# Patient Record
Sex: FEMALE | Race: BLACK OR AFRICAN AMERICAN | NOT HISPANIC OR LATINO | Employment: OTHER | URBAN - METROPOLITAN AREA
[De-identification: names, ages, dates, MRNs, and addresses within clinical notes are randomized per-mention and may not be internally consistent; named-entity substitution may affect disease eponyms.]

---

## 2020-02-21 ENCOUNTER — OFFICE VISIT (OUTPATIENT)
Dept: URGENT CARE | Facility: CLINIC | Age: 75
End: 2020-02-21
Payer: COMMERCIAL

## 2020-02-21 VITALS
DIASTOLIC BLOOD PRESSURE: 82 MMHG | RESPIRATION RATE: 20 BRPM | OXYGEN SATURATION: 97 % | TEMPERATURE: 99 F | SYSTOLIC BLOOD PRESSURE: 126 MMHG | HEART RATE: 116 BPM

## 2020-02-21 DIAGNOSIS — J45.901 ASTHMA WITH ACUTE EXACERBATION, UNSPECIFIED ASTHMA SEVERITY, UNSPECIFIED WHETHER PERSISTENT: Primary | ICD-10-CM

## 2020-02-21 PROCEDURE — G0463 HOSPITAL OUTPT CLINIC VISIT: HCPCS | Performed by: PHYSICIAN ASSISTANT

## 2020-02-21 PROCEDURE — 99213 OFFICE O/P EST LOW 20 MIN: CPT | Performed by: PHYSICIAN ASSISTANT

## 2020-02-21 RX ORDER — EZETIMIBE 10 MG/1
10 TABLET ORAL DAILY
COMMUNITY
End: 2020-07-21 | Stop reason: SDUPTHER

## 2020-02-21 RX ORDER — AMLODIPINE BESYLATE 10 MG/1
10 TABLET ORAL DAILY
COMMUNITY
End: 2020-09-09 | Stop reason: SDUPTHER

## 2020-02-21 RX ORDER — ASPIRIN 81 MG/1
81 TABLET ORAL DAILY
COMMUNITY
End: 2021-09-27

## 2020-02-21 RX ORDER — PREDNISONE 10 MG/1
TABLET ORAL
Qty: 20 TABLET | Refills: 0 | Status: SHIPPED | OUTPATIENT
Start: 2020-02-21 | End: 2020-04-15 | Stop reason: ALTCHOICE

## 2020-02-21 RX ORDER — BUDESONIDE AND FORMOTEROL FUMARATE DIHYDRATE 160; 4.5 UG/1; UG/1
2 AEROSOL RESPIRATORY (INHALATION) 2 TIMES DAILY
COMMUNITY
End: 2021-09-27

## 2020-02-21 NOTE — PATIENT INSTRUCTIONS
Take prednisone as directed  Continue with Symbicort daily  Use albuterol as needed  Advised patient she could consider restarting her Singulair as prescribed by her PCP  Also suggested adding antihistamine such as Claritin 10 milligrams daily  Patient is new to the area and will make a follow-up appointment with a new PCP  Any worsening of symptoms go to the emergency room  Asthma   WHAT YOU NEED TO KNOW:   Asthma is a lung disease that makes breathing difficult  Chronic inflammation and reactions to triggers narrow the airways in the lungs  Asthma can become life-threatening if it is not managed  DISCHARGE INSTRUCTIONS:   Return to the emergency department if:   · You have severe shortness of breath  · Your lips or nails turn blue or gray  · The skin around your neck and ribs pulls in with each breath  · You have shortness of breath, even after you take your short-term medicine as directed  · Your peak flow numbers are in the red zone of your AAP  Contact your healthcare provider if:   · You run out of medicine before your next refill is due  · Your symptoms get worse  · You need to take more medicine than usual to control your symptoms  · You have questions or concerns about your condition or care  Medicines:   · Medicines  decrease inflammation, open airways, and make it easier to breathe  Medicines may be inhaled, taken as a pill, or injected  Short-term medicines relieve your symptoms quickly  Long-term medicines are used to prevent future attacks  You may also need medicine to help control your allergies  Ask your healthcare provider for more information about the medicine you are given and how to take it safely  · Take your medicine as directed  Contact your healthcare provider if you think your medicine is not helping or if you have side effects  Tell him of her if you are allergic to any medicine  Keep a list of the medicines, vitamins, and herbs you take  Include the amounts, and when and why you take them  Bring the list or the pill bottles to follow-up visits  Carry your medicine list with you in case of an emergency  Follow up with your healthcare provider as directed: You will need to return to make sure your medicine is working and your symptoms are controlled  You may be referred to an asthma specialist  Mylene Harbour may be asked to keep a record of your peak flow values and bring it with you to your appointments  Write down your questions so you remember to ask them during your visits  Manage your symptoms and prevent future attacks:   · Follow your Asthma Action Plan (AAP)  This is a written plan that you and your healthcare provider create  It explains which medicine you need and when to change doses if necessary  It also explains how you can monitor symptoms and use a peak flow meter  The meter measures how well your lungs are working  · Manage other health conditions , such as allergies, acid reflux, and sleep apnea  · Identify and avoid triggers  These may include pets, dust mites, mold, and cockroaches  · Do not smoke or be around others who smoke  Nicotine and other chemicals in cigarettes and cigars can cause lung damage  Ask your healthcare provider for information if you currently smoke and need help to quit  E-cigarettes or smokeless tobacco still contain nicotine  Talk to your healthcare provider before you use these products  · Ask about the flu vaccine  The flu can make your asthma worse  You may need a yearly flu shot  © 2017 [facility] Information is for End User's use only and may not be sold, redistributed or otherwise used for commercial purposes  All illustrations and images included in CareNotes® are the copyrighted property of A D A M , Inc  or Messi Louise  The above information is an  only  It is not intended as medical advice for individual conditions or treatments   Talk to your doctor, nurse or pharmacist before following any medical regimen to see if it is safe and effective for you

## 2020-02-21 NOTE — PROGRESS NOTES
[facility]        NAME: Ana Mosqueda is a 69 y o  female  : 1945    MRN: [id number]  DATE: 2020  TIME: 1:47 PM    Assessment and Plan   Asthma with acute exacerbation, unspecified asthma severity, unspecified whether persistent [J45 [id number]]  1  Asthma with acute exacerbation, unspecified asthma severity, unspecified whether persistent  predniSONE 10 mg tablet         Patient Instructions     Follow up with PCP in 3-5 days  Proceed to  ER if symptoms worsen  Chief Complaint     Chief Complaint   Patient presents with    Asthma     Pt states she has hx of asmtha that is exacerbated by dust, recently moved in with daughter and has been having flares  History of Present Illness       72-year-old female presents for exacerbation of asthma  Patient states she has a history of asthma takes Symbicort daily  However, she is not taking Symbicort regularly  She states she was also prescribed Singulair and Claritin which she is currently not taking  Patient states she recently moved here from [facility] and is staying with her daughter  They were fixing up a room for her in the basement which is very [place]  Patient states she has been exposed to the dust she has been experiencing significant coughing  She denies any wheezing or shortness of breath  Review of Systems   Review of Systems   Constitutional: Negative  HENT: Negative  Eyes: Negative  Respiratory: Positive for cough  Negative for chest tightness, shortness of breath and wheezing  Gastrointestinal: Negative  Skin: Negative            Current Medications       Current Outpatient Medications:     amLODIPine (NORVASC) 10 mg tablet, Take 10 mg by mouth daily, Disp: , Rfl:     aspirin (ECOTRIN LOW STRENGTH) 81 mg EC tablet, Take 81 mg by mouth daily, Disp: , Rfl:     budesonide-formoterol (SYMBICORT) 160-4 5 mcg/act inhaler, Inhale 2 puffs 2 (two) times a day Rinse mouth after use , Disp: , Rfl:    Ergocalciferol (VITAMIN D2 PO), Take by mouth, Disp: , Rfl:     ezetimibe (ZETIA) 10 mg tablet, Take 10 mg by mouth daily, Disp: , Rfl:     predniSONE 10 mg tablet, Take 4 pills daily for 2 days, take 3 pills daily for 2 days, take 2 pills daily for 2 days, take 1 pill daily for 2 days, Disp: 20 tablet, Rfl: 0    Current Allergies     Allergies as of 02/21/2020 - Reviewed 02/21/2020   Allergen Reaction Noted    Codeine Rash 02/21/2020            The following portions of the patient's history were reviewed and updated as appropriate: allergies, current medications, past family history, past medical history, past social history, past surgical history and problem list     Objective   /82   Pulse (!) 116   Temp 99 °F (37 2 °C) (Tympanic Core)   Resp 20   SpO2 97%        Physical Exam     Physical Exam   Constitutional: Vital signs are normal  She appears well-developed and well-nourished  No distress  HENT:   Head: Normocephalic and atraumatic  Right Ear: Hearing, tympanic membrane, external ear and ear canal normal    Left Ear: Hearing, tympanic membrane, external ear and ear canal normal    Nose: Nose normal  No mucosal edema or rhinorrhea  Right sinus exhibits no maxillary sinus tenderness and no frontal sinus tenderness  Left sinus exhibits no maxillary sinus tenderness and no frontal sinus tenderness  Mouth/Throat: Uvula is midline, oropharynx is clear and moist and mucous membranes are normal  No oropharyngeal exudate, posterior oropharyngeal edema, posterior oropharyngeal erythema or tonsillar abscesses  Eyes: Conjunctivae and lids are normal    Cardiovascular: Normal rate, regular rhythm and normal heart sounds  No murmur heard  Pulmonary/Chest: Effort normal and breath sounds normal  No respiratory distress  She has no decreased breath sounds  She has no wheezes  She has no rhonchi  She has no rales     Lungs are currently clear no wheezing was noted   Lymphadenopathy:        Head (right side): No submandibular and no tonsillar adenopathy present  Head (left side): No submandibular and no tonsillar adenopathy present  Skin: No rash noted  Nursing note and vitals reviewed

## 2020-03-05 ENCOUNTER — APPOINTMENT (OUTPATIENT)
Dept: RADIOLOGY | Facility: CLINIC | Age: 75
End: 2020-03-05
Payer: COMMERCIAL

## 2020-03-05 ENCOUNTER — OFFICE VISIT (OUTPATIENT)
Dept: FAMILY MEDICINE CLINIC | Facility: CLINIC | Age: 75
End: 2020-03-05
Payer: COMMERCIAL

## 2020-03-05 ENCOUNTER — TELEPHONE (OUTPATIENT)
Dept: FAMILY MEDICINE CLINIC | Facility: CLINIC | Age: 75
End: 2020-03-05

## 2020-03-05 VITALS
HEIGHT: 63 IN | BODY MASS INDEX: 23.04 KG/M2 | HEART RATE: 104 BPM | DIASTOLIC BLOOD PRESSURE: 80 MMHG | WEIGHT: 130 LBS | TEMPERATURE: 97.9 F | SYSTOLIC BLOOD PRESSURE: 142 MMHG

## 2020-03-05 DIAGNOSIS — I10 ESSENTIAL HYPERTENSION: ICD-10-CM

## 2020-03-05 DIAGNOSIS — R05.9 COUGH: Primary | ICD-10-CM

## 2020-03-05 DIAGNOSIS — R05.9 COUGH: ICD-10-CM

## 2020-03-05 DIAGNOSIS — I87.2 VENOUS INSUFFICIENCY: ICD-10-CM

## 2020-03-05 DIAGNOSIS — J45.21 MILD INTERMITTENT ASTHMA WITH ACUTE EXACERBATION: ICD-10-CM

## 2020-03-05 DIAGNOSIS — E78.5 DYSLIPIDEMIA: ICD-10-CM

## 2020-03-05 PROCEDURE — 99204 OFFICE O/P NEW MOD 45 MIN: CPT | Performed by: FAMILY MEDICINE

## 2020-03-05 PROCEDURE — 1160F RVW MEDS BY RX/DR IN RCRD: CPT | Performed by: FAMILY MEDICINE

## 2020-03-05 PROCEDURE — 1101F PT FALLS ASSESS-DOCD LE1/YR: CPT | Performed by: FAMILY MEDICINE

## 2020-03-05 PROCEDURE — 3079F DIAST BP 80-89 MM HG: CPT | Performed by: FAMILY MEDICINE

## 2020-03-05 PROCEDURE — 3288F FALL RISK ASSESSMENT DOCD: CPT | Performed by: FAMILY MEDICINE

## 2020-03-05 PROCEDURE — 71046 X-RAY EXAM CHEST 2 VIEWS: CPT

## 2020-03-05 PROCEDURE — 3008F BODY MASS INDEX DOCD: CPT | Performed by: FAMILY MEDICINE

## 2020-03-05 PROCEDURE — 3077F SYST BP >= 140 MM HG: CPT | Performed by: FAMILY MEDICINE

## 2020-03-05 PROCEDURE — 1036F TOBACCO NON-USER: CPT | Performed by: FAMILY MEDICINE

## 2020-03-05 RX ORDER — DIPHENHYDRAMINE HCL 25 MG
25 TABLET ORAL EVERY 6 HOURS PRN
COMMUNITY
End: 2021-09-27

## 2020-03-05 RX ORDER — MONTELUKAST SODIUM 10 MG/1
10 TABLET ORAL
COMMUNITY
End: 2021-09-27

## 2020-03-06 PROBLEM — I87.2 VENOUS INSUFFICIENCY: Status: ACTIVE | Noted: 2020-03-06

## 2020-03-06 PROBLEM — I10 ESSENTIAL HYPERTENSION: Status: ACTIVE | Noted: 2020-03-06

## 2020-03-06 PROBLEM — E78.5 DYSLIPIDEMIA: Status: ACTIVE | Noted: 2020-03-06

## 2020-03-06 NOTE — TELEPHONE ENCOUNTER
Pt came back up after cxr to ask about blwk wants full workup will go downstairs to [facility] and also rx for compression stockings

## 2020-03-06 NOTE — PROGRESS NOTES
Assessment/Plan:   patient is 72-year-old female new to our practice  She was seen last night in the office when are epic system was down  Patient has a history of asthma, hyperlipidemia and hypertension  She also has a history of venous insufficiency  She was seen in the care now on February 21 and prescribed prednisone for an exacerbation of asthma  Patient still has cough and feels tight on her chest   I did not hear any wheezing and she appear to be moving good air on exam last evening  I reviewed her medications  She does take her Symbicort regularly but takes the this Singulair off and on  I asked her to take that daily and also to start the Claritin daily as recommended in the urgent care  I am holding off on prescribing any prednisone for now as she had a course at the end of January and then from February 22 to February 29  I ordered a chest x-ray to rule out pneumonia  We will call her with results  also I will enter orders for fasting blood work and she can make a follow-up appointment and we can do her Medicare wellness exam at that time  Diagnoses and all orders for this visit:    Cough  -     Cancel: XR chest pa & lateral; Future  -     XR chest pa & lateral; Future  -     CBC and differential; Future    Mild intermittent asthma with acute exacerbation  -     Cancel: XR chest pa & lateral; Future  -     XR chest pa & lateral; Future    Essential hypertension  -     Comprehensive metabolic panel; Future  -     TSH, 3rd generation with Free T4 reflex; Future    Dyslipidemia  -     Comprehensive metabolic panel; Future  -     TSH, 3rd generation with Free T4 reflex; Future  -     Lipid Panel with Direct LDL reflex; Future    Venous insufficiency  -     Compression Stocking    Other orders  -     diphenhydrAMINE (BENADRYL) 25 mg tablet; Take 25 mg by mouth every 6 (six) hours as needed for itching  -     montelukast (SINGULAIR) 10 mg tablet;  Take 10 mg by mouth daily at bedtime          Subjective:   Chief Complaint   Patient presents with    [facility]     New patient  Persistent cough, given prednisone at urgent care [facility] 2/21        Patient ID: Maria Us is a 69 y o  female  Patient is a 82-year-old female new to our practice  She has moved here from [place] to live with her daughter  She was seen in the urgent care for exacerbation of asthma and prescribed prednisone which she took from February 22nd to February 29  Prior to that she had taken a course of prednisone that she had left over from her doctor in [place] and that was from January 26 to February 1  Patient has been taking her Singulair often on  She has not started Claritin as recommended in the urgent care  She is using her generic Symbicort inhaler  She continues with cough and feels tight on the chest    allergies -codeine   past medical history -hypertension, hyperlipidemia, asthma, venous insufficiency, history of fainting spells      The following portions of the patient's history were reviewed and updated as appropriate: allergies, current medications, past family history, past medical history, past social history, past surgical history and problem list     Review of Systems   Constitutional: Negative for chills, fever and unexpected weight change  HENT: Positive for congestion  Respiratory: Positive for cough and chest tightness  Left breast pain   Cardiovascular: Negative for chest pain and palpitations  Objective:      /80 (BP Location: Left arm, Patient Position: Sitting, Cuff Size: Adult)   Pulse 104   Temp 97 9 °F (36 6 °C) (Tympanic)   Ht 5' 2 5" (1 588 m)   Wt 59 kg (130 lb)   Breastfeeding No   BMI 23 40 kg/m²          Physical Exam   Constitutional: She is oriented to person, place, and time  She appears well-nourished  No distress  HENT:     TMs okay  Throat with clear postnasal drainage  Neck: No thyromegaly present  Cardiovascular: Normal rate, regular rhythm and normal heart sounds  Heart rate 96   Pulmonary/Chest: Effort normal and breath sounds normal  She has no wheezes  She exhibits tenderness (  Left pectoral area  No breast masses or adenopathy palpated  )  Musculoskeletal: She exhibits no edema  Lymphadenopathy:     She has no cervical adenopathy  Neurological: She is alert and oriented to person, place, and time  Skin: Skin is warm and dry  Psychiatric: She has a normal mood and affect  Nursing note and vitals reviewed

## 2020-03-16 ENCOUNTER — TELEPHONE (OUTPATIENT)
Dept: FAMILY MEDICINE CLINIC | Facility: CLINIC | Age: 75
End: 2020-03-16

## 2020-03-16 NOTE — TELEPHONE ENCOUNTER
Call patient  She can take Mucinex to help break up the mucus  If she taking the Singulair that I had recommended?    Has her cough or wheezing worsened since I saw her last?

## 2020-03-17 NOTE — TELEPHONE ENCOUNTER
Patient informed  Says that when she presses on her chest her bones feels very fragile due to the persistent cough  Patient will continue to take mucinex and singulair as recommended

## 2020-04-15 ENCOUNTER — TELEPHONE (OUTPATIENT)
Dept: FAMILY MEDICINE CLINIC | Facility: CLINIC | Age: 75
End: 2020-04-15

## 2020-04-15 ENCOUNTER — TELEMEDICINE (OUTPATIENT)
Dept: FAMILY MEDICINE CLINIC | Facility: CLINIC | Age: 75
End: 2020-04-15
Payer: COMMERCIAL

## 2020-04-15 DIAGNOSIS — F41.9 ANXIETY: Primary | ICD-10-CM

## 2020-04-15 DIAGNOSIS — J45.20 MILD INTERMITTENT ASTHMA WITHOUT COMPLICATION: ICD-10-CM

## 2020-04-15 PROCEDURE — 1160F RVW MEDS BY RX/DR IN RCRD: CPT | Performed by: FAMILY MEDICINE

## 2020-04-15 PROCEDURE — 99214 OFFICE O/P EST MOD 30 MIN: CPT | Performed by: FAMILY MEDICINE

## 2020-04-16 PROBLEM — J45.20 MILD INTERMITTENT ASTHMA WITHOUT COMPLICATION: Status: ACTIVE | Noted: 2020-04-16

## 2020-05-11 ENCOUNTER — TELEPHONE (OUTPATIENT)
Dept: FAMILY MEDICINE CLINIC | Facility: CLINIC | Age: 75
End: 2020-05-11

## 2020-05-11 ENCOUNTER — TELEMEDICINE (OUTPATIENT)
Dept: FAMILY MEDICINE CLINIC | Facility: CLINIC | Age: 75
End: 2020-05-11
Payer: COMMERCIAL

## 2020-05-11 DIAGNOSIS — F41.9 ANXIETY: ICD-10-CM

## 2020-05-11 DIAGNOSIS — F51.01 PRIMARY INSOMNIA: Primary | ICD-10-CM

## 2020-05-11 PROCEDURE — G2012 BRIEF CHECK IN BY MD/QHP: HCPCS | Performed by: FAMILY MEDICINE

## 2020-05-11 RX ORDER — LORATADINE 10 MG/1
10 TABLET ORAL DAILY
COMMUNITY
Start: 2020-04-22 | End: 2021-09-27

## 2020-05-11 RX ORDER — ALPRAZOLAM 0.25 MG/1
0.25 TABLET ORAL
Refills: 0
Start: 2020-05-11 | End: 2021-04-29 | Stop reason: ALTCHOICE

## 2020-06-01 ENCOUNTER — TELEPHONE (OUTPATIENT)
Dept: FAMILY MEDICINE CLINIC | Facility: CLINIC | Age: 75
End: 2020-06-01

## 2020-06-09 ENCOUNTER — APPOINTMENT (OUTPATIENT)
Dept: LAB | Facility: CLINIC | Age: 75
End: 2020-06-09
Payer: COMMERCIAL

## 2020-06-09 DIAGNOSIS — E78.5 DYSLIPIDEMIA: ICD-10-CM

## 2020-06-09 DIAGNOSIS — I10 ESSENTIAL HYPERTENSION: ICD-10-CM

## 2020-06-09 DIAGNOSIS — R05.9 COUGH: ICD-10-CM

## 2020-06-09 LAB
ALBUMIN SERPL BCP-MCNC: 4 G/DL (ref 3.5–5)
ALP SERPL-CCNC: 85 U/L (ref 46–116)
ALT SERPL W P-5'-P-CCNC: 19 U/L (ref 12–78)
ANION GAP SERPL CALCULATED.3IONS-SCNC: 5 MMOL/L (ref 4–13)
AST SERPL W P-5'-P-CCNC: 16 U/L (ref 5–45)
BASOPHILS # BLD AUTO: 0.03 THOUSANDS/ΜL (ref 0–0.1)
BASOPHILS NFR BLD AUTO: 0 % (ref 0–1)
BILIRUB SERPL-MCNC: 0.52 MG/DL (ref 0.2–1)
BUN SERPL-MCNC: 16 MG/DL (ref 5–25)
CALCIUM SERPL-MCNC: 9.4 MG/DL (ref 8.3–10.1)
CHLORIDE SERPL-SCNC: 103 MMOL/L (ref 100–108)
CHOLEST SERPL-MCNC: 213 MG/DL (ref 50–200)
CO2 SERPL-SCNC: 29 MMOL/L (ref 21–32)
CREAT SERPL-MCNC: 0.67 MG/DL (ref 0.6–1.3)
EOSINOPHIL # BLD AUTO: 0.09 THOUSAND/ΜL (ref 0–0.61)
EOSINOPHIL NFR BLD AUTO: 1 % (ref 0–6)
ERYTHROCYTE [DISTWIDTH] IN BLOOD BY AUTOMATED COUNT: 12.6 % (ref 11.6–15.1)
GFR SERPL CREATININE-BSD FRML MDRD: 88 ML/MIN/1.73SQ M
GLUCOSE P FAST SERPL-MCNC: 91 MG/DL (ref 65–99)
HCT VFR BLD AUTO: 44.4 % (ref 34.8–46.1)
HDLC SERPL-MCNC: 66 MG/DL
HGB BLD-MCNC: 14.8 G/DL (ref 11.5–15.4)
IMM GRANULOCYTES # BLD AUTO: 0.02 THOUSAND/UL (ref 0–0.2)
IMM GRANULOCYTES NFR BLD AUTO: 0 % (ref 0–2)
LDLC SERPL CALC-MCNC: 118 MG/DL (ref 0–100)
LYMPHOCYTES # BLD AUTO: 2.95 THOUSANDS/ΜL (ref 0.6–4.47)
LYMPHOCYTES NFR BLD AUTO: 29 % (ref 14–44)
MCH RBC QN AUTO: 31.8 PG (ref 26.8–34.3)
MCHC RBC AUTO-ENTMCNC: 33.3 G/DL (ref 31.4–37.4)
MCV RBC AUTO: 95 FL (ref 82–98)
MONOCYTES # BLD AUTO: 0.61 THOUSAND/ΜL (ref 0.17–1.22)
MONOCYTES NFR BLD AUTO: 6 % (ref 4–12)
NEUTROPHILS # BLD AUTO: 6.39 THOUSANDS/ΜL (ref 1.85–7.62)
NEUTS SEG NFR BLD AUTO: 64 % (ref 43–75)
NRBC BLD AUTO-RTO: 0 /100 WBCS
PLATELET # BLD AUTO: 226 THOUSANDS/UL (ref 149–390)
PMV BLD AUTO: 10.7 FL (ref 8.9–12.7)
POTASSIUM SERPL-SCNC: 4 MMOL/L (ref 3.5–5.3)
PROT SERPL-MCNC: 7.5 G/DL (ref 6.4–8.2)
RBC # BLD AUTO: 4.66 MILLION/UL (ref 3.81–5.12)
SODIUM SERPL-SCNC: 137 MMOL/L (ref 136–145)
TRIGL SERPL-MCNC: 143 MG/DL
TSH SERPL DL<=0.05 MIU/L-ACNC: 2 UIU/ML (ref 0.36–3.74)
WBC # BLD AUTO: 10.09 THOUSAND/UL (ref 4.31–10.16)

## 2020-06-09 PROCEDURE — 84443 ASSAY THYROID STIM HORMONE: CPT

## 2020-06-09 PROCEDURE — 80061 LIPID PANEL: CPT

## 2020-06-09 PROCEDURE — 36415 COLL VENOUS BLD VENIPUNCTURE: CPT

## 2020-06-09 PROCEDURE — 85025 COMPLETE CBC W/AUTO DIFF WBC: CPT

## 2020-06-09 PROCEDURE — 80053 COMPREHEN METABOLIC PANEL: CPT

## 2020-06-11 ENCOUNTER — TELEPHONE (OUTPATIENT)
Dept: FAMILY MEDICINE CLINIC | Facility: CLINIC | Age: 75
End: 2020-06-11

## 2020-06-15 ENCOUNTER — OFFICE VISIT (OUTPATIENT)
Dept: FAMILY MEDICINE CLINIC | Facility: CLINIC | Age: 75
End: 2020-06-15
Payer: COMMERCIAL

## 2020-06-15 VITALS
SYSTOLIC BLOOD PRESSURE: 130 MMHG | BODY MASS INDEX: 22.39 KG/M2 | OXYGEN SATURATION: 99 % | HEIGHT: 63 IN | DIASTOLIC BLOOD PRESSURE: 68 MMHG | TEMPERATURE: 97.4 F | WEIGHT: 126.4 LBS | HEART RATE: 93 BPM

## 2020-06-15 DIAGNOSIS — H54.7 WORSENING VISION: ICD-10-CM

## 2020-06-15 DIAGNOSIS — H93.13 TINNITUS OF BOTH EARS: ICD-10-CM

## 2020-06-15 DIAGNOSIS — E55.9 VITAMIN D DEFICIENCY: ICD-10-CM

## 2020-06-15 DIAGNOSIS — R73.9 ELEVATED BLOOD SUGAR: ICD-10-CM

## 2020-06-15 DIAGNOSIS — I10 ESSENTIAL HYPERTENSION: Primary | ICD-10-CM

## 2020-06-15 DIAGNOSIS — Z12.31 BREAST CANCER SCREENING BY MAMMOGRAM: ICD-10-CM

## 2020-06-15 DIAGNOSIS — E78.5 DYSLIPIDEMIA: ICD-10-CM

## 2020-06-15 DIAGNOSIS — Z00.00 MEDICARE ANNUAL WELLNESS VISIT, SUBSEQUENT: ICD-10-CM

## 2020-06-15 PROCEDURE — 3078F DIAST BP <80 MM HG: CPT | Performed by: FAMILY MEDICINE

## 2020-06-15 PROCEDURE — 3008F BODY MASS INDEX DOCD: CPT | Performed by: FAMILY MEDICINE

## 2020-06-15 PROCEDURE — 1160F RVW MEDS BY RX/DR IN RCRD: CPT | Performed by: FAMILY MEDICINE

## 2020-06-15 PROCEDURE — 1170F FXNL STATUS ASSESSED: CPT | Performed by: FAMILY MEDICINE

## 2020-06-15 PROCEDURE — G0439 PPPS, SUBSEQ VISIT: HCPCS | Performed by: FAMILY MEDICINE

## 2020-06-15 PROCEDURE — 3075F SYST BP GE 130 - 139MM HG: CPT | Performed by: FAMILY MEDICINE

## 2020-06-15 PROCEDURE — 1036F TOBACCO NON-USER: CPT | Performed by: FAMILY MEDICINE

## 2020-06-15 PROCEDURE — 99214 OFFICE O/P EST MOD 30 MIN: CPT | Performed by: FAMILY MEDICINE

## 2020-06-15 RX ORDER — ATORVASTATIN CALCIUM 20 MG/1
20 TABLET, FILM COATED ORAL DAILY
Qty: 90 TABLET | Refills: 1 | Status: SHIPPED | OUTPATIENT
Start: 2020-06-15 | End: 2020-11-06 | Stop reason: SDUPTHER

## 2020-06-15 RX ORDER — PREDNISONE 1 MG/1
1 TABLET ORAL
COMMUNITY
End: 2020-06-15 | Stop reason: ALTCHOICE

## 2020-06-15 RX ORDER — ALBUTEROL SULFATE 90 UG/1
AEROSOL, METERED RESPIRATORY (INHALATION)
COMMUNITY
End: 2021-09-27

## 2020-06-15 RX ORDER — ATORVASTATIN CALCIUM 20 MG/1
TABLET, FILM COATED ORAL
COMMUNITY
Start: 2016-04-03 | End: 2020-06-15 | Stop reason: SDUPTHER

## 2020-06-24 ENCOUNTER — TELEPHONE (OUTPATIENT)
Dept: FAMILY MEDICINE CLINIC | Facility: CLINIC | Age: 75
End: 2020-06-24

## 2020-06-26 ENCOUNTER — APPOINTMENT (OUTPATIENT)
Dept: LAB | Facility: CLINIC | Age: 75
End: 2020-06-26
Payer: COMMERCIAL

## 2020-06-26 DIAGNOSIS — R73.9 ELEVATED BLOOD SUGAR: ICD-10-CM

## 2020-06-26 DIAGNOSIS — E55.9 VITAMIN D DEFICIENCY: ICD-10-CM

## 2020-06-26 LAB
25(OH)D3 SERPL-MCNC: 65.1 NG/ML (ref 30–100)
EST. AVERAGE GLUCOSE BLD GHB EST-MCNC: 108 MG/DL
HBA1C MFR BLD: 5.4 %

## 2020-06-26 PROCEDURE — 36415 COLL VENOUS BLD VENIPUNCTURE: CPT

## 2020-06-26 PROCEDURE — 82306 VITAMIN D 25 HYDROXY: CPT

## 2020-06-26 PROCEDURE — 83036 HEMOGLOBIN GLYCOSYLATED A1C: CPT

## 2020-07-21 DIAGNOSIS — I10 ESSENTIAL HYPERTENSION: Primary | ICD-10-CM

## 2020-07-21 RX ORDER — EZETIMIBE 10 MG/1
10 TABLET ORAL DAILY
Qty: 90 TABLET | Refills: 2 | Status: SHIPPED | OUTPATIENT
Start: 2020-07-21 | End: 2020-07-23 | Stop reason: SDUPTHER

## 2020-07-21 NOTE — TELEPHONE ENCOUNTER
----- Message from Zhane [place] [facility] [place] [place] sent at 7/21/2020  4:32 PM EDT -----  Regarding: Prescription Question  Contact: [phone]  Dr Yaima Workman  Can you please call  In my refill     For EZETIMIBE 10 Mg I usually get   Three month supply    [phone]

## 2020-07-23 DIAGNOSIS — I10 ESSENTIAL HYPERTENSION: ICD-10-CM

## 2020-07-23 NOTE — TELEPHONE ENCOUNTER
Patient LM states that she would like to speak to you about a sore throat that she has had, as well as an ear problem  Ears have been ringing for a week now  Please advise

## 2020-07-24 ENCOUNTER — TELEPHONE (OUTPATIENT)
Dept: FAMILY MEDICINE CLINIC | Facility: CLINIC | Age: 75
End: 2020-07-24

## 2020-07-24 DIAGNOSIS — N81.10 FEMALE BLADDER PROLAPSE: Primary | ICD-10-CM

## 2020-07-24 RX ORDER — EZETIMIBE 10 MG/1
10 TABLET ORAL DAILY
Qty: 90 TABLET | Refills: 2 | Status: SHIPPED | OUTPATIENT
Start: 2020-07-24 | End: 2020-11-06 | Stop reason: SDUPTHER

## 2020-07-24 NOTE — TELEPHONE ENCOUNTER
PT called again and said throat is sore  I offered pt appt with providers today and she declined  Offered pt appt with you tomorrow and pt stated she could not do tomorrow  But still would like for you to call pt personally  Please advise

## 2020-07-24 NOTE — TELEPHONE ENCOUNTER
I spoke to patient  I recommended she take 1 Claritin daily  Continue salt water gargles  She does have an appointment to see Dr Ginna Jeronimo under on 07/30  She could call their office and see if they could see her earlier next week  She has been checking her temperature and she has no fever  She also has not seen any exudate in her throat

## 2020-07-24 NOTE — TELEPHONE ENCOUNTER
Please call patient and give her number for gyn in our office  I had called her regarding a message about a sore throat and she asked about seeing a gyn because she thinks her bladder is dropping  I am putting on order in

## 2020-09-01 ENCOUNTER — TELEPHONE (OUTPATIENT)
Dept: FAMILY MEDICINE CLINIC | Facility: CLINIC | Age: 75
End: 2020-09-01

## 2020-09-01 NOTE — TELEPHONE ENCOUNTER
Pt Lm stating she would like a call back in regards to a stabbing L chest pain that started today  I called Pt back and advised Pt to go to either urgent care or ER for this pain  Pt states she does not really want to go to urgent care and would like an appt with Dr Kendall Gomes  I did schedule an appt for Pt on Saturday with Dr Kendall Gomes and also advised Pt that she should be evaluated sooner than that at urgent care or ER

## 2020-09-05 ENCOUNTER — TELEPHONE (OUTPATIENT)
Dept: FAMILY MEDICINE CLINIC | Facility: CLINIC | Age: 75
End: 2020-09-05

## 2020-09-05 NOTE — TELEPHONE ENCOUNTER
I contacted patient because she was scheduled to be seen today by Dr Saul Faye for her chest pain  She did not show up  She was very difficult to understand on the phone, but she states the appointment was canceled, she doesn't know if she canceled it or if we did  I asked her how her chest pain is and she said it was a muscle spasm and it went away  She says she will call back to reschedule

## 2020-09-09 DIAGNOSIS — I10 ESSENTIAL HYPERTENSION: Primary | ICD-10-CM

## 2020-09-10 RX ORDER — AMLODIPINE BESYLATE 10 MG/1
10 TABLET ORAL DAILY
Qty: 90 TABLET | Refills: 1 | Status: SHIPPED | OUTPATIENT
Start: 2020-09-10 | End: 2020-11-06 | Stop reason: SDUPTHER

## 2020-10-13 ENCOUNTER — TELEPHONE (OUTPATIENT)
Dept: FAMILY MEDICINE CLINIC | Facility: CLINIC | Age: 75
End: 2020-10-13

## 2020-10-14 ENCOUNTER — OFFICE VISIT (OUTPATIENT)
Dept: FAMILY MEDICINE CLINIC | Facility: CLINIC | Age: 75
End: 2020-10-14
Payer: COMMERCIAL

## 2020-10-14 VITALS
TEMPERATURE: 97.8 F | DIASTOLIC BLOOD PRESSURE: 76 MMHG | WEIGHT: 124 LBS | HEIGHT: 62 IN | OXYGEN SATURATION: 98 % | SYSTOLIC BLOOD PRESSURE: 126 MMHG | HEART RATE: 101 BPM | BODY MASS INDEX: 22.82 KG/M2

## 2020-10-14 DIAGNOSIS — Z20.822 EXPOSURE TO COVID-19 VIRUS: ICD-10-CM

## 2020-10-14 DIAGNOSIS — M94.0 COSTOCHONDRITIS, ACUTE: ICD-10-CM

## 2020-10-14 DIAGNOSIS — G47.00 INSOMNIA, UNSPECIFIED TYPE: ICD-10-CM

## 2020-10-14 DIAGNOSIS — F41.9 ANXIETY: Primary | ICD-10-CM

## 2020-10-14 DIAGNOSIS — K21.00 GASTROESOPHAGEAL REFLUX DISEASE WITH ESOPHAGITIS WITHOUT HEMORRHAGE: ICD-10-CM

## 2020-10-14 PROBLEM — R94.31 ELECTROCARDIOGRAM ABNORMAL: Status: ACTIVE | Noted: 2020-10-14

## 2020-10-14 PROBLEM — R94.31 ELECTROCARDIOGRAM ABNORMAL: Status: RESOLVED | Noted: 2020-10-14 | Resolved: 2020-10-14

## 2020-10-14 PROBLEM — M67.472 GANGLION CYST OF LEFT FOOT: Status: ACTIVE | Noted: 2019-11-16

## 2020-10-14 PROBLEM — L84 CORN OR CALLUS: Status: ACTIVE | Noted: 2019-09-27

## 2020-10-14 PROBLEM — M19.90 ARTHRITIS: Status: ACTIVE | Noted: 2020-10-14

## 2020-10-14 PROBLEM — M20.12 HALLUX VALGUS, LEFT: Status: ACTIVE | Noted: 2019-09-27

## 2020-10-14 PROCEDURE — 1160F RVW MEDS BY RX/DR IN RCRD: CPT | Performed by: FAMILY MEDICINE

## 2020-10-14 PROCEDURE — 99215 OFFICE O/P EST HI 40 MIN: CPT | Performed by: FAMILY MEDICINE

## 2020-10-14 PROCEDURE — 93000 ELECTROCARDIOGRAM COMPLETE: CPT | Performed by: FAMILY MEDICINE

## 2020-10-14 PROCEDURE — 1036F TOBACCO NON-USER: CPT | Performed by: FAMILY MEDICINE

## 2020-10-14 PROCEDURE — 3078F DIAST BP <80 MM HG: CPT | Performed by: FAMILY MEDICINE

## 2020-10-14 PROCEDURE — U0003 INFECTIOUS AGENT DETECTION BY NUCLEIC ACID (DNA OR RNA); SEVERE ACUTE RESPIRATORY SYNDROME CORONAVIRUS 2 (SARS-COV-2) (CORONAVIRUS DISEASE [COVID-19]), AMPLIFIED PROBE TECHNIQUE, MAKING USE OF HIGH THROUGHPUT TECHNOLOGIES AS DESCRIBED BY CMS-2020-01-R: HCPCS | Performed by: FAMILY MEDICINE

## 2020-10-14 RX ORDER — TRAZODONE HYDROCHLORIDE 50 MG/1
50 TABLET ORAL
Qty: 30 TABLET | Refills: 1 | Status: SHIPPED | OUTPATIENT
Start: 2020-10-14 | End: 2020-11-06 | Stop reason: SDUPTHER

## 2020-10-14 RX ORDER — NAPROXEN SODIUM 220 MG
220 TABLET ORAL 2 TIMES DAILY WITH MEALS
Qty: 30 TABLET | Refills: 1 | Status: SHIPPED | OUTPATIENT
Start: 2020-10-14 | End: 2020-12-09 | Stop reason: ALTCHOICE

## 2020-10-15 LAB — SARS-COV-2 RNA SPEC QL NAA+PROBE: NOT DETECTED

## 2020-11-06 DIAGNOSIS — G47.00 INSOMNIA, UNSPECIFIED TYPE: ICD-10-CM

## 2020-11-06 DIAGNOSIS — I10 ESSENTIAL HYPERTENSION: ICD-10-CM

## 2020-11-06 DIAGNOSIS — F41.9 ANXIETY: ICD-10-CM

## 2020-11-06 DIAGNOSIS — E78.5 DYSLIPIDEMIA: ICD-10-CM

## 2020-11-07 RX ORDER — EZETIMIBE 10 MG/1
10 TABLET ORAL DAILY
Qty: 90 TABLET | Refills: 0 | Status: SHIPPED | OUTPATIENT
Start: 2020-11-07 | End: 2021-03-20 | Stop reason: SDUPTHER

## 2020-11-07 RX ORDER — TRAZODONE HYDROCHLORIDE 50 MG/1
50 TABLET ORAL
Qty: 90 TABLET | Refills: 0 | Status: SHIPPED | OUTPATIENT
Start: 2020-11-07 | End: 2020-12-09 | Stop reason: ALTCHOICE

## 2020-11-07 RX ORDER — ATORVASTATIN CALCIUM 20 MG/1
20 TABLET, FILM COATED ORAL DAILY
Qty: 90 TABLET | Refills: 0 | Status: SHIPPED | OUTPATIENT
Start: 2020-11-07 | End: 2021-03-20 | Stop reason: SDUPTHER

## 2020-11-07 RX ORDER — AMLODIPINE BESYLATE 10 MG/1
10 TABLET ORAL DAILY
Qty: 90 TABLET | Refills: 0 | Status: SHIPPED | OUTPATIENT
Start: 2020-11-07 | End: 2021-01-25

## 2020-12-07 ENCOUNTER — OFFICE VISIT (OUTPATIENT)
Dept: FAMILY MEDICINE CLINIC | Facility: CLINIC | Age: 75
End: 2020-12-07
Payer: COMMERCIAL

## 2020-12-07 VITALS
RESPIRATION RATE: 16 BRPM | SYSTOLIC BLOOD PRESSURE: 122 MMHG | HEART RATE: 97 BPM | OXYGEN SATURATION: 97 % | HEIGHT: 63 IN | TEMPERATURE: 98.3 F | BODY MASS INDEX: 22.04 KG/M2 | WEIGHT: 124.4 LBS | DIASTOLIC BLOOD PRESSURE: 72 MMHG

## 2020-12-07 DIAGNOSIS — R63.4 WEIGHT LOSS: Primary | ICD-10-CM

## 2020-12-07 DIAGNOSIS — E78.5 DYSLIPIDEMIA: ICD-10-CM

## 2020-12-07 DIAGNOSIS — R11.0 NAUSEA: ICD-10-CM

## 2020-12-07 DIAGNOSIS — R07.81 RIB PAIN: ICD-10-CM

## 2020-12-07 DIAGNOSIS — I10 ESSENTIAL HYPERTENSION: ICD-10-CM

## 2020-12-07 PROCEDURE — 3008F BODY MASS INDEX DOCD: CPT | Performed by: FAMILY MEDICINE

## 2020-12-07 PROCEDURE — 99214 OFFICE O/P EST MOD 30 MIN: CPT | Performed by: FAMILY MEDICINE

## 2020-12-07 NOTE — PROGRESS NOTES
Assessment/Plan:    Patient is 72-year-old female seen for complaints of nausea and weight loss  She is concerned because she had history of liver cysts  She is concerned that there is something more going on with her chest and abdomen  I ordered blood work and an ultrasound  Patient is anxious about her symptoms  Diagnoses and all orders for this visit:    Weight loss  -     Comprehensive metabolic panel; Future  -     TSH, 3rd generation with Free T4 reflex; Future  -     CBC and differential; Future  -     Amylase; Future  -     Lipase; Future  -     US abdomen complete; Future    Nausea  -     Comprehensive metabolic panel; Future  -     TSH, 3rd generation with Free T4 reflex; Future  -     CBC and differential; Future  -     Amylase; Future  -     Lipase; Future  -     US abdomen complete; Future    Rib pain  -     Cancel: XR ribs 2 vw right; Future    Essential hypertension  -     Comprehensive metabolic panel; Future    Dyslipidemia  -     Lipid Panel with Direct LDL reflex; Future          Subjective:   Chief Complaint   Patient presents with    Chest Pain     R Side Rib Pain x1wk    Nausea     x1wk    Fatigue     x1wk    Loss of Appetite     x1wk        Patient ID: Jameel Riley is a 70 y o  female  Patient with history of right sided pain since 2014 - inside of her right rib  Has cysts on liver and kidneys  Concerned about weight loss and nausea      The following portions of the patient's history were reviewed and updated as appropriate: allergies, current medications, past family history, past medical history, past social history, past surgical history and problem list     Review of Systems   Constitutional: Positive for appetite change  Negative for chills and fever  HENT: Negative for congestion and sore throat  Respiratory: Negative for chest tightness  Rib pain   Cardiovascular: Negative for chest pain and palpitations  Gastrointestinal: Positive for nausea  Negative for abdominal pain, constipation and diarrhea  Genitourinary: Negative for difficulty urinating  Skin: Negative  Neurological: Negative for dizziness and headaches  Psychiatric/Behavioral: Negative  Objective:      /72 (BP Location: Left arm, Patient Position: Sitting, Cuff Size: Standard)   Pulse 97   Temp 98 3 °F (36 8 °C) (Tympanic)   Resp 16   Ht 5' 2 8" (1 595 m)   Wt 56 4 kg (124 lb 6 4 oz)   SpO2 97%   BMI 22 18 kg/m²          Physical Exam  Vitals signs and nursing note reviewed  Constitutional:       General: She is not in acute distress  HENT:      Head: Normocephalic  Neck:      Thyroid: No thyromegaly  Cardiovascular:      Rate and Rhythm: Normal rate and regular rhythm  Heart sounds: Normal heart sounds  Pulmonary:      Effort: Pulmonary effort is normal       Breath sounds: Normal breath sounds  Chest:      Chest wall: No tenderness  Abdominal:      Palpations: Abdomen is soft  There is no mass  Musculoskeletal:      Right lower leg: No edema  Left lower leg: No edema  Lymphadenopathy:      Cervical: No cervical adenopathy  Skin:     General: Skin is warm and dry  Neurological:      Mental Status: She is alert and oriented to person, place, and time  Psychiatric:         Mood and Affect: Mood is anxious

## 2020-12-08 ENCOUNTER — APPOINTMENT (OUTPATIENT)
Dept: LAB | Facility: CLINIC | Age: 75
End: 2020-12-08
Payer: COMMERCIAL

## 2020-12-08 ENCOUNTER — APPOINTMENT (OUTPATIENT)
Dept: RADIOLOGY | Facility: CLINIC | Age: 75
End: 2020-12-08
Payer: COMMERCIAL

## 2020-12-08 DIAGNOSIS — R11.0 NAUSEA: ICD-10-CM

## 2020-12-08 DIAGNOSIS — R63.4 WEIGHT LOSS: ICD-10-CM

## 2020-12-08 DIAGNOSIS — I10 ESSENTIAL HYPERTENSION: ICD-10-CM

## 2020-12-08 DIAGNOSIS — E78.5 DYSLIPIDEMIA: ICD-10-CM

## 2020-12-08 DIAGNOSIS — R07.81 RIB PAIN: ICD-10-CM

## 2020-12-08 LAB
ALBUMIN SERPL BCP-MCNC: 4.1 G/DL (ref 3.5–5)
ALP SERPL-CCNC: 85 U/L (ref 46–116)
ALT SERPL W P-5'-P-CCNC: 31 U/L (ref 12–78)
AMYLASE SERPL-CCNC: 75 IU/L (ref 25–115)
ANION GAP SERPL CALCULATED.3IONS-SCNC: 6 MMOL/L (ref 4–13)
AST SERPL W P-5'-P-CCNC: 17 U/L (ref 5–45)
BASOPHILS # BLD AUTO: 0.02 THOUSANDS/ΜL (ref 0–0.1)
BASOPHILS NFR BLD AUTO: 0 % (ref 0–1)
BILIRUB SERPL-MCNC: 0.76 MG/DL (ref 0.2–1)
BUN SERPL-MCNC: 13 MG/DL (ref 5–25)
CALCIUM SERPL-MCNC: 9.9 MG/DL (ref 8.3–10.1)
CHLORIDE SERPL-SCNC: 103 MMOL/L (ref 100–108)
CHOLEST SERPL-MCNC: 131 MG/DL (ref 50–200)
CO2 SERPL-SCNC: 31 MMOL/L (ref 21–32)
CREAT SERPL-MCNC: 0.58 MG/DL (ref 0.6–1.3)
EOSINOPHIL # BLD AUTO: 0.12 THOUSAND/ΜL (ref 0–0.61)
EOSINOPHIL NFR BLD AUTO: 2 % (ref 0–6)
ERYTHROCYTE [DISTWIDTH] IN BLOOD BY AUTOMATED COUNT: 12.4 % (ref 11.6–15.1)
GFR SERPL CREATININE-BSD FRML MDRD: 92 ML/MIN/1.73SQ M
GLUCOSE P FAST SERPL-MCNC: 83 MG/DL (ref 65–99)
HCT VFR BLD AUTO: 46.9 % (ref 34.8–46.1)
HDLC SERPL-MCNC: 69 MG/DL
HGB BLD-MCNC: 15.3 G/DL (ref 11.5–15.4)
IMM GRANULOCYTES # BLD AUTO: 0.01 THOUSAND/UL (ref 0–0.2)
IMM GRANULOCYTES NFR BLD AUTO: 0 % (ref 0–2)
LDLC SERPL CALC-MCNC: 43 MG/DL (ref 0–100)
LIPASE SERPL-CCNC: 143 U/L (ref 73–393)
LYMPHOCYTES # BLD AUTO: 2.84 THOUSANDS/ΜL (ref 0.6–4.47)
LYMPHOCYTES NFR BLD AUTO: 38 % (ref 14–44)
MCH RBC QN AUTO: 31.9 PG (ref 26.8–34.3)
MCHC RBC AUTO-ENTMCNC: 32.6 G/DL (ref 31.4–37.4)
MCV RBC AUTO: 98 FL (ref 82–98)
MONOCYTES # BLD AUTO: 0.49 THOUSAND/ΜL (ref 0.17–1.22)
MONOCYTES NFR BLD AUTO: 7 % (ref 4–12)
NEUTROPHILS # BLD AUTO: 4.04 THOUSANDS/ΜL (ref 1.85–7.62)
NEUTS SEG NFR BLD AUTO: 53 % (ref 43–75)
NRBC BLD AUTO-RTO: 0 /100 WBCS
PLATELET # BLD AUTO: 234 THOUSANDS/UL (ref 149–390)
PMV BLD AUTO: 10.7 FL (ref 8.9–12.7)
POTASSIUM SERPL-SCNC: 3.4 MMOL/L (ref 3.5–5.3)
PROT SERPL-MCNC: 7.7 G/DL (ref 6.4–8.2)
RBC # BLD AUTO: 4.8 MILLION/UL (ref 3.81–5.12)
SODIUM SERPL-SCNC: 140 MMOL/L (ref 136–145)
TRIGL SERPL-MCNC: 94 MG/DL
TSH SERPL DL<=0.05 MIU/L-ACNC: 2.05 UIU/ML (ref 0.36–3.74)
WBC # BLD AUTO: 7.52 THOUSAND/UL (ref 4.31–10.16)

## 2020-12-08 PROCEDURE — 71101 X-RAY EXAM UNILAT RIBS/CHEST: CPT

## 2020-12-08 PROCEDURE — 82150 ASSAY OF AMYLASE: CPT

## 2020-12-08 PROCEDURE — 85025 COMPLETE CBC W/AUTO DIFF WBC: CPT

## 2020-12-08 PROCEDURE — 80061 LIPID PANEL: CPT

## 2020-12-08 PROCEDURE — 80053 COMPREHEN METABOLIC PANEL: CPT

## 2020-12-08 PROCEDURE — 84443 ASSAY THYROID STIM HORMONE: CPT

## 2020-12-08 PROCEDURE — 83690 ASSAY OF LIPASE: CPT

## 2020-12-08 PROCEDURE — 36415 COLL VENOUS BLD VENIPUNCTURE: CPT

## 2020-12-09 ENCOUNTER — TELEPHONE (OUTPATIENT)
Dept: FAMILY MEDICINE CLINIC | Facility: CLINIC | Age: 75
End: 2020-12-09

## 2020-12-09 ENCOUNTER — HOSPITAL ENCOUNTER (OUTPATIENT)
Dept: ULTRASOUND IMAGING | Facility: MEDICAL CENTER | Age: 75
Discharge: HOME | End: 2020-12-09
Payer: COMMERCIAL

## 2020-12-09 DIAGNOSIS — R63.4 WEIGHT LOSS: ICD-10-CM

## 2020-12-09 DIAGNOSIS — R11.0 NAUSEA: ICD-10-CM

## 2020-12-09 PROCEDURE — 76700 US EXAM ABDOM COMPLETE: CPT

## 2020-12-11 DIAGNOSIS — R11.0 NAUSEA: Primary | ICD-10-CM

## 2020-12-11 DIAGNOSIS — R10.9 ABDOMINAL DISCOMFORT: ICD-10-CM

## 2020-12-15 ENCOUNTER — TELEPHONE (OUTPATIENT)
Dept: GASTROENTEROLOGY | Facility: MEDICAL CENTER | Age: 75
End: 2020-12-15

## 2020-12-21 ENCOUNTER — RX ONLY (RX ONLY)
Age: 75
End: 2020-12-21

## 2020-12-21 ENCOUNTER — DOCTOR'S OFFICE (OUTPATIENT)
Dept: URBAN - METROPOLITAN AREA CLINIC 136 | Facility: CLINIC | Age: 75
Setting detail: OPHTHALMOLOGY
End: 2020-12-21
Payer: COMMERCIAL

## 2020-12-21 DIAGNOSIS — H25.013: ICD-10-CM

## 2020-12-21 DIAGNOSIS — H40.013: ICD-10-CM

## 2020-12-21 PROCEDURE — 92133 CPTRZD OPH DX IMG PST SGM ON: CPT | Performed by: OPHTHALMOLOGY

## 2020-12-21 PROCEDURE — 99204 OFFICE O/P NEW MOD 45 MIN: CPT | Performed by: OPHTHALMOLOGY

## 2020-12-21 PROCEDURE — 76514 ECHO EXAM OF EYE THICKNESS: CPT | Performed by: OPHTHALMOLOGY

## 2020-12-21 ASSESSMENT — REFRACTION_CURRENTRX
OD_OVR_VA: 20/
OD_ADD: +3.00
OD_VPRISM_DIRECTION: PROGS
OS_SPHERE: +0.75
OD_SPHERE: -0.75
OD_AXIS: 705
OS_CYLINDER: -1.00
OS_VPRISM_DIRECTION: PROGS
OS_OVR_VA: 20/
OS_AXIS: 90
OS_ADD: +3.00
OD_CYLINDER: -0.50

## 2020-12-21 ASSESSMENT — PACHYMETRY
OD_CT_CORRECTION: -1
OD_CT_UM: 557
OS_CT_CORRECTION: -1
OS_CT_UM: 563

## 2020-12-21 ASSESSMENT — REFRACTION_AUTOREFRACTION
OS_CYLINDER: -2.00
OD_AXIS: 91
OD_SPHERE: -0.25
OS_SPHERE: +1.25
OD_CYLINDER: -0.75
OS_AXIS: 86

## 2020-12-21 ASSESSMENT — VISUAL ACUITY
OD_BCVA: 20/40-2
OS_BCVA: 20/40-2

## 2020-12-21 ASSESSMENT — CONFRONTATIONAL VISUAL FIELD TEST (CVF)
OS_FINDINGS: FULL
OD_FINDINGS: FULL

## 2020-12-21 ASSESSMENT — SPHEQUIV_DERIVED
OD_SPHEQUIV: -0.625
OS_SPHEQUIV: 0.25

## 2020-12-29 ENCOUNTER — HOSPITAL ENCOUNTER (OUTPATIENT)
Dept: MAMMOGRAPHY | Facility: MEDICAL CENTER | Age: 75
Discharge: HOME | End: 2020-12-29
Payer: COMMERCIAL

## 2020-12-29 VITALS — WEIGHT: 124 LBS | HEIGHT: 63 IN | BODY MASS INDEX: 21.97 KG/M2

## 2020-12-29 DIAGNOSIS — Z12.31 VISIT FOR SCREENING MAMMOGRAM: ICD-10-CM

## 2020-12-29 PROCEDURE — 77067 SCR MAMMO BI INCL CAD: CPT

## 2020-12-29 PROCEDURE — 77063 BREAST TOMOSYNTHESIS BI: CPT

## 2020-12-30 ENCOUNTER — TELEPHONE (OUTPATIENT)
Dept: FAMILY MEDICINE CLINIC | Facility: CLINIC | Age: 75
End: 2020-12-30

## 2021-01-04 ENCOUNTER — TELEPHONE (OUTPATIENT)
Dept: FAMILY MEDICINE CLINIC | Facility: CLINIC | Age: 76
End: 2021-01-04

## 2021-01-04 DIAGNOSIS — M54.50 ACUTE LOW BACK PAIN, UNSPECIFIED BACK PAIN LATERALITY, UNSPECIFIED WHETHER SCIATICA PRESENT: Primary | ICD-10-CM

## 2021-01-04 RX ORDER — TIZANIDINE 2 MG/1
2 TABLET ORAL EVERY 8 HOURS PRN
Qty: 30 TABLET | Refills: 0 | Status: SHIPPED | OUTPATIENT
Start: 2021-01-04 | End: 2021-09-27

## 2021-01-04 NOTE — TELEPHONE ENCOUNTER
I spoke to patient  She has long history of back problems  She does not recall a muscle relaxer she has had in the past   I will send tizanidine 2 mg to the pharmacy for her  She will let me know if it helps or not

## 2021-01-04 NOTE — TELEPHONE ENCOUNTER
Pt called she would like a callback from you she is having lower back pain she lifted something to heavy is looking for muscle relaxer [facility] [address]

## 2021-01-07 NOTE — TELEPHONE ENCOUNTER
Pt LM stating Tizanidine is not helping with her back pain  Pt would like to know if she can take Meloxicam instead

## 2021-01-25 DIAGNOSIS — I10 ESSENTIAL HYPERTENSION: ICD-10-CM

## 2021-01-25 RX ORDER — AMLODIPINE BESYLATE 10 MG/1
TABLET ORAL
Qty: 90 TABLET | Refills: 0 | Status: SHIPPED | OUTPATIENT
Start: 2021-01-25 | End: 2021-03-18 | Stop reason: SDUPTHER

## 2021-01-28 ENCOUNTER — DOCTOR'S OFFICE (OUTPATIENT)
Dept: URBAN - METROPOLITAN AREA CLINIC 136 | Facility: CLINIC | Age: 76
Setting detail: OPHTHALMOLOGY
End: 2021-01-28
Payer: COMMERCIAL

## 2021-01-28 DIAGNOSIS — H25.013: ICD-10-CM

## 2021-01-28 DIAGNOSIS — H40.023: ICD-10-CM

## 2021-01-28 PROCEDURE — 92020 GONIOSCOPY: CPT | Performed by: OPHTHALMOLOGY

## 2021-01-28 PROCEDURE — 92202 OPSCPY EXTND ON/MAC DRAW: CPT | Performed by: OPHTHALMOLOGY

## 2021-01-28 PROCEDURE — 92083 EXTENDED VISUAL FIELD XM: CPT | Performed by: OPHTHALMOLOGY

## 2021-01-28 PROCEDURE — 92014 COMPRE OPH EXAM EST PT 1/>: CPT | Performed by: OPHTHALMOLOGY

## 2021-01-28 ASSESSMENT — PACHYMETRY
OS_CT_UM: 563
OD_CT_UM: 557
OS_CT_CORRECTION: -1
OD_CT_CORRECTION: -1

## 2021-01-28 ASSESSMENT — TONOMETRY
OS_IOP_MMHG: 15
OD_IOP_MMHG: 14

## 2021-01-28 ASSESSMENT — CONFRONTATIONAL VISUAL FIELD TEST (CVF)
OS_FINDINGS: FULL
OD_FINDINGS: FULL

## 2021-01-29 ASSESSMENT — SPHEQUIV_DERIVED
OD_SPHEQUIV: -0.625
OS_SPHEQUIV: 0.25

## 2021-01-29 ASSESSMENT — REFRACTION_CURRENTRX
OD_AXIS: 705
OD_ADD: +3.00
OS_OVR_VA: 20/
OD_OVR_VA: 20/
OS_VPRISM_DIRECTION: PROGS
OD_VPRISM_DIRECTION: PROGS
OS_SPHERE: +0.75
OD_CYLINDER: -0.50
OD_SPHERE: -0.75
OS_AXIS: 90
OS_ADD: +3.00
OS_CYLINDER: -1.00

## 2021-01-29 ASSESSMENT — REFRACTION_AUTOREFRACTION
OD_AXIS: 91
OD_CYLINDER: -0.75
OS_CYLINDER: -2.00
OS_SPHERE: +1.25
OS_AXIS: 86
OD_SPHERE: -0.25

## 2021-01-29 ASSESSMENT — VISUAL ACUITY
OD_BCVA: 20/50+2
OS_BCVA: 20/40+2

## 2021-02-08 ENCOUNTER — TELEPHONE (OUTPATIENT)
Dept: FAMILY MEDICINE CLINIC | Facility: CLINIC | Age: 76
End: 2021-02-08

## 2021-02-08 NOTE — TELEPHONE ENCOUNTER
Received call from pt  Requesting a telephone visit with you  There are no openings until next week  Pt declined seeing another provider  Pt wanted to know if you could call her  Or if I can override a same day  Pt has multiple concerns  One being ongoing headaches  The other question she has is about current medication trazodone  Please advise

## 2021-02-09 NOTE — TELEPHONE ENCOUNTER
I spoke to patient last evening - she will try taking one half of a Trazodone for sleep  She read the package insert and is afraid to take it  I assured her that any medication prescribed for sleep would have similar possible side effects  It does not mean everyone gets those side effects

## 2021-03-08 ENCOUNTER — TELEPHONE (OUTPATIENT)
Dept: FAMILY MEDICINE CLINIC | Facility: CLINIC | Age: 76
End: 2021-03-08

## 2021-03-08 NOTE — TELEPHONE ENCOUNTER
Pt LM stating she would like a records release form to send to Urology so Dr Ale hCerry will have access to records  Pt would like form emailed to her at email on file

## 2021-03-15 ENCOUNTER — TELEPHONE (OUTPATIENT)
Dept: FAMILY MEDICINE CLINIC | Facility: CLINIC | Age: 76
End: 2021-03-15

## 2021-03-15 NOTE — TELEPHONE ENCOUNTER
Received call from pt to schedule appt to discuss side effects from medication  Scheduled pt for next week  Pt called back asking if she can be seen possibly tomorrow  She feels it is causing SOB  OK to override same day appt?

## 2021-03-16 ENCOUNTER — OFFICE VISIT (OUTPATIENT)
Dept: FAMILY MEDICINE CLINIC | Facility: CLINIC | Age: 76
End: 2021-03-16
Payer: COMMERCIAL

## 2021-03-16 VITALS
BODY MASS INDEX: 23.04 KG/M2 | HEART RATE: 76 BPM | TEMPERATURE: 98 F | DIASTOLIC BLOOD PRESSURE: 78 MMHG | OXYGEN SATURATION: 98 % | WEIGHT: 130 LBS | HEIGHT: 63 IN | SYSTOLIC BLOOD PRESSURE: 122 MMHG

## 2021-03-16 DIAGNOSIS — G47.00 INSOMNIA, UNSPECIFIED TYPE: Primary | ICD-10-CM

## 2021-03-16 DIAGNOSIS — E78.5 DYSLIPIDEMIA: ICD-10-CM

## 2021-03-16 DIAGNOSIS — F41.9 ANXIETY: ICD-10-CM

## 2021-03-16 DIAGNOSIS — I10 ESSENTIAL HYPERTENSION: ICD-10-CM

## 2021-03-16 PROCEDURE — 3074F SYST BP LT 130 MM HG: CPT | Performed by: FAMILY MEDICINE

## 2021-03-16 PROCEDURE — 1036F TOBACCO NON-USER: CPT | Performed by: FAMILY MEDICINE

## 2021-03-16 PROCEDURE — 3078F DIAST BP <80 MM HG: CPT | Performed by: FAMILY MEDICINE

## 2021-03-16 PROCEDURE — 99214 OFFICE O/P EST MOD 30 MIN: CPT | Performed by: FAMILY MEDICINE

## 2021-03-16 PROCEDURE — 1160F RVW MEDS BY RX/DR IN RCRD: CPT | Performed by: FAMILY MEDICINE

## 2021-03-16 PROCEDURE — 3008F BODY MASS INDEX DOCD: CPT | Performed by: FAMILY MEDICINE

## 2021-03-16 RX ORDER — BIMATOPROST 0.01 %
1 DROPS OPHTHALMIC (EYE)
COMMUNITY
Start: 2020-12-21 | End: 2021-06-15

## 2021-03-16 RX ORDER — LATANOPROST 50 UG/ML
SOLUTION/ DROPS OPHTHALMIC
COMMUNITY
Start: 2021-03-10 | End: 2022-06-30

## 2021-03-16 NOTE — PROGRESS NOTES
Assessment/Plan:    Patient is 45-year-old female with hypertension and hyperlipidemia  She also has history of lung disease and chronic ischemic changes on MRI  She brings all her old records to the visit with her  She is very concerned because she does not sleep but yet when she reads the side effects of medications, she does not want to take them  She will try to cap the trazodone  I assured her it would not cause her any problems with her breathing  Most the visit was spent reassuring patient about her medical conditions  Will take one half Trazadone and baby aspirin daily    I will see her back in 3 months and she will have fasting blood work prior  Diagnoses and all orders for this visit:    Insomnia, unspecified type    Anxiety  -     TSH, 3rd generation with Free T4 reflex; Future  -     CBC and differential; Future    Dyslipidemia  -     Lipid Panel with Direct LDL reflex; Future    Essential hypertension  -     Comprehensive metabolic panel; Future  -     TSH, 3rd generation with Free T4 reflex; Future  -     CBC and differential; Future    Other orders  -     Lumigan 0 01 % ophthalmic drops; Administer 1 drop to both eyes daily at bedtime  -     latanoprost (XALATAN) 0 005 % ophthalmic solution          Subjective:   No chief complaint on file  Patient ID: Anahi Cardoso is a 70 y o  female  Patient is concerned about Trazadone - she read side effects - concerned about palpitations and risk of seizures  The following portions of the patient's history were reviewed and updated as appropriate: allergies, current medications, past family history, past medical history, past social history, past surgical history and problem list     Review of Systems   Constitutional: Negative for chills and fever  HENT: Negative for congestion and sore throat  Respiratory: Positive for shortness of breath  Negative for chest tightness  Cardiovascular: Negative for chest pain and palpitations  Gastrointestinal: Negative for abdominal pain, constipation, diarrhea and nausea  Genitourinary: Negative for difficulty urinating  Skin: Negative  Neurological: Negative for dizziness and headaches  Psychiatric/Behavioral: Positive for dysphoric mood and sleep disturbance  The patient is nervous/anxious  Objective:      /78 (BP Location: Right arm, Patient Position: Sitting)   Pulse 76   Temp 98 °F (36 7 °C) (Tympanic)   Ht 5' 2 75" (1 594 m)   Wt 59 kg (130 lb)   SpO2 98%   BMI 23 21 kg/m²          Physical Exam  Vitals signs and nursing note reviewed  Constitutional:       General: She is not in acute distress  Neck:      Thyroid: No thyromegaly  Cardiovascular:      Rate and Rhythm: Normal rate and regular rhythm  Heart sounds: Normal heart sounds  Pulmonary:      Effort: Pulmonary effort is normal       Breath sounds: Normal breath sounds  Lymphadenopathy:      Cervical: No cervical adenopathy  Skin:     General: Skin is warm and dry  Neurological:      Mental Status: She is alert and oriented to person, place, and time  Psychiatric:         Mood and Affect: Mood is anxious

## 2021-03-18 DIAGNOSIS — I10 ESSENTIAL HYPERTENSION: ICD-10-CM

## 2021-03-19 RX ORDER — AMLODIPINE BESYLATE 10 MG/1
10 TABLET ORAL DAILY
Qty: 90 TABLET | Refills: 1 | Status: SHIPPED | OUTPATIENT
Start: 2021-03-19 | End: 2021-08-25

## 2021-03-20 DIAGNOSIS — E78.5 DYSLIPIDEMIA: ICD-10-CM

## 2021-03-20 DIAGNOSIS — I10 ESSENTIAL HYPERTENSION: ICD-10-CM

## 2021-03-22 RX ORDER — EZETIMIBE 10 MG/1
10 TABLET ORAL DAILY
Qty: 90 TABLET | Refills: 1 | Status: SHIPPED | OUTPATIENT
Start: 2021-03-22 | End: 2021-08-18

## 2021-03-22 RX ORDER — ATORVASTATIN CALCIUM 20 MG/1
20 TABLET, FILM COATED ORAL DAILY
Qty: 90 TABLET | Refills: 1 | Status: SHIPPED | OUTPATIENT
Start: 2021-03-22 | End: 2021-08-18

## 2021-04-03 ENCOUNTER — TELEPHONE (OUTPATIENT)
Dept: FAMILY MEDICINE CLINIC | Facility: CLINIC | Age: 76
End: 2021-04-03

## 2021-04-03 NOTE — TELEPHONE ENCOUNTER
Pt notified and wants to verify if ok to take Tylenol w/ rib pain  As well wanted to f/u forgot to d/c at recent visit treatment for right side rib pain  States episodes have been on in off recent episode has been this past month please advise

## 2021-04-03 NOTE — TELEPHONE ENCOUNTER
Pt called stating she has hx of right side rib pain  Pt stated she had recent episode of green stools which has since cleared up x1wk  Pt noticed change in stools after taking 1,000 units vit d3, 1,000 vit c, and multivitamin  Pt very anxious she is scheduled for covid shot 4/9/21 at [facility] wants to make sure she is ok to get vaccine per recent events   Pt also wanted to notify you she is taking Trazadone 25 mg for anxiety rx previously d/c on med list

## 2021-04-27 ENCOUNTER — TELEPHONE (OUTPATIENT)
Dept: FAMILY MEDICINE CLINIC | Facility: CLINIC | Age: 76
End: 2021-04-27

## 2021-04-27 NOTE — TELEPHONE ENCOUNTER
Pt called c/o seasonal allergies, is it okay to take Claritin before second COVID vaccine scheduled for Friday?

## 2021-04-29 ENCOUNTER — TELEMEDICINE (OUTPATIENT)
Dept: FAMILY MEDICINE CLINIC | Facility: CLINIC | Age: 76
End: 2021-04-29
Payer: COMMERCIAL

## 2021-04-29 DIAGNOSIS — J02.9 SORE THROAT: ICD-10-CM

## 2021-04-29 DIAGNOSIS — R20.8 BURNING SENSATION OF LOWER EXTREMITY: Primary | ICD-10-CM

## 2021-04-29 DIAGNOSIS — G47.00 INSOMNIA, UNSPECIFIED TYPE: ICD-10-CM

## 2021-04-29 DIAGNOSIS — F41.9 ANXIETY: ICD-10-CM

## 2021-04-29 PROCEDURE — 99213 OFFICE O/P EST LOW 20 MIN: CPT | Performed by: FAMILY MEDICINE

## 2021-04-29 NOTE — PROGRESS NOTES
Virtual Regular Visit      Assessment/Plan:  Can continue Claritin  She is getting good sleep from Trazadone  But she is getting a sore throat - she will stop  Patient is asking to see a therapist virtually  I will put in a referral   Problem List Items Addressed This Visit        Other    Insomnia    Anxiety      Other Visit Diagnoses     Burning sensation of lower extremity    -  Primary    Sore throat                   Reason for visit is   Chief Complaint   Patient presents with    Virtual Regular Visit        Encounter provider Rehana Khan DO    Provider located at [place] [address]  [address] [facility] [id number] [phone]      Recent Visits  Date Type Provider Dept   04/29/21 / Leanna 78, [address] [facility]   04/27/21 Telephone Yifan Boyle 12 recent visits within past 7 days and meeting all other requirements     Future Appointments  No visits were found meeting these conditions  Showing future appointments within next 150 days and meeting all other requirements        The patient was identified by name and date of birth  Brittnee Foster was informed that this is a telemedicine visit and that the visit is being conducted through [address] [place] [address] Now and patient was informed that this is a secure, HIPAA-compliant platform  She agrees to proceed     My office door was closed  No one else was in the room  She acknowledged consent and understanding of privacy and security of the video platform  The patient has agreed to participate and understands they can discontinue the visit at any time  Patient is aware this is a billable service  Subjective  Brittnee Foster is a 70 y o  female     Patient is concerned about getting her second COVID vaccine tomorrow  She had swelling of her arm after the shot  She is concerned about burning she is having in her body   She says that she gets this with allergies - or is it her anxiety? She is stressing about her vaccine  Past Medical History:   Diagnosis Date    Asthma     variant cough asthma    Hyperlipidemia     Hypertension        Past Surgical History:   Procedure Laterality Date    HYSTERECTOMY      UTERINE SUSPENSION         Current Outpatient Medications   Medication Sig Dispense Refill    albuterol (PROVENTIL HFA,VENTOLIN HFA) 90 mcg/act inhaler Ventolin HFA 90 mcg/actuation aerosol inhaler      amLODIPine (NORVASC) 10 mg tablet Take 1 tablet (10 mg total) by mouth daily 90 tablet 1    aspirin (ECOTRIN LOW STRENGTH) 81 mg EC tablet Take 81 mg by mouth daily      atorvastatin (LIPITOR) 20 mg tablet Take 1 tablet (20 mg total) by mouth daily 90 tablet 1    budesonide-formoterol (SYMBICORT) 160-4 5 mcg/act inhaler Inhale 2 puffs 2 (two) times a day Rinse mouth after use   diphenhydrAMINE (BENADRYL) 25 mg tablet Take 25 mg by mouth every 6 (six) hours as needed for itching      Ergocalciferol (VITAMIN D2 PO) Take by mouth      ezetimibe (ZETIA) 10 mg tablet Take 1 tablet (10 mg total) by mouth daily 90 tablet 1    latanoprost (XALATAN) 0 005 % ophthalmic solution       loratadine (CLARITIN) 10 mg tablet Take 10 mg by mouth daily      Lumigan 0 01 % ophthalmic drops Administer 1 drop to both eyes daily at bedtime      montelukast (SINGULAIR) 10 mg tablet Take 10 mg by mouth daily at bedtime      tiZANidine (ZANAFLEX) 2 mg tablet Take 1 tablet (2 mg total) by mouth every 8 (eight) hours as needed for muscle spasms 30 tablet 0     No current facility-administered medications for this visit  Allergies   Allergen Reactions    Codeine Rash       Review of Systems   Constitutional: Negative for chills and fever  HENT: Positive for sore throat  Negative for congestion  Respiratory: Negative for chest tightness  Cardiovascular: Negative for chest pain and palpitations     Gastrointestinal: Negative for abdominal pain, constipation, diarrhea and nausea  Genitourinary: Negative for difficulty urinating  Skin: Negative  Neurological: Negative for dizziness and headaches  As in HPI   Psychiatric/Behavioral: Positive for sleep disturbance  The patient is nervous/anxious  Video Exam   patient had difficulty with video portion of the exam   We have done virtual calls the past and she has been able to do the video visit  There were no vitals filed for this visit  Physical Exam  Psychiatric:      Comments:   Sounds anxious  I spent 15 minutes directly with the patient during this visit      VIRTUAL VISIT DISCLAIMER    Bradley Gopal acknowledges that she has consented to an online visit or consultation  She understands that the online visit is based solely on information provided by her, and that, in the absence of a face-to-face physical evaluation by the physician, the diagnosis she receives is both limited and provisional in terms of accuracy and completeness  This is not intended to replace a full medical face-to-face evaluation by the physician  Bradley Herron understands and accepts these terms

## 2021-06-09 ENCOUNTER — APPOINTMENT (OUTPATIENT)
Dept: LAB | Facility: CLINIC | Age: 76
End: 2021-06-09
Payer: COMMERCIAL

## 2021-06-09 DIAGNOSIS — E78.5 DYSLIPIDEMIA: ICD-10-CM

## 2021-06-09 DIAGNOSIS — I10 ESSENTIAL HYPERTENSION: ICD-10-CM

## 2021-06-09 DIAGNOSIS — F41.9 ANXIETY: ICD-10-CM

## 2021-06-09 LAB
ALBUMIN SERPL BCP-MCNC: 4.3 G/DL (ref 3.5–5)
ALP SERPL-CCNC: 89 U/L (ref 46–116)
ALT SERPL W P-5'-P-CCNC: 30 U/L (ref 12–78)
ANION GAP SERPL CALCULATED.3IONS-SCNC: 6 MMOL/L (ref 4–13)
AST SERPL W P-5'-P-CCNC: 20 U/L (ref 5–45)
BASOPHILS # BLD AUTO: 0.05 THOUSANDS/ΜL (ref 0–0.1)
BASOPHILS NFR BLD AUTO: 1 % (ref 0–1)
BILIRUB SERPL-MCNC: 0.5 MG/DL (ref 0.2–1)
BUN SERPL-MCNC: 13 MG/DL (ref 5–25)
CALCIUM SERPL-MCNC: 9.3 MG/DL (ref 8.3–10.1)
CHLORIDE SERPL-SCNC: 104 MMOL/L (ref 100–108)
CHOLEST SERPL-MCNC: 138 MG/DL (ref 50–200)
CO2 SERPL-SCNC: 29 MMOL/L (ref 21–32)
CREAT SERPL-MCNC: 0.47 MG/DL (ref 0.6–1.3)
EOSINOPHIL # BLD AUTO: 0.19 THOUSAND/ΜL (ref 0–0.61)
EOSINOPHIL NFR BLD AUTO: 2 % (ref 0–6)
ERYTHROCYTE [DISTWIDTH] IN BLOOD BY AUTOMATED COUNT: 12.1 % (ref 11.6–15.1)
GFR SERPL CREATININE-BSD FRML MDRD: 98 ML/MIN/1.73SQ M
GLUCOSE P FAST SERPL-MCNC: 83 MG/DL (ref 65–99)
HCT VFR BLD AUTO: 45.7 % (ref 34.8–46.1)
HDLC SERPL-MCNC: 72 MG/DL
HGB BLD-MCNC: 15.2 G/DL (ref 11.5–15.4)
IMM GRANULOCYTES # BLD AUTO: 0.02 THOUSAND/UL (ref 0–0.2)
IMM GRANULOCYTES NFR BLD AUTO: 0 % (ref 0–2)
LDLC SERPL CALC-MCNC: 44 MG/DL (ref 0–100)
LYMPHOCYTES # BLD AUTO: 3.35 THOUSANDS/ΜL (ref 0.6–4.47)
LYMPHOCYTES NFR BLD AUTO: 32 % (ref 14–44)
MCH RBC QN AUTO: 31.7 PG (ref 26.8–34.3)
MCHC RBC AUTO-ENTMCNC: 33.3 G/DL (ref 31.4–37.4)
MCV RBC AUTO: 95 FL (ref 82–98)
MONOCYTES # BLD AUTO: 0.55 THOUSAND/ΜL (ref 0.17–1.22)
MONOCYTES NFR BLD AUTO: 5 % (ref 4–12)
NEUTROPHILS # BLD AUTO: 6.41 THOUSANDS/ΜL (ref 1.85–7.62)
NEUTS SEG NFR BLD AUTO: 60 % (ref 43–75)
NRBC BLD AUTO-RTO: 0 /100 WBCS
PLATELET # BLD AUTO: 204 THOUSANDS/UL (ref 149–390)
PMV BLD AUTO: 10.7 FL (ref 8.9–12.7)
POTASSIUM SERPL-SCNC: 3.8 MMOL/L (ref 3.5–5.3)
PROT SERPL-MCNC: 7.9 G/DL (ref 6.4–8.2)
RBC # BLD AUTO: 4.8 MILLION/UL (ref 3.81–5.12)
SODIUM SERPL-SCNC: 139 MMOL/L (ref 136–145)
TRIGL SERPL-MCNC: 111 MG/DL
TSH SERPL DL<=0.05 MIU/L-ACNC: 1.29 UIU/ML (ref 0.36–3.74)
WBC # BLD AUTO: 10.57 THOUSAND/UL (ref 4.31–10.16)

## 2021-06-09 PROCEDURE — 84443 ASSAY THYROID STIM HORMONE: CPT

## 2021-06-09 PROCEDURE — 80053 COMPREHEN METABOLIC PANEL: CPT

## 2021-06-09 PROCEDURE — 85025 COMPLETE CBC W/AUTO DIFF WBC: CPT

## 2021-06-09 PROCEDURE — 36415 COLL VENOUS BLD VENIPUNCTURE: CPT

## 2021-06-09 PROCEDURE — 80061 LIPID PANEL: CPT

## 2021-06-10 ENCOUNTER — RA CDI HCC (OUTPATIENT)
Dept: OTHER | Facility: HOSPITAL | Age: 76
End: 2021-06-10

## 2021-06-10 NOTE — PROGRESS NOTES
[facility]  coding opportunities          Chart reviewed, no opportunity found: CHART REVIEWED, NO OPPORTUNITY FOUND                     Patients insurance company: [employer] only)

## 2021-06-15 ENCOUNTER — OFFICE VISIT (OUTPATIENT)
Dept: FAMILY MEDICINE CLINIC | Facility: CLINIC | Age: 76
End: 2021-06-15
Payer: COMMERCIAL

## 2021-06-15 VITALS
HEART RATE: 104 BPM | TEMPERATURE: 98.6 F | DIASTOLIC BLOOD PRESSURE: 70 MMHG | WEIGHT: 126.4 LBS | BODY MASS INDEX: 22.39 KG/M2 | SYSTOLIC BLOOD PRESSURE: 130 MMHG | OXYGEN SATURATION: 98 % | HEIGHT: 63 IN

## 2021-06-15 DIAGNOSIS — F41.9 ANXIETY: ICD-10-CM

## 2021-06-15 DIAGNOSIS — N64.4 BREAST PAIN, LEFT: ICD-10-CM

## 2021-06-15 DIAGNOSIS — Z00.00 ENCOUNTER FOR SUBSEQUENT ANNUAL WELLNESS VISIT (AWV) IN MEDICARE PATIENT: Primary | ICD-10-CM

## 2021-06-15 DIAGNOSIS — Z13.820 SCREENING FOR OSTEOPOROSIS: ICD-10-CM

## 2021-06-15 DIAGNOSIS — Z71.89 ADVANCED CARE PLANNING/COUNSELING DISCUSSION: ICD-10-CM

## 2021-06-15 DIAGNOSIS — D72.829 LEUKOCYTOSIS, UNSPECIFIED TYPE: ICD-10-CM

## 2021-06-15 PROCEDURE — 1125F AMNT PAIN NOTED PAIN PRSNT: CPT | Performed by: FAMILY MEDICINE

## 2021-06-15 PROCEDURE — 3075F SYST BP GE 130 - 139MM HG: CPT | Performed by: FAMILY MEDICINE

## 2021-06-15 PROCEDURE — 99214 OFFICE O/P EST MOD 30 MIN: CPT | Performed by: FAMILY MEDICINE

## 2021-06-15 PROCEDURE — 3008F BODY MASS INDEX DOCD: CPT | Performed by: FAMILY MEDICINE

## 2021-06-15 PROCEDURE — 3078F DIAST BP <80 MM HG: CPT | Performed by: FAMILY MEDICINE

## 2021-06-15 PROCEDURE — 1170F FXNL STATUS ASSESSED: CPT | Performed by: FAMILY MEDICINE

## 2021-06-15 PROCEDURE — 1160F RVW MEDS BY RX/DR IN RCRD: CPT | Performed by: FAMILY MEDICINE

## 2021-06-15 PROCEDURE — 3288F FALL RISK ASSESSMENT DOCD: CPT | Performed by: FAMILY MEDICINE

## 2021-06-15 PROCEDURE — G0439 PPPS, SUBSEQ VISIT: HCPCS | Performed by: FAMILY MEDICINE

## 2021-06-15 PROCEDURE — 3725F SCREEN DEPRESSION PERFORMED: CPT | Performed by: FAMILY MEDICINE

## 2021-06-15 PROCEDURE — 99497 ADVNCD CARE PLAN 30 MIN: CPT | Performed by: FAMILY MEDICINE

## 2021-06-15 PROCEDURE — 1036F TOBACCO NON-USER: CPT | Performed by: FAMILY MEDICINE

## 2021-06-15 RX ORDER — TRAZODONE HYDROCHLORIDE 50 MG/1
TABLET ORAL
COMMUNITY
Start: 2021-02-05 | End: 2021-09-27

## 2021-06-15 NOTE — PROGRESS NOTES
Assessment and Plan:     Problem List Items Addressed This Visit        Other    Anxiety     Chronic anxiety  Addressed self-care methods for lessening anxiety such as decreased alcohol intake, decreased caffeine intake, healthy diet, stress management, and relaxation techniques  Breast pain, left     Left breast pain  Normal breast exam today  Likely musculoskeletal   Recent mammogram normal    Will obtain breast US of left breast for follow up  Relevant Orders    US breast left limited (diagnostic)    Advanced care planning/counseling discussion     Do you have a Living Will, Healthcare Power of  or POLST?: [address] discussed at length with patient and/or family  Palliative Care and Goal setting discussed  The Advanced Directive/POLST form was reviewed in detail, categories explained and questions answered  The patient elected to bring form home for further review and then mail back or schedule another appointment to discuss  Total time spent was 15 minutes  Leukocytosis     Elevated WBC count is mild (10 5) with no bandemia or left shift  Patient educated on meaning of labs  Mild elevation likely due to allergies or current periodontal disease  Recheck in 2-4 weeks for resolution  Other Visit Diagnoses     Encounter for subsequent annual wellness visit (AWV) in Medicare patient    -  Primary    Screening for osteoporosis        Relevant Orders    DXA bone density spine hip and pelvis           Preventive health issues were discussed with patient, and age appropriate screening tests were ordered as noted in patient's After Visit Summary  Personalized health advice and appropriate referrals for health education or preventive services given if needed, as noted in patient's After Visit Summary  History of Present Illness:     Patient presents for Welcome to Medicare visit       Patient Care Team:  Portia Soler DO as PCP - General (Family Medicine)     Review of Systems:     Review of Systems   Constitutional: Negative for chills and fever  HENT: Positive for dental problem (bleeding gums) and sore throat  Negative for ear pain  Eyes: Negative for pain and visual disturbance  Respiratory: Negative for cough and shortness of breath  Cardiovascular: Negative for chest pain and palpitations  Gastrointestinal: Negative for abdominal pain and vomiting  Genitourinary: Negative for dysuria and hematuria         +breast pain    Musculoskeletal: Negative for arthralgias and back pain  Skin: Negative for color change and rash  Neurological: Negative for seizures and syncope  Psychiatric/Behavioral: The patient is nervous/anxious  All other systems reviewed and are negative       Problem List:     Patient Active Problem List   Diagnosis    Essential hypertension    Dyslipidemia    Venous insufficiency    Mild intermittent asthma without complication    Ganglion cyst of left foot    Hallux valgus, left    Corn or callus    Arthritis    Gastroesophageal reflux disease with esophagitis without hemorrhage    Costochondritis, acute    Insomnia    Anxiety    Exposure to COVID-19 virus    Breast pain, left    Advanced care planning/counseling discussion    Leukocytosis      Past Medical and Surgical History:     Past Medical History:   Diagnosis Date    Asthma     variant cough asthma    Hyperlipidemia     Hypertension      Past Surgical History:   Procedure Laterality Date    HYSTERECTOMY      UTERINE SUSPENSION        Family History:     Family History   Problem Relation Age of Onset    No Known Problems Mother     No Known Problems Father     No Known Problems Sister     No Known Problems Daughter     No Known Problems Maternal Grandmother     No Known Problems Maternal Grandfather     No Known Problems Paternal Grandmother     No Known Problems Paternal Grandfather     No Known Problems Sister Social History:     Social History     Socioeconomic History    Marital status:      Spouse name: None    Number of children: None    Years of education: None    Highest education level: None   Occupational History    None   Tobacco Use    Smoking status: Never Smoker    Smokeless tobacco: Never Used   Vaping Use    Vaping Use: Never used   Substance and Sexual Activity    Alcohol use: Not Currently    Drug use: Never    Sexual activity: None   Other Topics Concern    None   Social History Narrative    None     Social Determinants of Health     Financial Resource Strain:     Difficulty of Paying Living Expenses:    Food Insecurity:     Worried About Running Out of Food in the Last Year:     Ran Out of Food in the Last Year:    Transportation Needs:     Lack of Transportation (Medical):      Lack of Transportation (Non-Medical):    Physical Activity:     Days of Exercise per Week:     Minutes of Exercise per Session:    Stress:     Feeling of Stress :    Social Connections:     Frequency of Communication with Friends and Family:     Frequency of Social Gatherings with Friends and Family:     Attends Mandaen Services:     Active Member of Clubs or Organizations:     Attends Club or Organization Meetings:     Marital Status:    Intimate Partner Violence:     Fear of Current or Ex-Partner:     Emotionally Abused:     Physically Abused:     Sexually Abused:       Medications and Allergies:     Current Outpatient Medications   Medication Sig Dispense Refill    amLODIPine (NORVASC) 10 mg tablet Take 1 tablet (10 mg total) by mouth daily 90 tablet 1    aspirin (ECOTRIN LOW STRENGTH) 81 mg EC tablet Take 81 mg by mouth daily      atorvastatin (LIPITOR) 20 mg tablet Take 1 tablet (20 mg total) by mouth daily 90 tablet 1    ezetimibe (ZETIA) 10 mg tablet Take 1 tablet (10 mg total) by mouth daily 90 tablet 1    latanoprost (XALATAN) 0 005 % ophthalmic solution       loratadine (CLARITIN) 10 mg tablet Take 10 mg by mouth daily      albuterol (PROVENTIL HFA,VENTOLIN HFA) 90 mcg/act inhaler Ventolin HFA 90 mcg/actuation aerosol inhaler      budesonide-formoterol (SYMBICORT) 160-4 5 mcg/act inhaler Inhale 2 puffs 2 (two) times a day Rinse mouth after use   diphenhydrAMINE (BENADRYL) 25 mg tablet Take 25 mg by mouth every 6 (six) hours as needed for itching      Ergocalciferol (VITAMIN D2 PO) Take by mouth      montelukast (SINGULAIR) 10 mg tablet Take 10 mg by mouth daily at bedtime      tiZANidine (ZANAFLEX) 2 mg tablet Take 1 tablet (2 mg total) by mouth every 8 (eight) hours as needed for muscle spasms (Patient not taking: Reported on 6/15/2021) 30 tablet 0    traZODone (DESYREL) 50 mg tablet        No current facility-administered medications for this visit  Allergies   Allergen Reactions    Codeine Rash      Immunizations:     Immunization History   Administered Date(s) Administered    INFLUENZA 01/01/2020    Influenza, high dose seasonal 0 7 mL 11/11/2020    SARS-CoV-2 / COVID-19 mRNA IM ([employer]) 04/09/2021, 04/30/2021      Health Maintenance:         Topic Date Due    Hepatitis C Screening  Never done    Colorectal Cancer Screening  Never done    MAMMOGRAM  12/29/2021         Topic Date Due    Pneumococcal Vaccine: 65+ Years (1 of 2 - PPSV23) Never done    DTaP,Tdap,and Td Vaccines (1 - Tdap) Never done      Medicare Screening Tests and Risk Assessments:         Health Risk Assessment:   Patient rates overall health as good  Patient feels that their physical health rating is slightly better  Patient is satisfied with their life  Eyesight was rated as same  Hearing was rated as same  Patient feels that their emotional and mental health rating is slightly better  Patients states they are never, rarely angry  Patient states they are sometimes unusually tired/fatigued  Pain experienced in the last 7 days has been some   Patient states that she has experienced no weight loss or gain in last 6 months  Depression Screening:   PHQ-2 Score: 0      Fall Risk Screening: In the past year, patient has experienced: no history of falling in past year      Urinary Incontinence Screening:   Patient has leaked urine accidently in the last six months  Home Safety:  Patient does not have trouble with stairs inside or outside of their home  Patient has working smoke alarms and has working carbon monoxide detector  Home safety hazards include: none  Nutrition:   Current diet is Regular  Medications:   Patient is currently taking over-the-counter supplements  OTC medications include: see medication list  Patient is able to manage medications  Activities of Daily Living (ADLs)/Instrumental Activities of Daily Living (IADLs):   Walk and transfer into and out of bed and chair?: Yes  Dress and groom yourself?: Yes    Bathe or shower yourself?: Yes    Feed yourself?  Yes  Do your laundry/housekeeping?: Yes  Manage your money, pay your bills and track your expenses?: Yes  Make your own meals?: Yes    Do your own shopping?: Yes    Previous Hospitalizations:   Any hospitalizations or ED visits within the last 12 months?: No      Advance Care Planning:   Living will: No    Durable POA for healthcare: No    Advanced directive: Yes    Advanced directive counseling given: Yes    Five wishes given: Yes    End of Life Decisions reviewed with patient: Yes      PREVENTIVE SCREENINGS      Cardiovascular Screening:    General: Screening Current      Diabetes Screening:     General: Screening Current      Breast Cancer Screening:     General: Screening Current      Cervical Cancer Screening:    General: Screening Not Indicated      Lung Cancer Screening:     General: Screening Not Indicated    Screening, Brief Intervention, and Referral to Treatment (SBIRT)    Screening  Typical number of drinks in a day: 0  Typical number of drinks in a week: 0  Interpretation: Low risk drinking behavior  No exam data present     Physical Exam:     /70 (BP Location: Right arm, Patient Position: Sitting, Cuff Size: Large)   Pulse 104   Temp 98 6 °F (37 °C) (Tympanic)   Ht 5' 2 75" (1 594 m)   Wt 57 3 kg (126 lb 6 4 oz)   SpO2 98%   BMI 22 57 kg/m²     Physical Exam  Vitals and nursing note reviewed  Constitutional:       General: She is not in acute distress  Appearance: She is well-developed  She is not ill-appearing  HENT:      Head: Normocephalic and atraumatic  Eyes:      Conjunctiva/sclera: Conjunctivae normal    Neck:      Thyroid: No thyromegaly  Cardiovascular:      Rate and Rhythm: Normal rate and regular rhythm  Heart sounds: Normal heart sounds  No murmur heard  Pulmonary:      Effort: Pulmonary effort is normal  No respiratory distress  Breath sounds: Normal breath sounds  No wheezing  Abdominal:      General: There is no distension  Palpations: Abdomen is soft  Musculoskeletal:      Cervical back: Normal range of motion  Skin:     General: Skin is warm  Neurological:      General: No focal deficit present  Mental Status: She is alert and oriented to person, place, and time     Psychiatric:         Mood and Affect: Mood normal          Behavior: Behavior normal           Jayson Gun, DO

## 2021-06-15 NOTE — ASSESSMENT & PLAN NOTE
Chronic anxiety  Addressed self-care methods for lessening anxiety such as decreased alcohol intake, decreased caffeine intake, healthy diet, stress management, and relaxation techniques

## 2021-06-15 NOTE — ASSESSMENT & PLAN NOTE
Elevated WBC count is mild (10 5) with no bandemia or left shift  Patient educated on meaning of labs  Mild elevation likely due to allergies or current periodontal disease  Recheck in 2-4 weeks for resolution

## 2021-06-15 NOTE — PATIENT INSTRUCTIONS
Medicare Preventive Visit Patient Instructions  Thank you for completing your Welcome to Medicare Visit or Medicare Annual Wellness Visit today  Your next wellness visit will be due in one year (6/16/2022)  The screening/preventive services that you may require over the next 5-10 years are detailed below  Some tests may not apply to you based off risk factors and/or age  Screening tests ordered at today's visit but not completed yet may show as past due  Also, please note that scanned in results may not display below  Preventive Screenings:  Service Recommendations Previous Testing/Comments   Colorectal Cancer Screening  * Colonoscopy    * Fecal Occult Blood Test (FOBT)/Fecal Immunochemical Test (FIT)  * Fecal DNA/Cologuard Test  * Flexible Sigmoidoscopy Age: 54-65 years old   Colonoscopy: every 10 years (may be performed more frequently if at higher risk)  OR  FOBT/FIT: every 1 year  OR  Cologuard: every 3 years  OR  Sigmoidoscopy: every 5 years  Screening may be recommended earlier than age 48 if at higher risk for colorectal cancer  Also, an individualized decision between you and your healthcare provider will decide whether screening between the ages of 74-80 would be appropriate  Colonoscopy: Not on file  FOBT/FIT: Not on file  Cologuard: Not on file  Sigmoidoscopy: Not on file          Breast Cancer Screening Age: 36 years old  Frequency: every 1-2 years  Not required if history of left and right mastectomy Mammogram: 12/29/2020    Screening Current   Cervical Cancer Screening Between the ages of 21-29, pap smear recommended once every 3 years  Between the ages of 33-67, can perform pap smear with HPV co-testing every 5 years     Recommendations may differ for women with a history of total hysterectomy, cervical cancer, or abnormal pap smears in past  Pap Smear: Not on file    Screening Not Indicated   Hepatitis C Screening Once for adults born between 1945 and 1965  More frequently in patients at high risk for Hepatitis C Hep C Antibody: Not on file        Diabetes Screening 1-2 times per year if you're at risk for diabetes or have pre-diabetes Fasting glucose: 83 mg/dL   A1C: 5 4 %    Screening Current   Cholesterol Screening Once every 5 years if you don't have a lipid disorder  May order more often based on risk factors  Lipid panel: 06/09/2021    Screening Current     Other Preventive Screenings Covered by Medicare:  1  Abdominal Aortic Aneurysm (AAA) Screening: covered once if your at risk  You're considered to be at risk if you have a family history of AAA  2  Lung Cancer Screening: covers low dose CT scan once per year if you meet all of the following conditions: (1) Age 50-69; (2) No signs or symptoms of lung cancer; (3) Current smoker or have quit smoking within the last 15 years; (4) You have a tobacco smoking history of at least 30 pack years (packs per day multiplied by number of years you smoked); (5) You get a written order from a healthcare provider  3  Glaucoma Screening: covered annually if you're considered high risk: (1) You have diabetes OR (2) Family history of glaucoma OR (3)  aged 48 and older OR (3)  American aged 72 and older  3  Osteoporosis Screening: covered every 2 years if you meet one of the following conditions: (1) You're estrogen deficient and at risk for osteoporosis based off medical history and other findings; (2) Have a vertebral abnormality; (3) On glucocorticoid therapy for more than 3 months; (4) Have primary hyperparathyroidism; (5) On osteoporosis medications and need to assess response to drug therapy  · Last bone density test (DXA Scan): Not on file  5  HIV Screening: covered annually if you're between the age of 12-76  Also covered annually if you are younger than 13 and older than 72 with risk factors for HIV infection  For pregnant patients, it is covered up to 3 times per pregnancy      Immunizations:  Immunization Recommendations Influenza Vaccine Annual influenza vaccination during flu season is recommended for all persons aged >= 6 months who do not have contraindications   Pneumococcal Vaccine (Prevnar and Pneumovax)  * Prevnar = PCV13  * Pneumovax = PPSV23   Adults 25-60 years old: 1-3 doses may be recommended based on certain risk factors  Adults 72 years old: Prevnar (PCV13) vaccine recommended followed by Pneumovax (PPSV23) vaccine  If already received PPSV23 since turning 65, then PCV13 recommended at least one year after PPSV23 dose  Hepatitis B Vaccine 3 dose series if at intermediate or high risk (ex: diabetes, end stage renal disease, liver disease)   Tetanus (Td) Vaccine - COST NOT COVERED BY MEDICARE PART B Following completion of primary series, a booster dose should be given every 10 years to maintain immunity against tetanus  Td may also be given as tetanus wound prophylaxis  Tdap Vaccine - COST NOT COVERED BY MEDICARE PART B Recommended at least once for all adults  For pregnant patients, recommended with each pregnancy  Shingles Vaccine (Shingrix) - COST NOT COVERED BY MEDICARE PART B  2 shot series recommended in those aged 48 and above     Health Maintenance Due:      Topic Date Due    Hepatitis C Screening  Never done    Colorectal Cancer Screening  Never done    MAMMOGRAM  12/29/2021     Immunizations Due:      Topic Date Due    Pneumococcal Vaccine: 65+ Years (1 of 2 - PPSV23) Never done    DTaP,Tdap,and Td Vaccines (1 - Tdap) Never done     Advance Directives   What are advance directives? Advance directives are legal documents that state your wishes and plans for medical care  These plans are made ahead of time in case you lose your ability to make decisions for yourself  Advance directives can apply to any medical decision, such as the treatments you want, and if you want to donate organs  What are the types of advance directives?   There are many types of advance directives, and each state has rules about how to use them  You may choose a combination of any of the following:  · Living will: This is a written record of the treatment you want  You can also choose which treatments you do not want, which to limit, and which to stop at a certain time  This includes surgery, medicine, IV fluid, and tube feedings  · Durable power of  for healthcare [facility]): This is a written record that states who you want to make healthcare choices for you when you are unable to make them for yourself  This person, called a proxy, is usually a family member or a friend  You may choose more than 1 proxy  · Do not resuscitate (DNR) order:  A DNR order is used in case your heart stops beating or you stop breathing  It is a request not to have certain forms of treatment, such as CPR  A DNR order may be included in other types of advance directives  · Medical directive: This covers the care that you want if you are in a coma, near death, or unable to make decisions for yourself  You can list the treatments you want for each condition  Treatment may include pain medicine, surgery, blood transfusions, dialysis, IV or tube feedings, and a ventilator (breathing machine)  · Values history: This document has questions about your views, beliefs, and how you feel and think about life  This information can help others choose the care that you would choose  Why are advance directives important? An advance directive helps you control your care  Although spoken wishes may be used, it is better to have your wishes written down  Spoken wishes can be misunderstood, or not followed  Treatments may be given even if you do not want them  An advance directive may make it easier for your family to make difficult choices about your care  Urinary Incontinence   Urinary incontinence (UI)  is when you lose control of your bladder  UI develops because your bladder cannot store or empty urine properly   The 3 most common types of UI are stress incontinence, urge incontinence, or both  Medicines:   · May be given to help strengthen your bladder control  Report any side effects of medication to your healthcare provider  Do pelvic muscle exercises often:  Your pelvic muscles help you stop urinating  Squeeze these muscles tight for 5 seconds, then relax for 5 seconds  Gradually work up to squeezing for 10 seconds  Do 3 sets of 15 repetitions a day, or as directed  This will help strengthen your pelvic muscles and improve bladder control  Train your bladder:  Go to the bathroom at set times, such as every 2 hours, even if you do not feel the urge to go  You can also try to hold your urine when you feel the urge to go  For example, hold your urine for 5 minutes when you feel the urge to go  As that becomes easier, hold your urine for 10 minutes  Self-care:   · Keep a UI record  Write down how often you leak urine and how much you leak  Make a note of what you were doing when you leaked urine  · Drink liquids as directed  You may need to limit the amount of liquid you drink to help control your urine leakage  Do not drink any liquid right before you go to bed  Limit or do not have drinks that contain caffeine or alcohol  · Prevent constipation  Eat a variety of high-fiber foods  Good examples are high-fiber cereals, beans, vegetables, and whole-grain breads  Walking is the best way to trigger your intestines to have a bowel movement  · Exercise regularly and maintain a healthy weight  Weight loss and exercise will decrease pressure on your bladder and help you control your leakage  · Use a catheter as directed  to help empty your bladder  A catheter is a tiny, plastic tube that is put into your bladder to drain your urine  · Go to behavior therapy as directed  Behavior therapy may be used to help you learn to control your urge to urinate         © Copyright [employer] 2018 Information is for End User's use only and may not be sold, redistributed or otherwise used for commercial purposes   All illustrations and images included in CareNotes® are the copyrighted property of A D A M , Inc  or [facility] Mary Ceja

## 2021-06-15 NOTE — PROGRESS NOTES
Assessment/Plan:    1  Encounter for subsequent annual wellness visit (AWV) in Medicare patient    2  Advanced care planning/counseling discussion  Assessment & Plan:  Do you have a Living Will, Healthcare Power of  or POLST?: [address] discussed at length with patient and/or family  Palliative Care and Goal setting discussed  The Advanced Directive/POLST form was reviewed in detail, categories explained and questions answered  The patient elected to bring form home for further review and then mail back or schedule another appointment to discuss  Total time spent was 15 minutes  3  Breast pain, left  Assessment & Plan:  Left breast pain  Normal breast exam today  Likely musculoskeletal   Recent mammogram normal    Will obtain breast US of left breast for follow up  Orders:  -     US breast left limited (diagnostic); Future; Expected date: 06/15/2021    4  Screening for osteoporosis  -     DXA bone density spine hip and pelvis; Future; Expected date: 06/15/2021    5  Anxiety  Assessment & Plan:  Chronic anxiety  Addressed self-care methods for lessening anxiety such as decreased alcohol intake, decreased caffeine intake, healthy diet, stress management, and relaxation techniques  6  Leukocytosis, unspecified type  Assessment & Plan:  Elevated WBC count is mild (10 5) with no bandemia or left shift  Patient educated on meaning of labs  Mild elevation likely due to allergies or current periodontal disease  Recheck in 2-4 weeks for resolution  Subjective:      Patient ID: Rd Mehta is a 70 y o  female  HPI    Patient with multiple complaints today  She has left breast pain  Mammogram was done 6 months ago and was normal   When she pushes on her anterior chest she feels soreness  She holds her phone in her left hand and elevates arm and thinks that may be causing her pain  Patient has had several weeks of sore throat    She does have seasonal allergies  She denies pain with swallowing and fever  She has periodontal disease and is going to be seeing her dentist for deep cleaning  She is a very anxious person at baseline and is worried about her blood work results  Blood work was reviewed with the patient in detail today  Her labs are normal except for slight elevation in WBC which we discussed may be secondary to current periodontal disease or her allergies  The patient is taking Flonase for her allergies and Claritin as well  Patient has venous insufficiency and states that she has feeling that her left leg is always heavy  She does not have foot drop, weakness, numbness or tingling in the foot  She has full range of motion  No current swelling in LE  She denies skin changes  All other ROS negative       The following portions of the patient's history were reviewed and updated as appropriate: allergies, current medications, past family history, past medical history, past social history, past surgical history, and problem list       Current Outpatient Medications:     amLODIPine (NORVASC) 10 mg tablet, Take 1 tablet (10 mg total) by mouth daily, Disp: 90 tablet, Rfl: 1    aspirin (ECOTRIN LOW STRENGTH) 81 mg EC tablet, Take 81 mg by mouth daily, Disp: , Rfl:     atorvastatin (LIPITOR) 20 mg tablet, Take 1 tablet (20 mg total) by mouth daily, Disp: 90 tablet, Rfl: 1    ezetimibe (ZETIA) 10 mg tablet, Take 1 tablet (10 mg total) by mouth daily, Disp: 90 tablet, Rfl: 1    latanoprost (XALATAN) 0 005 % ophthalmic solution, , Disp: , Rfl:     loratadine (CLARITIN) 10 mg tablet, Take 10 mg by mouth daily, Disp: , Rfl:     albuterol (PROVENTIL HFA,VENTOLIN HFA) 90 mcg/act inhaler, Ventolin HFA 90 mcg/actuation aerosol inhaler, Disp: , Rfl:     budesonide-formoterol (SYMBICORT) 160-4 5 mcg/act inhaler, Inhale 2 puffs 2 (two) times a day Rinse mouth after use , Disp: , Rfl:     diphenhydrAMINE (BENADRYL) 25 mg tablet, Take 25 mg by mouth every 6 (six) hours as needed for itching, Disp: , Rfl:     Ergocalciferol (VITAMIN D2 PO), Take by mouth, Disp: , Rfl:     montelukast (SINGULAIR) 10 mg tablet, Take 10 mg by mouth daily at bedtime, Disp: , Rfl:     tiZANidine (ZANAFLEX) 2 mg tablet, Take 1 tablet (2 mg total) by mouth every 8 (eight) hours as needed for muscle spasms (Patient not taking: Reported on 6/15/2021), Disp: 30 tablet, Rfl: 0    traZODone (DESYREL) 50 mg tablet, , Disp: , Rfl:       Review of Systems   Constitutional: Negative for activity change, appetite change, chills, fatigue and fever  HENT: Positive for dental problem (bleeding gums) and sore throat  Negative for congestion, ear discharge, ear pain, postnasal drip, rhinorrhea, sinus pressure, sinus pain, sneezing and trouble swallowing  Eyes: Negative for pain, discharge, redness, itching and visual disturbance  Respiratory: Negative for apnea, cough, chest tightness, shortness of breath and wheezing  Cardiovascular: Negative for chest pain, palpitations and leg swelling  Gastrointestinal: Negative for abdominal distention, abdominal pain, blood in stool, constipation, diarrhea, nausea and vomiting  Endocrine: Negative for polyuria  Genitourinary: Negative for decreased urine volume, difficulty urinating, dyspareunia, dysuria, flank pain, frequency, menstrual problem, pelvic pain, urgency, vaginal bleeding and vaginal discharge  +breast pain     Musculoskeletal: Negative for arthralgias, gait problem, joint swelling and myalgias  Skin: Negative for rash  Neurological: Negative for dizziness, weakness, light-headedness, numbness and headaches  Psychiatric/Behavioral: Negative for behavioral problems and dysphoric mood  The patient is nervous/anxious  All other systems reviewed and are negative          Objective:      /70 (BP Location: Right arm, Patient Position: Sitting, Cuff Size: Large)   Pulse 104   Temp 98 6 °F (37 °C) (Tympanic) Ht 5' 2 75" (1 594 m)   Wt 57 3 kg (126 lb 6 4 oz)   SpO2 98%   BMI 22 57 kg/m²          Physical Exam  Vitals and nursing note reviewed  Constitutional:       General: She is not in acute distress  Appearance: She is well-developed  She is not ill-appearing  HENT:      Head: Normocephalic and atraumatic  Eyes:      Conjunctiva/sclera: Conjunctivae normal    Neck:      Thyroid: No thyromegaly  Cardiovascular:      Rate and Rhythm: Normal rate and regular rhythm  Heart sounds: Normal heart sounds  No murmur heard  Pulmonary:      Effort: Pulmonary effort is normal       Breath sounds: Normal breath sounds  Chest:      Breasts: Breasts are symmetrical          Right: Normal  No swelling, inverted nipple, mass, skin change or tenderness  Left: Tenderness present  No swelling, inverted nipple, mass or skin change  Abdominal:      General: Bowel sounds are normal  There is no distension  Palpations: Abdomen is soft  Musculoskeletal:      Cervical back: Normal range of motion and neck supple  Lymphadenopathy:      Cervical: No cervical adenopathy  Skin:     General: Skin is warm and dry  Neurological:      General: No focal deficit present  Mental Status: She is alert and oriented to person, place, and time     Psychiatric:         Mood and Affect: Mood normal          Behavior: Behavior normal

## 2021-06-15 NOTE — ASSESSMENT & PLAN NOTE
Do you have a Living Will, Healthcare Power of  or POLST?: [address] discussed at length with patient and/or family  Palliative Care and Goal setting discussed  The Advanced Directive/POLST form was reviewed in detail, categories explained and questions answered  The patient elected to bring form home for further review and then mail back or schedule another appointment to discuss  Total time spent was 15 minutes

## 2021-06-15 NOTE — ASSESSMENT & PLAN NOTE
Left breast pain  Normal breast exam today  Likely musculoskeletal   Recent mammogram normal    Will obtain breast US of left breast for follow up

## 2021-07-07 ENCOUNTER — TELEPHONE (OUTPATIENT)
Dept: FAMILY MEDICINE CLINIC | Facility: CLINIC | Age: 76
End: 2021-07-07

## 2021-07-07 NOTE — TELEPHONE ENCOUNTER
Discharge Instructions  Kidney Stones    Kidney stones are a common problem that can cause a lot of pain but fortunately are usually not dangerous. Kidney stones form in the kidney and then can cause a blockage (obstruction) of the flow of urine from the kidney which leads to pain. Most patients can manage kidney stones at home (without a hospital stay).  However, sometimes your condition may be worse than it seemed at first, or may get worse with time. Most kidney stones will pass on their own, but occasionally stones may need to be removed by an urologist.    Generally, every Emergency Department visit should have a follow-up clinic visit with either a primary or a specialty clinic/provider. Please follow-up as instructed by your emergency provider today.      Return to the Emergency Department if:  Your pain is not controlled despite the medications provided or recommended.  You are vomiting (throwing up) and cannot keep fluids or medications down.  You develop a fever (>100.4 F).  You feel much more ill or develop new symptoms.  What can I do to help myself?  Be sure to drink plenty of fluids.  If instructed to do so, strain your urine (pee) with the urine strainer you were provided with today. Your stone may look like a grain of sand or a small pebble. Collect any stones in the cup provided and bring to your follow-up appointment.  Staying active is good, and may help the stone to pass. You may do whatever you feel up to doing without restrictions.   Treatment:  Non-steroidal anti-inflammatory drugs (NSAIDs). This includes prescription medicines like Toradol  (ketorolac) and non-prescription medicines like Advil  (ibuprofen) and Nuprin  (ibuprofen) and Naproxen. These pain relievers are very effective for kidney stones.  Nausea (sick to your stomach) medication.  Nausea and vomiting are common with kidney stones, so your provider may send you home with medicine for this.   Flomax  (tamsulosin). This medicine is  Pt called would like to speak with you about starting back on trazodone for sleep would like call back today if possible told her it may be after your shift after 5p sometimes used for men with prostate problems, but also can help kidney stones to pass. Its effectiveness is controversial or questionable so it is prescribed in certain situations. This medicine can lower blood pressure, and you may feel faint/lightheaded, especially when you first stand up. Be sure to get up gradually, sit down if you feel faint, and avoid activity where feeling faint would be dangerous, such as climbing ladders.  If you were given a prescription for medicine here today, be sure to read all of the information (including the package insert) that comes with your prescription.  This will include important information about the medicine, its side effects, and any warnings that you need to know about.  The pharmacist who fills the prescription can provide more information and answer questions you may have about the medicine.  If you have questions or concerns that the pharmacist cannot address, please call or return to the Emergency Department.   Remember that you can always come back to the Emergency Department if you are not able to see your regular provider in the amount of time listed above, if you get any new symptoms, or if there is anything that worries you.     Opioid Medication Information    You have been given a prescription for an opioid (narcotic) pain medicine and/or have received a pain medicine while here in the Emergency Department. These medicines can make you drowsy or impaired. You must not drive, operate dangerous equipment, or engage in any other dangerous activities while taking these medications. If you drive while taking these medications, you could be arrested for driving under the influence (DUI). Do not drink any alcohol while you are taking these medications.     Opioid pain medications can cause addiction. If you have a history of chemical dependency of any type, you are at a higher risk of becoming addicted to pain medications.  Only take these prescribed medications to  treat your pain when all other options have been tried. Take it for as short a time and as few doses as possible. Store your pain pills in a secure place, as they are frequently stolen and provide a dangerous opportunity for children or visitors in your house to start abusing these powerful medications. We will not replace any lost or stolen medicine.    If you do not finish your medication, it is a good idea to get rid of it but please do not flush it down the toilet. Please dispose of the remaining medication at a local pharmacy or law enforcement facility. The [place] [employer] has additional information on medication disposal: [email or web address]://[email or web address].[email or web address].us/living-green/[email or web address]-[email or web address]-medications.      Many prescription pain medications contain Tylenol  (acetaminophen), including Vicodin , Tylenol #3 , Norco , Lortab , and Percocet .  You should not take any extra pills of Tylenol  if you are using these prescription medications or you can get very sick.  Do not ever take more than 3000 mg of acetaminophen in any 24 hour period.    All opioids tend to cause constipation. Drink plenty of water and eat foods that have a lot of fiber, such as fruits, vegetables, prune juice, apple juice and high fiber cereal.  Take a laxative if you don t move your bowels at least every other day. Miralax , Milk of Magnesia, Colace , or Senna  can be used to keep you regular.

## 2021-07-07 NOTE — TELEPHONE ENCOUNTER
I spoke to patient  She would like to try trazodone again  She had stopped it because she thought it was causing burning in her throat but she continues to have the burning  I recommend she start with half a tablet at bedtime and she can increase to a full tablet as needed for sleep

## 2021-07-18 ENCOUNTER — OFFICE VISIT (OUTPATIENT)
Dept: URGENT CARE | Facility: MEDICAL CENTER | Age: 76
End: 2021-07-18
Payer: COMMERCIAL

## 2021-07-18 VITALS
WEIGHT: 124 LBS | BODY MASS INDEX: 21.97 KG/M2 | DIASTOLIC BLOOD PRESSURE: 84 MMHG | OXYGEN SATURATION: 98 % | HEART RATE: 104 BPM | RESPIRATION RATE: 18 BRPM | HEIGHT: 63 IN | SYSTOLIC BLOOD PRESSURE: 141 MMHG | TEMPERATURE: 98.1 F

## 2021-07-18 DIAGNOSIS — R00.2 PALPITATION: Primary | ICD-10-CM

## 2021-07-18 DIAGNOSIS — F41.9 ANXIETY: ICD-10-CM

## 2021-07-18 LAB
ATRIAL RATE: 108 BPM
P AXIS: 67 DEGREES
PR INTERVAL: 178 MS
QRS AXIS: 17 DEGREES
QRSD INTERVAL: 72 MS
QT INTERVAL: 322 MS
QTC INTERVAL: 431 MS
T WAVE AXIS: 41 DEGREES
VENTRICULAR RATE: 108 BPM

## 2021-07-18 PROCEDURE — G0463 HOSPITAL OUTPT CLINIC VISIT: HCPCS | Performed by: FAMILY MEDICINE

## 2021-07-18 PROCEDURE — 93010 ELECTROCARDIOGRAM REPORT: CPT | Performed by: INTERNAL MEDICINE

## 2021-07-18 PROCEDURE — 99213 OFFICE O/P EST LOW 20 MIN: CPT | Performed by: FAMILY MEDICINE

## 2021-07-18 PROCEDURE — 93005 ELECTROCARDIOGRAM TRACING: CPT | Performed by: FAMILY MEDICINE

## 2021-07-18 NOTE — PATIENT INSTRUCTIONS
Vital signs are stable  Repeat blood pressure was  141/64 with a heart rate of 108  EKG reveals sinus tachycardia heart rate of 1 0 weight  No ischemic changes observed  I believe her symptoms are secondary to anxiety  Recommend she discuss treatment with her primary care provider  Anxiety   WHAT YOU NEED TO KNOW:   Anxiety is a condition that causes you to feel extremely worried or nervous  The feelings are so strong that they can cause problems with your daily activities or sleep  Anxiety may be triggered by something you fear, or it may happen without a cause  Family or work stress, smoking, caffeine, and alcohol can increase your risk for anxiety  Certain medicines or health conditions can also increase your risk  Anxiety can become a long-term condition if it is not managed or treated  DISCHARGE INSTRUCTIONS:   Call your local emergency number (911 in the [address] [facility]) if:   · You have chest pain, tightness, or heaviness that may spread to your shoulders, arms, jaw, neck, or back  · You feel like hurting yourself or someone else  Call your doctor if:   · Your symptoms get worse or do not get better with treatment  · Your anxiety keeps you from doing your regular daily activities  · You have new symptoms since your last visit  · You have questions or concerns about your condition or care  Medicines:   · Medicines  may be given to help you feel more calm and relaxed, and decrease your symptoms  · Take your medicine as directed  Contact your healthcare provider if you think your medicine is not helping or if you have side effects  Tell him of her if you are allergic to any medicine  Keep a list of the medicines, vitamins, and herbs you take  Include the amounts, and when and why you take them  Bring the list or the pill bottles to follow-up visits  Carry your medicine list with you in case of an emergency  Manage anxiety:   · Talk to someone about your anxiety    Your healthcare provider may suggest counseling  Cognitive behavioral therapy can help you understand and change how you react to events that trigger your symptoms  You might feel more comfortable talking with a friend or family member about your anxiety  Choose someone you know will be supportive and encouraging  · Find ways to relax  Activities such as exercise, meditation, or listening to music can help you relax  Spend time with friends, or do things you enjoy  · Practice deep breathing  Deep breathing can help you relax when you feel anxious  Focus on taking slow, deep breaths several times a day, or during an anxiety attack  Breathe in through your nose and out through your mouth  · Create a regular sleep routine  Regular sleep can help you feel calmer during the day  Go to sleep and wake up at the same times every day  Do not watch television or use the computer right before bed  Your room should be comfortable, dark, and quiet  · Eat a variety of healthy foods  Healthy foods include fruits, vegetables, low-fat dairy products, lean meats, fish, whole-grain breads, and cooked beans  Healthy foods can help you feel less anxious and have more energy  · Exercise regularly  Exercise can increase your energy level  Exercise may also lift your mood and help you sleep better  Your healthcare provider can help you create an exercise plan  · Do not smoke  Nicotine and other chemicals in cigarettes and cigars can increase anxiety  Ask your healthcare provider for information if you currently smoke and need help to quit  E-cigarettes or smokeless tobacco still contain nicotine  Talk to your healthcare provider before you use these products  · Do not have caffeine  Caffeine can make your symptoms worse  Do not have foods or drinks that are meant to increase your energy level  · Limit or do not drink alcohol  Ask your healthcare provider if alcohol is safe for you   You may not be able to drink alcohol if you take certain anxiety or depression medicines  Limit alcohol to 1 drink per day if you are a woman  Limit alcohol to 2 drinks per day if you are a man  A drink of alcohol is 12 ounces of beer, 5 ounces of wine, or 1½ ounces of liquor  · Do not use drugs  Drugs can make your anxiety worse  It can also make anxiety hard to manage  Talk to your healthcare provider if you use drugs and want help to quit  Follow up with your doctor within 2 weeks or as directed:  Write down your questions so you remember to ask them during your visits  © Copyright [address] [place] [address] Drive Information is for End User's use only and may not be sold, redistributed or otherwise used for commercial purposes  All illustrations and images included in CareNotes® are the copyrighted property of [employer]  or [facility] [employer]   The above information is an  only  It is not intended as medical advice for individual conditions or treatments  Talk to your doctor, nurse or pharmacist before following any medical regimen to see if it is safe and effective for you

## 2021-07-18 NOTE — PROGRESS NOTES
[facility] Now        NAME: Luis Enrique Pagan is a 70 y o  female  : 1945    MRN: [id number]  DATE: 2021  TIME: 2:38 PM    Assessment and Plan   Palpitation [R00 2]  1  Palpitation     2  Anxiety           Patient Instructions       Follow up with PCP in 3-5 days  Proceed to  ER if symptoms worsen  Chief Complaint     Chief Complaint   Patient presents with    Dizziness     Patient states sh has been feeling anxious, dizzy, nauseated, and her head feels heavy  Patient has been having a hard time sleeping  She states since the pandemic she has had more difficulty  she was started on trazadone  SHe stopped inmarch and restarted in July  She started with the symptoms after restarting the trazadone         History of Present Illness         79-year-old female here today with complaints of dizziness, headache, lightheadedness and nauseousness which is been present for well over week  She attributes her symptoms to the fact that she was taking trazodone a up until 1 week ago that was prescribed for insomnia  She was taking trazodone about 2-3 months ago that was prescribed by her primary care provider  However after a while she stopped it because she developed some side effects such as sore throat  Patient has a known history of anxiety  She expressed concern today because she checked her blood pressure and was elevated 170/85 and a pulse rate of 115  She repeated found elevated and decided to come to urgent care for assessment  At the present time denies any chest pain  Denies any diaphoresis  Denies any known heart disease  Describes having chronic in sinus sleeps on average 4 6 hours      Review of Systems   Review of Systems   Constitutional: Negative  Respiratory: Negative  Cardiovascular: Positive for palpitations  Gastrointestinal: Positive for nausea  Neurological: Positive for dizziness and headaches  Psychiatric/Behavioral: The patient is nervous/anxious  Current Medications       Current Outpatient Medications:     amLODIPine (NORVASC) 10 mg tablet, Take 1 tablet (10 mg total) by mouth daily, Disp: 90 tablet, Rfl: 1    atorvastatin (LIPITOR) 20 mg tablet, Take 1 tablet (20 mg total) by mouth daily, Disp: 90 tablet, Rfl: 1    ezetimibe (ZETIA) 10 mg tablet, Take 1 tablet (10 mg total) by mouth daily, Disp: 90 tablet, Rfl: 1    latanoprost (XALATAN) 0 005 % ophthalmic solution, , Disp: , Rfl:     albuterol (PROVENTIL HFA,VENTOLIN HFA) 90 mcg/act inhaler, Ventolin HFA 90 mcg/actuation aerosol inhaler (Patient not taking: Reported on 7/18/2021), Disp: , Rfl:     aspirin (ECOTRIN LOW STRENGTH) 81 mg EC tablet, Take 81 mg by mouth daily (Patient not taking: Reported on 7/18/2021), Disp: , Rfl:     budesonide-formoterol (SYMBICORT) 160-4 5 mcg/act inhaler, Inhale 2 puffs 2 (two) times a day Rinse mouth after use   (Patient not taking: Reported on 7/18/2021), Disp: , Rfl:     diphenhydrAMINE (BENADRYL) 25 mg tablet, Take 25 mg by mouth every 6 (six) hours as needed for itching (Patient not taking: Reported on 7/18/2021), Disp: , Rfl:     Ergocalciferol (VITAMIN D2 PO), Take by mouth (Patient not taking: Reported on 7/18/2021), Disp: , Rfl:     loratadine (CLARITIN) 10 mg tablet, Take 10 mg by mouth daily (Patient not taking: Reported on 7/18/2021), Disp: , Rfl:     montelukast (SINGULAIR) 10 mg tablet, Take 10 mg by mouth daily at bedtime (Patient not taking: Reported on 7/18/2021), Disp: , Rfl:     tiZANidine (ZANAFLEX) 2 mg tablet, Take 1 tablet (2 mg total) by mouth every 8 (eight) hours as needed for muscle spasms (Patient not taking: Reported on 6/15/2021), Disp: 30 tablet, Rfl: 0    traZODone (DESYREL) 50 mg tablet, , Disp: , Rfl:     Current Allergies     Allergies as of 07/18/2021 - Reviewed 07/18/2021   Allergen Reaction Noted    Codeine Rash 02/21/2020            The following portions of the patient's history were reviewed and updated as appropriate: allergies, current medications, past family history, past medical history, past social history, past surgical history and problem list      Past Medical History:   Diagnosis Date    Asthma     variant cough asthma    Hyperlipidemia     Hypertension        Past Surgical History:   Procedure Laterality Date    HYSTERECTOMY      UTERINE SUSPENSION         Family History   Problem Relation Age of Onset    No Known Problems Mother     No Known Problems Father     No Known Problems Sister     No Known Problems Daughter     No Known Problems Maternal Grandmother     No Known Problems Maternal Grandfather     No Known Problems Paternal Grandmother     No Known Problems Paternal Grandfather     No Known Problems Sister          Medications have been verified  Objective   /84   Pulse 104   Temp 98 1 °F (36 7 °C)   Resp 18   Ht 5' 3" (1 6 m)   Wt 56 2 kg (124 lb)   SpO2 98%   BMI 21 97 kg/m²   No LMP recorded  Patient is postmenopausal        Physical Exam     Physical Exam  Nursing note reviewed  Constitutional:       Appearance: Normal appearance  Cardiovascular:      Rate and Rhythm: Regular rhythm  Tachycardia present  Pulses: Normal pulses  Pulmonary:      Effort: Pulmonary effort is normal       Breath sounds: Normal breath sounds  Abdominal:      General: Abdomen is flat  Bowel sounds are normal    Skin:     General: Skin is warm  Neurological:      General: No focal deficit present  Mental Status: She is alert and oriented to person, place, and time  Comments: Romberg test- negative    Gait - normal    Psychiatric:         Mood and Affect: Mood normal

## 2021-08-18 DIAGNOSIS — I10 ESSENTIAL HYPERTENSION: ICD-10-CM

## 2021-08-18 DIAGNOSIS — E78.5 DYSLIPIDEMIA: ICD-10-CM

## 2021-08-18 RX ORDER — ATORVASTATIN CALCIUM 20 MG/1
20 TABLET, FILM COATED ORAL DAILY
Qty: 90 TABLET | Refills: 1 | Status: SHIPPED | OUTPATIENT
Start: 2021-08-18 | End: 2021-11-26

## 2021-08-18 RX ORDER — EZETIMIBE 10 MG/1
10 TABLET ORAL DAILY
Qty: 90 TABLET | Refills: 1 | Status: SHIPPED | OUTPATIENT
Start: 2021-08-18 | End: 2021-11-26

## 2021-08-18 NOTE — TELEPHONE ENCOUNTER
Last zetia fill: 3/22/21 #90 1 refill  Last atorvastatin fill: 3/22/21 #90 1 refill  Last appt: 6/15/21  No future appt scheduled

## 2021-08-25 DIAGNOSIS — I10 ESSENTIAL HYPERTENSION: ICD-10-CM

## 2021-08-25 RX ORDER — AMLODIPINE BESYLATE 10 MG/1
TABLET ORAL
Qty: 90 TABLET | Refills: 1 | Status: SHIPPED | OUTPATIENT
Start: 2021-08-25 | End: 2022-01-19

## 2021-09-26 ENCOUNTER — NURSE TRIAGE (OUTPATIENT)
Dept: OTHER | Facility: OTHER | Age: 76
End: 2021-09-26

## 2021-09-26 NOTE — TELEPHONE ENCOUNTER
Reason for Disposition   [1] MODERATE dizziness (e g , interferes with normal activities) AND [2] has NOT been evaluated by physician for this  (Exception: dizziness caused by heat exposure, sudden standing, or poor fluid intake)    Answer Assessment - Initial Assessment Questions  1  DESCRIPTION: "Describe your dizziness "      Pt feels dizzy when standing up since 9/20    2  LIGHTHEADED: "Do you feel lightheaded?" (e g , somewhat faint, woozy, weak upon standing)   somewhat faint    3  VERTIGO: "Do you feel like either you or the room is spinning or tilting?" (i e  vertigo)     No    4  SEVERITY: "How bad is it?"  "Do you feel like you are going to faint?" "Can you stand and walk?"    - MILD: Feels slightly dizzy, but walking normally  - MODERATE: Feels very unsteady when walking, but not falling; interferes with normal activities (e g , school, work)   - SEVERE: Unable to walk without falling, or requires assistance to walk without falling; feels like passing out now  Moderate    5  ONSET:  "When did the dizziness begin?"     9/20    6  AGGRAVATING FACTORS: "Does anything make it worse?" (e g , standing, change in head position)    Standing    7  HEART RATE: "Can you tell me your heart rate?" "How many beats in 15 seconds?"  (Note: not all patients can do this)       91 bpm    8  CAUSE: "What do you think is causing the dizziness?"    Unknown    9  RECURRENT SYMPTOM: "Have you had dizziness before?" If Yes, ask: "When was the last time?" "What happened that time?"  Denies     10  OTHER SYMPTOMS: "Do you have any other symptoms?" (e g , fever, chest pain, vomiting, diarrhea, bleeding)      nausea    Protocols used: DIZZINESS - LIGHTHEADEDNESS-ADULT-AH    Pt states she has been hydrating with water and drinking a lot of herbal teas  Bp-159/79- pt has not taken BP meds yet  HR-91bpm  Pulse ox-98%    Pt does not have transportation, requesting a virtual appt for tomorrow   Virtual appt on 9/27 at 2: 45pm

## 2021-09-27 ENCOUNTER — TELEMEDICINE (OUTPATIENT)
Dept: FAMILY MEDICINE CLINIC | Facility: CLINIC | Age: 76
End: 2021-09-27
Payer: COMMERCIAL

## 2021-09-27 ENCOUNTER — TELEPHONE (OUTPATIENT)
Dept: FAMILY MEDICINE CLINIC | Facility: CLINIC | Age: 76
End: 2021-09-27

## 2021-09-27 DIAGNOSIS — E78.5 DYSLIPIDEMIA: ICD-10-CM

## 2021-09-27 DIAGNOSIS — I10 ESSENTIAL HYPERTENSION: ICD-10-CM

## 2021-09-27 DIAGNOSIS — R01.1 HEART MURMUR: ICD-10-CM

## 2021-09-27 DIAGNOSIS — R07.9 CHEST PAIN, UNSPECIFIED TYPE: Primary | ICD-10-CM

## 2021-09-27 DIAGNOSIS — K21.00 GASTROESOPHAGEAL REFLUX DISEASE WITH ESOPHAGITIS WITHOUT HEMORRHAGE: ICD-10-CM

## 2021-09-27 DIAGNOSIS — R11.0 NAUSEA: ICD-10-CM

## 2021-09-27 DIAGNOSIS — R42 LIGHTHEADEDNESS: ICD-10-CM

## 2021-09-27 PROCEDURE — 1036F TOBACCO NON-USER: CPT | Performed by: FAMILY MEDICINE

## 2021-09-27 PROCEDURE — 1160F RVW MEDS BY RX/DR IN RCRD: CPT | Performed by: FAMILY MEDICINE

## 2021-09-27 PROCEDURE — 99213 OFFICE O/P EST LOW 20 MIN: CPT | Performed by: FAMILY MEDICINE

## 2021-09-27 NOTE — PROGRESS NOTES
Assessment/Plan:     There are no diagnoses linked to this encounter  Subjective:      Patient ID: Laurence Peters is a 70 y o  female  Patient states she has a terrible burning sensation in her stomach  She states it came back this stomach  She has history of GERD  She is assuming it is due to something she ate  Sometimes better with milk or mylanta  She does feel a soreness in between her sternum  She states she is lightheaded in morning and nauseated in the am  She is feeling lightheaded and nauseated  Has not had   Took last BP about 20 minutes ago  Patient is having problems sleeping at night  She has been taking a lot of herbal teas  Helps with sleep for about 5 weeks or more  Does have history of headaches  Noticed more issues recently  Also has history of       The following portions of the patient's history were reviewed and updated as appropriate: allergies, current medications, past family history, past medical history, past social history, past surgical history, and problem list     Review of Systems      Objective: There were no vitals taken for this visit           Physical Exam

## 2021-09-27 NOTE — PROGRESS NOTES
Virtual Regular Visit    Verification of patient location:    Patient is located in the following state in which I hold an active license [place]      Assessment/Plan:    Problem List Items Addressed This Visit        Digestive    Gastroesophageal reflux disease with esophagitis without hemorrhage       Cardiovascular and Mediastinum    Essential hypertension       Other    Dyslipidemia    Heart murmur    Chest pain - Primary    Lightheadedness      Other Visit Diagnoses     Nausea                   Reason for visit is   Chief Complaint   Patient presents with    Virtual Regular Visit        Encounter provider Josi Paiz DO    Provider located at [place] [id number]  [address] [facility] [address] [facility] [address]  [phone]      Recent Visits  No visits were found meeting these conditions  Showing recent visits within past 7 days and meeting all other requirements  Today's Visits  Date Type Provider Dept   09/27/21 Telephone [facility]   09/27/21 Telemedicine DO [facility]   Showing today's visits and meeting all other requirements  Future Appointments  No visits were found meeting these conditions  Showing future appointments within next 150 days and meeting all other requirements       The patient was identified by name and date of birth  Zakiya Feliz was informed that this is a telemedicine visit and that the visit is being conducted through [address] [place] [address] Now and patient was informed that this is a secure, HIPAA-compliant platform  She agrees to proceed     My office door was closed  No one else was in the room  She acknowledged consent and understanding of privacy and security of the video platform  The patient has agreed to participate and understands they can discontinue the visit at any time  Patient is aware this is a billable service       Subjective  Zakiya Feliz is a 70 y o  female with concerns of lightheadedness  Patient presents with about 1 week of lightheadedness and chest discomfort  Per patient she has had lightheadedness especially in am  She also complains of nausea with this  She is feeling lightheaded and nauseated still  Seems to have terrible burning sensation substernal  She does have history of GERD and assumes it is related to something she ate  Seems to get better at times with milk or MyLanta  She states there is soreness between her sternum  No fevers  Has not taken BP  She states she did just take her BP medication about 20 minutes ago  She was having trouble sleeping and started taking herbal teas about 5 or so weeks ago and unsure if related to her onset of symptoms  She also is complaining of a headache  She does have history of headache but noticed more recently  Past Medical History:   Diagnosis Date    Asthma     variant cough asthma    Hyperlipidemia     Hypertension        Past Surgical History:   Procedure Laterality Date    HYSTERECTOMY      UTERINE SUSPENSION         Current Outpatient Medications   Medication Sig Dispense Refill    amLODIPine (NORVASC) 10 mg tablet TAKE 1 TABLET DAILY 90 tablet 1    atorvastatin (LIPITOR) 20 mg tablet TAKE 1 TABLET (20 MG TOTAL) BY MOUTH DAILY 90 tablet 1    ezetimibe (ZETIA) 10 mg tablet TAKE 1 TABLET (10 MG TOTAL) BY MOUTH DAILY 90 tablet 1    latanoprost (XALATAN) 0 005 % ophthalmic solution        No current facility-administered medications for this visit  Allergies   Allergen Reactions    Codeine Rash       Review of Systems   Constitutional: Positive for appetite change and fatigue  Negative for chills and fever  Respiratory: Negative for shortness of breath  Cardiovascular: Positive for chest pain  Gastrointestinal: Positive for abdominal pain and nausea  Neurological: Positive for light-headedness and headaches  Psychiatric/Behavioral: The patient is nervous/anxious          Video Exam    There were no vitals filed for this visit  Physical Exam  Constitutional:       General: She is not in acute distress  Appearance: She is not ill-appearing, toxic-appearing or diaphoretic  HENT:      Head: Normocephalic and atraumatic  Pulmonary:      Effort: Pulmonary effort is normal    Neurological:      General: No focal deficit present  Mental Status: She is alert  Psychiatric:         Mood and Affect: Mood is anxious  Behavior: Behavior normal          Thought Content: Thought content normal          Judgment: Judgment normal           I spent 15 minutes directly with the patient during this visit    [address] [place] [address] verbally agrees to participate in [employer]  Pt is aware that [employer] could be limited without vital signs or the ability to perform a full hands-on physical Dorian Hunt understands she or the provider may request at any time to terminate the video visit and request the patient to seek care or treatment in person

## 2021-09-27 NOTE — TELEPHONE ENCOUNTER
Called pt to review current symptoms  Pt scheduled today @ [phone] for a VV, but that visit is not appropriate due to her symptoms on the visit note

## 2021-09-29 NOTE — ASSESSMENT & PLAN NOTE
May be related to her symptoms but advised needed further evaluation given atypical chest pain; pt in agreement with plan

## 2021-09-29 NOTE — ASSESSMENT & PLAN NOTE
Advised given patient's age and symptoms she needed to be physically assessed; given chest pain advised ER for further evaluation; pt agrees with plan and will have daughter transport her

## 2021-10-14 ENCOUNTER — SOCIAL WORK (OUTPATIENT)
Dept: BEHAVIORAL/MENTAL HEALTH CLINIC | Facility: CLINIC | Age: 76
End: 2021-10-14
Payer: COMMERCIAL

## 2021-10-14 DIAGNOSIS — F41.1 GENERALIZED ANXIETY DISORDER: Primary | ICD-10-CM

## 2021-10-14 PROCEDURE — 90834 PSYTX W PT 45 MINUTES: CPT | Performed by: SOCIAL WORKER

## 2021-10-18 PROBLEM — F41.1 GENERALIZED ANXIETY DISORDER: Status: ACTIVE | Noted: 2021-10-18

## 2021-10-29 ENCOUNTER — CONSULT (OUTPATIENT)
Dept: GASTROENTEROLOGY | Facility: MEDICAL CENTER | Age: 76
End: 2021-10-29
Payer: COMMERCIAL

## 2021-10-29 VITALS
DIASTOLIC BLOOD PRESSURE: 68 MMHG | BODY MASS INDEX: 22.6 KG/M2 | SYSTOLIC BLOOD PRESSURE: 118 MMHG | TEMPERATURE: 97.8 F | WEIGHT: 127.6 LBS | HEART RATE: 87 BPM

## 2021-10-29 DIAGNOSIS — K21.9 GASTROESOPHAGEAL REFLUX DISEASE, UNSPECIFIED WHETHER ESOPHAGITIS PRESENT: Primary | ICD-10-CM

## 2021-10-29 DIAGNOSIS — Z12.11 COLON CANCER SCREENING: ICD-10-CM

## 2021-10-29 DIAGNOSIS — R10.9 ABDOMINAL DISCOMFORT: ICD-10-CM

## 2021-10-29 PROCEDURE — 99203 OFFICE O/P NEW LOW 30 MIN: CPT | Performed by: STUDENT IN AN ORGANIZED HEALTH CARE EDUCATION/TRAINING PROGRAM

## 2021-10-29 RX ORDER — FAMOTIDINE 20 MG/1
20 TABLET, FILM COATED ORAL 2 TIMES DAILY PRN
Qty: 180 TABLET | Refills: 3 | Status: SHIPPED | OUTPATIENT
Start: 2021-10-29

## 2021-10-29 RX ORDER — FAMOTIDINE 20 MG/1
20 TABLET, FILM COATED ORAL 2 TIMES DAILY
COMMUNITY
End: 2021-11-26

## 2021-10-29 RX ORDER — HYDROXYZINE HYDROCHLORIDE 25 MG/1
25 TABLET, FILM COATED ORAL EVERY 6 HOURS PRN
COMMUNITY
End: 2021-11-26

## 2021-11-06 ENCOUNTER — TELEPHONE (OUTPATIENT)
Dept: FAMILY MEDICINE CLINIC | Facility: CLINIC | Age: 76
End: 2021-11-06

## 2021-11-07 ENCOUNTER — NURSE TRIAGE (OUTPATIENT)
Dept: OTHER | Facility: OTHER | Age: 76
End: 2021-11-07

## 2021-11-17 ENCOUNTER — OFFICE VISIT (OUTPATIENT)
Dept: URGENT CARE | Facility: CLINIC | Age: 76
End: 2021-11-17
Payer: COMMERCIAL

## 2021-11-17 VITALS
WEIGHT: 127 LBS | DIASTOLIC BLOOD PRESSURE: 80 MMHG | HEIGHT: 63 IN | OXYGEN SATURATION: 100 % | HEART RATE: 115 BPM | BODY MASS INDEX: 22.5 KG/M2 | RESPIRATION RATE: 22 BRPM | SYSTOLIC BLOOD PRESSURE: 176 MMHG | TEMPERATURE: 97.2 F

## 2021-11-17 DIAGNOSIS — R05.9 COUGH: Primary | ICD-10-CM

## 2021-11-17 PROCEDURE — U0005 INFEC AGEN DETEC AMPLI PROBE: HCPCS | Performed by: PHYSICIAN ASSISTANT

## 2021-11-17 PROCEDURE — 99213 OFFICE O/P EST LOW 20 MIN: CPT | Performed by: PHYSICIAN ASSISTANT

## 2021-11-17 PROCEDURE — U0003 INFECTIOUS AGENT DETECTION BY NUCLEIC ACID (DNA OR RNA); SEVERE ACUTE RESPIRATORY SYNDROME CORONAVIRUS 2 (SARS-COV-2) (CORONAVIRUS DISEASE [COVID-19]), AMPLIFIED PROBE TECHNIQUE, MAKING USE OF HIGH THROUGHPUT TECHNOLOGIES AS DESCRIBED BY CMS-2020-01-R: HCPCS | Performed by: PHYSICIAN ASSISTANT

## 2021-11-17 PROCEDURE — G0463 HOSPITAL OUTPT CLINIC VISIT: HCPCS | Performed by: PHYSICIAN ASSISTANT

## 2021-11-17 RX ORDER — BENZONATATE 200 MG/1
200 CAPSULE ORAL 3 TIMES DAILY PRN
Qty: 20 CAPSULE | Refills: 0 | Status: SHIPPED | OUTPATIENT
Start: 2021-11-17 | End: 2022-01-17 | Stop reason: ALTCHOICE

## 2021-11-18 LAB — SARS-COV-2 RNA RESP QL NAA+PROBE: NEGATIVE

## 2021-11-26 ENCOUNTER — TELEMEDICINE (OUTPATIENT)
Dept: FAMILY MEDICINE CLINIC | Facility: CLINIC | Age: 76
End: 2021-11-26
Payer: COMMERCIAL

## 2021-11-26 DIAGNOSIS — M54.9 UPPER BACK PAIN: ICD-10-CM

## 2021-11-26 DIAGNOSIS — D17.1 LIPOMA OF TORSO: ICD-10-CM

## 2021-11-26 DIAGNOSIS — J45.21 MILD INTERMITTENT ASTHMA WITH ACUTE EXACERBATION: Primary | ICD-10-CM

## 2021-11-26 PROCEDURE — 99213 OFFICE O/P EST LOW 20 MIN: CPT | Performed by: FAMILY MEDICINE

## 2021-11-26 PROCEDURE — 1160F RVW MEDS BY RX/DR IN RCRD: CPT | Performed by: FAMILY MEDICINE

## 2021-11-26 PROCEDURE — 1036F TOBACCO NON-USER: CPT | Performed by: FAMILY MEDICINE

## 2021-11-26 RX ORDER — METHYLPREDNISOLONE 4 MG/1
TABLET ORAL
Qty: 21 EACH | Refills: 0 | Status: SHIPPED | OUTPATIENT
Start: 2021-11-26 | End: 2021-11-26

## 2021-11-26 RX ORDER — PREDNISONE 10 MG/1
TABLET ORAL
Qty: 30 TABLET | Refills: 0 | Status: SHIPPED | OUTPATIENT
Start: 2021-11-26 | End: 2022-01-17 | Stop reason: ALTCHOICE

## 2021-11-26 RX ORDER — ROSUVASTATIN AND EZETIMIBE 10; 10.4 MG/1; MG/1
TABLET ORAL
COMMUNITY
End: 2022-02-02 | Stop reason: CLARIF

## 2021-12-07 ENCOUNTER — TELEPHONE (OUTPATIENT)
Dept: PREADMISSION TESTING | Facility: HOSPITAL | Age: 76
End: 2021-12-07

## 2021-12-10 ENCOUNTER — TELEPHONE (OUTPATIENT)
Dept: GASTROENTEROLOGY | Facility: CLINIC | Age: 76
End: 2021-12-10

## 2021-12-14 ENCOUNTER — TELEPHONE (OUTPATIENT)
Dept: GASTROENTEROLOGY | Facility: CLINIC | Age: 76
End: 2021-12-14

## 2021-12-14 ENCOUNTER — TELEPHONE (OUTPATIENT)
Dept: FAMILY MEDICINE CLINIC | Facility: CLINIC | Age: 76
End: 2021-12-14

## 2022-01-11 ENCOUNTER — TELEPHONE (OUTPATIENT)
Dept: FAMILY MEDICINE CLINIC | Facility: CLINIC | Age: 77
End: 2022-01-11

## 2022-01-11 NOTE — TELEPHONE ENCOUNTER
Pt LM stating she was seen in UC and given a script for Mucinex 600 and the pharmacy only had Mucinex 1200  Pt would like to know if it is okay she take the 1200 instead

## 2022-01-11 NOTE — TELEPHONE ENCOUNTER
Pt notified  Pt states her sxs started on 12/28/21  Pt was tested on 1/6/22 and was negative  Pt still has cough with some mucus  Will try and cut Mucinex in half also is using nasal spray and Tylenol as needed  Will call office in 1 week if sxs are not resolving

## 2022-01-17 ENCOUNTER — TELEMEDICINE (OUTPATIENT)
Dept: FAMILY MEDICINE CLINIC | Facility: CLINIC | Age: 77
End: 2022-01-17
Payer: COMMERCIAL

## 2022-01-17 DIAGNOSIS — R05.9 COUGH: Primary | ICD-10-CM

## 2022-01-17 DIAGNOSIS — R09.81 HEAD CONGESTION: ICD-10-CM

## 2022-01-17 PROCEDURE — 99213 OFFICE O/P EST LOW 20 MIN: CPT | Performed by: FAMILY MEDICINE

## 2022-01-17 NOTE — PROGRESS NOTES
COVID-19 Outpatient Progress Note    Assessment/Plan:    Problem List Items Addressed This Visit     None      Visit Diagnoses     Cough    -  Primary    Relevant Orders    COVID Only- Collected at [facility] (Completed)    Head congestion        Relevant Orders    COVID Only- Collected at [facility] (Completed)         Disposition:     Referred patient to centralized site to test for COVID-19  Discussed symptom directed medication options with patient  I asked patient to go to the tent for a COVID test but she says she wants to come to the [facility] in [place]  for testing  I did tell her that she may wait longer than if she went to the tent on [address] [facility]  We discussed her medications  She should use Symbicort  Be sure to keep well hydrated  Also, she may take Mucinex as I had instructed before but not the 1200 mg formulation  And if she takes Mucinex DM, she should not take Delsym  I have spent 16 minutes directly with the patient  Encounter provider Rodrigo Foster DO    Provider located at [place] [id number] [facility] [address] [place] [id number] [phone]    Recent Visits  Date Type Provider Dept   01/21/22 Telephone Rodrigo Foster DO [id number] recent visits within past 7 days and meeting all other requirements  Future Appointments  No visits were found meeting these conditions  Showing future appointments within next 150 days and meeting all other requirements     This virtual check-in was done via [employer] and patient was informed that this is a secure, HIPAA-compliant platform  She agrees to proceed  Patient agrees to participate in a virtual check in via telephone or video visit instead of presenting to the office to address urgent/immediate medical needs  Patient is aware this is a billable service  After connecting through [facility], the patient was identified by name and date of birth  Renu Cueto was informed that this was a telemedicine visit and that the exam was being conducted confidentially over secure lines  My office door was closed  No one else was in the room  Renu Cueto acknowledged consent and understanding of privacy and security of the telemedicine visit  I informed the patient that I have reviewed her record in Epic and presented the opportunity for her to ask any questions regarding the visit today  The patient agreed to participate  Verification of patient location:  Patient is located in the following state in which I hold an active license: [place]    Subjective:   Renu Cueto is a 78 y o  female who is concerned about COVID-19  Patient's symptoms include cough  Patient denies fever, chills, fatigue, malaise, congestion, rhinorrhea, sore throat, anosmia, loss of taste, shortness of breath, chest tightness, abdominal pain, nausea, vomiting, diarrhea, myalgias and headaches  COVID-19 vaccination status: Fully vaccinated with booster    Exposure:   Contact with a person who is under investigation (PUI) for or who is positive for COVID-19 within the last 14 days?: Yes    Hospitalized recently for fever and/or lower respiratory symptoms?: No      Currently a healthcare worker that is involved in direct patient care?: No      Works in a special setting where the risk of COVID-19 transmission may be high? (this may include long-term care, correctional and MCC facilities; homeless shelters; assisted-living facilities and group homes ): No      Patient is concerned about COVID exposure - possibly 1/1  Feeling weak everyday  Feeling mucous in her throat      Lab Results   Component Value Date    SARSCOV2 Positive (A) 01/19/2022    Elias Ormond Not Detected 10/14/2020    [address] [facility] Not Detected 01/06/2022     Past Medical History:   Diagnosis Date    Asthma     variant cough asthma    Hyperlipidemia     Hypertension      Past Surgical History:   Procedure Laterality Date    HYSTERECTOMY      UTERINE SUSPENSION       Current Outpatient Medications   Medication Sig Dispense Refill    albuterol (Ventolin HFA) 90 mcg/act inhaler Inhale 2 puffs every 6 (six) hours as needed for wheezing 54 g 1    budesonide-formoterol (Symbicort) 160-4 5 mcg/act inhaler Inhale 2 puffs 2 (two) times a day Rinse mouth after use  30 6 g 1    amLODIPine (NORVASC) 10 mg tablet TAKE 1 TABLET EVERY DAY 90 tablet 1    Ezetimibe-Rosuvastatin 10-10 MG TABS Take by mouth      famotidine (PEPCID) 20 mg tablet Take 1 tablet (20 mg total) by mouth 2 (two) times a day as needed for heartburn 180 tablet 3    latanoprost (XALATAN) 0 005 % ophthalmic solution       Simethicone (MYLANTA GAS RELIEF MAXIMUM STR PO) Take by mouth       No current facility-administered medications for this visit  Allergies   Allergen Reactions    Codeine Rash       Review of Systems   Constitutional: Negative for chills, fatigue and fever  HENT: Negative for congestion, rhinorrhea and sore throat  Respiratory: Positive for cough  Negative for chest tightness and shortness of breath  Gastrointestinal: Negative for abdominal pain, diarrhea, nausea and vomiting  Musculoskeletal: Negative for myalgias  Neurological: Negative for headaches  Objective: There were no vitals filed for this visit  Physical Exam  Constitutional:       General: She is not in acute distress  HENT:      Head: Normocephalic  Pulmonary:      Effort: Pulmonary effort is normal  No respiratory distress  Musculoskeletal:      Comments: Patient is ambulating  Skin:     Coloration: Skin is not pale  Neurological:      Mental Status: She is alert and oriented to person, place, and time  Psychiatric:         Mood and Affect: Mood normal          VIRTUAL VISIT DISCLAIMER    Nishant Jaramillo verbally agrees to participate in [employer]   Pt is aware that Virtual Care Services could be limited without vital signs or the ability to perform a full hands-on physical Rice Ache understands she or the provider may request at any time to terminate the video visit and request the patient to seek care or treatment in person

## 2022-01-19 DIAGNOSIS — I10 ESSENTIAL HYPERTENSION: ICD-10-CM

## 2022-01-19 PROCEDURE — U0005 INFEC AGEN DETEC AMPLI PROBE: HCPCS | Performed by: FAMILY MEDICINE

## 2022-01-19 PROCEDURE — U0003 INFECTIOUS AGENT DETECTION BY NUCLEIC ACID (DNA OR RNA); SEVERE ACUTE RESPIRATORY SYNDROME CORONAVIRUS 2 (SARS-COV-2) (CORONAVIRUS DISEASE [COVID-19]), AMPLIFIED PROBE TECHNIQUE, MAKING USE OF HIGH THROUGHPUT TECHNOLOGIES AS DESCRIBED BY CMS-2020-01-R: HCPCS | Performed by: FAMILY MEDICINE

## 2022-01-19 RX ORDER — AMLODIPINE BESYLATE 10 MG/1
TABLET ORAL
Qty: 90 TABLET | Refills: 1 | Status: SHIPPED | OUTPATIENT
Start: 2022-01-19 | End: 2022-06-15

## 2022-01-21 ENCOUNTER — TELEPHONE (OUTPATIENT)
Dept: FAMILY MEDICINE CLINIC | Facility: CLINIC | Age: 77
End: 2022-01-21

## 2022-01-21 NOTE — TELEPHONE ENCOUNTER
I spoke to patient last night regarding her positive COVID test   She had a negative test on the 6th even though she thought she was exposed on the 1st of this month  Her test on 18th was positive for COVID  I cannot tell her exactly when she was exposed and since she has had symptoms off and on, I recommended that she isolate for another 5 days to be on the safe side  She continue with the Mucinex DM and her inhaler  She should call with any more symptoms or questions

## 2022-02-01 ENCOUNTER — TELEPHONE (OUTPATIENT)
Dept: PREADMISSION TESTING | Facility: HOSPITAL | Age: 77
End: 2022-02-01

## 2022-02-02 ENCOUNTER — APPOINTMENT (OUTPATIENT)
Dept: RADIOLOGY | Facility: CLINIC | Age: 77
End: 2022-02-02
Payer: COMMERCIAL

## 2022-02-02 ENCOUNTER — OFFICE VISIT (OUTPATIENT)
Dept: FAMILY MEDICINE CLINIC | Facility: CLINIC | Age: 77
End: 2022-02-02
Payer: COMMERCIAL

## 2022-02-02 VITALS
HEIGHT: 63 IN | HEART RATE: 93 BPM | BODY MASS INDEX: 22.04 KG/M2 | SYSTOLIC BLOOD PRESSURE: 130 MMHG | DIASTOLIC BLOOD PRESSURE: 72 MMHG | OXYGEN SATURATION: 97 % | WEIGHT: 124.4 LBS | TEMPERATURE: 97.8 F

## 2022-02-02 DIAGNOSIS — R53.83 FATIGUE, UNSPECIFIED TYPE: ICD-10-CM

## 2022-02-02 DIAGNOSIS — R05.9 COUGH: Primary | ICD-10-CM

## 2022-02-02 DIAGNOSIS — Z12.31 BREAST CANCER SCREENING BY MAMMOGRAM: ICD-10-CM

## 2022-02-02 DIAGNOSIS — Z78.0 POST-MENOPAUSAL: ICD-10-CM

## 2022-02-02 DIAGNOSIS — E78.5 DYSLIPIDEMIA: ICD-10-CM

## 2022-02-02 DIAGNOSIS — Z13.820 SCREENING FOR OSTEOPOROSIS: ICD-10-CM

## 2022-02-02 DIAGNOSIS — R05.9 COUGH: ICD-10-CM

## 2022-02-02 PROCEDURE — 1036F TOBACCO NON-USER: CPT | Performed by: FAMILY MEDICINE

## 2022-02-02 PROCEDURE — 3078F DIAST BP <80 MM HG: CPT | Performed by: FAMILY MEDICINE

## 2022-02-02 PROCEDURE — 3075F SYST BP GE 130 - 139MM HG: CPT | Performed by: FAMILY MEDICINE

## 2022-02-02 PROCEDURE — 1160F RVW MEDS BY RX/DR IN RCRD: CPT | Performed by: FAMILY MEDICINE

## 2022-02-02 PROCEDURE — 99213 OFFICE O/P EST LOW 20 MIN: CPT | Performed by: FAMILY MEDICINE

## 2022-02-02 PROCEDURE — 71046 X-RAY EXAM CHEST 2 VIEWS: CPT

## 2022-02-02 PROCEDURE — 3008F BODY MASS INDEX DOCD: CPT | Performed by: FAMILY MEDICINE

## 2022-02-02 RX ORDER — MELATONIN
1000 DAILY
COMMUNITY

## 2022-02-02 RX ORDER — EZETIMIBE 10 MG/1
10 TABLET ORAL DAILY
Start: 2022-02-02

## 2022-02-02 RX ORDER — PHENOL 1.4 %
600 AEROSOL, SPRAY (ML) MUCOUS MEMBRANE 2 TIMES DAILY WITH MEALS
COMMUNITY

## 2022-02-02 RX ORDER — ATORVASTATIN CALCIUM 20 MG/1
20 TABLET, FILM COATED ORAL DAILY
Start: 2022-02-02

## 2022-02-02 NOTE — PROGRESS NOTES
Assessment/Plan:    Patient is 21-year-old female seen after testing positive for COVID on 1/19  She is still with weakness and cough  I told her that the inflammation from the COVID virus can last weeks  Her lungs sounded fine but I ordered a chest x-ray to assure her that she is okay  I also ordered a blood count  I recommended that she use her Symbicort  Will call with xray and blood work  At this time, I do not feel that Prednisone is necessary at this time  Use symbicort  Can continue to use OTC mucous relief  Diagnoses and all orders for this visit:    Cough  -     XR chest pa & lateral; Future    Fatigue, unspecified type  -     XR chest pa & lateral; Future  -     CBC and differential; Future  -     Comprehensive metabolic panel; Future    Screening for osteoporosis  -     DXA bone density spine hip and pelvis; Future    Post-menopausal  -     DXA bone density spine hip and pelvis; Future    Breast cancer screening by mammogram  -     Mammo screening bilateral w 3d & cad; Future    Dyslipidemia  -     atorvastatin (LIPITOR) 20 mg tablet; Take 1 tablet (20 mg total) by mouth daily  -     ezetimibe (ZETIA) 10 mg tablet; Take 1 tablet (10 mg total) by mouth daily    Other orders  -     cholecalciferol (VITAMIN D3) 1,000 units tablet; Take 1,000 Units by mouth daily  -     calcium carbonate (OS-JULIETA) 600 MG tablet; Take 600 mg by mouth 2 (two) times a day with meals          Subjective:   Chief Complaint   Patient presents with    COVID-19     longhauler sxs        Patient ID: Lorna Vee is a 78 y o  female  Patient tested positive for COVID on 1/19/2022  Still feeling weak, lightheaded  Not sleeping well  Has cough- taking Symbicort  Was on Prednisone in November for cough, it went away  Then in end of December/beginning of January had bad headache, runny nose        The following portions of the patient's history were reviewed and updated as appropriate: allergies, current medications, past family history, past medical history, past social history, past surgical history and problem list     Review of Systems   Constitutional: Positive for fatigue  Negative for chills and fever  HENT: Negative for congestion and sore throat  Respiratory: Positive for cough  Negative for chest tightness  Cardiovascular: Negative for chest pain and palpitations  Gastrointestinal: Negative for abdominal pain, constipation, diarrhea and nausea  Genitourinary: Negative for difficulty urinating  Skin: Negative  Neurological: Negative for dizziness and headaches  Psychiatric/Behavioral: Positive for sleep disturbance  The patient is nervous/anxious  Objective:      /72 (BP Location: Left arm, Patient Position: Sitting)   Pulse 93   Temp 97 8 °F (36 6 °C) (Tympanic)   Ht 5' 3" (1 6 m)   Wt 56 4 kg (124 lb 6 4 oz)   SpO2 97%   BMI 22 04 kg/m²          Physical Exam  Vitals and nursing note reviewed  Constitutional:       General: She is not in acute distress  HENT:      Head: Normocephalic  Neck:      Thyroid: No thyromegaly  Cardiovascular:      Rate and Rhythm: Normal rate and regular rhythm  Heart sounds: Normal heart sounds  Pulmonary:      Effort: Pulmonary effort is normal       Breath sounds: Normal breath sounds  No wheezing  Musculoskeletal:      Right lower leg: No edema  Left lower leg: No edema  Lymphadenopathy:      Cervical: No cervical adenopathy  Skin:     General: Skin is warm and dry  Neurological:      Mental Status: She is alert and oriented to person, place, and time     Psychiatric:         Mood and Affect: Mood normal  Eyes with no visual disturbances.  Ears clean and dry and no hearing difficulties. Nose with pink mucosa and no drainage.  Mouth mucous membranes moist and pink.  No tenderness or swelling to throat or neck.

## 2022-02-03 ENCOUNTER — APPOINTMENT (OUTPATIENT)
Dept: LAB | Facility: CLINIC | Age: 77
End: 2022-02-03
Payer: COMMERCIAL

## 2022-02-03 DIAGNOSIS — R53.83 FATIGUE, UNSPECIFIED TYPE: ICD-10-CM

## 2022-02-03 LAB
ALBUMIN SERPL BCP-MCNC: 3.8 G/DL (ref 3.5–5)
ALP SERPL-CCNC: 96 U/L (ref 46–116)
ALT SERPL W P-5'-P-CCNC: 26 U/L (ref 12–78)
ANION GAP SERPL CALCULATED.3IONS-SCNC: 4 MMOL/L (ref 4–13)
AST SERPL W P-5'-P-CCNC: 17 U/L (ref 5–45)
BASOPHILS # BLD AUTO: 0.02 THOUSANDS/ΜL (ref 0–0.1)
BASOPHILS NFR BLD AUTO: 0 % (ref 0–1)
BILIRUB SERPL-MCNC: 1.2 MG/DL (ref 0.2–1)
BUN SERPL-MCNC: 22 MG/DL (ref 5–25)
CALCIUM SERPL-MCNC: 9.2 MG/DL (ref 8.3–10.1)
CHLORIDE SERPL-SCNC: 104 MMOL/L (ref 100–108)
CO2 SERPL-SCNC: 32 MMOL/L (ref 21–32)
CREAT SERPL-MCNC: 0.75 MG/DL (ref 0.6–1.3)
EOSINOPHIL # BLD AUTO: 0.14 THOUSAND/ΜL (ref 0–0.61)
EOSINOPHIL NFR BLD AUTO: 2 % (ref 0–6)
ERYTHROCYTE [DISTWIDTH] IN BLOOD BY AUTOMATED COUNT: 12.5 % (ref 11.6–15.1)
GFR SERPL CREATININE-BSD FRML MDRD: 78 ML/MIN/1.73SQ M
GLUCOSE SERPL-MCNC: 113 MG/DL (ref 65–140)
HCT VFR BLD AUTO: 45 % (ref 34.8–46.1)
HGB BLD-MCNC: 15 G/DL (ref 11.5–15.4)
IMM GRANULOCYTES # BLD AUTO: 0.02 THOUSAND/UL (ref 0–0.2)
IMM GRANULOCYTES NFR BLD AUTO: 0 % (ref 0–2)
LYMPHOCYTES # BLD AUTO: 2.97 THOUSANDS/ΜL (ref 0.6–4.47)
LYMPHOCYTES NFR BLD AUTO: 39 % (ref 14–44)
MCH RBC QN AUTO: 31.6 PG (ref 26.8–34.3)
MCHC RBC AUTO-ENTMCNC: 33.3 G/DL (ref 31.4–37.4)
MCV RBC AUTO: 95 FL (ref 82–98)
MONOCYTES # BLD AUTO: 0.53 THOUSAND/ΜL (ref 0.17–1.22)
MONOCYTES NFR BLD AUTO: 7 % (ref 4–12)
NEUTROPHILS # BLD AUTO: 3.89 THOUSANDS/ΜL (ref 1.85–7.62)
NEUTS SEG NFR BLD AUTO: 52 % (ref 43–75)
NRBC BLD AUTO-RTO: 0 /100 WBCS
PLATELET # BLD AUTO: 251 THOUSANDS/UL (ref 149–390)
PMV BLD AUTO: 10.8 FL (ref 8.9–12.7)
POTASSIUM SERPL-SCNC: 3.9 MMOL/L (ref 3.5–5.3)
PROT SERPL-MCNC: 7.4 G/DL (ref 6.4–8.2)
RBC # BLD AUTO: 4.75 MILLION/UL (ref 3.81–5.12)
SODIUM SERPL-SCNC: 140 MMOL/L (ref 136–145)
WBC # BLD AUTO: 7.57 THOUSAND/UL (ref 4.31–10.16)

## 2022-02-03 PROCEDURE — 85025 COMPLETE CBC W/AUTO DIFF WBC: CPT

## 2022-02-03 PROCEDURE — 80053 COMPREHEN METABOLIC PANEL: CPT

## 2022-02-03 PROCEDURE — 36415 COLL VENOUS BLD VENIPUNCTURE: CPT

## 2022-02-22 ENCOUNTER — TELEPHONE (OUTPATIENT)
Dept: FAMILY MEDICINE CLINIC | Facility: CLINIC | Age: 77
End: 2022-02-22

## 2022-02-22 NOTE — TELEPHONE ENCOUNTER
Pt LM stating she still has a cough for over a month  Pt states the way she is coughing makes her heart race  Pt states she has tries Delsym, Mucinex, and Symbicort with no relief

## 2022-02-22 NOTE — TELEPHONE ENCOUNTER
Call patient  I noted she had taken Singulair in the past   I would like to try that again  I am sending a prescription to her pharmacy

## 2022-02-23 NOTE — TELEPHONE ENCOUNTER
Call patient  I did not prescribe prednisone when I saw her at the beginning of February as I did not feel that her exam warranted the need for steroids  She had just had a course of prednisone in November we do not like to write for them often  It has nothing to do with her having had COVID  I will send a prescription for a prednisone taper but I would still recommend that she try the Singulair

## 2022-02-23 NOTE — TELEPHONE ENCOUNTER
Patient informed to try Singulair again  She states she has been coughing constantly for 5 weeks and is requesting prednisone  Says it's the only thing that knocks it out  She wants to know if the reason we aren't prescribing it has anything to do with her recently having COVID? (I don't see any prior messages that she requested prednisone and was denied)  Please advise

## 2022-03-04 ENCOUNTER — TELEPHONE (OUTPATIENT)
Dept: GASTROENTEROLOGY | Facility: CLINIC | Age: 77
End: 2022-03-04

## 2022-03-04 NOTE — TELEPHONE ENCOUNTER
Patients GI provider:  Dr Junito Pickett    Number to return call: [phone]    Reason for call: Pt called stating that she is currently on prednisone and other meds for her asthma  She would like to know if she is able to have her colonoscopy while on these meds or does she need to stop  Above is her number       Scheduled procedure/appointment date if applicable: Apt/procedure NA

## 2022-03-07 NOTE — TELEPHONE ENCOUNTER
Pt does not currently have colonoscopy scheduled but is asking if being on prednisone is an issue for the scope?

## 2022-03-14 NOTE — TELEPHONE ENCOUNTER
Spoke with patient  She had COVID in January/February and required steroids for some breathing difficulty/asthma  Now off prednisone and feels breathing is back to baseline  Told her it is ok to schedule her EGD at [facility] for sometime in April or May

## 2022-03-14 NOTE — TELEPHONE ENCOUNTER
Patient is calling back and wants to speak directly to Dr Bebe Davis  She states that she is waiting to hear back about whethere or not it is safe for her to schdule her procedure now that she is done taking prednisone and hydroxyzine for her asthma and COVID  She finished both medication last week and never heard back from the office  She would like to speak to someone asap  Please advise

## 2022-03-15 NOTE — TELEPHONE ENCOUNTER
TC to pt to schedule EGD      Scheduled date of EGD(as of today):  5/12/22  Physician performing EGD:  Dr Nurys Rankin  Location of EGD:  [facility]  Instructions reviewed with patient by:  Vilma Gusman pt retains instructions provided in office  Clearances: n/a

## 2022-03-16 ENCOUNTER — OFFICE VISIT (OUTPATIENT)
Dept: URGENT CARE | Facility: CLINIC | Age: 77
End: 2022-03-16
Payer: COMMERCIAL

## 2022-03-16 ENCOUNTER — APPOINTMENT (OUTPATIENT)
Dept: LAB | Facility: CLINIC | Age: 77
End: 2022-03-16
Payer: COMMERCIAL

## 2022-03-16 VITALS
HEART RATE: 104 BPM | OXYGEN SATURATION: 96 % | HEIGHT: 63 IN | RESPIRATION RATE: 16 BRPM | DIASTOLIC BLOOD PRESSURE: 84 MMHG | WEIGHT: 124 LBS | TEMPERATURE: 97.6 F | BODY MASS INDEX: 21.97 KG/M2 | SYSTOLIC BLOOD PRESSURE: 142 MMHG

## 2022-03-16 DIAGNOSIS — F41.9 ANXIETY: ICD-10-CM

## 2022-03-16 DIAGNOSIS — R17 ELEVATED BILIRUBIN: ICD-10-CM

## 2022-03-16 DIAGNOSIS — G47.00 INSOMNIA, UNSPECIFIED TYPE: ICD-10-CM

## 2022-03-16 DIAGNOSIS — R42 DIZZINESS AND GIDDINESS: Primary | ICD-10-CM

## 2022-03-16 LAB
ALBUMIN SERPL BCP-MCNC: 4 G/DL (ref 3.5–5)
ALP SERPL-CCNC: 79 U/L (ref 46–116)
ALT SERPL W P-5'-P-CCNC: 30 U/L (ref 12–78)
ANION GAP SERPL CALCULATED.3IONS-SCNC: 5 MMOL/L (ref 4–13)
AST SERPL W P-5'-P-CCNC: 21 U/L (ref 5–45)
BILIRUB DIRECT SERPL-MCNC: 0.24 MG/DL (ref 0–0.2)
BILIRUB SERPL-MCNC: 0.95 MG/DL (ref 0.2–1)
BUN SERPL-MCNC: 10 MG/DL (ref 5–25)
CALCIUM SERPL-MCNC: 9.4 MG/DL (ref 8.3–10.1)
CHLORIDE SERPL-SCNC: 107 MMOL/L (ref 100–108)
CO2 SERPL-SCNC: 29 MMOL/L (ref 21–32)
CREAT SERPL-MCNC: 0.63 MG/DL (ref 0.6–1.3)
GFR SERPL CREATININE-BSD FRML MDRD: 88 ML/MIN/1.73SQ M
GLUCOSE P FAST SERPL-MCNC: 110 MG/DL (ref 65–99)
POTASSIUM SERPL-SCNC: 3.8 MMOL/L (ref 3.5–5.3)
PROT SERPL-MCNC: 7.6 G/DL (ref 6.4–8.2)
SODIUM SERPL-SCNC: 141 MMOL/L (ref 136–145)

## 2022-03-16 PROCEDURE — 93010 ELECTROCARDIOGRAM REPORT: CPT | Performed by: INTERNAL MEDICINE

## 2022-03-16 PROCEDURE — 80076 HEPATIC FUNCTION PANEL: CPT

## 2022-03-16 PROCEDURE — 93005 ELECTROCARDIOGRAM TRACING: CPT | Performed by: PHYSICIAN ASSISTANT

## 2022-03-16 PROCEDURE — 80048 BASIC METABOLIC PNL TOTAL CA: CPT

## 2022-03-16 PROCEDURE — 36415 COLL VENOUS BLD VENIPUNCTURE: CPT

## 2022-03-16 PROCEDURE — 99213 OFFICE O/P EST LOW 20 MIN: CPT | Performed by: PHYSICIAN ASSISTANT

## 2022-03-16 PROCEDURE — G0463 HOSPITAL OUTPT CLINIC VISIT: HCPCS | Performed by: PHYSICIAN ASSISTANT

## 2022-03-16 NOTE — PROGRESS NOTES
[facility]    NAME: Kiara Mike is a 78 y o  female  : 1945    MRN: [id number]  DATE: 2022  TIME: 2:38 PM    Assessment and Plan   Dizziness and giddiness [R42]  1  Dizziness and giddiness  ECG 12 lead   2  Insomnia, unspecified type     3  Anxiety       EKG:  Mild sinus tachycardia at 1:06  No acute ST T-wave changes  Good R-wave progression  Patient Instructions   Patient Instructions   Until your seen by the primary care provider, I would recommend that you continue your hydroxyzine to help with sleep and anxiety  Stay well hydrated  Avoid sudden changes in body positions from laying to standing, sitting to standing as may cause drop in blood pressure that can cause dizziness and associated nausea  This may be worse 1st thing in the morning  Small frequent meals  Make follow-up appointment with your primary care provider as soon as possible for further evaluation  If significant worsening of your symptoms proceed to emergency room immediately for further evaluation  Chief Complaint     Chief Complaint   Patient presents with    Anxiety     Pt c/o lightheadness, dizzy, and nauseous for the past few days  Pt states she also has not been sleeping the past few nights and admits to having anxiety  Last BM yesterday  Pt has endoscopy appt on 22 for digestive issues  Pt finished prednisone taper on 3/9/22  Pt also takes hydroxyzine, last dose 3/7/22  History of Present Illness   Kiara Mike presents to the clinic c/o  77-year-old female comes in stating the last several days she has not been sleeping well, feels more anxious, and  dizzy the 1st half of the day with associated nausea  She says when she 1st gets up she feels lightheaded and vision seems to be a little off  As the day goes on after several hours she feels better  She recently stopped her hydroxyzine because she is afraid of potential dementia side effects    She has use that for sleep and anxiety  She was recently on prednisone for cough  She has been off that for a few days  Her cough has resolved  She says that that never causes problems with sleeplessness and it has taken her cough away  She takes her blood pressure medicine at bedtime  She came in for blood work today and decided to be seen due to not feeling well the last several days  Denies chest pain, shortness of breath  Denies any weakness of arm or leg  No vertigo-like dizziness at this time although history of vertigo  Daughter states that patient is better on the nights that she sleeps well and after taking her hydroxyzine  Moved to this area 2019 to live with her daughter  From [place]  Review of Systems   Review of Systems   Constitutional: Positive for activity change, appetite change and fatigue  Negative for chills, diaphoresis and fever  Respiratory: Negative  Cardiovascular: Negative  Gastrointestinal: Positive for nausea  Negative for abdominal pain and vomiting  Neurological: Positive for light-headedness  Negative for dizziness and headaches  Current Medications     Long-Term Medications   Medication Sig Dispense Refill    amLODIPine (NORVASC) 10 mg tablet TAKE 1 TABLET EVERY DAY 90 tablet 1    atorvastatin (LIPITOR) 20 mg tablet Take 1 tablet (20 mg total) by mouth daily      budesonide-formoterol (Symbicort) 160-4 5 mcg/act inhaler Inhale 2 puffs 2 (two) times a day Rinse mouth after use   30 6 g 1    calcium carbonate (OS-JULIETA) 600 MG tablet Take 600 mg by mouth 2 (two) times a day with meals      cholecalciferol (VITAMIN D3) 1,000 units tablet Take 1,000 Units by mouth daily      ezetimibe (ZETIA) 10 mg tablet Take 1 tablet (10 mg total) by mouth daily      famotidine (PEPCID) 20 mg tablet Take 1 tablet (20 mg total) by mouth 2 (two) times a day as needed for heartburn 180 tablet 3    latanoprost (XALATAN) 0 005 % ophthalmic solution       montelukast (SINGULAIR) 10 mg tablet Take 1 tablet (10 mg total) by mouth daily at bedtime 30 tablet 0       Current Allergies     Allergies as of 03/16/2022 - Reviewed 03/16/2022   Allergen Reaction Noted    Codeine Rash 02/21/2020          The following portions of the patient's history were reviewed and updated as appropriate: allergies, current medications, past family history, past medical history, past social history, past surgical history and problem list   Past Medical History:   Diagnosis Date    Asthma     variant cough asthma    Hyperlipidemia     Hypertension      Past Surgical History:   Procedure Laterality Date    HYSTERECTOMY      UTERINE SUSPENSION       Family History   Problem Relation Age of Onset    No Known Problems Mother     No Known Problems Father     No Known Problems Sister     No Known Problems Daughter     No Known Problems Maternal Grandmother     No Known Problems Maternal Grandfather     No Known Problems Paternal Grandmother     No Known Problems Paternal Grandfather     No Known Problems Sister        Objective   /84   Pulse 104   Temp 97 6 °F (36 4 °C) (Tympanic)   Resp 16   Ht 5' 3" (1 6 m)   Wt 56 2 kg (124 lb)   SpO2 96%   BMI 21 97 kg/m²   No LMP recorded  Patient is postmenopausal        Physical Exam     Physical Exam  Vitals and nursing note reviewed  Constitutional:       General: She is not in acute distress  Appearance: Normal appearance  She is not ill-appearing, toxic-appearing or diaphoretic  Comments: Slightly anxious female no acute distress  Daughter did come in towards the end of her appointment  HENT:      Head: Normocephalic and atraumatic  Eyes:      General: No scleral icterus  Extraocular Movements: Extraocular movements intact  Conjunctiva/sclera: Conjunctivae normal       Pupils: Pupils are equal, round, and reactive to light  Cardiovascular:      Rate and Rhythm: Regular rhythm  Tachycardia present        Heart sounds: Murmur heard  No friction rub  No gallop  Pulmonary:      Effort: Pulmonary effort is normal  No respiratory distress  Breath sounds: Normal breath sounds  No stridor  No wheezing, rhonchi or rales  Abdominal:      Tenderness: There is no abdominal tenderness  There is no guarding  Musculoskeletal:      Cervical back: Normal range of motion and neck supple  No rigidity or tenderness  Right lower leg: No edema  Left lower leg: No edema  Lymphadenopathy:      Cervical: No cervical adenopathy  Skin:     General: Skin is warm and dry  Coloration: Skin is not pale  Neurological:      General: No focal deficit present  Mental Status: She is alert and oriented to person, place, and time     Psychiatric:         Mood and Affect: Mood normal          Behavior: Behavior normal

## 2022-03-16 NOTE — PATIENT INSTRUCTIONS
Until your seen by the primary care provider, I would recommend that you continue your hydroxyzine to help with sleep and anxiety  Stay well hydrated  Avoid sudden changes in body positions from laying to standing, sitting to standing as may cause drop in blood pressure that can cause dizziness and associated nausea  This may be worse 1st thing in the morning  Small frequent meals  Make follow-up appointment with your primary care provider as soon as possible for further evaluation  If significant worsening of your symptoms proceed to emergency room immediately for further evaluation

## 2022-03-17 LAB
ATRIAL RATE: 106 BPM
ATRIAL RATE: 106 BPM
P AXIS: 51 DEGREES
P AXIS: 57 DEGREES
PR INTERVAL: 158 MS
PR INTERVAL: 158 MS
QRS AXIS: 12 DEGREES
QRS AXIS: 13 DEGREES
QRSD INTERVAL: 76 MS
QRSD INTERVAL: 78 MS
QT INTERVAL: 328 MS
QT INTERVAL: 330 MS
QTC INTERVAL: 435 MS
QTC INTERVAL: 438 MS
T WAVE AXIS: 39 DEGREES
T WAVE AXIS: 43 DEGREES
VENTRICULAR RATE: 106 BPM
VENTRICULAR RATE: 106 BPM

## 2022-03-17 PROCEDURE — 93010 ELECTROCARDIOGRAM REPORT: CPT | Performed by: INTERNAL MEDICINE

## 2022-04-09 ENCOUNTER — HOSPITAL ENCOUNTER (OUTPATIENT)
Dept: MAMMOGRAPHY | Facility: MEDICAL CENTER | Age: 77
Discharge: HOME | End: 2022-04-09
Payer: COMMERCIAL

## 2022-04-09 VITALS — BODY MASS INDEX: 21.97 KG/M2 | WEIGHT: 124 LBS | HEIGHT: 63 IN

## 2022-04-09 DIAGNOSIS — Z12.31 BREAST CANCER SCREENING BY MAMMOGRAM: ICD-10-CM

## 2022-04-09 PROCEDURE — 77063 BREAST TOMOSYNTHESIS BI: CPT

## 2022-04-09 PROCEDURE — 77067 SCR MAMMO BI INCL CAD: CPT

## 2022-04-13 ENCOUNTER — TELEPHONE (OUTPATIENT)
Dept: FAMILY MEDICINE CLINIC | Facility: CLINIC | Age: 77
End: 2022-04-13

## 2022-04-13 NOTE — TELEPHONE ENCOUNTER
I spoke with patient - she can take Claritin and montelukast with Symbicort  She should have her upper endoscopy as reflux can cause cough  We will refer to pulmonary for the cough  Also she has concerns because her brother has hypertrophic cardiomyopathy - she did have a work up and was told that she does not have it  Certainly if GI and pulmonary do not find a cause, we will refer to cardiology  ----- Message from Katerina Branch sent at 4/13/2022 12:47 PM EDT -----  Regarding: FW: mammogram    ----- Message -----  From: Oneida Whitfield  Sent: 4/13/2022  12:42 PM EDT  To: [facility]  Subject: mammogram                                        Hi  Its regarding my coughing asthma   Its back again and am coughing out of control   Two weeks  Am using the symbicort  I have taken Prednisone  twice  End of November   And end of February  And its back again   Am afraid to take the prednisone again   I am having a pain my upper right side under my ribs

## 2022-04-27 ENCOUNTER — TELEPHONE (OUTPATIENT)
Dept: GASTROENTEROLOGY | Facility: AMBULARY SURGERY CENTER | Age: 77
End: 2022-04-27

## 2022-04-27 NOTE — TELEPHONE ENCOUNTER
Patient called back stated she missed a call from the gastro office and was not sure what the call was about  Would like a call back the call was received at [phone] today as per patient

## 2022-04-27 NOTE — TELEPHONE ENCOUNTER
Spoke to patient and mailed out another prep sheet and yellow hospital form to her she lost her prior ones

## 2022-04-27 NOTE — TELEPHONE ENCOUNTER
Patients GI provider:  Dr Sharon Castaneda    Number to return call: [phone]    Reason for call: Pt calling requesting for a new form that she needs to fill out for her procedure because the one she has was dated for December  She would like a new copy sent to her home address       Scheduled procedure/appointment date if applicable: Apt/procedure 5/12/22

## 2022-05-12 ENCOUNTER — HOSPITAL ENCOUNTER (OUTPATIENT)
Dept: GASTROENTEROLOGY | Facility: HOSPITAL | Age: 77
Setting detail: OUTPATIENT SURGERY
Discharge: HOME | End: 2022-05-12
Attending: STUDENT IN AN ORGANIZED HEALTH CARE EDUCATION/TRAINING PROGRAM | Admitting: STUDENT IN AN ORGANIZED HEALTH CARE EDUCATION/TRAINING PROGRAM
Payer: COMMERCIAL

## 2022-05-12 ENCOUNTER — ANESTHESIA EVENT (OUTPATIENT)
Dept: GASTROENTEROLOGY | Facility: HOSPITAL | Age: 77
End: 2022-05-12

## 2022-05-12 ENCOUNTER — ANESTHESIA (OUTPATIENT)
Dept: GASTROENTEROLOGY | Facility: HOSPITAL | Age: 77
End: 2022-05-12

## 2022-05-12 VITALS
OXYGEN SATURATION: 97 % | DIASTOLIC BLOOD PRESSURE: 61 MMHG | BODY MASS INDEX: 21.97 KG/M2 | WEIGHT: 124 LBS | TEMPERATURE: 97.7 F | SYSTOLIC BLOOD PRESSURE: 110 MMHG | HEART RATE: 80 BPM | HEIGHT: 63 IN | RESPIRATION RATE: 18 BRPM

## 2022-05-12 DIAGNOSIS — R10.9 ABDOMINAL DISCOMFORT: ICD-10-CM

## 2022-05-12 DIAGNOSIS — K21.9 GASTROESOPHAGEAL REFLUX DISEASE, UNSPECIFIED WHETHER ESOPHAGITIS PRESENT: ICD-10-CM

## 2022-05-12 PROCEDURE — 43239 EGD BIOPSY SINGLE/MULTIPLE: CPT | Performed by: STUDENT IN AN ORGANIZED HEALTH CARE EDUCATION/TRAINING PROGRAM

## 2022-05-12 PROCEDURE — 88305 TISSUE EXAM BY PATHOLOGIST: CPT | Performed by: PATHOLOGY

## 2022-05-12 RX ORDER — PROPOFOL 10 MG/ML
INJECTION, EMULSION INTRAVENOUS AS NEEDED
Status: DISCONTINUED | OUTPATIENT
Start: 2022-05-12 | End: 2022-05-12

## 2022-05-12 RX ORDER — SODIUM CHLORIDE 9 MG/ML
75 INJECTION, SOLUTION INTRAVENOUS CONTINUOUS
Status: DISCONTINUED | OUTPATIENT
Start: 2022-05-12 | End: 2022-05-16 | Stop reason: HOSPADM

## 2022-05-12 RX ORDER — SODIUM CHLORIDE 9 MG/ML
INJECTION, SOLUTION INTRAVENOUS CONTINUOUS PRN
Status: DISCONTINUED | OUTPATIENT
Start: 2022-05-12 | End: 2022-05-12

## 2022-05-12 RX ADMIN — SODIUM CHLORIDE 75 ML/HR: 0.9 INJECTION, SOLUTION INTRAVENOUS at 09:56

## 2022-05-12 RX ADMIN — PROPOFOL 50 MG: 10 INJECTION, EMULSION INTRAVENOUS at 10:23

## 2022-05-12 RX ADMIN — PROPOFOL 50 MG: 10 INJECTION, EMULSION INTRAVENOUS at 10:25

## 2022-05-12 RX ADMIN — SODIUM CHLORIDE: 0.9 INJECTION, SOLUTION INTRAVENOUS at 10:22

## 2022-05-12 NOTE — H&P
History and Physical - [facility]  Mindy Cheng 78 y o  female MRN: [id number]                  HPI: Mindy Cheng is a 78y o  year old female who presents for abdominal discomfort and GERD      REVIEW OF SYSTEMS: Per the HPI, and otherwise unremarkable      Historical Information   Past Medical History:   Diagnosis Date    Asthma     variant cough asthma    Hyperlipidemia     Hypertension      Past Surgical History:   Procedure Laterality Date    HYSTERECTOMY      OOPHORECTOMY Bilateral     UTERINE SUSPENSION       Social History   Social History     Substance and Sexual Activity   Alcohol Use Not Currently     Social History     Substance and Sexual Activity   Drug Use Never     Social History     Tobacco Use   Smoking Status Never Smoker   Smokeless Tobacco Never Used     Family History   Problem Relation Age of Onset    No Known Problems Mother     No Known Problems Father     No Known Problems Sister     No Known Problems Maternal Grandmother     No Known Problems Maternal Grandfather     No Known Problems Paternal Grandmother     No Known Problems Paternal Grandfather     No Known Problems Sister        Meds/Allergies       Current Outpatient Medications:     amLODIPine (NORVASC) 10 mg tablet    atorvastatin (LIPITOR) 20 mg tablet    budesonide-formoterol (Symbicort) 160-4 5 mcg/act inhaler    calcium carbonate (OS-JULIETA) 600 MG tablet    cholecalciferol (VITAMIN D3) 1,000 units tablet    ezetimibe (ZETIA) 10 mg tablet    famotidine (PEPCID) 20 mg tablet    latanoprost (XALATAN) 0 005 % ophthalmic solution    montelukast (SINGULAIR) 10 mg tablet    Simethicone (MYLANTA GAS RELIEF MAXIMUM STR PO)    albuterol (Ventolin HFA) 90 mcg/act inhaler    Current Facility-Administered Medications:     sodium chloride 0 9 % infusion, 75 mL/hr, Intravenous, Continuous, 75 mL/hr at 05/12/22 0956    Allergies   Allergen Reactions    Codeine Rash       Objective     BP 162/72   Pulse (!) 108   Temp (!) 97 °F (36 1 °C) (Temporal)   Resp 16   Ht 5' 3" (1 6 m)   Wt 56 2 kg (124 lb)   SpO2 98%   BMI 21 97 kg/m²       PHYSICAL EXAM    Gen: NAD  Head: NCAT  CV: RRR  CHEST: Clear  ABD: soft, NT/ND  EXT: no edema      ASSESSMENT/PLAN:  This is a 78y o  year old female here for EGD, and she is stable and optimized for her procedure

## 2022-05-12 NOTE — ANESTHESIA POSTPROCEDURE EVALUATION
Post-Op Assessment Note    CV Status:  Stable  Pain Score: 0    Pain management: adequate     Mental Status:  Alert   Hydration Status:  Stable   PONV Controlled:  Controlled   Airway Patency:  Patent      Post Op Vitals Reviewed: Yes      Staff: CRNA         No complications documented      BP   140/63   Temp     Pulse 103   Resp   20   SpO2   99

## 2022-05-12 NOTE — ANESTHESIA PREPROCEDURE EVALUATION
Procedure:  EGD    Relevant Problems   CARDIO   (+) Breast pain, left   (+) Chest pain   (+) Essential hypertension   (+) Heart murmur      GI/HEPATIC   (+) Gastroesophageal reflux disease with esophagitis without hemorrhage      MUSCULOSKELETAL   (+) Arthritis   (+) Upper back pain      NEURO/PSYCH   (+) Anxiety   (+) Generalized anxiety disorder      PULMONARY   (+) Mild intermittent asthma without complication      Sinus tachycardia  Otherwise normal ECG  When compared with ECG of 16-MAR-2022 12:54, (unconfirmed)  No significant change was found  Confirmed by Paxton Murphy ([id number]) on 3/17/2022 12:39:22 PM    Physical Exam    Airway    Mallampati score: II  TM Distance: >3 FB  Neck ROM: full     Dental       Cardiovascular      Pulmonary      Other Findings        Anesthesia Plan  ASA Score- 2     Anesthesia Type- IV sedation with anesthesia with ASA Monitors  Additional Monitors:   Airway Plan:           Plan Factors-Exercise tolerance (METS): >4 METS  Chart reviewed  Patient summary reviewed  Patient is not a current smoker  Patient did not smoke on day of surgery  Induction- intravenous  Postoperative Plan-     Informed Consent- Anesthetic plan and risks discussed with patient  I personally reviewed this patient with the CRNA  Discussed and agreed on the Anesthesia Plan with the CRNA  Peri Chan

## 2022-05-16 ENCOUNTER — NURSE TRIAGE (OUTPATIENT)
Dept: OTHER | Facility: OTHER | Age: 77
End: 2022-05-16

## 2022-05-16 DIAGNOSIS — R11.0 NAUSEA: Primary | ICD-10-CM

## 2022-05-16 DIAGNOSIS — R10.13 EPIGASTRIC PAIN: ICD-10-CM

## 2022-05-16 RX ORDER — ONDANSETRON 4 MG/1
4 TABLET, FILM COATED ORAL EVERY 8 HOURS PRN
Qty: 30 TABLET | Refills: 0 | Status: SHIPPED | OUTPATIENT
Start: 2022-05-16 | End: 2022-09-28 | Stop reason: ALTCHOICE

## 2022-05-16 NOTE — TELEPHONE ENCOUNTER
Regarding: Nauseous advice  ----- Message from Mitzy Sanders sent at 5/16/2022  2:10 PM EDT -----  "I had a procedure done last Tuesday and they told me I have to wait 4 weeks for a biopsy, but I'm so nauseous   What can I do?"

## 2022-05-16 NOTE — TELEPHONE ENCOUNTER
Reason for Disposition   Age > 60 years    Answer Assessment - Initial Assessment Questions  1  NAUSEA SEVERITY: "How bad is the nausea?" (e g , mild, moderate, severe; dehydration, weight loss)    - MILD: loss of appetite without change in eating habits    - MODERATE: decreased oral intake without significant weight loss, dehydration, or malnutrition    - SEVERE: inadequate caloric or fluid intake, significant weight loss, symptoms of dehydration      Mild nausea   2  ONSET: "When did the nausea begin?"      Patient is not sure when nausea was developed   3  VOMITING: "Any vomiting?" If Yes, ask: "How many times today?"      No   4  RECURRENT SYMPTOM: "Have you had nausea before?" If Yes, ask: "When was the last time?" "What happened that time?"      No   5  CAUSE: "What do you think is causing the nausea?"      Patient thinks that her nausea could be related to the EGD procedure on 5/12/2022   6  PREGNANCY: "Is there any chance you are pregnant?" (e g , unprotected intercourse, missed birth control pill, broken condom)      N/A    Answer Assessment - Initial Assessment Questions  1  LOCATION: "Where does it hurt?"       Middle of the stomach   2  RADIATION: "Does the pain shoot anywhere else?" (e g , chest, back)      No   3  ONSET: "When did the pain begin?" (e g , minutes, hours or days ago)       Before the EGD procedure on 5/12/22   5  PATTERN "Does the pain come and go, or is it constant?"     - If constant: "Is it getting better, staying the same, or worsening?"       (Note: Constant means the pain never goes away completely; most serious pain is constant and it progresses)      - If intermittent: "How long does it last?" "Do you have pain now?"      (Note: Intermittent means the pain goes away completely between bouts)       Constant   6   SEVERITY: "How bad is the pain?"  (e g , Scale 1-10; mild, moderate, or severe)     - MILD (1-3): doesn't interfere with normal activities, abdomen soft and not tender to touch      - MODERATE (4-7): interferes with normal activities or awakens from sleep, tender to touch      - SEVERE (8-10): excruciating pain, doubled over, unable to do any normal activities        Moderate 7 out of 10   7  RECURRENT SYMPTOM: "Have you ever had this type of stomach pain before?" If Yes, ask: "When was the last time?" and "What happened that time?"       Chronic ongoing pain along with nausea   8  AGGRAVATING FACTORS: "Does anything seem to cause this pain?" (e g , foods, stress, alcohol)      Unknown   9  CARDIAC SYMPTOMS: "Do you have any of the following symptoms: chest pain, difficulty breathing, sweating, nausea?"        Patient was referred by PCP to pulmonologist to evaluate pain in ribcage      10  OTHER SYMPTOMS: "Do you have any other symptoms?" (e g , fever, vomiting, diarrhea)        Nausea    Protocols used: ABDOMINAL PAIN - UPPER-ADULT-OH, NAUSEA-ADULT-OH

## 2022-05-21 ENCOUNTER — TELEPHONE (OUTPATIENT)
Dept: FAMILY MEDICINE CLINIC | Facility: CLINIC | Age: 77
End: 2022-05-21

## 2022-05-21 NOTE — TELEPHONE ENCOUNTER
Pt LM stating she is having rib pain under her breast bone  Pt thinks it may be from coughing so much  Pt would like to know what she can take to help alleviate this discomfort  Pt sees Pulmonology on 5/25 for cough

## 2022-05-23 DIAGNOSIS — I10 ESSENTIAL HYPERTENSION: Primary | ICD-10-CM

## 2022-05-23 DIAGNOSIS — F41.9 ANXIETY: ICD-10-CM

## 2022-05-23 DIAGNOSIS — E78.5 DYSLIPIDEMIA: ICD-10-CM

## 2022-05-23 PROBLEM — L30.9 DERMATITIS: Status: ACTIVE | Noted: 2022-05-23

## 2022-05-23 NOTE — TELEPHONE ENCOUNTER
I spoke to patient - she can use tylenol and moist warm compresses  She asked about coming in sooner for her Medicare wellness   I said I would put the blood owrk orders in her chart, but she can wait until the end of June (it is already scheduled)

## 2022-05-25 ENCOUNTER — CONSULT (OUTPATIENT)
Dept: PULMONOLOGY | Facility: CLINIC | Age: 77
End: 2022-05-25
Payer: COMMERCIAL

## 2022-05-25 VITALS
HEIGHT: 63 IN | BODY MASS INDEX: 21.97 KG/M2 | RESPIRATION RATE: 18 BRPM | HEART RATE: 70 BPM | WEIGHT: 124 LBS | DIASTOLIC BLOOD PRESSURE: 70 MMHG | SYSTOLIC BLOOD PRESSURE: 108 MMHG | TEMPERATURE: 97 F | OXYGEN SATURATION: 97 %

## 2022-05-25 DIAGNOSIS — J45.20 MILD INTERMITTENT ASTHMA WITHOUT COMPLICATION: ICD-10-CM

## 2022-05-25 DIAGNOSIS — R05.3 CHRONIC COUGH: Primary | ICD-10-CM

## 2022-05-25 PROCEDURE — 99204 OFFICE O/P NEW MOD 45 MIN: CPT | Performed by: INTERNAL MEDICINE

## 2022-05-25 PROCEDURE — 1160F RVW MEDS BY RX/DR IN RCRD: CPT | Performed by: INTERNAL MEDICINE

## 2022-05-25 RX ORDER — ALBUTEROL SULFATE 90 UG/1
2 AEROSOL, METERED RESPIRATORY (INHALATION) EVERY 6 HOURS PRN
Qty: 54 G | Refills: 11 | Status: SHIPPED | OUTPATIENT
Start: 2022-05-25

## 2022-05-25 RX ORDER — HYDROXYZINE HYDROCHLORIDE 25 MG/1
25 TABLET, FILM COATED ORAL EVERY 6 HOURS PRN
COMMUNITY
End: 2022-06-30 | Stop reason: SDUPTHER

## 2022-05-25 NOTE — PROGRESS NOTES
Pulmonary Consultation   Oneida Whitfield 78 y o  female MRN: [id number]  5/25/2022      Assessment:  Chronic cough   · DDx cough variant asthma/reversible airway disease (given the symptomatic relief with oral steroids) vs UACS/reflux , vs post viral cough (+ COVID 19 PCR in 01/2022), not on ACEI, no history of CHF, or chronic lung infection lifelong nonsmoker  · Chest x-ray was clear lung fields 3 months ago     Plan:   · Already has Symbicort, noted that she was not raising at the right way   · Reviewed the proper inhaler use technique/advised to rinse mouth following each use, given a spacer   · Reordered p r n  albuterol, encouraged to use the Singulair HS   · In the chronicity, will check CT chest for underlying structural lung abnormalities  · Complete pulmonary function test/BD response  · Educated about anti-reflux measures, advised to use famotidine daily instead of p r n  Return in about 3 months (around 8/25/2022)  History of Present Illness     New Consult for:  Chronic cough      Background  78 y o  female with a h/o mild intermittent asthma, HTN, osteoarthritis, reflux    Patient states that symptoms started approximately around 11/2021, with persistent minimally productive cough  Does not recall all the circumstances at that time  Reports worse with exposure to chemicals, paint, dusts, or perfume and sometimes with exposure to cold air  Improved with 1 round of steroids at that time however recurred after having a COVID infection in 01/2022  Given another round of steroids at the end of February with improvement however recurred again in March/April  Described as intermittent/frequent, with the above triggers and sometimes worse at night/with laying flat, also associated with pleuritic/right lower chest pain  Mucinex did not help, as well as Symbicort  Reports prior diagnosis of cough variant asthma      Review of Systems  As per hpi, all other systems reviewed and were negative  Social history   Originally from [facility], moved to [place]  Moved to [address] 2 years ago  Lifelong nonsmoker, nonalcoholic  Used to work as a nanny/  Family history: Mother had a TB    Studies:  Imaging and other studies: I have personally reviewed pertinent films in PACS      Pulmonary function testing:       EKG, Pathology, and Other Studies: I have personally reviewed pertinent reports  Historical Information   Past Medical History:   Diagnosis Date    Asthma     variant cough asthma    Cataract     GERD (gastroesophageal reflux disease)     Hyperlipidemia     Hypertension      Past Surgical History:   Procedure Laterality Date    EYE SURGERY      HYSTERECTOMY      OOPHORECTOMY Bilateral     UTERINE SUSPENSION       Family History   Problem Relation Age of Onset    Asthma Mother     Hypertension Mother             No Known Problems Father     No Known Problems Sister     No Known Problems Maternal Grandmother     No Known Problems Maternal Grandfather     No Known Problems Paternal Grandmother     No Known Problems Paternal Grandfather     No Known Problems Sister     Hypertension Brother         Pace maker         Medications/Allergies: Reviewed    Vitals: Blood pressure 108/70, pulse 70, temperature (!) 97 °F (36 1 °C), resp  rate 18, height 5' 3" (1 6 m), weight 56 2 kg (124 lb), SpO2 97 %, not currently breastfeeding  Body mass index is 21 97 kg/m²  Oxygen Therapy  SpO2: 97 %  Oxygen Therapy: None (Room air)      Physical Exam  Body mass index is 21 97 kg/m²     Gen: not in acute distress,   Neck/Eyes: supple, no adenopathy, PERRL  Ear: normal appearance, no significant hearing impairment  Nose:  normal nasal mucosa, no drainage  Mouth:  unremarkable/normal appearance of lips, teeth and gums  Oropharynx: mucosa is moist, no focal lesions or erythema  Salivary glands: soft nontender  Chest: normal respiratory efforts, clear breath sounds bilaterally  CV: RRR, no murmurs appreciated, no edema  Abdomen: soft, non tender  Extremities:  No observed deformity   Skin: unremarkable  Neuro: AAO X3, no focal motor deficit         Labs:  Lab Results   Component Value Date    WBC 7 57 02/03/2022    HGB 15 0 02/03/2022    HCT 45 0 02/03/2022    MCV 95 02/03/2022     02/03/2022     Lab Results   Component Value Date    CALCIUM 9 4 03/16/2022    K 3 8 03/16/2022    CO2 29 03/16/2022     03/16/2022    BUN 10 03/16/2022    CREATININE 0 63 03/16/2022     No results found for: IGE  Lab Results   Component Value Date    ALT 30 03/16/2022    AST 21 03/16/2022    ALKPHOS 79 03/16/2022           Portions of the record may have been created with voice recognition software  Occasional wrong word or "sound a like" substitutions may have occurred due to the inherent limitations of voice recognition software  Read the chart carefully and recognize, using context, where substitutions have occurred    CELSO Hylton [facility]

## 2022-05-27 ENCOUNTER — APPOINTMENT (OUTPATIENT)
Dept: LAB | Facility: CLINIC | Age: 77
End: 2022-05-27
Payer: COMMERCIAL

## 2022-05-27 DIAGNOSIS — I10 ESSENTIAL HYPERTENSION: ICD-10-CM

## 2022-05-27 DIAGNOSIS — E78.5 DYSLIPIDEMIA: ICD-10-CM

## 2022-05-27 DIAGNOSIS — F41.9 ANXIETY: ICD-10-CM

## 2022-05-27 LAB
ALBUMIN SERPL BCP-MCNC: 3.9 G/DL (ref 3.5–5)
ALP SERPL-CCNC: 86 U/L (ref 46–116)
ALT SERPL W P-5'-P-CCNC: 30 U/L (ref 12–78)
ANION GAP SERPL CALCULATED.3IONS-SCNC: 2 MMOL/L (ref 4–13)
AST SERPL W P-5'-P-CCNC: 22 U/L (ref 5–45)
BASOPHILS # BLD AUTO: 0.03 THOUSANDS/ΜL (ref 0–0.1)
BASOPHILS NFR BLD AUTO: 0 % (ref 0–1)
BILIRUB SERPL-MCNC: 0.7 MG/DL (ref 0.2–1)
BUN SERPL-MCNC: 12 MG/DL (ref 5–25)
CALCIUM SERPL-MCNC: 9.8 MG/DL (ref 8.3–10.1)
CHLORIDE SERPL-SCNC: 106 MMOL/L (ref 100–108)
CHOLEST SERPL-MCNC: 132 MG/DL
CO2 SERPL-SCNC: 32 MMOL/L (ref 21–32)
CREAT SERPL-MCNC: 0.62 MG/DL (ref 0.6–1.3)
EOSINOPHIL # BLD AUTO: 0.15 THOUSAND/ΜL (ref 0–0.61)
EOSINOPHIL NFR BLD AUTO: 2 % (ref 0–6)
ERYTHROCYTE [DISTWIDTH] IN BLOOD BY AUTOMATED COUNT: 12.7 % (ref 11.6–15.1)
GFR SERPL CREATININE-BSD FRML MDRD: 89 ML/MIN/1.73SQ M
GLUCOSE P FAST SERPL-MCNC: 99 MG/DL (ref 65–99)
HCT VFR BLD AUTO: 46.1 % (ref 34.8–46.1)
HDLC SERPL-MCNC: 74 MG/DL
HGB BLD-MCNC: 15.4 G/DL (ref 11.5–15.4)
IMM GRANULOCYTES # BLD AUTO: 0.02 THOUSAND/UL (ref 0–0.2)
IMM GRANULOCYTES NFR BLD AUTO: 0 % (ref 0–2)
LDLC SERPL CALC-MCNC: 43 MG/DL (ref 0–100)
LYMPHOCYTES # BLD AUTO: 2.05 THOUSANDS/ΜL (ref 0.6–4.47)
LYMPHOCYTES NFR BLD AUTO: 31 % (ref 14–44)
MCH RBC QN AUTO: 31.8 PG (ref 26.8–34.3)
MCHC RBC AUTO-ENTMCNC: 33.4 G/DL (ref 31.4–37.4)
MCV RBC AUTO: 95 FL (ref 82–98)
MONOCYTES # BLD AUTO: 0.45 THOUSAND/ΜL (ref 0.17–1.22)
MONOCYTES NFR BLD AUTO: 7 % (ref 4–12)
NEUTROPHILS # BLD AUTO: 3.97 THOUSANDS/ΜL (ref 1.85–7.62)
NEUTS SEG NFR BLD AUTO: 60 % (ref 43–75)
NRBC BLD AUTO-RTO: 0 /100 WBCS
PLATELET # BLD AUTO: 205 THOUSANDS/UL (ref 149–390)
PMV BLD AUTO: 10.5 FL (ref 8.9–12.7)
POTASSIUM SERPL-SCNC: 3.9 MMOL/L (ref 3.5–5.3)
PROT SERPL-MCNC: 7.5 G/DL (ref 6.4–8.2)
RBC # BLD AUTO: 4.85 MILLION/UL (ref 3.81–5.12)
SODIUM SERPL-SCNC: 140 MMOL/L (ref 136–145)
TRIGL SERPL-MCNC: 75 MG/DL
TSH SERPL DL<=0.05 MIU/L-ACNC: 1.37 UIU/ML (ref 0.45–4.5)
WBC # BLD AUTO: 6.67 THOUSAND/UL (ref 4.31–10.16)

## 2022-05-27 PROCEDURE — 36415 COLL VENOUS BLD VENIPUNCTURE: CPT

## 2022-05-27 PROCEDURE — 85025 COMPLETE CBC W/AUTO DIFF WBC: CPT

## 2022-05-27 PROCEDURE — 80053 COMPREHEN METABOLIC PANEL: CPT

## 2022-05-27 PROCEDURE — 80061 LIPID PANEL: CPT

## 2022-05-27 PROCEDURE — 84443 ASSAY THYROID STIM HORMONE: CPT

## 2022-06-02 DIAGNOSIS — R05.9 COUGH: ICD-10-CM

## 2022-06-02 DIAGNOSIS — J45.20 MILD INTERMITTENT ASTHMA WITHOUT COMPLICATION: ICD-10-CM

## 2022-06-02 RX ORDER — MONTELUKAST SODIUM 10 MG/1
10 TABLET ORAL
Qty: 90 TABLET | Refills: 1 | Status: SHIPPED | OUTPATIENT
Start: 2022-06-02

## 2022-06-08 ENCOUNTER — HOSPITAL ENCOUNTER (OUTPATIENT)
Dept: ULTRASOUND IMAGING | Facility: MEDICAL CENTER | Age: 77
Discharge: HOME | End: 2022-06-08
Payer: COMMERCIAL

## 2022-06-08 ENCOUNTER — OFFICE VISIT (OUTPATIENT)
Dept: URGENT CARE | Facility: MEDICAL CENTER | Age: 77
End: 2022-06-08
Payer: COMMERCIAL

## 2022-06-08 ENCOUNTER — HOSPITAL ENCOUNTER (OUTPATIENT)
Dept: BONE DENSITY | Facility: MEDICAL CENTER | Age: 77
Discharge: HOME | End: 2022-06-08
Payer: COMMERCIAL

## 2022-06-08 VITALS
TEMPERATURE: 97.1 F | HEIGHT: 63 IN | WEIGHT: 125 LBS | RESPIRATION RATE: 20 BRPM | HEART RATE: 86 BPM | BODY MASS INDEX: 22.15 KG/M2 | OXYGEN SATURATION: 98 %

## 2022-06-08 DIAGNOSIS — R10.13 EPIGASTRIC PAIN: ICD-10-CM

## 2022-06-08 DIAGNOSIS — J02.9 SORE THROAT: Primary | ICD-10-CM

## 2022-06-08 DIAGNOSIS — Z78.0 POST-MENOPAUSAL: ICD-10-CM

## 2022-06-08 DIAGNOSIS — Z13.820 SCREENING FOR OSTEOPOROSIS: ICD-10-CM

## 2022-06-08 DIAGNOSIS — R11.0 NAUSEA: ICD-10-CM

## 2022-06-08 LAB — S PYO AG THROAT QL: NEGATIVE

## 2022-06-08 PROCEDURE — 87880 STREP A ASSAY W/OPTIC: CPT | Performed by: EMERGENCY MEDICINE

## 2022-06-08 PROCEDURE — G0463 HOSPITAL OUTPT CLINIC VISIT: HCPCS | Performed by: EMERGENCY MEDICINE

## 2022-06-08 PROCEDURE — 76705 ECHO EXAM OF ABDOMEN: CPT

## 2022-06-08 PROCEDURE — 99213 OFFICE O/P EST LOW 20 MIN: CPT | Performed by: EMERGENCY MEDICINE

## 2022-06-08 PROCEDURE — 77080 DXA BONE DENSITY AXIAL: CPT

## 2022-06-08 NOTE — PATIENT INSTRUCTIONS
Pharyngitis   WHAT YOU NEED TO KNOW:   Pharyngitis, or sore throat, is inflammation of the tissues and structures in your pharynx (throat)  Pharyngitis is most often caused by bacteria  It may also be caused by a cold or flu virus  Other causes include smoking, allergies, or acid reflux  DISCHARGE INSTRUCTIONS:   Call 911 for any of the following: You have trouble breathing or swallowing because your throat is swollen or sore  Return to the emergency department if:   You are drooling because it hurts too much to swallow  Your fever is higher than 102? F (39?C) or lasts longer than 3 days  You are confused  You taste blood in your throat  Contact your healthcare provider if:   Your throat pain gets worse  You have a painful lump in your throat that does not go away after 5 days  Your symptoms do not improve after 5 days  You have questions or concerns about your condition or care  Medicines:  Viral pharyngitis will go away on its own without treatment  Your sore throat should start to feel better in 3 to 5 days for both viral and bacterial infections  You may need any of the following:  Antibiotics  treat a bacterial infection  NSAIDs , such as ibuprofen, help decrease swelling, pain, and fever  NSAIDs can cause stomach bleeding or kidney problems in certain people  If you take blood thinner medicine, always ask your healthcare provider if NSAIDs are safe for you  Always read the medicine label and follow directions  Acetaminophen  decreases pain and fever  It is available without a doctor's order  Ask how much to take and how often to take it  Follow directions  Acetaminophen can cause liver damage if not taken correctly  Take your medicine as directed  Contact your healthcare provider if you think your medicine is not helping or if you have side effects  Tell him or her if you are allergic to any medicine  Keep a list of the medicines, vitamins, and herbs you take   Include the amounts, and when and why you take them  Bring the list or the pill bottles to follow-up visits  Carry your medicine list with you in case of an emergency  Manage your symptoms:   Gargle salt water  Mix ¼ teaspoon salt in an 8 ounce glass of warm water and gargle  This may help decrease swelling in your throat  Drink liquids as directed  You may need to drink more liquids than usual  Liquids may help soothe your throat and prevent dehydration  Ask how much liquid to drink each day and which liquids are best for you  Use a cool-steam humidifier  to help moisten the air in your room and calm your cough  Soothe your throat  with cough drops, ice, soft foods, or popsicles  Prevent the spread of pharyngitis:  Cover your mouth and nose when you cough or sneeze  Do not share food or drinks  Wash your hands often  Use soap and water  If soap and water are unavailable, use an alcohol based hand   Follow up with your doctor as directed:  Write down your questions so you remember to ask them during your visits  © Copyright [employer] 2022 Information is for End User's use only and may not be sold, redistributed or otherwise used for commercial purposes  All illustrations and images included in CareNotes® are the copyrighted property of [employer]  or [employer]  The above information is an  only  It is not intended as medical advice for individual conditions or treatments  Talk to your doctor, nurse or pharmacist before following any medical regimen to see if it is safe and effective for you

## 2022-06-08 NOTE — PROGRESS NOTES
[facility]        NAME: Jay Limon is a 78 y o  female  : 1945    MRN: [id number]  DATE: 2022  TIME: 12:08 PM    Assessment and Plan   Sore throat [J02 9]  1  Sore throat  POCT rapid strepA     Rapid strep negative  No clinical evidence of strep throat  There is no erythema  No exudate  There is no evidence of oral or pharyngeal thrush  Patient's physical exam is normal   Advised her to discuss her symptoms with her primary care physician or the physician that prescribed the medications she is concerned about  I did advise her to not stop taking her pulmonary medications without discussion with her doctor  Patient Instructions     Pharyngitis   WHAT YOU NEED TO KNOW:   Pharyngitis, or sore throat, is inflammation of the tissues and structures in your pharynx (throat)  Pharyngitis is most often caused by bacteria  It may also be caused by a cold or flu virus  Other causes include smoking, allergies, or acid reflux  DISCHARGE INSTRUCTIONS:   Call 911 for any of the following:   · You have trouble breathing or swallowing because your throat is swollen or sore  Return to the emergency department if:   · You are drooling because it hurts too much to swallow  · Your fever is higher than 102? F (39?C) or lasts longer than 3 days  · You are confused  · You taste blood in your throat  Contact your healthcare provider if:   · Your throat pain gets worse  · You have a painful lump in your throat that does not go away after 5 days  · Your symptoms do not improve after 5 days  · You have questions or concerns about your condition or care  Medicines:  Viral pharyngitis will go away on its own without treatment  Your sore throat should start to feel better in 3 to 5 days for both viral and bacterial infections  You may need any of the following:  · Antibiotics  treat a bacterial infection      · NSAIDs , such as ibuprofen, help decrease swelling, pain, and fever  NSAIDs can cause stomach bleeding or kidney problems in certain people  If you take blood thinner medicine, always ask your healthcare provider if NSAIDs are safe for you  Always read the medicine label and follow directions  · Acetaminophen  decreases pain and fever  It is available without a doctor's order  Ask how much to take and how often to take it  Follow directions  Acetaminophen can cause liver damage if not taken correctly  · Take your medicine as directed  Contact your healthcare provider if you think your medicine is not helping or if you have side effects  Tell him or her if you are allergic to any medicine  Keep a list of the medicines, vitamins, and herbs you take  Include the amounts, and when and why you take them  Bring the list or the pill bottles to follow-up visits  Carry your medicine list with you in case of an emergency  Manage your symptoms:   · Gargle salt water  Mix ¼ teaspoon salt in an 8 ounce glass of warm water and gargle  This may help decrease swelling in your throat  · Drink liquids as directed  You may need to drink more liquids than usual  Liquids may help soothe your throat and prevent dehydration  Ask how much liquid to drink each day and which liquids are best for you  · Use a cool-steam humidifier  to help moisten the air in your room and calm your cough  · Soothe your throat  with cough drops, ice, soft foods, or popsicles  Prevent the spread of pharyngitis:  Cover your mouth and nose when you cough or sneeze  Do not share food or drinks  Wash your hands often  Use soap and water  If soap and water are unavailable, use an alcohol based hand   Follow up with your doctor as directed:  Write down your questions so you remember to ask them during your visits  © Copyright [employer] 2022 Information is for End User's use only and may not be sold, redistributed or otherwise used for commercial purposes   All illustrations and images included in [employer] are the copyrighted property of [employer]  or [facility]   The above information is an  only  It is not intended as medical advice for individual conditions or treatments  Talk to your doctor, nurse or pharmacist before following any medical regimen to see if it is safe and effective for you  Follow up with PCP in 3-5 days  Proceed to  ER if symptoms worsen  Chief Complaint     Chief Complaint   Patient presents with    Cold Like Symptoms     Taking Symbicort and Singulair for "variant asthma cough"x 8 months  Now c/o sore throat and ear pain  Pharmacist warned of sore throat and ear pain with regular use of these medications  History of Present Illness       82-year-old female presents today for evaluation of a sore throat and bilateral ear pain  Patient states that she has had a cough that started approximately around 11/2021, with persistent minimally productive cough  Does not recall all the circumstances at that time  Reports worse with exposure to chemicals, paint, dusts, or perfume and sometimes with exposure to cold air  Improved with 1 round of steroids at that time however recurred after having a COVID infection in 01/2022  Given another round of steroids at the end of February with improvement however recurred again in March/April  She states that she was here in the building for other testing and decided to come down to [facility] sure she did not have strep throat  She did call the pharmacist who told her that her symptoms could be due to the medications that she is taking, including Symbicort and Singulair, stating both of their side effect profiles list sore throat  Review of Systems   Review of Systems   Constitutional: Negative for chills, fatigue and fever  HENT: Positive for ear pain and sore throat  Negative for postnasal drip and trouble swallowing  Respiratory: Positive for cough   Negative for chest tightness and shortness of breath  Gastrointestinal: Negative for abdominal pain  Genitourinary: Negative for dysuria  Musculoskeletal: Negative for back pain  Skin: Negative for rash  Allergic/Immunologic: Negative for immunocompromised state  Neurological: Negative for dizziness, light-headedness and headaches  Psychiatric/Behavioral: Negative for confusion           Current Medications       Current Outpatient Medications:     albuterol (Ventolin HFA) 90 mcg/act inhaler, Inhale 2 puffs every 6 (six) hours as needed for wheezing, Disp: 54 g, Rfl: 11    amLODIPine (NORVASC) 10 mg tablet, TAKE 1 TABLET EVERY DAY, Disp: 90 tablet, Rfl: 1    atorvastatin (LIPITOR) 20 mg tablet, Take 1 tablet (20 mg total) by mouth daily, Disp: , Rfl:     budesonide-formoterol (Symbicort) 160-4 5 mcg/act inhaler, Inhale 2 puffs 2 (two) times a day Rinse mouth after use , Disp: 30 6 g, Rfl: 1    ezetimibe (ZETIA) 10 mg tablet, Take 1 tablet (10 mg total) by mouth daily, Disp: , Rfl:     famotidine (PEPCID) 20 mg tablet, Take 1 tablet (20 mg total) by mouth 2 (two) times a day as needed for heartburn, Disp: 180 tablet, Rfl: 3    hydrOXYzine HCL (ATARAX) 25 mg tablet, Take 25 mg by mouth every 6 (six) hours as needed for itching, Disp: , Rfl:     montelukast (SINGULAIR) 10 mg tablet, Take 1 tablet (10 mg total) by mouth daily at bedtime, Disp: 90 tablet, Rfl: 1    ondansetron (ZOFRAN) 4 mg tablet, Take 1 tablet (4 mg total) by mouth every 8 (eight) hours as needed for nausea or vomiting (Patient not taking: No sig reported), Disp: 30 tablet, Rfl: 0    Simethicone (MYLANTA GAS RELIEF MAXIMUM STR PO), Take by mouth, Disp: , Rfl:     calcium carbonate (OS-JULIETA) 600 MG tablet, Take 600 mg by mouth 2 (two) times a day with meals (Patient not taking: No sig reported), Disp: , Rfl:     cholecalciferol (VITAMIN D3) 1,000 units tablet, Take 1,000 Units by mouth daily (Patient not taking: No sig reported), Disp: , Rfl:     latanoprost (XALATAN) 0 005 % ophthalmic solution, , Disp: , Rfl:     Current Allergies     Allergies as of 2022 - Reviewed 2022   Allergen Reaction Noted    Other Cough 2022    Codeine Rash 2020            The following portions of the patient's history were reviewed and updated as appropriate: allergies, current medications, past family history, past medical history, past social history, past surgical history and problem list      Past Medical History:   Diagnosis Date    Asthma     variant cough asthma    Cataract     GERD (gastroesophageal reflux disease)     Hyperlipidemia     Hypertension        Past Surgical History:   Procedure Laterality Date    EYE SURGERY      HYSTERECTOMY      OOPHORECTOMY Bilateral     UTERINE SUSPENSION         Family History   Problem Relation Age of Onset    Asthma Mother     Hypertension Mother             No Known Problems Father     No Known Problems Sister     No Known Problems Maternal Grandmother     No Known Problems Maternal Grandfather     No Known Problems Paternal Grandmother     No Known Problems Paternal Grandfather     No Known Problems Sister     Hypertension Brother         Pace maker         Medications have been verified  Objective   Pulse 86   Temp (!) 97 1 °F (36 2 °C)   Resp 20   Ht 5' 3" (1 6 m)   Wt 56 7 kg (125 lb)   SpO2 98%   BMI 22 14 kg/m²        Physical Exam     Physical Exam  Vitals and nursing note reviewed  Constitutional:       Appearance: Normal appearance  She is not ill-appearing  HENT:      Head: Normocephalic and atraumatic  Right Ear: Tympanic membrane, ear canal and external ear normal       Left Ear: Tympanic membrane, ear canal and external ear normal       Nose: Nose normal       Mouth/Throat:      Mouth: Mucous membranes are moist    Eyes:      Extraocular Movements: Extraocular movements intact  Pupils: Pupils are equal, round, and reactive to light     Cardiovascular: Rate and Rhythm: Normal rate and regular rhythm  Pulses: Normal pulses  Pulmonary:      Effort: Pulmonary effort is normal       Breath sounds: Normal breath sounds  Musculoskeletal:      Cervical back: Normal range of motion  Skin:     General: Skin is warm and dry  Capillary Refill: Capillary refill takes less than 2 seconds  Neurological:      General: No focal deficit present  Mental Status: She is alert and oriented to person, place, and time     Psychiatric:         Mood and Affect: Mood normal          Behavior: Behavior normal

## 2022-06-10 ENCOUNTER — TELEPHONE (OUTPATIENT)
Dept: PULMONOLOGY | Facility: CLINIC | Age: 77
End: 2022-06-10

## 2022-06-10 DIAGNOSIS — R05.3 CHRONIC COUGH: Primary | ICD-10-CM

## 2022-06-10 NOTE — TELEPHONE ENCOUNTER
Called and spoke with pt, she reports she has always had a cough and used prednisone for it in the past  When she saw Dr Renaldo Rodriguez she stopped taking the prednisone and now her cough is worse- she is also c/o sore throat  Pt wants to know if she needs Covid test? Pt also mentioned she has a PFT and CT scan scheduled for 7/26 and does not want the prednisone to interfere with the results of these tests  She is asking for a call back to discuss if possible from the provider   [phone]

## 2022-06-12 RX ORDER — PREDNISONE 10 MG/1
TABLET ORAL
Qty: 30 TABLET | Refills: 0 | Status: SHIPPED | OUTPATIENT
Start: 2022-06-12

## 2022-06-12 NOTE — TELEPHONE ENCOUNTER
Spoke with patient she developed sore throat headache since using her symbicort singular and pepcid regurally she stopped on 6/6 and since then her throat has somewhat improved, she was seen by PCP negative for strep and thrush    - I feel the symbicort is causing the sore throat, will order prednisone taper , unfortunately I am not sure if she will be able to preform PFTs

## 2022-06-15 DIAGNOSIS — I10 ESSENTIAL HYPERTENSION: ICD-10-CM

## 2022-06-15 RX ORDER — AMLODIPINE BESYLATE 10 MG/1
TABLET ORAL
Qty: 90 TABLET | Refills: 1 | Status: SHIPPED | OUTPATIENT
Start: 2022-06-15

## 2022-06-20 ENCOUNTER — TELEPHONE (OUTPATIENT)
Dept: PULMONOLOGY | Facility: CLINIC | Age: 77
End: 2022-06-20

## 2022-06-20 NOTE — TELEPHONE ENCOUNTER
Dr Mandy Dixon called about a peer to peer for Pt's CT Chest   You can give Dr Mandy Dixon a call at [phone]

## 2022-06-23 ENCOUNTER — RA CDI HCC (OUTPATIENT)
Dept: OTHER | Facility: HOSPITAL | Age: 77
End: 2022-06-23

## 2022-06-23 NOTE — PROGRESS NOTES
[facility]  coding opportunities       Chart reviewed, no opportunity found:   [address]        Patients Insurance     Medicare Insurance: [facility]

## 2022-06-28 ENCOUNTER — TELEPHONE (OUTPATIENT)
Dept: GASTROENTEROLOGY | Facility: MEDICAL CENTER | Age: 77
End: 2022-06-28

## 2022-06-28 NOTE — TELEPHONE ENCOUNTER
Her EGD and ultrasound did not show any significant abnormality that would cause her pain  She should continue using famotidine as needed for heartburn  Please offer her a follow up visit with me or one of the PAs if she would like to discuss face to face

## 2022-06-28 NOTE — TELEPHONE ENCOUNTER
Called and spoke with Pt and gave her the results and she is scheduled at 11:30 am on august 5th that was the soonest available she did state that she is still having the same symptoms and she is if not having more discomfort and pain at this time   I advised that if it gets so bad please to hospital but she was very pleased that we called when we did because she is in a lot of discomfort and pain

## 2022-06-28 NOTE — TELEPHONE ENCOUNTER
Patient called because she is confused about her results of the endoscopy  and US of the rt upper quadrant, and how to proceed moving forward

## 2022-06-30 ENCOUNTER — OFFICE VISIT (OUTPATIENT)
Dept: FAMILY MEDICINE CLINIC | Facility: CLINIC | Age: 77
End: 2022-06-30
Payer: COMMERCIAL

## 2022-06-30 VITALS
DIASTOLIC BLOOD PRESSURE: 84 MMHG | HEART RATE: 86 BPM | RESPIRATION RATE: 18 BRPM | OXYGEN SATURATION: 98 % | TEMPERATURE: 97.7 F | SYSTOLIC BLOOD PRESSURE: 124 MMHG | WEIGHT: 124.8 LBS | BODY MASS INDEX: 22.11 KG/M2 | HEIGHT: 63 IN

## 2022-06-30 DIAGNOSIS — Z23 ENCOUNTER FOR IMMUNIZATION: ICD-10-CM

## 2022-06-30 DIAGNOSIS — Z00.00 MEDICARE ANNUAL WELLNESS VISIT, SUBSEQUENT: ICD-10-CM

## 2022-06-30 DIAGNOSIS — M81.0 AGE-RELATED OSTEOPOROSIS WITHOUT CURRENT PATHOLOGICAL FRACTURE: ICD-10-CM

## 2022-06-30 DIAGNOSIS — E78.5 DYSLIPIDEMIA: ICD-10-CM

## 2022-06-30 DIAGNOSIS — N81.10 PROLAPSE OF FEMALE BLADDER, ACQUIRED: ICD-10-CM

## 2022-06-30 DIAGNOSIS — F41.9 ANXIETY: ICD-10-CM

## 2022-06-30 DIAGNOSIS — I10 ESSENTIAL HYPERTENSION: Primary | ICD-10-CM

## 2022-06-30 DIAGNOSIS — Z11.59 NEED FOR HEPATITIS C SCREENING TEST: ICD-10-CM

## 2022-06-30 PROCEDURE — G0439 PPPS, SUBSEQ VISIT: HCPCS | Performed by: FAMILY MEDICINE

## 2022-06-30 PROCEDURE — 99214 OFFICE O/P EST MOD 30 MIN: CPT | Performed by: FAMILY MEDICINE

## 2022-06-30 PROCEDURE — 90677 PCV20 VACCINE IM: CPT | Performed by: FAMILY MEDICINE

## 2022-06-30 PROCEDURE — 3008F BODY MASS INDEX DOCD: CPT | Performed by: INTERNAL MEDICINE

## 2022-06-30 PROCEDURE — G0009 ADMIN PNEUMOCOCCAL VACCINE: HCPCS | Performed by: FAMILY MEDICINE

## 2022-06-30 RX ORDER — HYDROXYZINE HYDROCHLORIDE 25 MG/1
25 TABLET, FILM COATED ORAL EVERY 6 HOURS PRN
Qty: 40 TABLET | Refills: 1 | Status: SHIPPED | OUTPATIENT
Start: 2022-06-30 | End: 2022-09-28 | Stop reason: ALTCHOICE

## 2022-06-30 NOTE — PROGRESS NOTES
Assessment and Plan:     Problem List Items Addressed This Visit        Cardiovascular and Mediastinum    Essential hypertension - Primary       Musculoskeletal and Integument    Age-related osteoporosis without current pathological fracture    Relevant Orders    Vitamin D 25 hydroxy    PTH, intact       Other    Dyslipidemia    Anxiety    Relevant Medications    hydrOXYzine HCL (ATARAX) 25 mg tablet      Other Visit Diagnoses     Need for hepatitis C screening test        Relevant Orders    Hepatitis C antibody    Encounter for immunization        Relevant Orders    Pneumococcal Conjugate Vaccine 20-valent (PCV20) (Completed)    Prolapse of female bladder, acquired        Relevant Orders    Ambulatory Referral to Urogynecology    Medicare annual wellness visit, subsequent              Depression Screening and Follow-up Plan: Patient was screened for depression during today's encounter  They screened negative with a PHQ-2 score of 2  Preventive health issues were discussed with patient, and age appropriate screening tests were ordered as noted in patient's After Visit Summary  Personalized health advice and appropriate referrals for health education or preventive services given if needed, as noted in patient's After Visit Summary       History of Present Illness:     Patient presents for a Medicare Wellness Visit    Patient Care Team:  Priscila Seay DO as PCP - General (Family Medicine)     Review of Systems:        Problem List:     Patient Active Problem List   Diagnosis    Essential hypertension    Dyslipidemia    Venous insufficiency    Mild intermittent asthma without complication    Ganglion cyst of left foot    Hallux valgus, left    Corn or callus    Arthritis    Gastroesophageal reflux disease with esophagitis without hemorrhage    Costochondritis, acute    Insomnia    Anxiety    Exposure to COVID-19 virus    Breast pain, left    Advanced care planning/counseling discussion    Leukocytosis    Heart murmur    Chest pain    Lightheadedness    Generalized anxiety disorder    Upper back pain    Lipoma of torso    Dermatitis    Chronic cough    Age-related osteoporosis without current pathological fracture      Past Medical and Surgical History:     Past Medical History:   Diagnosis Date    Asthma     variant cough asthma    Cataract     GERD (gastroesophageal reflux disease)     Hyperlipidemia     Hypertension      Past Surgical History:   Procedure Laterality Date    EYE SURGERY      HYSTERECTOMY      OOPHORECTOMY Bilateral     UTERINE SUSPENSION        Family History:     Family History   Problem Relation Age of Onset    Asthma Mother     Hypertension Mother             No Known Problems Father     No Known Problems Sister     No Known Problems Maternal Grandmother     No Known Problems Maternal Grandfather     No Known Problems Paternal Grandmother     No Known Problems Paternal Grandfather     No Known Problems Sister     Hypertension Brother         Pace maker      Social History:     Social History     Socioeconomic History    Marital status:      Spouse name: None    Number of children: None    Years of education: None    Highest education level: None   Occupational History    None   Tobacco Use    Smoking status: Never Smoker    Smokeless tobacco: Never Used   Vaping Use    Vaping Use: Never used   Substance and Sexual Activity    Alcohol use: Not Currently    Drug use: Never    Sexual activity: Yes     Partners: Male   Other Topics Concern    None   Social History Narrative    None     Social Determinants of Health     Financial Resource Strain: Not on file   Food Insecurity: Not on file   Transportation Needs: Not on file   Physical Activity: Not on file   Stress: Not on file   Social Connections: Not on file   Intimate Partner Violence: Not on file   Housing Stability: Not on file      Medications and Allergies: Current Outpatient Medications   Medication Sig Dispense Refill    amLODIPine (NORVASC) 10 mg tablet TAKE 1 TABLET EVERY DAY 90 tablet 1    atorvastatin (LIPITOR) 20 mg tablet Take 1 tablet (20 mg total) by mouth daily      ezetimibe (ZETIA) 10 mg tablet Take 1 tablet (10 mg total) by mouth daily      famotidine (PEPCID) 20 mg tablet Take 1 tablet (20 mg total) by mouth 2 (two) times a day as needed for heartburn 180 tablet 3    hydrOXYzine HCL (ATARAX) 25 mg tablet Take 1 tablet (25 mg total) by mouth every 6 (six) hours as needed for itching 40 tablet 1    albuterol (Ventolin HFA) 90 mcg/act inhaler Inhale 2 puffs every 6 (six) hours as needed for wheezing (Patient not taking: Reported on 6/30/2022) 54 g 11    budesonide-formoterol (Symbicort) 160-4 5 mcg/act inhaler Inhale 2 puffs 2 (two) times a day Rinse mouth after use  (Patient not taking: Reported on 6/30/2022) 30 6 g 1    calcium carbonate (OS-JULIETA) 600 MG tablet Take 600 mg by mouth 2 (two) times a day with meals (Patient not taking: No sig reported)      cholecalciferol (VITAMIN D3) 1,000 units tablet Take 1,000 Units by mouth daily (Patient not taking: No sig reported)      montelukast (SINGULAIR) 10 mg tablet Take 1 tablet (10 mg total) by mouth daily at bedtime (Patient not taking: Reported on 6/30/2022) 90 tablet 1    ondansetron (ZOFRAN) 4 mg tablet Take 1 tablet (4 mg total) by mouth every 8 (eight) hours as needed for nausea or vomiting (Patient not taking: No sig reported) 30 tablet 0    predniSONE 10 mg tablet 4 tabs x 3 days, decrease by 1 tablet every 3 days (Patient not taking: Reported on 6/30/2022) 30 tablet 0    Simethicone (MYLANTA GAS RELIEF MAXIMUM STR PO) Take by mouth (Patient not taking: Reported on 6/30/2022)       No current facility-administered medications for this visit       Allergies   Allergen Reactions    Other Cough     Seasonal, perfumes, colon, chemicals    Codeine Rash      Immunizations:     Immunization History   Administered Date(s) Administered    COVID-19 PFIZER VACCINE 0 3 ML IM 04/09/2021, 04/30/2021, 12/16/2021    INFLUENZA 01/01/2020, 10/24/2021    Influenza, high dose seasonal 0 7 mL 11/11/2020    Pneumococcal Conjugate Vaccine 20-valent (Pcv20), Polysace 06/30/2022      Health Maintenance:         Topic Date Due    Hepatitis C Screening  Never done    Colorectal Cancer Screening  Never done    Breast Cancer Screening: Mammogram  04/09/2023         Topic Date Due    DTaP,Tdap,and Td Vaccines (1 - Tdap) Never done    COVID-19 Vaccine (4 - Booster for Pfizer series) 04/16/2022    Influenza Vaccine (1) 09/01/2022      Medicare Screening Tests and Risk Assessments:     Graciela Gray is here for her Subsequent Wellness visit  Last Medicare Wellness visit information reviewed, patient interviewed and updates made to the record today  Health Risk Assessment:   Patient rates overall health as fair  Patient feels that their physical health rating is slightly worse  Patient is satisfied with their life  Eyesight was rated as slightly worse  Hearing was rated as same  Patient feels that their emotional and mental health rating is slightly worse  Patients states they are never, rarely angry  Patient states they are never, rarely unusually tired/fatigued  Pain experienced in the last 7 days has been some  Patient's pain rating has been 8/10  Patient states that she has experienced no weight loss or gain in last 6 months  Depression Screening:   PHQ-2 Score: 2      Fall Risk Screening: In the past year, patient has experienced: no history of falling in past year      Urinary Incontinence Screening:   Patient has leaked urine accidently in the last six months  Home Safety:  Patient does not have trouble with stairs inside or outside of their home  Patient has working smoke alarms and has no working carbon monoxide detector  Home safety hazards include: none  Nutrition:   Current diet is Regular  Medications:   Patient is currently taking over-the-counter supplements  OTC medications include: see medication list  Patient is able to manage medications  Activities of Daily Living (ADLs)/Instrumental Activities of Daily Living (IADLs):   Walk and transfer into and out of bed and chair?: Yes  Dress and groom yourself?: Yes    Bathe or shower yourself?: Yes    Feed yourself? Yes  Do your laundry/housekeeping?: Yes  Manage your money, pay your bills and track your expenses?: Yes  Make your own meals?: Yes    Do your own shopping?: Yes    Previous Hospitalizations:   Any hospitalizations or ED visits within the last 12 months?: No      Advance Care Planning:   Living will: No    Durable POA for healthcare: No    Advanced directive: No    Five wishes given: Yes    End of Life Decisions reviewed with patient: Yes      PREVENTIVE SCREENINGS      Cardiovascular Screening:    General: Screening Current      Diabetes Screening:     General: Screening Current      Breast Cancer Screening:     General: Screening Current      Cervical Cancer Screening:    General: Screening Not Indicated      Osteoporosis Screening:    General: Screening Not Indicated and History Osteoporosis      Abdominal Aortic Aneurysm (AAA) Screening:        General: Screening Not Indicated      Lung Cancer Screening:     General: Screening Not Indicated    Screening, Brief Intervention, and Referral to Treatment (SBIRT)    Screening  Typical number of drinks in a day: 0  Typical number of drinks in a week: 0  Interpretation: Low risk drinking behavior  Single Item Drug Screening:  How often have you used an illegal drug (including marijuana) or a prescription medication for non-medical reasons in the past year? never    Single Item Drug Screen Score: 0  Interpretation: Negative screen for possible drug use disorder    Brief Intervention  Alcohol & drug use screenings were reviewed  No concerns regarding substance use disorder identified  No exam data present     Physical Exam:     /84 (BP Location: Left arm, Patient Position: Sitting)   Pulse 86   Temp 97 7 °F (36 5 °C) (Tympanic)   Resp 18   Ht 5' 3 25" (1 607 m)   Wt 56 6 kg (124 lb 12 8 oz)   SpO2 98%   BMI 21 93 kg/m²       Marty Nunes DO

## 2022-06-30 NOTE — PROGRESS NOTES
Assessment/Plan:    Patient is 72-year-old female seen for follow-up of chronic medical conditions  She has discontinued a lot of her medication because of her cough  Restart Famotidine  Patient would like to see urogynecologist     We discussed her bone density test and she will get a vitamin-D level and a parathyroid hormone  Diagnoses and all orders for this visit:    Essential hypertension    Need for hepatitis C screening test  -     Hepatitis C antibody; Future    Encounter for immunization  -     Pneumococcal Conjugate Vaccine 20-valent (PCV20)    Dyslipidemia    Prolapse of female bladder, acquired  -     Ambulatory Referral to Urogynecology; Future    Anxiety  -     hydrOXYzine HCL (ATARAX) 25 mg tablet; Take 1 tablet (25 mg total) by mouth every 6 (six) hours as needed for itching    Age-related osteoporosis without current pathological fracture  -     Vitamin D 25 hydroxy; Future  -     PTH, intact; Future    Medicare annual wellness visit, subsequent        Subjective:   Chief Complaint   Patient presents with    Follow-up    Medicare Wellness Visit        Patient ID: Carl Nicholson is a 78 y o  female  Patient continues with cough  She finished Prednisone  Has PFT scheduled for 7/26 and seeing GI on 8/6  The following portions of the patient's history were reviewed and updated as appropriate: allergies, current medications, past family history, past medical history, past social history, past surgical history and problem list     Review of Systems   Constitutional: Negative for chills and fever  HENT: Negative for congestion and sore throat  Respiratory: Positive for cough  Negative for chest tightness  Cardiovascular: Negative for chest pain and palpitations  Gastrointestinal: Negative for abdominal pain, constipation, diarrhea and nausea  Genitourinary: Negative for difficulty urinating  Skin: Negative  Neurological: Negative for dizziness and headaches  Psychiatric/Behavioral: The patient is nervous/anxious  Objective:      /84 (BP Location: Left arm, Patient Position: Sitting)   Pulse 86   Temp 97 7 °F (36 5 °C) (Tympanic)   Resp 18   Ht 5' 3 25" (1 607 m)   Wt 56 6 kg (124 lb 12 8 oz)   SpO2 98%   BMI 21 93 kg/m²          Physical Exam  Vitals and nursing note reviewed  Constitutional:       General: She is not in acute distress  HENT:      Head: Normocephalic  Right Ear: Tympanic membrane normal       Left Ear: Tympanic membrane normal    Neck:      Thyroid: No thyromegaly  Cardiovascular:      Rate and Rhythm: Normal rate and regular rhythm  Heart sounds: Normal heart sounds  Pulmonary:      Effort: Pulmonary effort is normal       Breath sounds: Normal breath sounds  No wheezing  Musculoskeletal:      Right lower leg: No edema  Left lower leg: No edema  Lymphadenopathy:      Cervical: No cervical adenopathy  Skin:     General: Skin is warm and dry  Neurological:      Mental Status: She is alert and oriented to person, place, and time     Psychiatric:         Mood and Affect: Mood normal

## 2022-07-01 ENCOUNTER — TELEPHONE (OUTPATIENT)
Dept: ADMINISTRATIVE | Facility: OTHER | Age: 77
End: 2022-07-01

## 2022-07-01 NOTE — LETTER
Procedure Request Form: Colonoscopy      Date Requested: 22  Patient: Lizzy Sanz  Patient : 1945   Referring Provider: Mk Conteh DO        Date of Procedure ______________________________       The above patient has informed us that they have completed their   most recent Colonoscopy at your facility  Please complete   this form and attach all corresponding procedure reports/results  Comments DOS: [id number]  ____________________________________________________________________  ____________________________________________________________________  ____________________________________________________________________    Facility Completing Procedure _________________________________________    Form Completed By (print name) _______________________________________      Signature __________________________________________________________      These reports are needed for  compliance  Please fax this completed form and a copy of the procedure report to our office located at [place] [address] as soon as possible to [phone] edward Hernandez Pu: Phone [phone]    We thank you for your assistance in treating our mutual patient

## 2022-07-01 NOTE — LETTER
Procedure Request Form: Colonoscopy      Date Requested: 07/15/22  Patient:   Patient : 1945   Referring Provider: Home Ruggiero DO        Date of Procedure ______________________________       The above patient has informed us that they have completed their   most recent Colonoscopy at your facility  Please complete   this form and attach all corresponding procedure reports/results  Comments DOS: [id number]  ____________________________________________________________________  ____________________________________________________________________  ____________________________________________________________________    Facility Completing Procedure _________________________________________    Form Completed By (print name) _______________________________________      Signature __________________________________________________________      These reports are needed for  compliance  Please fax this completed form and a copy of the procedure report to our office located at [place] [address] as soon as possible to [phone] attention Corky Grubbs: Phone [phone]    We thank you for your assistance in treating our mutual patient

## 2022-07-01 NOTE — TELEPHONE ENCOUNTER
----- Message from Katerina Leo sent at 6/30/2022  5:39 PM EDT -----  Regarding: Care Gap Request  06/30/22 5:39 PM    Hello, our patient attached above has had CRC: Colonoscopy completed/performed  Please assist in updating the patient chart by making an External outreach to Dr Poncho Ching facility located in [place], [place]  The date of service is [id number]      Thank you,  Toi Enriquez MA  [facility]

## 2022-07-07 NOTE — TELEPHONE ENCOUNTER
Upon review of the In Basket request and the patient's chart, initial outreach has been made via fax, please see Contacts section for details       Thank you  Triston Bishop

## 2022-07-15 NOTE — TELEPHONE ENCOUNTER
As a follow-up, a second attempt has been made for outreach via fax, please see Contacts section for details      Thank you  Marry García

## 2022-07-26 ENCOUNTER — HOSPITAL ENCOUNTER (OUTPATIENT)
Dept: CT IMAGING | Facility: HOSPITAL | Age: 77
Discharge: HOME | End: 2022-07-26
Attending: INTERNAL MEDICINE
Payer: COMMERCIAL

## 2022-07-26 ENCOUNTER — HOSPITAL ENCOUNTER (OUTPATIENT)
Dept: PULMONOLOGY | Facility: HOSPITAL | Age: 77
Discharge: HOME | End: 2022-07-26
Attending: INTERNAL MEDICINE
Payer: COMMERCIAL

## 2022-07-26 ENCOUNTER — APPOINTMENT (OUTPATIENT)
Dept: LAB | Facility: HOSPITAL | Age: 77
End: 2022-07-26
Payer: COMMERCIAL

## 2022-07-26 DIAGNOSIS — M81.0 AGE-RELATED OSTEOPOROSIS WITHOUT CURRENT PATHOLOGICAL FRACTURE: ICD-10-CM

## 2022-07-26 DIAGNOSIS — R05.3 CHRONIC COUGH: ICD-10-CM

## 2022-07-26 DIAGNOSIS — Z11.59 NEED FOR HEPATITIS C SCREENING TEST: ICD-10-CM

## 2022-07-26 LAB
25(OH)D3 SERPL-MCNC: 32.9 NG/ML (ref 30–100)
HCV AB SER QL: NORMAL
PTH-INTACT SERPL-MCNC: 39.1 PG/ML (ref 18.4–80.1)

## 2022-07-26 PROCEDURE — 94010 BREATHING CAPACITY TEST: CPT | Performed by: INTERNAL MEDICINE

## 2022-07-26 PROCEDURE — 94010 BREATHING CAPACITY TEST: CPT

## 2022-07-26 PROCEDURE — 94726 PLETHYSMOGRAPHY LUNG VOLUMES: CPT

## 2022-07-26 PROCEDURE — 94729 DIFFUSING CAPACITY: CPT

## 2022-07-26 PROCEDURE — 36415 COLL VENOUS BLD VENIPUNCTURE: CPT

## 2022-07-26 PROCEDURE — 94729 DIFFUSING CAPACITY: CPT | Performed by: INTERNAL MEDICINE

## 2022-07-26 PROCEDURE — G1004 CDSM NDSC: HCPCS

## 2022-07-26 PROCEDURE — 94726 PLETHYSMOGRAPHY LUNG VOLUMES: CPT | Performed by: INTERNAL MEDICINE

## 2022-07-26 PROCEDURE — 94760 N-INVAS EAR/PLS OXIMETRY 1: CPT

## 2022-07-26 PROCEDURE — 86803 HEPATITIS C AB TEST: CPT

## 2022-07-26 PROCEDURE — 82306 VITAMIN D 25 HYDROXY: CPT

## 2022-07-26 PROCEDURE — 83970 ASSAY OF PARATHORMONE: CPT

## 2022-07-26 PROCEDURE — 71250 CT THORAX DX C-: CPT

## 2022-07-26 RX ORDER — ALBUTEROL SULFATE 2.5 MG/3ML
2.5 SOLUTION RESPIRATORY (INHALATION) ONCE AS NEEDED
Status: DISCONTINUED | OUTPATIENT
Start: 2022-07-26 | End: 2022-07-30 | Stop reason: HOSPADM

## 2022-08-01 ENCOUNTER — TELEPHONE (OUTPATIENT)
Dept: PULMONOLOGY | Facility: CLINIC | Age: 77
End: 2022-08-01

## 2022-08-01 NOTE — TELEPHONE ENCOUNTER
Patient is calling with very high anxiety about her recent CT results from 7/26  She is unable to sleep or think and is so worried about this  She hopes someone will give her a call back to briefly discuss   Please advise

## 2022-08-01 NOTE — TELEPHONE ENCOUNTER
Called patient reviewed results of CT chest   Offered reassurance  Patient is completely asymptomatic  Recommend she follow up with Dr Kelly Bird as previously planned  She verbalized understanding and appreciation  All questions answered

## 2022-08-02 NOTE — TELEPHONE ENCOUNTER
As a final attempt, a third outreach has been made via telephone call  Please see Contacts section for details  This encounter will be closed and completed by end of day  Should we receive the requested information because of previous outreach attempts, the requested patient's chart will be updated appropriately       Thank you  Leela Noble

## 2022-08-16 DIAGNOSIS — E78.5 DYSLIPIDEMIA: ICD-10-CM

## 2022-08-16 RX ORDER — ATORVASTATIN CALCIUM 20 MG/1
20 TABLET, FILM COATED ORAL DAILY
Qty: 90 TABLET | Refills: 1 | Status: SHIPPED | OUTPATIENT
Start: 2022-08-16

## 2022-08-16 RX ORDER — EZETIMIBE 10 MG/1
10 TABLET ORAL DAILY
Qty: 90 TABLET | Refills: 1 | Status: SHIPPED | OUTPATIENT
Start: 2022-08-16

## 2022-08-31 ENCOUNTER — TELEPHONE (OUTPATIENT)
Dept: PULMONOLOGY | Facility: CLINIC | Age: 77
End: 2022-08-31

## 2022-08-31 DIAGNOSIS — M94.0 COSTOCHONDRITIS: Primary | ICD-10-CM

## 2022-08-31 NOTE — TELEPHONE ENCOUNTER
Pt called re: she has this strange burning and cramping sensation from her lower abdomen to upper back and has been coughing for months now  She also stated she never received her PFT results from 7/22     Please advise: [phone]

## 2022-08-31 NOTE — TELEPHONE ENCOUNTER
Called patient-left message    PFTs normal    Will order CXR    Instructed to take NSAIDS and use ICE/heat    Otherwise can be further discussed at scheduled follow up on 9/14/22

## 2022-09-13 ENCOUNTER — TELEPHONE (OUTPATIENT)
Dept: FAMILY MEDICINE CLINIC | Facility: CLINIC | Age: 77
End: 2022-09-13

## 2022-09-13 NOTE — TELEPHONE ENCOUNTER
----- Message from Yogesh Gill DO sent at 9/13/2022  5:53 AM EDT -----  Regarding: FW: Burning Sensation Upper Back and lower Abdomen  Please call patient to make an appointment to see one of the other providers in the office since my schedule is full today  Thanks, Richelle To  ----- Message -----  From: Damon Pate LPN  Sent: 2/18/1215   7:06 PM EDT  To: Yogesh Gill DO  Subject: FW: Burning Sensation Upper Back and lower A#      ----- Message -----  From: Veto Cheng  Sent: 9/12/2022   6:51 PM EDT  To: [facility]  Subject: Burning Sensation Upper Back and lower Abdom#    The Burning is not with urination    Its  with the lower abdomen and upper and lower back cramping   Sometimes in my entire tummy  The best way I can explain its burning sensation  Especially upper and lower back  My appointment is on Wednesday the 14th  Should I still have the test done? Tomorrow?   Thank you Dr Coleen Perdomo

## 2022-09-13 NOTE — TELEPHONE ENCOUNTER
Spoke with patient, she has an appt with her Pulmonary doctor and her Gynecologist tomorrow  She will call to set up an appt with us after she gets these two appts taken care of

## 2022-09-14 ENCOUNTER — TELEPHONE (OUTPATIENT)
Dept: PULMONOLOGY | Facility: CLINIC | Age: 77
End: 2022-09-14

## 2022-09-14 ENCOUNTER — OFFICE VISIT (OUTPATIENT)
Dept: PULMONOLOGY | Facility: CLINIC | Age: 77
End: 2022-09-14
Payer: COMMERCIAL

## 2022-09-14 VITALS
SYSTOLIC BLOOD PRESSURE: 126 MMHG | HEART RATE: 100 BPM | OXYGEN SATURATION: 98 % | HEIGHT: 63 IN | WEIGHT: 124 LBS | DIASTOLIC BLOOD PRESSURE: 70 MMHG | RESPIRATION RATE: 18 BRPM | BODY MASS INDEX: 21.97 KG/M2

## 2022-09-14 DIAGNOSIS — R05.3 CHRONIC COUGH: Primary | ICD-10-CM

## 2022-09-14 DIAGNOSIS — R93.89 ABNORMAL CT OF THE CHEST: ICD-10-CM

## 2022-09-14 DIAGNOSIS — J45.20 MILD INTERMITTENT ASTHMA WITHOUT COMPLICATION: ICD-10-CM

## 2022-09-14 LAB
DME PARACHUTE DELIVERY DATE REQUESTED: NORMAL
DME PARACHUTE ITEM DESCRIPTION: NORMAL
DME PARACHUTE ORDER STATUS: NORMAL
DME PARACHUTE SUPPLIER NAME: NORMAL
DME PARACHUTE SUPPLIER PHONE: NORMAL

## 2022-09-14 PROCEDURE — 99215 OFFICE O/P EST HI 40 MIN: CPT | Performed by: INTERNAL MEDICINE

## 2022-09-14 PROCEDURE — 1160F RVW MEDS BY RX/DR IN RCRD: CPT | Performed by: INTERNAL MEDICINE

## 2022-09-14 RX ORDER — GABAPENTIN 300 MG/1
300 CAPSULE ORAL
Qty: 30 CAPSULE | Refills: 5 | Status: SHIPPED | OUTPATIENT
Start: 2022-09-14

## 2022-09-14 RX ORDER — SODIUM CHLORIDE FOR INHALATION 3 %
4 VIAL, NEBULIZER (ML) INHALATION 2 TIMES DAILY
Qty: 240 ML | Refills: 5 | Status: SHIPPED | OUTPATIENT
Start: 2022-09-14 | End: 2022-10-14

## 2022-09-14 NOTE — PROGRESS NOTES
Pulmonary Follow Up Note   Riana Novoa 78 y o  female MRN: [id number]  9/14/2022    Assessment:  Chronic cough/abnormal CT chest   · Suspect cough variant asthma/reversible airway disease given the known triggers, improving with oral steroids, vs UACS/reflux related vs chronic atypical infection/MAC given the CT chest findings  · Since last visit, only use Symbicort 2 puffs q 12, no significant improve with that   · Continued to have new coughing fits with exposure to allergens, treated with oral steroid rounds once in June   · Admits to not following the anti-reflux measures, also not using the p r n  albuterol as recommended  · PFT was not a good quality study, no lung restriction or hyperinflation, spirometry was a poor quality cannot be interpreted    Plan:   · Counseled extensively about all the above potential etiologies   · Continue Symbicort 2 puffs q 12, reinforced/reviewed the proper inhaler use technique   · Encouraged/advised to use p r n  albuterol when having coughing spells  · Added gabapentin 300 mg daily   · Will check methacholine challenge test   · Ordered [place] allergy panel    Abnormal CT chest   · Suspect S chronic bronchitis/chronic atypical infection, retained secretions  · Denies fever, chills, night sweats, anorexia or weight loss, no hemoptysis   · No exposure to birds, however she lived in [facility] before coming to [place], possible prior exposure to TB sick contact  · No signs of active TB on CT chest    Plan:  · I recommended bronchoscopy /BAL for deeper sample however she is hesitant about that   · Start/enhance mucus clearing with hypertonic saline nebs b i d , this has been improving to improve symptoms related to MAC    · Check QuantiFERON  · Will re-evaluate after other treatment for reversible airway disease as above for bronchoscopy    Return in about 3 months (around 12/14/2022)  History of Present Illness     Follow up for: Chronic cough        Background  78 y o  female with a h/o mild intermittent asthma, HTN, osteoarthritis, reflux     Since 11/2021, noted persistent minimally productive cough  Does not recall all the circumstances at that time  Reports worse with exposure to chemicals, paint, dusts, or perfume and sometimes with exposure to cold air  Improved with 1 round of steroids at that time however recurred after having a COVID infection in 01/2022  Given another round of steroids at the end of February with improvement however recurred again in March/April      Describes it as intermittent/frequent, with the above triggers and sometimes worse at [facility] flat, also associated with pleuritic/right lower chest pain  Mucinex did not help, as well as Symbicort  Reports prior diagnosis of cough variant asthma      05/25/2022 visit-continued on Symbicort start using Singulair/p r n  albuterol  CT chest, PFT  Anti-reflux measures    Interval history  Since last seen, continued to have coughing fits, mainly dry sometimes with clear sputum  Using Symbicort 2 puffs q 12, did not use albuterol p r n  with the coughing fits  Treated with oral steroids once in June, noted significant improvement of the cough with that  States that she had exposure to cleaning detergents/Lysol that seems to cause exacerbation of the cough spells  Also known other triggers of smoke, dust, paint, or chemicals  Denies any fever, chills, night sweats, anorexia or weight loss        Review of Systems  As per hpi, all other systems reviewed and were negative  Answers for HPI/ROS submitted by the patient on 9/13/2022  Do you have a wet cough?: Yes  Chronicity: chronic  When did you first notice your symptoms?: more than 1 month ago  How often do your symptoms occur?: daily  Since you first noticed this problem, how has it changed?: unchanged  Do you have shortness of breath that occurs with effort or exertion?: Yes  Do you have fatigue?: Yes  Which of the following makes your symptoms worse?: exposure to smoke, occupational exposure, pollen  Which of the following makes your symptoms better?: nothing, oral steroids, OTC cough suppressant, prescription cough suppressant, steroid inhaler      Studies:    Imaging and other studies: I have personally reviewed pertinent films in PACS    CT chest 7/26/2022 - small areas of TIBO at RLL, and LLL  Multiple nodules 3 mm at the R major fissure and LLL  Mild bronchial wall thickening    Pulmonary function testing:   PFT 07/26/2022-ratio 89%, FEV1 1 96 L/93%, FVC 2 2 L/81%  TLC 88%, RV 93%, DLCO 65%  EKG, Pathology, and Other Studies: I have personally reviewed pertinent reports  Past medical, surgical, social and family histories reviewed  Medications/Allergies: Reviewed      Vitals: Blood pressure 126/70, pulse 100, resp  rate 18, height 5' 3" (1 6 m), weight 56 2 kg (124 lb), SpO2 98 %, not currently breastfeeding  Body mass index is 21 97 kg/m²  Oxygen Therapy  SpO2: 98 %  Oxygen Therapy: None (Room air)      Physical Exam  Body mass index is 21 97 kg/m²     Gen: not in acute distress, thin  Neck/Eyes: supple, no adenopathy, PERRL  Ear: normal appearance, no significant hearing impairment  Nose:  normal nasal mucosa, no drainage  Mouth:  unremarkable/normal appearance of lips, teeth and gums  Oropharynx: mucosa is moist, no focal lesions or erythema  Salivary glands: soft nontender  Chest: normal respiratory efforts, clear breath sounds, no wheezing, crackles or rhonchi  CV: RRR, no murmurs appreciated, no edema  Abdomen: soft, non tender  Extremities:  No observed deformity   Skin: unremarkable  Neuro: AAO X3, no focal motor deficit        Labs:  Lab Results   Component Value Date    WBC 6 67 05/27/2022    HGB 15 4 05/27/2022    HCT 46 1 05/27/2022    MCV 95 05/27/2022     05/27/2022     Lab Results   Component Value Date    CALCIUM 9 8 05/27/2022    K 3 9 05/27/2022    CO2 32 05/27/2022     05/27/2022    BUN 12 05/27/2022    CREATININE 0 62 05/27/2022     No results found for: IGE  Lab Results   Component Value Date    ALT 30 05/27/2022    AST 22 05/27/2022    ALKPHOS 86 05/27/2022           Portions of the record may have been created with voice recognition software  Occasional wrong word or "sound a like" substitutions may have occurred due to the inherent limitations of voice recognition software  Read the chart carefully and recognize, using context, where substitutions have occurred    [facility]

## 2022-09-15 ENCOUNTER — APPOINTMENT (OUTPATIENT)
Dept: LAB | Facility: CLINIC | Age: 77
End: 2022-09-15
Payer: COMMERCIAL

## 2022-09-15 DIAGNOSIS — J45.20 MILD INTERMITTENT ASTHMA WITHOUT COMPLICATION: ICD-10-CM

## 2022-09-15 DIAGNOSIS — R05.3 CHRONIC COUGH: ICD-10-CM

## 2022-09-15 PROCEDURE — 36415 COLL VENOUS BLD VENIPUNCTURE: CPT

## 2022-09-15 PROCEDURE — 86480 TB TEST CELL IMMUN MEASURE: CPT

## 2022-09-15 PROCEDURE — 86003 ALLG SPEC IGE CRUDE XTRC EA: CPT

## 2022-09-15 PROCEDURE — 82785 ASSAY OF IGE: CPT

## 2022-09-16 LAB
A ALTERNATA IGE QN: <0.1 KUA/I
A FUMIGATUS IGE QN: <0.1 KUA/I
BERMUDA GRASS IGE QN: <0.1 KUA/I
BOXELDER IGE QN: <0.1 KUA/I
C HERBARUM IGE QN: <0.1 KUA/I
CAT DANDER IGE QN: <0.1 KUA/I
CMN PIGWEED IGE QN: <0.1 KUA/I
COMMON RAGWEED IGE QN: <0.1 KUA/I
COTTONWOOD IGE QN: <0.1 KUA/I
D FARINAE IGE QN: 0.38 KUA/I
D PTERONYSS IGE QN: 0.26 KUA/I
DOG DANDER IGE QN: <0.1 KUA/I
LONDON PLANE IGE QN: <0.1 KUA/I
MOUSE URINE PROT IGE QN: <0.1 KUA/I
MT JUNIPER IGE QN: 0.1 KUA/I
MUGWORT IGE QN: <0.1 KUA/I
P NOTATUM IGE QN: <0.1 KUA/I
ROACH IGE QN: 0.13 KUA/I
SHEEP SORREL IGE QN: <0.1 KUA/I
SILVER BIRCH IGE QN: 11.4 KUA/I
TIMOTHY IGE QN: <0.1 KUA/I
TOTAL IGE SMQN RAST: 215 KU/L (ref 0–113)
WALNUT IGE QN: 0.14 KUA/I
WHITE ASH IGE QN: <0.1 KUA/I
WHITE ELM IGE QN: 0.15 KUA/I
WHITE MULBERRY IGE QN: <0.1 KUA/I
WHITE OAK IGE QN: 1.37 KUA/I

## 2022-09-17 LAB
GAMMA INTERFERON BACKGROUND BLD IA-ACNC: 0.03 IU/ML
M TB IFN-G BLD-IMP: NEGATIVE
M TB IFN-G CD4+ BCKGRND COR BLD-ACNC: -0.01 IU/ML
M TB IFN-G CD4+ BCKGRND COR BLD-ACNC: 0 IU/ML
MITOGEN IGNF BCKGRD COR BLD-ACNC: >10 IU/ML

## 2022-09-21 LAB
DME PARACHUTE DELIVERY DATE ACTUAL: NORMAL
DME PARACHUTE DELIVERY DATE REQUESTED: NORMAL
DME PARACHUTE ITEM DESCRIPTION: NORMAL
DME PARACHUTE ORDER STATUS: NORMAL
DME PARACHUTE SUPPLIER NAME: NORMAL
DME PARACHUTE SUPPLIER PHONE: NORMAL

## 2022-09-22 ENCOUNTER — PATIENT MESSAGE (OUTPATIENT)
Dept: PULMONOLOGY | Facility: CLINIC | Age: 77
End: 2022-09-22

## 2022-09-23 ENCOUNTER — TELEPHONE (OUTPATIENT)
Dept: PULMONOLOGY | Facility: CLINIC | Age: 77
End: 2022-09-23

## 2022-09-23 DIAGNOSIS — R05.3 CHRONIC COUGH: Primary | ICD-10-CM

## 2022-09-23 RX ORDER — ALBUTEROL SULFATE 2.5 MG/3ML
2.5 SOLUTION RESPIRATORY (INHALATION) EVERY 6 HOURS PRN
Qty: 180 ML | Refills: 4 | Status: SHIPPED | OUTPATIENT
Start: 2022-09-23

## 2022-09-23 NOTE — TELEPHONE ENCOUNTER
Regarding: Medication for nebulizer   ----- Message from Svitlana Daniels sent at 9/23/2022  3:23 PM EDT -----  Rudi Au, can you please advise this as Dr Yves Wright is off today     ----- Message from Danny Hester to Zaida Noriega MD sent at 9/22/2022  7:11 PM -----   Good morning Dr Yves Wright  I received the Nebulizer today   And need to know what medicine   I should purchase  For the nebulizer  Still coughing out of control   I got an appointment for October 10th  at the [place] location  For the Thounds   Yola Gregg And will need to bring my December 8th appointment with you  Closer since I will be out of the country from end of November  Thank you Dr Yves Wright

## 2022-09-27 ENCOUNTER — TELEPHONE (OUTPATIENT)
Dept: PULMONOLOGY | Facility: CLINIC | Age: 77
End: 2022-09-27

## 2022-09-27 NOTE — TELEPHONE ENCOUNTER
Pt has called in to ask if she should continue using the Albuterol ( Ventolin ) Inhaler  Pt states she is using the Symbicort inhaler along with the new albuterol neb solution that was previously sent in  Pt wants to confirm if she should be using all 3   Please advise

## 2022-09-27 NOTE — TELEPHONE ENCOUNTER
Yes she may use all three the albuterol inhaler is only as needed for severe wheezing or chest tightness

## 2022-09-28 ENCOUNTER — TELEPHONE (OUTPATIENT)
Dept: PSYCHIATRY | Facility: CLINIC | Age: 77
End: 2022-09-28

## 2022-09-28 ENCOUNTER — OFFICE VISIT (OUTPATIENT)
Dept: FAMILY MEDICINE CLINIC | Facility: CLINIC | Age: 77
End: 2022-09-28
Payer: COMMERCIAL

## 2022-09-28 DIAGNOSIS — F41.9 ANXIETY: Primary | ICD-10-CM

## 2022-09-28 DIAGNOSIS — R19.5 CHANGE IN CONSISTENCY OF STOOL: ICD-10-CM

## 2022-09-28 DIAGNOSIS — Z23 NEED FOR INFLUENZA VACCINATION: ICD-10-CM

## 2022-09-28 DIAGNOSIS — Z82.49 FAMILY HISTORY OF CARDIOMYOPATHY: ICD-10-CM

## 2022-09-28 PROBLEM — N64.4 BREAST PAIN, LEFT: Status: RESOLVED | Noted: 2021-06-15 | Resolved: 2022-09-28

## 2022-09-28 PROCEDURE — G0008 ADMIN INFLUENZA VIRUS VAC: HCPCS | Performed by: FAMILY MEDICINE

## 2022-09-28 PROCEDURE — 99214 OFFICE O/P EST MOD 30 MIN: CPT | Performed by: FAMILY MEDICINE

## 2022-09-28 PROCEDURE — 90662 IIV NO PRSV INCREASED AG IM: CPT | Performed by: FAMILY MEDICINE

## 2022-09-28 NOTE — PROGRESS NOTES
Name: Lorna Vee      :       MRN: [id number]  Encounter Provider: Jan Carpenter DO  Encounter Date: 2022   Encounter department: [address] [place] [address]     Patient is 68-year-old female seen for increased anxiety and shortness of breath  She does see Pulmonary but she is concerned because she used to see a cardiologist when she lived in [place] - she has a family history of cardiomyopathy  She feels her anxiety is worsening  She is asking to be referred to Psychiatry  1  Anxiety  -     Ambulatory Referral to Psychiatry; Future    2  Family history of cardiomyopathy  -     Ambulatory Referral to Cardiology; Future    3  Change in consistency of stool  -     Ambulatory Referral to Gastroenterology; Future    4  Need for influenza vaccination  -     influenza vaccine, high-dose, PF 0 7 mL (FLUZONE HIGH-DOSE)         Subjective    No chief complaint on file  Feels that she is more short of breath than usual  Just got nebulizer ordered by pulmonary - got instructions today  Pulmonologist also started Gabapentin for cough  She has had a burning sensation in lower abdomen - has decreased  Also having problems with BM's - is having issues with BM's leaving rectum  Her brother was diagnosed with a hypertrophic cardiomyopathy  Patient is going to [facility] to visit family at end of November  She was told by her ophthalmologist that she needs eye surgery - lens has dislocated  She worries about everything, very anxious wants to see psychiatry  She moved here from [place] in 2019 to live with her daughter and since the pandemic, she feels more isolated  Living in daughter's basement  Review of Systems   Constitutional: Negative for chills and fever  HENT: Negative for congestion and sore throat  Respiratory: Positive for shortness of breath  Negative for chest tightness  Cardiovascular: Negative for chest pain and palpitations     Gastrointestinal: Positive for constipation  Negative for abdominal pain, diarrhea and nausea  Genitourinary: Negative for difficulty urinating  Skin: Negative  Neurological: Negative for dizziness and headaches  Psychiatric/Behavioral: The patient is nervous/anxious  Current Outpatient Medications on File Prior to Visit   Medication Sig    albuterol (2 5 mg/3 mL) 0 083 % nebulizer solution Take 3 mL (2 5 mg total) by nebulization every 6 (six) hours as needed for wheezing or shortness of breath    albuterol (Ventolin HFA) 90 mcg/act inhaler Inhale 2 puffs every 6 (six) hours as needed for wheezing (Patient not taking: No sig reported)    amLODIPine (NORVASC) 10 mg tablet TAKE 1 TABLET EVERY DAY    atorvastatin (LIPITOR) 20 mg tablet Take 1 tablet (20 mg total) by mouth daily    budesonide-formoterol (Symbicort) 160-4 5 mcg/act inhaler Inhale 2 puffs 2 (two) times a day Rinse mouth after use   calcium carbonate (OS-JULIETA) 600 MG tablet Take 600 mg by mouth 2 (two) times a day with meals (Patient not taking: No sig reported)    cholecalciferol (VITAMIN D3) 1,000 units tablet Take 1,000 Units by mouth daily (Patient not taking: No sig reported)    ezetimibe (ZETIA) 10 mg tablet Take 1 tablet (10 mg total) by mouth daily    famotidine (PEPCID) 20 mg tablet Take 1 tablet (20 mg total) by mouth 2 (two) times a day as needed for heartburn    gabapentin (Neurontin) 300 mg capsule Take 1 capsule (300 mg total) by mouth daily at bedtime    montelukast (SINGULAIR) 10 mg tablet Take 1 tablet (10 mg total) by mouth daily at bedtime    Simethicone (MYLANTA GAS RELIEF MAXIMUM STR PO) Take by mouth (Patient not taking: No sig reported)    sodium chloride 3 % inhalation solution Take 4 mL by nebulization 2 (two) times a day       Objective     There were no vitals taken for this visit  Physical Exam  Vitals and nursing note reviewed  Constitutional:       General: She is not in acute distress    HENT:      Head: Normocephalic  Neck:      Thyroid: No thyromegaly  Cardiovascular:      Rate and Rhythm: Normal rate and regular rhythm  Heart sounds: Normal heart sounds  Pulmonary:      Effort: Pulmonary effort is normal       Breath sounds: Normal breath sounds  Musculoskeletal:      Right lower leg: No edema  Left lower leg: No edema  Lymphadenopathy:      Cervical: No cervical adenopathy  Skin:     General: Skin is warm and dry  Neurological:      Mental Status: She is alert and oriented to person, place, and time     Psychiatric:         Mood and Affect: Mood normal        Glenna Friedman, DO

## 2022-09-28 NOTE — TELEPHONE ENCOUNTER
called pt in regards to routine referral  pt has been added to talk therapy waitlist  Pt prefers female and in person

## 2022-10-03 ENCOUNTER — TELEPHONE (OUTPATIENT)
Dept: PULMONOLOGY | Facility: CLINIC | Age: 77
End: 2022-10-03

## 2022-10-03 DIAGNOSIS — J45.20 MILD INTERMITTENT ASTHMA WITHOUT COMPLICATION: ICD-10-CM

## 2022-10-03 RX ORDER — BUDESONIDE AND FORMOTEROL FUMARATE DIHYDRATE 160; 4.5 UG/1; UG/1
2 AEROSOL RESPIRATORY (INHALATION) 2 TIMES DAILY
Qty: 30.6 G | Refills: 1 | Status: SHIPPED | OUTPATIENT
Start: 2022-10-03

## 2022-10-03 NOTE — TELEPHONE ENCOUNTER
Pt called and LVM: She is asking to speak with Dr Fajardo about her Gabapentin medication  She is feeling light headed, nausea and full body weakness  She said she only started this about two weeks now, which helps her sleep at night, but she is still coughing and having these other symptoms   Please advise

## 2022-10-03 NOTE — TELEPHONE ENCOUNTER
I called and spoke with the pt, she would prefer to stop the medication  Can she just stop or should she taper off? Please let me know and ill call her back

## 2022-10-03 NOTE — TELEPHONE ENCOUNTER
These are all side effects of gabapentin, I would have her reduce her dose to 150 mg daily for one week and reevaluate symptoms  If symptoms persist she will need to stop medication    If symptoms resolve can then consider increasing dose back to 300mg

## 2022-10-04 ENCOUNTER — TELEPHONE (OUTPATIENT)
Dept: FAMILY MEDICINE CLINIC | Facility: CLINIC | Age: 77
End: 2022-10-04

## 2022-10-04 NOTE — TELEPHONE ENCOUNTER
Pt is going into the surgeon's office on 10/19 and they will call and let us know when the surgery is going to be

## 2022-10-04 NOTE — TELEPHONE ENCOUNTER
Call patient  Usually the eye surgeon will give them a form for pre-op physical  It needs to be done no more than 30 days prior to surgery   So, she will likely need an appointment for a pre-op physical

## 2022-10-04 NOTE — TELEPHONE ENCOUNTER
Pt is having cataract surgery soon and needs a clearance  from PCP for anesthesia  She was just seen here  9/28/22 and wasn't aware that she needed the letter  Pt is asking if she needs a letter   Please advise

## 2022-10-05 ENCOUNTER — OFFICE VISIT (OUTPATIENT)
Dept: GASTROENTEROLOGY | Facility: MEDICAL CENTER | Age: 77
End: 2022-10-05
Payer: COMMERCIAL

## 2022-10-05 VITALS
WEIGHT: 119.7 LBS | DIASTOLIC BLOOD PRESSURE: 74 MMHG | HEART RATE: 86 BPM | BODY MASS INDEX: 21.2 KG/M2 | SYSTOLIC BLOOD PRESSURE: 123 MMHG | TEMPERATURE: 98.5 F

## 2022-10-05 DIAGNOSIS — K59.00 CONSTIPATION, UNSPECIFIED CONSTIPATION TYPE: ICD-10-CM

## 2022-10-05 DIAGNOSIS — K21.9 GASTROESOPHAGEAL REFLUX DISEASE WITHOUT ESOPHAGITIS: Primary | ICD-10-CM

## 2022-10-05 DIAGNOSIS — Z12.11 COLON CANCER SCREENING: ICD-10-CM

## 2022-10-05 DIAGNOSIS — F41.9 ANXIETY: ICD-10-CM

## 2022-10-05 PROCEDURE — 99214 OFFICE O/P EST MOD 30 MIN: CPT | Performed by: STUDENT IN AN ORGANIZED HEALTH CARE EDUCATION/TRAINING PROGRAM

## 2022-10-05 NOTE — PROGRESS NOTES
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 MRN: [id number]  Encounter: [id number]          ASSESSMENT AND PLAN:    75F with HTN, HLD anxiety here for follow up epigastric pain  EGD 5/12/22 was normal with normal gastric biopsies  Doing well in terms of rare epigastric pain requiring infrequent H2 blocker  Has been having some intermittent constipation, unclear if this is dietary vs medication effect vs stress  Encouraged patient to try miralax for constipation  Also encouraged patient to follow up with PCP or schedule psychiatry appointment for anxiety as this is significantly impacting daily life  1  Gastroesophageal reflux disease without esophagitis  Continue famotidine as needed    2  Constipation, unspecified constipation type  Counseled on using fiber supplement or miralax as needed for soft, formed BMs    3  Colon cancer screening  Reviewed patient's colonoscopy records, next colonoscopy 2025 if health permitting    4  Anxiety  Encouraged patient to schedule psychiatry visit or follow up with PCP to discuss long term anxiety med like SSRI     ______________________________________________________________________    SUBJECTIVE:    Has been feeling weak because of medications  Still has persistent cough for the past year without relief  Going for different pulmonary testing  Very anxious about all this  Reviewed colonoscopy reports that patient brought  Takes famotidine rarely  Getting lower pelvic/abdominal pain that radiates up the back  First episode lasted a couple weeks with no aggravating or alleviating factors  Had shorter episode within the last month  Has been associated with wearing high heels both times  Feeling very anxious  Not sleeping at night at all  Worried she has cancer    REVIEW OF SYSTEMS IS OTHERWISE NEGATIVE        Historical Information   Past Medical History:   Diagnosis Date    Asthma     variant cough asthma    Cataract     GERD (gastroesophageal reflux disease)     Hyperlipidemia     Hypertension      Past Surgical History:   Procedure Laterality Date    EYE SURGERY      HYSTERECTOMY      OOPHORECTOMY Bilateral     UTERINE SUSPENSION       Social History   Social History     Substance and Sexual Activity   Alcohol Use Not Currently     Social History     Substance and Sexual Activity   Drug Use Never     Social History     Tobacco Use   Smoking Status Never Smoker   Smokeless Tobacco Never Used     Family History   Problem Relation Age of Onset    Asthma Mother     Hypertension Mother             No Known Problems Father     No Known Problems Sister     No Known Problems Maternal Grandmother     No Known Problems Maternal Grandfather     No Known Problems Paternal Grandmother     No Known Problems Paternal Grandfather     No Known Problems Sister     Hypertension Brother         Pace maker       Meds/Allergies       Current Outpatient Medications:     albuterol (2 5 mg/3 mL) 0 083 % nebulizer solution    amLODIPine (NORVASC) 10 mg tablet    atorvastatin (LIPITOR) 20 mg tablet    budesonide-formoterol (Symbicort) 160-4 5 mcg/act inhaler    cholecalciferol (VITAMIN D3) 1,000 units tablet    ezetimibe (ZETIA) 10 mg tablet    famotidine (PEPCID) 20 mg tablet    montelukast (SINGULAIR) 10 mg tablet    sodium chloride 3 % inhalation solution    albuterol (Ventolin HFA) 90 mcg/act inhaler    calcium carbonate (OS-JULIETA) 600 MG tablet    gabapentin (Neurontin) 300 mg capsule    Simethicone (MYLANTA GAS RELIEF MAXIMUM STR PO)    Allergies   Allergen Reactions    Other Cough     Seasonal, perfumes, colon, chemicals    Codeine Rash           Objective     Blood pressure 123/74, pulse 86, temperature 98 5 °F (36 9 °C), weight 54 3 kg (119 lb 11 2 oz), not currently breastfeeding  Body mass index is 21 2 kg/m²        PHYSICAL EXAM:      General Appearance:   Alert, cooperative, no distress   HEENT:   Normocephalic, atraumatic, anicteric  Neck:  Supple, symmetrical, trachea midline   Lungs:   Clear to auscultation bilaterally; no rales, rhonchi or wheezing; respirations unlabored    Heart[de-identified]   Regular rate and rhythm; no murmur, rub, or gallop  Abdomen:   Soft, non-tender, non-distended; normal bowel sounds; no masses, no organomegaly    Genitalia:   Deferred    Rectal:   Deferred    Extremities:  No cyanosis, clubbing or edema    Pulses:  2+ and symmetric    Skin:  No jaundice, rashes, or lesions    Lymph nodes:  No palpable cervical lymphadenopathy        Lab Results:   No visits with results within 1 Day(s) from this visit  Latest known visit with results is:   Appointment on 09/15/2022   Component Date Value    A  ALTERNATA 09/15/2022 <0 10     A  FUMIGATUS 09/15/2022 <0 10     Bermuda Grass 09/15/2022 <0 10     Box Elder  09/15/2022 <0 10     Cat Epithellium-Dander 09/15/2022 <0 10     C  HERBARUM 09/15/2022 <0 10     Cockroach 09/15/2022 0 13 (A)    Common Silver Ardean Zoran 09/15/2022 11 40 (A)    [place] 09/15/2022 <0 10     D  farinae 09/15/2022 0 38 (A)    D  pteronyssinus 09/15/2022 0 26 (A)    Dog Dander 09/15/2022 <0 10     Elm IgE 09/15/2022 0 15 (A)    [place]ia 09/15/2022 0 10 (A)    Mugwort 09/15/2022 <0 10     [place] Tree 09/15/2022 <0 10     Oak 09/15/2022 1 37 (A)    P CHRYSOGENUM 09/15/2022 <0 10     Rough Pigweed  IgE 09/15/2022 <0 10     Common Ragweed 09/15/2022 <0 10     Sheep [place] IgE 09/15/2022 <0 10     [place] Tree 09/15/2022 <0 10     Elvin Grass 09/15/2022 <0 10     [place] Tree 09/15/2022 0 14 (A)    [place] Tree 09/15/2022 <0 10     IgE 09/15/2022 215 (A)    MOUSE URINE 09/15/2022 <0 10     QFT Nil 09/15/2022 0 03     QFT TB1-NIL 09/15/2022 0 00     QFT TB2-NIL 09/15/2022 -0 01     QFT Mitogen-NIL 09/15/2022 >10 00     QFT Final Interpretation 09/15/2022 Negative          Radiology Results:   No results found

## 2022-10-07 ENCOUNTER — TELEPHONE (OUTPATIENT)
Dept: FAMILY MEDICINE CLINIC | Facility: CLINIC | Age: 77
End: 2022-10-07

## 2022-10-07 ENCOUNTER — PATIENT MESSAGE (OUTPATIENT)
Dept: PULMONOLOGY | Facility: CLINIC | Age: 77
End: 2022-10-07

## 2022-10-07 DIAGNOSIS — R13.12 OROPHARYNGEAL DYSPHAGIA: Primary | ICD-10-CM

## 2022-10-07 NOTE — TELEPHONE ENCOUNTER
Pt called stating that she did not receive a call from psychiatry and is asking for a phone number  Is there someone specific you had in mind for her?     Please advise [phone]

## 2022-10-10 ENCOUNTER — HOSPITAL ENCOUNTER (OUTPATIENT)
Dept: PULMONOLOGY | Facility: HOSPITAL | Age: 77
Discharge: HOME | End: 2022-10-10
Attending: INTERNAL MEDICINE
Payer: COMMERCIAL

## 2022-10-10 DIAGNOSIS — R05.3 CHRONIC COUGH: ICD-10-CM

## 2022-10-10 DIAGNOSIS — J45.20 MILD INTERMITTENT ASTHMA WITHOUT COMPLICATION: ICD-10-CM

## 2022-10-10 PROBLEM — R13.12 OROPHARYNGEAL DYSPHAGIA: Status: ACTIVE | Noted: 2022-10-10

## 2022-10-10 PROCEDURE — 94726 PLETHYSMOGRAPHY LUNG VOLUMES: CPT | Performed by: INTERNAL MEDICINE

## 2022-10-10 PROCEDURE — 94070 EVALUATION OF WHEEZING: CPT

## 2022-10-10 PROCEDURE — 94070 EVALUATION OF WHEEZING: CPT | Performed by: INTERNAL MEDICINE

## 2022-10-10 PROCEDURE — 94760 N-INVAS EAR/PLS OXIMETRY 1: CPT

## 2022-10-10 RX ORDER — ALBUTEROL SULFATE 2.5 MG/3ML
2.5 SOLUTION RESPIRATORY (INHALATION) ONCE AS NEEDED
Status: COMPLETED | OUTPATIENT
Start: 2022-10-10 | End: 2022-10-10

## 2022-10-10 RX ADMIN — ALBUTEROL SULFATE 2.5 MG: 2.5 SOLUTION RESPIRATORY (INHALATION) at 14:21

## 2022-10-10 NOTE — TELEPHONE ENCOUNTER
Spoke to patient  She stated that at this point she will see a male or female  She would like to see a psychiatrist and would like her first visit to be virtual until she can arrange transportation

## 2022-10-10 NOTE — TELEPHONE ENCOUNTER
Called patient and infomed of Dr Lavern Che note  Patient informed she would talk to the doctor when she goes in on 10/19 and will have their office fax the form over  Informed patient she may need to come in for pre op physical with Dr Lavern Che  Patient understood and agreed

## 2022-10-10 NOTE — TELEPHONE ENCOUNTER
Call patient  It appears that she did receive a phone call from psychiatry and she told them that she wanted to see a talk therapist to was a female and so they put her name on a waiting list   Does she want to see a psychiatrist?  A psychiatrist would prescribe medication where as a therapist would engage in talk therapy  Certainly, she could do both

## 2022-10-11 NOTE — PATIENT COMMUNICATION
Pt called re: She needs a referral placed for the ENT Dr Zurita Foster she has an appt on 1020/22 with him  Also she would like to know in which order she should do the barium swallow study and see the ENT    Please advise: [phone]

## 2022-10-11 NOTE — PATIENT COMMUNICATION
I called patient, she does not have any fever, or chills or acute illness compared to last visit  Still having coughing fits, now starts with throat trickling  Methacholine challenge test is negative for asthma    I advised to stop the Symbicort, use albuterol nebs 3 times a day  I recommended ENT evaluation for direct laryngoscopy/concern for LPR      Still not interested in bronchoscopy/would like to move discussed that after the ENT evaluation

## 2022-10-12 ENCOUNTER — PATIENT MESSAGE (OUTPATIENT)
Dept: PULMONOLOGY | Facility: CLINIC | Age: 77
End: 2022-10-12

## 2022-10-12 DIAGNOSIS — F41.9 ANXIETY: Primary | ICD-10-CM

## 2022-10-12 NOTE — TELEPHONE ENCOUNTER
I would schedule follow up with Dr Christiano Frausto as she likely needs a prolonged discussion    She states she is leaving 12/8-but has an appointment on 12/8

## 2022-10-13 ENCOUNTER — TELEPHONE (OUTPATIENT)
Dept: PSYCHIATRY | Facility: CLINIC | Age: 77
End: 2022-10-13

## 2022-10-13 ENCOUNTER — TELEMEDICINE (OUTPATIENT)
Dept: FAMILY MEDICINE CLINIC | Facility: CLINIC | Age: 77
End: 2022-10-13
Payer: COMMERCIAL

## 2022-10-13 DIAGNOSIS — R05.3 CHRONIC COUGH: Primary | ICD-10-CM

## 2022-10-13 DIAGNOSIS — F41.9 ANXIETY: ICD-10-CM

## 2022-10-13 PROCEDURE — 99213 OFFICE O/P EST LOW 20 MIN: CPT | Performed by: FAMILY MEDICINE

## 2022-10-13 NOTE — PROGRESS NOTES
Virtual Regular Visit    Verification of patient location:    Patient is located in the following state in which I hold an active license [place]      Assessment/Plan:  Patient is 53-year-old female seen for chronic cough  She is concerned about radiation exposure from the "many tests and "she has had  I explained that although she has had radiation exposure, the newer machines have less exposure  She does not understand that all her inhalers have been discontinued  She did not tolerate gabapentin  I recommended that she finish up her testing and keep her follow-up appointment with Pulmonary so they can explain all the test to her  She asked if he could use Mucinex DM and told her this would be okay  Problem List Items Addressed This Visit        Other    Anxiety    Chronic cough - Primary      Patient was very anxious, repeating herself  I had put in the referral for therapy and Psychiatry and appears that when he received the call, he told you wanted talk therapy did not want medication  Reason for visit is   Chief Complaint   Patient presents with   • Virtual Regular Visit          Encounter provider Kacey Sutherland DO    Provider located at [place] [id number] [facility] [address] [facility] [address] [phone]      Recent Visits  Date Type Provider Dept   10/13/22 / [facility] 02, [facility]   10/07/22 Telephone Kacey Sutherland DO [place] [id number] [address] recent visits within past 7 days and meeting all other requirements  Future Appointments  No visits were found meeting these conditions  Showing future appointments within next 150 days and meeting all other requirements       The patient was identified by name and date of birth  Azul Anger was informed that this is a telemedicine visit and that the visit is being conducted through  [address] and patient was informed this is a secure, HIPAA-complaint platform   She agrees to proceed     My office door was closed  No one else was in the room  She acknowledged consent and understanding of privacy and security of the video platform  The patient has agreed to participate and understands they can discontinue the visit at any time  Patient is aware this is a billable service  Santiago Leija is a 78 y o  female cough  Patient with cough for many years  She is concerned because she has been having many tests - CT scan and PFT's but is still with cough  She is now scheduled for swallow study  She is concerned about radiation exposure  She is very anxious  Past Medical History:   Diagnosis Date   • Asthma     variant cough asthma   • Cataract    • GERD (gastroesophageal reflux disease) 1989   • Hyperlipidemia    • Hypertension        Past Surgical History:   Procedure Laterality Date   • EYE SURGERY     • HYSTERECTOMY     • OOPHORECTOMY Bilateral    • UTERINE SUSPENSION         Current Outpatient Medications   Medication Sig Dispense Refill   • albuterol (2 5 mg/3 mL) 0 083 % nebulizer solution Take 3 mL (2 5 mg total) by nebulization every 6 (six) hours as needed for wheezing or shortness of breath 180 mL 4   • albuterol (Ventolin HFA) 90 mcg/act inhaler Inhale 2 puffs every 6 (six) hours as needed for wheezing (Patient not taking: No sig reported) 54 g 11   • amLODIPine (NORVASC) 10 mg tablet TAKE 1 TABLET EVERY DAY 90 tablet 1   • atorvastatin (LIPITOR) 20 mg tablet Take 1 tablet (20 mg total) by mouth daily 90 tablet 1   • budesonide-formoterol (Symbicort) 160-4 5 mcg/act inhaler Inhale 2 puffs 2 (two) times a day Rinse mouth after use   30 6 g 1   • calcium carbonate (OS-JULIETA) 600 MG tablet Take 600 mg by mouth 2 (two) times a day with meals (Patient not taking: No sig reported)     • cholecalciferol (VITAMIN D3) 1,000 units tablet Take 1,000 Units by mouth daily     • ezetimibe (ZETIA) 10 mg tablet Take 1 tablet (10 mg total) by mouth daily 90 tablet 1   • famotidine (PEPCID) 20 mg tablet Take 1 tablet (20 mg total) by mouth 2 (two) times a day as needed for heartburn 180 tablet 3   • gabapentin (Neurontin) 300 mg capsule Take 1 capsule (300 mg total) by mouth daily at bedtime 30 capsule 5   • montelukast (SINGULAIR) 10 mg tablet Take 1 tablet (10 mg total) by mouth daily at bedtime 90 tablet 1   • Simethicone (MYLANTA GAS RELIEF MAXIMUM STR PO) Take by mouth (Patient not taking: No sig reported)     • sodium chloride 3 % inhalation solution Take 4 mL by nebulization 2 (two) times a day 240 mL 5     No current facility-administered medications for this visit  Allergies   Allergen Reactions   • Other Cough     Seasonal, perfumes, colon, chemicals   • Codeine Rash       Review of Systems   Respiratory: Positive for cough  Psychiatric/Behavioral: The patient is nervous/anxious  Video Exam    There were no vitals filed for this visit  Physical Exam  Neurological:      Mental Status: She is alert and oriented to person, place, and time  Psychiatric:         Mood and Affect: Mood is anxious  I had virtual visits with patient jae the past and she was able to use [email or web address] but today, she could not seem to open her camera  We did the visit by phone through [facility]       I spent 10 minutes directly with the patient during this visit

## 2022-10-13 NOTE — TELEPHONE ENCOUNTER
Spoke to pt and she is on the therapy wait list- was calling about a referral for psychiatry and she just wants to start with therapy and see If there is a need for med mgmt- I am closing the psychiatry referral at this time

## 2022-10-18 NOTE — TELEPHONE ENCOUNTER
Pt called in with update on wait list status  Informed pt that she is on wait list as of 10/13 and that in the meantime to reach out to her insurance company for a list of providers to reach out to

## 2022-10-25 ENCOUNTER — HOSPITAL ENCOUNTER (OUTPATIENT)
Dept: RADIOLOGY | Facility: HOSPITAL | Age: 77
Discharge: HOME | End: 2022-10-25
Attending: INTERNAL MEDICINE
Payer: COMMERCIAL

## 2022-10-25 DIAGNOSIS — R13.12 OROPHARYNGEAL DYSPHAGIA: ICD-10-CM

## 2022-10-25 PROCEDURE — 92611 MOTION FLUOROSCOPY/SWALLOW: CPT

## 2022-10-25 PROCEDURE — 74230 X-RAY XM SWLNG FUNCJ C+: CPT

## 2022-10-25 NOTE — PROCEDURES
Video Swallow Study      Patient Name: Joan Ibanez  YKNDC'O Date: 10/25/2022        Past Medical History  Past Medical History:   Diagnosis Date   • Asthma     variant cough asthma   • Cataract    • GERD (gastroesophageal reflux disease) 1989   • Hyperlipidemia    • Hypertension         Past Surgical History  Past Surgical History:   Procedure Laterality Date   • EYE SURGERY     • HYSTERECTOMY     • OOPHORECTOMY Bilateral    • UTERINE SUSPENSION         Video Barium Swallow Study    Summary:  Images are on PACS for review  Oral stage: WNL for mastication, bolus control, formation, and transfer  Pharyngeal stage: WNL for prompt swallow, hyolaryngeal excursion, pharyngeal constriction and epiglottic inversion  No retention, no penetration or aspiration  Per gross esophageal screen: Thin liquid retropulsion observed on final A/P view  Recommendations:  Diet:Regular  Liquids: thin  Meds: as tolerated  Frequent oral care  Upright position  F/u ST tx: Consider OP ST for chronic cough  Reflux Precautions  Results reviewed with: pt  If a dedicated assessment of the esophagus is desired, consider esophagram/barium swallow or EGD  Pt is a 80yof who reports chronic cough for years, as well as feeling of mucous in her throat         H&P/pertinent provider notes: (PMH noted above)  9/14/22 office visit w/ Pulmonary:  (Refer to that note for additional information)  Assessment:  Chronic cough/abnormal CT chest   · Suspect cough variant asthma/reversible airway disease given the known triggers, improving with oral steroids, vs UACS/reflux related vs chronic atypical infection/MAC given the CT chest findings  · Since last visit, only use Symbicort 2 puffs q 12, no significant improve with that   · Continued to have new coughing fits with exposure to allergens, treated with oral steroid rounds once in June   · Admits to not following the anti-reflux measures, also not using the p r n  albuterol as recommended  · PFT was not a good quality study, no lung restriction or hyperinflation, spirometry was a poor quality cannot be interpreted    GI note from office visit 10/5/22:  ASSESSMENT AND PLAN:    75F with HTN, HLD anxiety here for follow up epigastric pain  EGD 5/12/22 was normal with normal gastric biopsies  Doing well in terms of rare epigastric pain requiring infrequent H2 blocker  Has been having some intermittent constipation, unclear if this is dietary vs medication effect vs stress  Encouraged patient to try miralax for constipation  Also encouraged patient to follow up with PCP or schedule psychiatry appointment for anxiety as this is significantly impacting daily life  1  Gastroesophageal reflux disease without esophagitis  Continue famotidine as needed  2  Constipation, unspecified constipation type  Counseled on using fiber supplement or miralax as needed for soft, formed BMs  3  Colon cancer screening  Reviewed patient's colonoscopy records, next colonoscopy 2025 if health permitting  4  Anxiety  Encouraged patient to schedule psychiatry visit or follow up with PCP to discuss long term anxiety med like SSRI  Previous VBS:  none    Food Allergies:  none  Current Diet:  Regular   Dentition:  Partial natural, partial plate  O2 requirement:  none  Oral mech:  Strength and ROM:  WNL  Vocal Quality/Speech:  WNL  Cognitive status:  Alert    Consistencies administered: Puree, nutrigrain bar, hard cookie, turkey sandwich, thin liquid, barium tablet with thin  Liquids were administered/taken by cup      Pt was viewed standing laterally and AP

## 2022-10-27 ENCOUNTER — PATIENT MESSAGE (OUTPATIENT)
Dept: PULMONOLOGY | Facility: CLINIC | Age: 77
End: 2022-10-27

## 2022-10-31 ENCOUNTER — OFFICE VISIT (OUTPATIENT)
Dept: FAMILY MEDICINE CLINIC | Facility: CLINIC | Age: 77
End: 2022-10-31

## 2022-10-31 ENCOUNTER — APPOINTMENT (OUTPATIENT)
Dept: LAB | Facility: CLINIC | Age: 77
End: 2022-10-31

## 2022-10-31 VITALS
BODY MASS INDEX: 21.23 KG/M2 | HEIGHT: 63 IN | TEMPERATURE: 97.9 F | OXYGEN SATURATION: 98 % | WEIGHT: 119.8 LBS | SYSTOLIC BLOOD PRESSURE: 118 MMHG | DIASTOLIC BLOOD PRESSURE: 80 MMHG | HEART RATE: 97 BPM

## 2022-10-31 DIAGNOSIS — R51.9 TEMPORAL PAIN: ICD-10-CM

## 2022-10-31 DIAGNOSIS — F51.01 PRIMARY INSOMNIA: ICD-10-CM

## 2022-10-31 DIAGNOSIS — F41.9 ANXIETY: ICD-10-CM

## 2022-10-31 DIAGNOSIS — R05.3 CHRONIC COUGH: ICD-10-CM

## 2022-10-31 DIAGNOSIS — R05.3 CHRONIC COUGH: Primary | ICD-10-CM

## 2022-10-31 LAB
ALBUMIN SERPL BCP-MCNC: 3.8 G/DL (ref 3.5–5)
ALP SERPL-CCNC: 96 U/L (ref 46–116)
ALT SERPL W P-5'-P-CCNC: 27 U/L (ref 12–78)
ANION GAP SERPL CALCULATED.3IONS-SCNC: 4 MMOL/L (ref 4–13)
AST SERPL W P-5'-P-CCNC: 15 U/L (ref 5–45)
BASOPHILS # BLD AUTO: 0.04 THOUSANDS/ÂΜL (ref 0–0.1)
BASOPHILS NFR BLD AUTO: 1 % (ref 0–1)
BILIRUB SERPL-MCNC: 0.52 MG/DL (ref 0.2–1)
BUN SERPL-MCNC: 14 MG/DL (ref 5–25)
CALCIUM SERPL-MCNC: 9.7 MG/DL (ref 8.3–10.1)
CHLORIDE SERPL-SCNC: 104 MMOL/L (ref 96–108)
CO2 SERPL-SCNC: 30 MMOL/L (ref 21–32)
CREAT SERPL-MCNC: 0.79 MG/DL (ref 0.6–1.3)
CRP SERPL QL: <3 MG/L
EOSINOPHIL # BLD AUTO: 0.33 THOUSAND/ÂΜL (ref 0–0.61)
EOSINOPHIL NFR BLD AUTO: 4 % (ref 0–6)
ERYTHROCYTE [DISTWIDTH] IN BLOOD BY AUTOMATED COUNT: 12.3 % (ref 11.6–15.1)
ERYTHROCYTE [SEDIMENTATION RATE] IN BLOOD: 11 MM/HOUR (ref 0–29)
GFR SERPL CREATININE-BSD FRML MDRD: 73 ML/MIN/1.73SQ M
GLUCOSE P FAST SERPL-MCNC: 100 MG/DL (ref 65–99)
HCT VFR BLD AUTO: 46.9 % (ref 34.8–46.1)
HGB BLD-MCNC: 15.4 G/DL (ref 11.5–15.4)
IMM GRANULOCYTES # BLD AUTO: 0.01 THOUSAND/UL (ref 0–0.2)
IMM GRANULOCYTES NFR BLD AUTO: 0 % (ref 0–2)
LYMPHOCYTES # BLD AUTO: 2.42 THOUSANDS/ÂΜL (ref 0.6–4.47)
LYMPHOCYTES NFR BLD AUTO: 32 % (ref 14–44)
MCH RBC QN AUTO: 30.8 PG (ref 26.8–34.3)
MCHC RBC AUTO-ENTMCNC: 32.8 G/DL (ref 31.4–37.4)
MCV RBC AUTO: 94 FL (ref 82–98)
MONOCYTES # BLD AUTO: 0.43 THOUSAND/ÂΜL (ref 0.17–1.22)
MONOCYTES NFR BLD AUTO: 6 % (ref 4–12)
NEUTROPHILS # BLD AUTO: 4.43 THOUSANDS/ÂΜL (ref 1.85–7.62)
NEUTS SEG NFR BLD AUTO: 57 % (ref 43–75)
NRBC BLD AUTO-RTO: 0 /100 WBCS
PLATELET # BLD AUTO: 233 THOUSANDS/UL (ref 149–390)
PMV BLD AUTO: 10.4 FL (ref 8.9–12.7)
POTASSIUM SERPL-SCNC: 4 MMOL/L (ref 3.5–5.3)
PROT SERPL-MCNC: 7.8 G/DL (ref 6.4–8.4)
RBC # BLD AUTO: 5 MILLION/UL (ref 3.81–5.12)
SODIUM SERPL-SCNC: 138 MMOL/L (ref 135–147)
WBC # BLD AUTO: 7.66 THOUSAND/UL (ref 4.31–10.16)

## 2022-10-31 RX ORDER — HYDROXYZINE HYDROCHLORIDE 25 MG/1
25 TABLET, FILM COATED ORAL
Start: 2022-10-31

## 2022-10-31 NOTE — PROGRESS NOTES
Name: Jacob Stuart      :       MRN: [id number]  Encounter Provider: Cassie Martell DO  Encounter Date: 10/31/2022   Encounter department: [address] [place] [address]    Assessment & Plan     Patient is seen for chronic cough  She was scheduled for a preop physical on Wednesday, the day after tomorrow but showed up today  She has actually canceled her eye surgery but is very concerned about her cough, weight loss, blurry vision, weakness  1  Chronic cough  -     CBC and differential; Future; Expected date: 10/31/2022  -     Sedimentation rate, automated; Future; Expected date: 10/31/2022  -     C-reactive protein; Future; Expected date: 10/31/2022  -     Comprehensive metabolic panel; Future; Expected date: 10/31/2022    2  Anxiety  -     hydrOXYzine HCL (ATARAX) 25 mg tablet; Take 1 tablet (25 mg total) by mouth daily at bedtime    3  Primary insomnia  -     hydrOXYzine HCL (ATARAX) 25 mg tablet; Take 1 tablet (25 mg total) by mouth daily at bedtime    4  Temporal pain  -     CBC and differential; Future; Expected date: 10/31/2022  -     Sedimentation rate, automated; Future; Expected date: 10/31/2022  -     C-reactive protein; Future; Expected date: 10/31/2022  -     Comprehensive metabolic panel; Future; Expected date: 10/31/2022  Patient has coughed since  when she used Lysol on her mask  She coughs when goes outside in the cold without her mask  She coughs when she gets exposed to chemicals  (Patient did not cough at all while in the office)  Patient talked about not being able to sleep or eat - worried about cancer because of her CT scan  I discussed with her that her CT of the chest was in July and radiologist and pulmonary did not mention cancer  Pulmonologist has done all this testing to find out what is causing her cough  She does not tolerate medication that he has prescribed - like Gabapentin and she says meds like Symbicort do not work   She is not taking Pepcid regularly  I asked her to take this regularly  She also complains of pain in her temples and blurry vision and so I am checking a sed rate  I also recommend that she restart Hydroxyzine to help with sleep and anxiety  She had stopped it because she read that anti-depressants interfere with the nebulizer medication  I am not sure what she is referring to but assured her that Hydroxyzine is not in the class anti-depressants  Subjective      Chief Complaint   Patient presents with   • Headache     Pt canceled her surgery due to not feeling well  She has pain in her temple, nauseas and weak  Feels to have mucus on chest and nose  Patient states that she has pain in her left temple for weeks  She was scheduled for eye surgery, but she cancelled it  Patient is very upset  She continues to cough  She tried Gabapentin but it made her sick  She went back on Hydroxyzine - for sleep - took for two days  She is going away on December 12 for two months to [facility]  Headache    Review of Systems   Constitutional: Negative for chills and fever  HENT: Positive for congestion  Temporal pain   Eyes:        Blurry vision   Respiratory: Positive for cough  Neurological: Positive for headaches  Psychiatric/Behavioral: Positive for decreased concentration and sleep disturbance  The patient is nervous/anxious          Current Outpatient Medications on File Prior to Visit   Medication Sig   • albuterol (2 5 mg/3 mL) 0 083 % nebulizer solution Take 3 mL (2 5 mg total) by nebulization every 6 (six) hours as needed for wheezing or shortness of breath   • amLODIPine (NORVASC) 10 mg tablet TAKE 1 TABLET EVERY DAY   • atorvastatin (LIPITOR) 20 mg tablet Take 1 tablet (20 mg total) by mouth daily   • ezetimibe (ZETIA) 10 mg tablet Take 1 tablet (10 mg total) by mouth daily   • famotidine (PEPCID) 20 mg tablet Take 1 tablet (20 mg total) by mouth 2 (two) times a day as needed for heartburn   • montelukast (SINGULAIR) 10 mg tablet Take 1 tablet (10 mg total) by mouth daily at bedtime       Objective     /80   Pulse 97   Temp 97 9 °F (36 6 °C) (Tympanic)   Ht 5' 3" (1 6 m)   Wt 54 3 kg (119 lb 12 8 oz)   SpO2 98%   BMI 21 22 kg/m²     Physical Exam  Vitals and nursing note reviewed  Constitutional:       General: She is not in acute distress  Comments: She has lost some weight  HENT:      Head: Normocephalic  Eyes:      Extraocular Movements: Extraocular movements intact  Neck:      Thyroid: No thyromegaly  Cardiovascular:      Rate and Rhythm: Normal rate and regular rhythm  Heart sounds: Normal heart sounds  Pulmonary:      Effort: Pulmonary effort is normal       Breath sounds: Normal breath sounds  No wheezing  Lymphadenopathy:      Cervical: No cervical adenopathy  Skin:     General: Skin is warm and dry  Neurological:      Mental Status: She is alert and oriented to person, place, and time  Psychiatric:         Mood and Affect: Mood is anxious         Radha Jaramillo,

## 2022-11-11 ENCOUNTER — TELEPHONE (OUTPATIENT)
Dept: PULMONOLOGY | Facility: CLINIC | Age: 77
End: 2022-11-11

## 2022-11-11 DIAGNOSIS — R05.3 CHRONIC COUGH: Primary | ICD-10-CM

## 2022-11-11 NOTE — TELEPHONE ENCOUNTER
Pt called stating she is not feeling well  She stated she was told to stop inhalers and just use her nebulizer but she is feeling week & nauseous  Pt has an appointment with Dr Florin Bangura on 11/25/22 but she would like to be seen earlier  Can you please give her a call?

## 2022-11-15 RX ORDER — PREDNISONE 10 MG/1
TABLET ORAL
Qty: 30 TABLET | Refills: 0 | Status: SHIPPED | OUTPATIENT
Start: 2022-11-15

## 2022-11-15 NOTE — TELEPHONE ENCOUNTER
Spoke with patient-she would like something for her cough- order prednisone 40 mg decreased by 10 mg every 3 days- patient will follow-up on 11/25

## 2022-11-17 ENCOUNTER — OFFICE VISIT (OUTPATIENT)
Dept: URGENT CARE | Facility: CLINIC | Age: 77
End: 2022-11-17

## 2022-11-17 VITALS
DIASTOLIC BLOOD PRESSURE: 82 MMHG | TEMPERATURE: 97.7 F | OXYGEN SATURATION: 98 % | SYSTOLIC BLOOD PRESSURE: 126 MMHG | HEART RATE: 79 BPM | RESPIRATION RATE: 18 BRPM

## 2022-11-17 DIAGNOSIS — R30.0 DYSURIA: Primary | ICD-10-CM

## 2022-11-17 LAB
SL AMB  POCT GLUCOSE, UA: NEGATIVE
SL AMB LEUKOCYTE ESTERASE,UA: ABNORMAL
SL AMB POCT BILIRUBIN,UA: ABNORMAL
SL AMB POCT BLOOD,UA: ABNORMAL
SL AMB POCT CLARITY,UA: ABNORMAL
SL AMB POCT COLOR,UA: YELLOW
SL AMB POCT KETONES,UA: NEGATIVE
SL AMB POCT NITRITE,UA: POSITIVE
SL AMB POCT PH,UA: 6
SL AMB POCT SPECIFIC GRAVITY,UA: 1.02
SL AMB POCT URINE PROTEIN: 30
SL AMB POCT UROBILINOGEN: 0.2

## 2022-11-17 RX ORDER — NITROFURANTOIN 25; 75 MG/1; MG/1
100 CAPSULE ORAL 2 TIMES DAILY
Qty: 14 CAPSULE | Refills: 0 | Status: SHIPPED | OUTPATIENT
Start: 2022-11-17 | End: 2022-11-25

## 2022-11-17 RX ORDER — SULFAMETHOXAZOLE AND TRIMETHOPRIM 800; 160 MG/1; MG/1
1 TABLET ORAL EVERY 12 HOURS SCHEDULED
Qty: 6 TABLET | Refills: 0 | Status: SHIPPED | OUTPATIENT
Start: 2022-11-17 | End: 2022-11-17

## 2022-11-17 NOTE — PROGRESS NOTES
[facility]        NAME: Lorna Vee is a 78 y o  female  : 1945    MRN: [id number]  DATE: 2022  TIME: 11:04 AM    Assessment and Plan   Dysuria [R30 0]  1  Dysuria  POCT urine dip    Urine culture    nitrofurantoin (MACROBID) 100 mg capsule    DISCONTINUED: sulfamethoxazole-trimethoprim (BACTRIM DS) 800-160 mg per tablet        ua pos leuks, blood, nit  Sent bactrim - pt states she thinks when she was in her 29's may have had a reaction to sulfa but was never told so by a doctor - will start macrobid instead  Culture with sensitivity sent -  F/u with pcp    Patient Instructions     Follow up with PCP in 3-5 days  Proceed to  ER if symptoms worsen  Chief Complaint     Chief Complaint   Patient presents with   • Possible UTI     Patient with urinary frequency and burning and left lower flank pain for 1 week         History of Present Illness   Lorna Vee presents to the clinic c/o    Possible UTI (Patient with urinary frequency for about a week    Started on Saturday with burning at the end of the stream and some left low back pain)         Review of Systems   Review of Systems      Current Medications     Long-Term Medications   Medication Sig Dispense Refill   • amLODIPine (NORVASC) 10 mg tablet TAKE 1 TABLET EVERY DAY 90 tablet 1   • atorvastatin (LIPITOR) 20 mg tablet Take 1 tablet (20 mg total) by mouth daily 90 tablet 1   • ezetimibe (ZETIA) 10 mg tablet Take 1 tablet (10 mg total) by mouth daily 90 tablet 1   • famotidine (PEPCID) 20 mg tablet Take 1 tablet (20 mg total) by mouth 2 (two) times a day as needed for heartburn 180 tablet 3   • hydrOXYzine HCL (ATARAX) 25 mg tablet Take 1 tablet (25 mg total) by mouth daily at bedtime     • montelukast (SINGULAIR) 10 mg tablet Take 1 tablet (10 mg total) by mouth daily at bedtime 90 tablet 1       Current Allergies     Allergies as of 2022 - Reviewed 2022   Allergen Reaction Noted   • Other Cough 2022 • Codeine Rash 02/21/2020            The following portions of the patient's history were reviewed and updated as appropriate: allergies, current medications, past family history, past medical history, past social history, past surgical history and problem list     Objective   /82   Pulse 79   Temp 97 7 °F (36 5 °C) (Tympanic)   Resp 18   SpO2 98%        Physical Exam     Physical Exam

## 2022-11-17 NOTE — PATIENT INSTRUCTIONS
Urinary Tract Infection in Women   AMBULATORY CARE:   A urinary tract infection (UTI)  is caused by bacteria that get inside your urinary tract  Most bacteria that enter your urinary tract come out when you urinate  If the bacteria stay in your urinary tract, you may get an infection  Your urinary tract includes your kidneys, ureters, bladder, and urethra  Urine is made in your kidneys, and it flows from the ureters to the bladder  Urine leaves the bladder through the urethra  A UTI is more common in your lower urinary tract, which includes your bladder and urethra  Common symptoms include the following:   Urinating more often or waking from sleep to urinate    Pain or burning when you urinate    Pain or pressure in your lower abdomen     Urine that smells bad    Blood in your urine    Leaking urine    Seek care immediately if:   You are urinating very little or not at all  You have a high fever with shaking chills  You have side or back pain that gets worse  Call your doctor if:   You have a fever  You do not feel better after 2 days of taking antibiotics  You are vomiting  You have questions or concerns about your condition or care  Treatment for a UTI  may include antibiotics to treat a bacterial infection  You may also need medicines to decrease pain and burning, or decrease the urge to urinate often  If you have UTIs often (called recurrent UTIs), you may be given antibiotics to take regularly  You will be given directions for when and how to use antibiotics  The goal is to prevent UTIs but not cause antibiotic resistance by using antibiotics too often  Prevent a UTI:   Empty your bladder often  Urinate and empty your bladder as soon as you feel the need  Do not hold your urine for long periods of time  Wipe from front to back after you urinate or have a bowel movement  This will help prevent germs from getting into your urinary tract through your urethra      Drink liquids as directed  Ask how much liquid to drink each day and which liquids are best for you  You may need to drink more liquids than usual to help flush out the bacteria  Do not drink alcohol, caffeine, or citrus juices  These can irritate your bladder and increase your symptoms  Your healthcare provider may recommend cranberry juice to help prevent a UTI  Urinate after you have sex  This can help flush out bacteria passed during sex  Do not douche or use feminine deodorants  These can change the chemical balance in your vagina  Change sanitary pads or tampons often  This will help prevent germs from getting into your urinary tract  Talk to your healthcare provider about your birth control method  You may need to change your method if it is increasing your risk for UTIs  Wear cotton underwear and clothes that are loose  Tight pants and nylon underwear can trap moisture and cause bacteria to grow  Vaginal estrogen may be recommended  This medicine helps prevent UTIs in women who have gone through menopause or are in marky-menopause  Do pelvic muscle exercises often  Pelvic muscle exercises may help you start and stop urinating  Strong pelvic muscles may help you empty your bladder easier  Squeeze these muscles tightly for 5 seconds like you are trying to hold back urine  Then relax for 5 seconds  Gradually work up to squeezing for 10 seconds  Do 3 sets of 15 repetitions a day, or as directed  Follow up with your doctor as directed:  Write down your questions so you remember to ask them during your visits  © Copyright [employer] 2022 Information is for End User's use only and may not be sold, redistributed or otherwise used for commercial purposes  All illustrations and images included in CareNotes® are the copyrighted property of [employer]  or Nilo Ceja  The above information is an  only   It is not intended as medical advice for individual conditions or treatments  Talk to your doctor, nurse or pharmacist before following any medical regimen to see if it is safe and effective for you

## 2022-11-18 ENCOUNTER — TELEPHONE (OUTPATIENT)
Dept: PULMONOLOGY | Facility: CLINIC | Age: 77
End: 2022-11-18

## 2022-11-18 NOTE — TELEPHONE ENCOUNTER
Pt called re: she went to the urgent care yesterday for a UTI and was prescribed Macrobid  She states she just read the side effects and would like to know if its ok for her to continue taking this drug? She started it yesterday     Please advise: [phone]

## 2022-11-18 NOTE — TELEPHONE ENCOUNTER
Please let the patient know that pulmonary side effects are typically noted with chronic/long-term use of Macrobid  She should be okay with just a short-term course

## 2022-11-19 LAB — BACTERIA UR CULT: ABNORMAL

## 2022-11-25 ENCOUNTER — OFFICE VISIT (OUTPATIENT)
Dept: PULMONOLOGY | Facility: CLINIC | Age: 77
End: 2022-11-25

## 2022-11-25 VITALS
HEIGHT: 63 IN | OXYGEN SATURATION: 96 % | BODY MASS INDEX: 21.09 KG/M2 | SYSTOLIC BLOOD PRESSURE: 128 MMHG | DIASTOLIC BLOOD PRESSURE: 68 MMHG | WEIGHT: 119 LBS | RESPIRATION RATE: 18 BRPM | HEART RATE: 91 BPM

## 2022-11-25 DIAGNOSIS — R05.3 CHRONIC COUGH: Primary | ICD-10-CM

## 2022-11-25 DIAGNOSIS — J45.20 MILD INTERMITTENT ASTHMA WITHOUT COMPLICATION: ICD-10-CM

## 2022-11-25 RX ORDER — BUDESONIDE AND FORMOTEROL FUMARATE DIHYDRATE 160; 4.5 UG/1; UG/1
2 AEROSOL RESPIRATORY (INHALATION) 2 TIMES DAILY
Qty: 10.2 G | COMMUNITY
Start: 2022-11-25

## 2022-11-25 RX ORDER — ALBUTEROL SULFATE 90 UG/1
2 AEROSOL, METERED RESPIRATORY (INHALATION) EVERY 6 HOURS PRN
Qty: 18 G | COMMUNITY
Start: 2022-11-25

## 2022-11-25 NOTE — LETTER
November 25, 2022     Patient: Derek Angeles  YOB: 1945  Date of Visit: 11/25/2022      To Whom it May Concern:    Duyen Kowalski is under my professional care  Cathygerard Larasheng was seen in my office on 11/25/2022  Cathern Opitz has a chronic bronchitis that can sometimes cause a coughing fits  If you have any questions or concerns, please don't hesitate to call           Sincerely,          Kirk Hooker MD        CC: No Recipients

## 2022-11-25 NOTE — PROGRESS NOTES
Pulmonary Follow Up Note   Carmita Pérez 78 y o  female MRN: [id number]  11/25/2022    Assessment:  Chronic cough  · Possible chronic bronchitis vs variant at vs  upper laryngeal irritation UACS/reflux related vs chronic atypical infection   · Continues to have cough with throat irritation as a start, also reported coughing spells with exposure to strong perfumes/smoke  · Positive [place] allergy panel to multiple allergens  · Usually response to steroids, partial relief with Mucinex/albuterol  · PFT showed no obstruction/restriction, methacholine challenge test was negative    Plan:   · Continue p r n  albuterol/HFA and nebulizer  · May use Symbicort if noted frequent use of the albuterol   · Already has a steroid taper/may use before next travel  · Reinforced the anti-reflux measures  · ENT referral for DL exam    , CT chest   · Seems chronic bronchitis/possible chronic atypical infection   • No exposure to birds, no fever, chills, night sweats, anorexia  • Negative QuantiFERON, no signs of active TB on CT chest    Plan:   · Recommended bronchoscopy for deeper sample, however continues to be hesitant about undergoing it  · Could be agreeable to bronchoscopy after coming back from her international trip        Return in about 4 months (around 3/25/2023)  History of Present Illness     Follow up for: Chronic cough        Background  78 y  o  female with a h/o mild intermittent asthma, HTN, osteoarthritis, reflux     Since 11/2021, noted persistent minimally productive cough   Does not recall all the circumstances at that time   Reports worse with exposure to chemicals, paint, dusts, or perfume and sometimes with exposure to cold air   Improved with 1 round of steroids at that time however recurred after having a COVID infection in 01/2022   Given another round of steroids at the end of February with improvement however recurred again in March/April      Describes it as intermittent/frequent, with the above triggers and sometimes worse at [facility] flat, also associated with pleuritic/right lower chest pain   Mucinex did not help, as well as Symbicort  Reports prior diagnosis of cough variant asthma      05/25/2022 visit-continued on Symbicort start using Singulair/p r n  albuterol  CT chest, PFT  Anti-reflux measures    09/14/2022 visit-continue Symbicort, advised to use p r n  albuterol, added gabapentin 300 discontinued due to intolerance methacholine challenge test negative  QuantiFERON negative     Interval History  Since last seen, no significant changes    Better exercise capacity, less dyspnea on exertion significantly with using albuterol nebulizer, doing it once or twice a day  Prescribed prednisone taper however did not start using  Still with coughing spells 3-4 times per day, usually starts as throat trickling/mucus in the throat then triggers them minimally productive cough/mainly dry  Also worse with exposure to smoke, cologne, strong perfumes or sometimes with eating thick diet        Review of Systems  As per hpi, all other systems reviewed and were negative  Answers for HPI/ROS submitted by the patient on 11/18/2022  Do you experience frequent throat clearing?: Yes  Do you have a wet cough?: Yes  Chronicity: recurrent  When did you first notice your symptoms?: more than 1 year ago  How often do your symptoms occur?: daily  Since you first noticed this problem, how has it changed?: unchanged  Do you have shortness of breath that occurs with effort or exertion?: No  Do you have ear congestion?: No  Do you have heartburn?: Yes  Do you have fatigue?: Yes  Do you have shortness of breath when lying flat?: No  Do you have shortness of breath when you wake up?: No  Do you have sweats?: No  Have you experienced weight loss?: Yes  Which of the following makes your symptoms worse?: change in weather, exposure to fumes, exposure to smoke, minimal activity, occupational exposure, pollen  Which of the following makes your symptoms better?: oral steroids, OTC cough suppressant, prescription cough suppressant      Studies:     Imaging and other studies: I have personally reviewed pertinent films in PACS     CT chest 7/26/2022 - small areas of TIBO at RLL, and LLL  Multiple nodules 3 mm at the R major fissure and LLL  Mild bronchial wall thickening      Fluoroscopy barium swallow W/video-no penetration/aspiration     Pulmonary function testing:   PFT 07/26/2022-ratio 89%, FEV1 1 96 L/93%, FVC 2 2 L/81%  TLC 88%, RV 93%, DLCO 65%      EKG, Pathology, and Other Studies: I have personally reviewed pertinent reports        Past medical, surgical, social and family histories reviewed  Medications/Allergies: Reviewed      Vitals: Blood pressure 128/68, pulse 91, resp  rate 18, height 5' 3" (1 6 m), weight 54 kg (119 lb), SpO2 96 %, not currently breastfeeding  Body mass index is 21 08 kg/m²  Oxygen Therapy  SpO2: 96 %  Oxygen Therapy: None (Room air)      Physical Exam  Body mass index is 21 08 kg/m²     Gen: not in acute distress, thin  Neck/Eyes: supple, PERRL  Ear: normal appearance, no significant hearing impairment  Nose:  normal nasal mucosa, no drainage  Mouth:  unremarkable/normal appearance of lips, teeth and gums  Oropharynx: mucosa is moist, mild posterior pharyngeal erythema  Salivary glands: soft nontender  Chest: normal respiratory efforts, clear breath sounds bilaterally  CV: RRR, no murmurs appreciated, no edema  Abdomen: soft, non tender  Extremities:  No observed deformity   Skin: unremarkable  Neuro: AAO X3, no focal motor deficit        Labs:  Lab Results   Component Value Date    WBC 7 66 10/31/2022    HGB 15 4 10/31/2022    HCT 46 9 (H) 10/31/2022    MCV 94 10/31/2022     10/31/2022     Lab Results   Component Value Date    CALCIUM 9 7 10/31/2022    K 4 0 10/31/2022    CO2 30 10/31/2022     10/31/2022    BUN 14 10/31/2022    CREATININE 0 79 10/31/2022     Lab Results   Component Value Date     (H) 09/15/2022     Lab Results   Component Value Date    ALT 27 10/31/2022    AST 15 10/31/2022    ALKPHOS 96 10/31/2022           Portions of the record may have been created with voice recognition software  Occasional wrong word or "sound a like" substitutions may have occurred due to the inherent limitations of voice recognition software  Read the chart carefully and recognize, using context, where substitutions have occurred    CELSO Fierro    [address] [facility]

## 2022-11-29 ENCOUNTER — TELEPHONE (OUTPATIENT)
Dept: FAMILY MEDICINE CLINIC | Facility: CLINIC | Age: 77
End: 2022-11-29

## 2022-11-29 DIAGNOSIS — R30.0 DYSURIA: Primary | ICD-10-CM

## 2022-11-29 NOTE — TELEPHONE ENCOUNTER
Pt LM stating she has been taking Macrobid for UTI but is still having discomfort  Pt states she is leaving the country and would like to know if she should see GYN for this ongoing issue

## 2022-11-30 ENCOUNTER — APPOINTMENT (OUTPATIENT)
Dept: LAB | Facility: CLINIC | Age: 77
End: 2022-11-30

## 2022-11-30 DIAGNOSIS — Z20.2 POSSIBLE EXPOSURE TO STD: Primary | ICD-10-CM

## 2022-11-30 DIAGNOSIS — R30.0 DYSURIA: ICD-10-CM

## 2022-11-30 LAB
BACTERIA UR QL AUTO: ABNORMAL /HPF
BILIRUB UR QL STRIP: NEGATIVE
CLARITY UR: CLEAR
COLOR UR: ABNORMAL
GLUCOSE UR STRIP-MCNC: NEGATIVE MG/DL
HGB UR QL STRIP.AUTO: NEGATIVE
HYALINE CASTS #/AREA URNS LPF: ABNORMAL /LPF
KETONES UR STRIP-MCNC: NEGATIVE MG/DL
LEUKOCYTE ESTERASE UR QL STRIP: ABNORMAL
MUCOUS THREADS UR QL AUTO: ABNORMAL
NITRITE UR QL STRIP: NEGATIVE
NON-SQ EPI CELLS URNS QL MICRO: ABNORMAL /HPF
PH UR STRIP.AUTO: 6.5 [PH]
PROT UR STRIP-MCNC: NEGATIVE MG/DL
RBC #/AREA URNS AUTO: ABNORMAL /HPF
SP GR UR STRIP.AUTO: 1.01 (ref 1–1.03)
UROBILINOGEN UR STRIP-ACNC: <2 MG/DL
WBC #/AREA URNS AUTO: ABNORMAL /HPF

## 2022-11-30 PROCEDURE — 81001 URINALYSIS AUTO W/SCOPE: CPT | Performed by: FAMILY MEDICINE

## 2022-11-30 NOTE — TELEPHONE ENCOUNTER
Patient stopped into the office asking if Dr Quiana Benavidez has put in lab work for patient to complete  Patient states that she spoke with Dr Quiana Benavidez yesterday and she stated that she was putting in the urine and lab orders  Patient is requesting lab orders

## 2022-12-02 DIAGNOSIS — N39.0 URINARY TRACT INFECTION WITHOUT HEMATURIA, SITE UNSPECIFIED: Primary | ICD-10-CM

## 2022-12-02 LAB
BACTERIA UR CULT: ABNORMAL
BACTERIA UR CULT: ABNORMAL

## 2022-12-02 RX ORDER — AMOXICILLIN 500 MG/1
500 CAPSULE ORAL EVERY 12 HOURS SCHEDULED
Qty: 14 CAPSULE | Refills: 0 | Status: SHIPPED | OUTPATIENT
Start: 2022-12-02 | End: 2022-12-09

## 2023-01-11 DIAGNOSIS — E78.5 DYSLIPIDEMIA: ICD-10-CM

## 2023-01-11 RX ORDER — ATORVASTATIN CALCIUM 20 MG/1
TABLET, FILM COATED ORAL
Qty: 90 TABLET | Refills: 1 | Status: SHIPPED | OUTPATIENT
Start: 2023-01-11

## 2023-01-11 RX ORDER — EZETIMIBE 10 MG/1
TABLET ORAL
Qty: 90 TABLET | Refills: 1 | Status: SHIPPED | OUTPATIENT
Start: 2023-01-11

## 2023-01-21 DIAGNOSIS — I10 ESSENTIAL HYPERTENSION: ICD-10-CM

## 2023-01-21 RX ORDER — AMLODIPINE BESYLATE 10 MG/1
TABLET ORAL
Qty: 90 TABLET | Refills: 1 | Status: SHIPPED | OUTPATIENT
Start: 2023-01-21

## 2023-02-09 ENCOUNTER — TELEPHONE (OUTPATIENT)
Dept: PULMONOLOGY | Facility: CLINIC | Age: 78
End: 2023-02-09

## 2023-02-09 NOTE — TELEPHONE ENCOUNTER
Spoke with patient in regards to scheduling a 4 month follow up at our [facility] office with Dr Kurt Oswald or [address] in April but patient is in the [facility]  Informed patient I will mail her a reminder letter to schedule follow up

## 2023-03-23 ENCOUNTER — RA CDI HCC (OUTPATIENT)
Dept: OTHER | Facility: HOSPITAL | Age: 78
End: 2023-03-23

## 2023-03-27 ENCOUNTER — OFFICE VISIT (OUTPATIENT)
Dept: FAMILY MEDICINE CLINIC | Facility: CLINIC | Age: 78
End: 2023-03-27

## 2023-03-27 VITALS — BODY MASS INDEX: 22.5 KG/M2 | WEIGHT: 127 LBS

## 2023-03-27 DIAGNOSIS — I10 ESSENTIAL HYPERTENSION: Primary | ICD-10-CM

## 2023-03-27 DIAGNOSIS — E78.5 DYSLIPIDEMIA: ICD-10-CM

## 2023-03-27 DIAGNOSIS — R39.89 BLADDER PAIN: ICD-10-CM

## 2023-03-27 DIAGNOSIS — Z12.31 BREAST CANCER SCREENING BY MAMMOGRAM: ICD-10-CM

## 2023-03-27 PROBLEM — L84 CORN OR CALLUS: Status: RESOLVED | Noted: 2019-09-27 | Resolved: 2023-03-27

## 2023-03-27 PROBLEM — M67.472 GANGLION CYST OF LEFT FOOT: Status: RESOLVED | Noted: 2019-11-16 | Resolved: 2023-03-27

## 2023-03-27 PROBLEM — M20.12 HALLUX VALGUS, LEFT: Status: RESOLVED | Noted: 2019-09-27 | Resolved: 2023-03-27

## 2023-03-27 PROBLEM — N39.41 URGE INCONTINENCE: Status: ACTIVE | Noted: 2023-03-27

## 2023-03-27 PROBLEM — M94.0 COSTOCHONDRITIS, ACUTE: Status: RESOLVED | Noted: 2020-10-14 | Resolved: 2023-03-27

## 2023-03-27 PROBLEM — K59.00 CONSTIPATION: Status: ACTIVE | Noted: 2023-03-27

## 2023-03-27 RX ORDER — LATANOPROST 50 UG/ML
SOLUTION/ DROPS OPHTHALMIC
COMMUNITY
Start: 2023-03-24

## 2023-03-27 NOTE — PROGRESS NOTES
Name: Vikas Villanueva      :       MRN: [id number]  Encounter Provider: Paulie Nieto DO  Encounter Date: 3/27/2023   Encounter department: [address] [place] [address]   Patient is seen for multiple concerns - worried about Atorvastatin affecting her memory  Concerned about bladder pain - Dr Paige Kaur told her that she does not have a prolapsed bladder  1  Essential hypertension  -     Comprehensive metabolic panel; Future; Expected date: 2023  -     TSH, 3rd generation with Free T4 reflex; Future; Expected date: 2023  -     CBC and differential; Future; Expected date: 2023    2  Dyslipidemia  -     Comprehensive metabolic panel; Future; Expected date: 2023  -     TSH, 3rd generation with Free T4 reflex; Future; Expected date: 2023  -     CBC and differential; Future; Expected date: 2023  -     Lipid Panel with Direct LDL reflex; Future; Expected date: 2023    3  Bladder pain  -     Ambulatory Referral to Urology; Future    4  Breast cancer screening by mammogram  -     Mammo screening bilateral w 3d & cad; Future; Expected date: 2023  Given  orders for fasting blood work to see if we can decrease cholesterol medication  I have referred her to urology for a second opinion  Subjective    No chief complaint on file  Patient was in [place] for three months and she felt fine  Since she's back, she has pressure in her head  She saw eye doctor recently and pressure in her eyes was elevated up to 26 - started on Latanoprost   Patient also concerned about pelvic discomfort - saw Dr Paige Kaur and he told her that she did not have a prolapsed bladder  She says that she was told that she had a prolapsed bladder by two different doctors when she lived in [place]  Patient is concerned about taking statin and Zetia  Review of Systems   Constitutional: Negative for chills and fever     HENT: Negative for congestion and sore throat  Respiratory: Negative for chest tightness  Cardiovascular: Negative for chest pain and palpitations  Gastrointestinal: Negative for abdominal pain, constipation, diarrhea and nausea  Genitourinary: Positive for pelvic pain  Negative for difficulty urinating  Musculoskeletal: Positive for back pain and neck pain  Skin: Negative  Neurological: Positive for headaches  Negative for dizziness  Psychiatric/Behavioral: Negative  Current Outpatient Medications on File Prior to Visit   Medication Sig   • albuterol (2 5 mg/3 mL) 0 083 % nebulizer solution Take 3 mL (2 5 mg total) by nebulization every 6 (six) hours as needed for wheezing or shortness of breath   • albuterol (Ventolin HFA) 90 mcg/act inhaler Inhale 2 puffs every 6 (six) hours as needed for wheezing   • amLODIPine (NORVASC) 10 mg tablet TAKE 1 TABLET EVERY DAY   • atorvastatin (LIPITOR) 20 mg tablet TAKE 1 TABLET EVERY DAY   • budesonide-formoterol (Symbicort) 160-4 5 mcg/act inhaler Inhale 2 puffs 2 (two) times a day Rinse mouth after use  • ezetimibe (ZETIA) 10 mg tablet TAKE 1 TABLET EVERY DAY   • famotidine (PEPCID) 20 mg tablet Take 1 tablet (20 mg total) by mouth 2 (two) times a day as needed for heartburn   • hydrOXYzine HCL (ATARAX) 25 mg tablet Take 1 tablet (25 mg total) by mouth daily at bedtime   • latanoprost (XALATAN) 0 005 % ophthalmic solution    • [DISCONTINUED] montelukast (SINGULAIR) 10 mg tablet Take 1 tablet (10 mg total) by mouth daily at bedtime (Patient not taking: Reported on 11/25/2022)   • [DISCONTINUED] predniSONE 10 mg tablet 4 tabs x 3 days decreased by 1 tab every 3rd day (Patient not taking: Reported on 11/25/2022)       Objective     Wt 57 6 kg (127 lb)   BMI 22 50 kg/m²     Physical Exam  Vitals and nursing note reviewed  Constitutional:       General: She is not in acute distress  HENT:      Head: Normocephalic  Neck:      Thyroid: No thyromegaly     Cardiovascular:      Rate and Rhythm: Normal rate and regular rhythm  Heart sounds: Normal heart sounds  Pulmonary:      Effort: Pulmonary effort is normal       Breath sounds: Normal breath sounds  Musculoskeletal:      Right lower leg: No edema  Left lower leg: No edema  Lymphadenopathy:      Cervical: No cervical adenopathy  Skin:     General: Skin is warm and dry  Neurological:      Mental Status: She is alert and oriented to person, place, and time     Psychiatric:         Mood and Affect: Mood normal        Eh Ford DO

## 2023-03-29 ENCOUNTER — TELEPHONE (OUTPATIENT)
Dept: UROLOGY | Facility: MEDICAL CENTER | Age: 78
End: 2023-03-29

## 2023-03-29 NOTE — TELEPHONE ENCOUNTER
Should be urogyn or gynecology bobby if the concern is prolapse which it appears there was a questionable history of

## 2023-03-29 NOTE — TELEPHONE ENCOUNTER
Please Triage  New Patient    What is the reason for the patient’s appointment? Pelvic pain off and on for years  Pt requested to see Geena Mckee     What office location does the patient prefer? [facility]     Imaging/Lab Results:    Do we accept the patient's insurance or is the patient Self-Pay? Insurance Provider: [employer]   Plan Type/Number:  Member ID#: Has the patient had any previous Urologist(s)? No     Have patient records been requested? If not are records showing in Epic: in epic     Has the patient had any outside testing done? In epic   Does the patient have a personal history of cancer?   No

## 2023-03-31 ENCOUNTER — APPOINTMENT (OUTPATIENT)
Dept: LAB | Facility: CLINIC | Age: 78
End: 2023-03-31

## 2023-03-31 DIAGNOSIS — E78.5 DYSLIPIDEMIA: ICD-10-CM

## 2023-03-31 DIAGNOSIS — I10 ESSENTIAL HYPERTENSION: ICD-10-CM

## 2023-03-31 LAB
ALBUMIN SERPL BCP-MCNC: 3.7 G/DL (ref 3.5–5)
ALP SERPL-CCNC: 80 U/L (ref 46–116)
ALT SERPL W P-5'-P-CCNC: 24 U/L (ref 12–78)
ANION GAP SERPL CALCULATED.3IONS-SCNC: 3 MMOL/L (ref 4–13)
AST SERPL W P-5'-P-CCNC: 19 U/L (ref 5–45)
BASOPHILS # BLD AUTO: 0.05 THOUSANDS/ÂΜL (ref 0–0.1)
BASOPHILS NFR BLD AUTO: 1 % (ref 0–1)
BILIRUB SERPL-MCNC: 0.5 MG/DL (ref 0.2–1)
BUN SERPL-MCNC: 14 MG/DL (ref 5–25)
CALCIUM SERPL-MCNC: 8.7 MG/DL (ref 8.3–10.1)
CHLORIDE SERPL-SCNC: 105 MMOL/L (ref 96–108)
CHOLEST SERPL-MCNC: 134 MG/DL
CO2 SERPL-SCNC: 29 MMOL/L (ref 21–32)
CREAT SERPL-MCNC: 0.56 MG/DL (ref 0.6–1.3)
EOSINOPHIL # BLD AUTO: 0.37 THOUSAND/ÂΜL (ref 0–0.61)
EOSINOPHIL NFR BLD AUTO: 4 % (ref 0–6)
ERYTHROCYTE [DISTWIDTH] IN BLOOD BY AUTOMATED COUNT: 12 % (ref 11.6–15.1)
GFR SERPL CREATININE-BSD FRML MDRD: 91 ML/MIN/1.73SQ M
GLUCOSE P FAST SERPL-MCNC: 95 MG/DL (ref 65–99)
HCT VFR BLD AUTO: 45.7 % (ref 34.8–46.1)
HDLC SERPL-MCNC: 67 MG/DL
HGB BLD-MCNC: 15 G/DL (ref 11.5–15.4)
IMM GRANULOCYTES # BLD AUTO: 0.02 THOUSAND/UL (ref 0–0.2)
IMM GRANULOCYTES NFR BLD AUTO: 0 % (ref 0–2)
LDLC SERPL CALC-MCNC: 43 MG/DL (ref 0–100)
LYMPHOCYTES # BLD AUTO: 3.05 THOUSANDS/ÂΜL (ref 0.6–4.47)
LYMPHOCYTES NFR BLD AUTO: 37 % (ref 14–44)
MCH RBC QN AUTO: 31.2 PG (ref 26.8–34.3)
MCHC RBC AUTO-ENTMCNC: 32.8 G/DL (ref 31.4–37.4)
MCV RBC AUTO: 95 FL (ref 82–98)
MONOCYTES # BLD AUTO: 0.59 THOUSAND/ÂΜL (ref 0.17–1.22)
MONOCYTES NFR BLD AUTO: 7 % (ref 4–12)
NEUTROPHILS # BLD AUTO: 4.24 THOUSANDS/ÂΜL (ref 1.85–7.62)
NEUTS SEG NFR BLD AUTO: 51 % (ref 43–75)
NRBC BLD AUTO-RTO: 0 /100 WBCS
PLATELET # BLD AUTO: 221 THOUSANDS/UL (ref 149–390)
PMV BLD AUTO: 10.5 FL (ref 8.9–12.7)
POTASSIUM SERPL-SCNC: 3.6 MMOL/L (ref 3.5–5.3)
PROT SERPL-MCNC: 7.3 G/DL (ref 6.4–8.4)
RBC # BLD AUTO: 4.81 MILLION/UL (ref 3.81–5.12)
SODIUM SERPL-SCNC: 137 MMOL/L (ref 135–147)
TRIGL SERPL-MCNC: 122 MG/DL
TSH SERPL DL<=0.05 MIU/L-ACNC: 2.65 UIU/ML (ref 0.45–4.5)
WBC # BLD AUTO: 8.32 THOUSAND/UL (ref 4.31–10.16)

## 2023-05-17 ENCOUNTER — OFFICE VISIT (OUTPATIENT)
Dept: FAMILY MEDICINE CLINIC | Facility: CLINIC | Age: 78
End: 2023-05-17

## 2023-05-17 ENCOUNTER — NEW PATIENT COMPREHENSIVE (OUTPATIENT)
Dept: URBAN - METROPOLITAN AREA CLINIC 6 | Facility: CLINIC | Age: 78
End: 2023-05-17

## 2023-05-17 VITALS
DIASTOLIC BLOOD PRESSURE: 70 MMHG | WEIGHT: 128 LBS | TEMPERATURE: 97.5 F | SYSTOLIC BLOOD PRESSURE: 120 MMHG | RESPIRATION RATE: 16 BRPM | HEIGHT: 63 IN | BODY MASS INDEX: 22.68 KG/M2

## 2023-05-17 DIAGNOSIS — H25.813: ICD-10-CM

## 2023-05-17 DIAGNOSIS — H40.053: ICD-10-CM

## 2023-05-17 DIAGNOSIS — Z98.890: ICD-10-CM

## 2023-05-17 DIAGNOSIS — N64.4 BREAST PAIN, LEFT: Primary | ICD-10-CM

## 2023-05-17 DIAGNOSIS — R51.9: ICD-10-CM

## 2023-05-17 PROCEDURE — 99204 OFFICE O/P NEW MOD 45 MIN: CPT

## 2023-05-17 PROCEDURE — 92020 GONIOSCOPY: CPT

## 2023-05-17 ASSESSMENT — VISUAL ACUITY
OD_CC: 20/40-2
OS_PH: 20/40-2
OS_CC: 20/70

## 2023-05-17 ASSESSMENT — TONOMETRY
OD_IOP_MMHG: 22
OS_IOP_MMHG: 24

## 2023-05-17 NOTE — PROGRESS NOTES
Assessment/Plan:    1  Breast pain, left  Assessment & Plan:  Left breast pain has resolved  Patient with normal breast exam today  We discussed that her symptoms are likely musculoskeletal and to avoid wearing underwire bras as they can put pressure on rib cage  She has mammogram scheduled next week  Previous mammograms have been normal   Follow up as needed  Subjective:      Patient ID: Vee Charlton is a 78 y o  female  HPI    Patient with left breast pain  She has had this complaint in the past   She has mammogram scheduled for next week  Her mammograms in the past have all been normal    She has been wearing more underwire bone bra and states that while she was in [place] for 4 months that is the only bra she would wear  She also uses her left arm to hold her phone up, especially at night  Over the last week her pain has resolved and she is asymptomatic today       The following portions of the patient's history were reviewed and updated as appropriate: allergies, current medications, past family history, past medical history, past social history, past surgical history, and problem list       Current Outpatient Medications:   •  albuterol (2 5 mg/3 mL) 0 083 % nebulizer solution, Take 3 mL (2 5 mg total) by nebulization every 6 (six) hours as needed for wheezing or shortness of breath, Disp: 180 mL, Rfl: 4  •  albuterol (PROVENTIL HFA,VENTOLIN HFA) 90 mcg/act inhaler, Inhale 2 puffs every 6 (six) hours as needed for wheezing, Disp: 18 g, Rfl:   •  amLODIPine (NORVASC) 10 mg tablet, TAKE 1 TABLET EVERY DAY, Disp: 90 tablet, Rfl: 1  •  atorvastatin (LIPITOR) 20 mg tablet, TAKE 1 TABLET EVERY DAY, Disp: 90 tablet, Rfl: 1  •  budesonide-formoterol (SYMBICORT) 160-4 5 mcg/act inhaler, Inhale 2 puffs 2 (two) times a day Rinse mouth after use , Disp: 10 2 g, Rfl:   •  ezetimibe (ZETIA) 10 mg tablet, TAKE 1 TABLET EVERY DAY, Disp: 90 tablet, Rfl: 1  •  famotidine (PEPCID) 20 mg tablet, Take 1 "tablet (20 mg total) by mouth 2 (two) times a day as needed for heartburn, Disp: 180 tablet, Rfl: 3  •  hydrOXYzine HCL (ATARAX) 25 mg tablet, Take 1 tablet (25 mg total) by mouth daily at bedtime, Disp: , Rfl:   •  latanoprost (XALATAN) 0 005 % ophthalmic solution, , Disp: , Rfl:       Review of Systems   Constitutional: Negative for chills and fever  HENT: Negative for ear pain and sore throat  Eyes: Negative for pain and visual disturbance  Respiratory: Negative for cough and shortness of breath  Cardiovascular: Negative for chest pain and palpitations  Gastrointestinal: Negative for abdominal pain and vomiting  Genitourinary: Negative for dysuria and hematuria  Musculoskeletal: Negative for arthralgias and back pain  Skin: Negative for color change and rash  Neurological: Negative for seizures and syncope  All other systems reviewed and are negative  Objective:      /70 (BP Location: Right arm, Patient Position: Sitting, Cuff Size: Adult)   Temp 97 5 °F (36 4 °C) (Temporal)   Resp 16   Ht 5' 2 99\" (1 6 m)   Wt 58 1 kg (128 lb)   BMI 22 68 kg/m²          Physical Exam  Vitals and nursing note reviewed  Constitutional:       General: She is not in acute distress  Appearance: Normal appearance  She is not toxic-appearing  HENT:      Head: Normocephalic and atraumatic  Eyes:      Extraocular Movements: Extraocular movements intact  Conjunctiva/sclera: Conjunctivae normal       Pupils: Pupils are equal, round, and reactive to light  Cardiovascular:      Rate and Rhythm: Normal rate and regular rhythm  Heart sounds: Normal heart sounds  No murmur heard  Pulmonary:      Effort: Pulmonary effort is normal  No respiratory distress  Breath sounds: Normal breath sounds  No wheezing  Chest:   Breasts:     Breasts are symmetrical       Right: Normal  No mass, nipple discharge, skin change or tenderness        Left: Normal  No mass, nipple discharge, skin " change or tenderness  Musculoskeletal:      Cervical back: Normal range of motion  Lymphadenopathy:      Upper Body:      Right upper body: No axillary adenopathy  Left upper body: No axillary adenopathy  Skin:     General: Skin is warm  Neurological:      General: No focal deficit present  Mental Status: She is alert and oriented to person, place, and time  Psychiatric:         Mood and Affect: Mood normal          Behavior: Behavior normal          Thought Content:  Thought content normal

## 2023-05-17 NOTE — ASSESSMENT & PLAN NOTE
Left breast pain has resolved  Patient with normal breast exam today  We discussed that her symptoms are likely musculoskeletal and to avoid wearing underwire bras as they can put pressure on rib cage  She has mammogram scheduled next week  Previous mammograms have been normal   Follow up as needed

## 2023-05-18 ENCOUNTER — TELEPHONE (OUTPATIENT)
Dept: OTHER | Facility: OTHER | Age: 78
End: 2023-05-18

## 2023-05-18 ENCOUNTER — CONSULT (OUTPATIENT)
Dept: CARDIOLOGY CLINIC | Facility: CLINIC | Age: 78
End: 2023-05-18

## 2023-05-18 VITALS
HEART RATE: 87 BPM | DIASTOLIC BLOOD PRESSURE: 72 MMHG | WEIGHT: 127.4 LBS | BODY MASS INDEX: 22.57 KG/M2 | SYSTOLIC BLOOD PRESSURE: 120 MMHG

## 2023-05-18 DIAGNOSIS — E78.5 DYSLIPIDEMIA: ICD-10-CM

## 2023-05-18 DIAGNOSIS — I10 ESSENTIAL HYPERTENSION: ICD-10-CM

## 2023-05-18 DIAGNOSIS — R06.09 DYSPNEA ON EXERTION: ICD-10-CM

## 2023-05-18 DIAGNOSIS — Z01.810 PREOPERATIVE CARDIOVASCULAR EXAMINATION: Primary | ICD-10-CM

## 2023-05-18 DIAGNOSIS — H26.9 CATARACT OF BOTH EYES, UNSPECIFIED CATARACT TYPE: ICD-10-CM

## 2023-05-18 DIAGNOSIS — Z82.49 FAMILY HISTORY OF CARDIOMYOPATHY: ICD-10-CM

## 2023-05-18 NOTE — PROGRESS NOTES
Cardiology Office Note  MD Francisco Llamas MD, Zoie Trejo DO, Annalee Failing Mansoor, MD Denver Boas, DO, Kristyn Garland DO, [facility] [place]  ----------------------------------------------------------------  [facility]  [address], [address]    Raheem Durham 78 y o  female MRN: [id number]  Unit/Bed#:  Encounter: [id number]      History of Present Illness: It was a pleasure to see Raheem Durham in the office today for initial CV evaluation  She has a past medical history of hypertension, dyslipidemia, asthma and GERD  She has a family history reportedly with hypertrophic cardiomyopathy in her brother who also has either pacemaker or defibrillator in her mother had a history of cardiovascular disease  She established care with us in May 2023  The patient was recommended for evaluation of her cardiac status due to her family history  At baseline, when she had been doing significant physical activity, she did admit to some dyspnea on exertion  Her symptoms however did not stop her from doing physical activity including climbing stairs or being very active on a daily basis  She had no history of cardiovascular disease of which she was aware  She denies any chest pain, pressure, tightness or squeezing  Denies lightheadedness, dizziness or palpitations  Has chronic venous insufficiency which is very minimal   Denies orthopnea or paroxysmal nocturnal dyspnea  She is here today for cardiovascular evaluation as well as preoperative CV risk assessment for upcoming cataract surgery  Review of Systems:  Review of Systems   Constitutional: Negative for decreased appetite, fever, weight gain and weight loss  HENT: Negative for congestion and sore throat  Eyes: Negative for visual disturbance  Cardiovascular: Negative for chest pain, dyspnea on exertion, leg swelling, near-syncope and palpitations     Respiratory: Negative for cough and shortness of breath  Hematologic/Lymphatic: Negative for bleeding problem  Skin: Negative for rash  Musculoskeletal: Negative for myalgias and neck pain  Gastrointestinal: Negative for abdominal pain and nausea  Neurological: Negative for light-headedness and weakness  Psychiatric/Behavioral: Negative for depression         Past Medical History:   Diagnosis Date   • Asthma     variant cough asthma   • Cataract    • GERD (gastroesophageal reflux disease)    • Hyperlipidemia    • Hypertension        Past Surgical History:   Procedure Laterality Date   • EYE SURGERY     • HYSTERECTOMY     • OOPHORECTOMY Bilateral    • UTERINE SUSPENSION         Social History     Socioeconomic History   • Marital status:      Spouse name: Not on file   • Number of children: Not on file   • Years of education: Not on file   • Highest education level: Not on file   Occupational History   • Not on file   Tobacco Use   • Smoking status: Never   • Smokeless tobacco: Never   Vaping Use   • Vaping Use: Never used   Substance and Sexual Activity   • Alcohol use: Not Currently   • Drug use: Never   • Sexual activity: Yes     Partners: Male   Other Topics Concern   • Not on file   Social History Narrative   • Not on file     Social Determinants of Health     Financial Resource Strain: Not on file   Food Insecurity: Not on file   Transportation Needs: Not on file   Physical Activity: Not on file   Stress: Not on file   Social Connections: Not on file   Intimate Partner Violence: Not on file   Housing Stability: Not on file       Family History   Problem Relation Age of Onset   • Asthma Mother    • Hypertension Mother            • No Known Problems Father    • No Known Problems Sister    • No Known Problems Maternal Grandmother    • No Known Problems Maternal Grandfather    • No Known Problems Paternal Grandmother    • No Known Problems Paternal Grandfather    • No Known Problems Sister    • Hypertension Brother         Airam maker       Allergies   Allergen Reactions   • Other Cough     Seasonal, perfumes, colon, chemicals   • Codeine Rash         Current Outpatient Medications:   •  albuterol (2 5 mg/3 mL) 0 083 % nebulizer solution, Take 3 mL (2 5 mg total) by nebulization every 6 (six) hours as needed for wheezing or shortness of breath, Disp: 180 mL, Rfl: 4  •  albuterol (PROVENTIL HFA,VENTOLIN HFA) 90 mcg/act inhaler, Inhale 2 puffs every 6 (six) hours as needed for wheezing, Disp: 18 g, Rfl:   •  amLODIPine (NORVASC) 10 mg tablet, TAKE 1 TABLET EVERY DAY, Disp: 90 tablet, Rfl: 1  •  atorvastatin (LIPITOR) 20 mg tablet, TAKE 1 TABLET EVERY DAY, Disp: 90 tablet, Rfl: 1  •  ezetimibe (ZETIA) 10 mg tablet, TAKE 1 TABLET EVERY DAY, Disp: 90 tablet, Rfl: 1  •  famotidine (PEPCID) 20 mg tablet, Take 1 tablet (20 mg total) by mouth 2 (two) times a day as needed for heartburn, Disp: 180 tablet, Rfl: 3  •  budesonide-formoterol (SYMBICORT) 160-4 5 mcg/act inhaler, Inhale 2 puffs 2 (two) times a day Rinse mouth after use  (Patient not taking: Reported on 5/18/2023), Disp: 10 2 g, Rfl:   •  hydrOXYzine HCL (ATARAX) 25 mg tablet, Take 1 tablet (25 mg total) by mouth daily at bedtime (Patient not taking: Reported on 5/18/2023), Disp: , Rfl:   •  latanoprost (XALATAN) 0 005 % ophthalmic solution, , Disp: , Rfl:     Vitals:    05/18/23 1010   BP: 120/72   BP Location: Left arm   Patient Position: Sitting   Cuff Size: Standard   Pulse: 87   Weight: 57 8 kg (127 lb 6 4 oz)     Body mass index is 22 57 kg/m²  PHYSICAL EXAMINATION:  Gen: Awake, Alert, NAD   Head/eyes: AT/NC, pupils equal and round, Anicteric  ENT: mmm  Neck: Supple, No elevated JVP, trachea midline  Resp: CTA bilaterally no w/r/r  CV: RRR +S1, S2, No m/r/g  Abd: Soft, NT/ND + BS  Ext: no LE edema bilaterally  Neuro:  Follows commands, moves all extermities  Psych: Appropriate affect, normal mood, pleasant attitude, non-combative  Skin: warm; no rash, erythema or venous stasis changes on exposed skin    --------------------------------------------------------------------------------  TREADMILL STRESS  No results found for this or any previous visit      --------------------------------------------------------------------------------  NUCLEAR STRESS TEST: No results found for this or any previous visit  No results found for this or any previous visit       --------------------------------------------------------------------------------  CATH:  No results found for this or any previous visit     --------------------------------------------------------------------------------  ECHO:   No results found for this or any previous visit  No results found for this or any previous visit     --------------------------------------------------------------------------------  HOLTER  No results found for this or any previous visit      No results found for this or any previous visit     --------------------------------------------------------------------------------  CAROTIDS  No results found for this or any previous visit      --------------------------------------------------------------------------------  ECGs:  Results for orders placed or performed in visit on 05/18/23   POCT ECG    Impression    Sinus rhythm 87 bpm, normal ECG        Lab Results   Component Value Date    WBC 8 32 03/31/2023    HGB 15 0 03/31/2023    HCT 45 7 03/31/2023    MCV 95 03/31/2023     03/31/2023      Lab Results   Component Value Date    SODIUM 137 03/31/2023    K 3 6 03/31/2023     03/31/2023    CO2 29 03/31/2023    BUN 14 03/31/2023    CREATININE 0 56 (L) 03/31/2023    GLUC 113 02/03/2022    CALCIUM 8 7 03/31/2023      Lab Results   Component Value Date    HGBA1C 5 4 06/26/2020      No results found for: CHOL  Lab Results   Component Value Date    HDL 67 03/31/2023    HDL 74 05/27/2022    HDL 72 06/09/2021     Lab Results   Component Value Date    LDLCALC 43 03/31/2023    LDLCALC 43 05/27/2022 LDLCALC 44 06/09/2021     Lab Results   Component Value Date    TRIG 122 03/31/2023    TRIG 75 05/27/2022    TRIG 111 06/09/2021     No results found for: CHOLHDL   No results found for: INR, PROTIME     1  Family history of cardiomyopathy  -     Ambulatory Referral to Cardiology  -     POCT ECG    2  Essential hypertension    3  Dyslipidemia        IMPRESSION:  • Preoperative CV risk assessment for cataract surgery  • Bilateral cataracts  • Dyspnea on exertion  • Family history of presumed hypertrophic cardiomyopathy in brother  • Hypertension  • Dyslipidemia  • Chronic venous insufficiency  • Asthma  • GERD  • Anxiety    PLAN:  It was a pleasure to see Umesh Sharma in the office today for initial CV evaluation  She is here today due to her family history presumably of hypertrophic cardiomyopathy, but this is unclear  She also is here for preoperative CV risk assessment prior to her upcoming cataract surgery  She does admit to some exertional dyspnea that she had experienced in the past   Her ECG shows no acute ischemic changes  She has no signs or symptoms of heart failure and examines to be euvolemic  Blood pressure and heart rate are currently stable  She has been tolerating her current medications without any reported adverse effects and can perform greater than 4 METS on a daily basis without significant exertional symptoms  Based on her clinical presentation, I have the following recommendations:    1  Due to her good overall functional capacity, nonischemic ECG and limited risk factors, she is a low risk for her upcoming cataract surgery  No further CV testing prior to the OR as it would not change her management  There are no absolute contraindications to surgery from CV perspective  2   She admits to some exertional dyspnea from time to time  For completeness, I will like to check an exercise stress test to assess for any evidence of underlying myocardial ischemia    Stress test can be performed "after cataract surgery and should not hold up surgical intervention  3   Would obtain 2D echocardiogram to assess cardiac structure and function with longstanding history of hypertension and possible family history of hypertrophic cardiomyopathy  4   Recommend heart healthy diet low in sodium and carbohydrate  5  Would encourage 30 minutes a day, 5 days a week of moderate intensity activity to build cardiovascular endurance  6  Continue current antihypertensive regimen including amlodipine  7   Continue lipid-lowering regimen including statin therapy and Zetia  Goal LDL is less than 70 mg/dL  Repeat lipid panel in 3 to 6 months  8   Should her symptoms of shortness of breath worsen in frequency or severity or change in quality, recommend seeking immediate medical attention  9  We will follow-up with her after testing  As always, please do not hesitate to call with any questions  Portions of the record may have been created with voice recognition software  Occasional wrong word or \"sound a like\" substitutions may have occurred due to the inherent limitations of voice recognition software  Read the chart carefully and recognize, using context, where substitutions have occurred        Signed: Mary Li, , FACC, KEITH, FACP  "

## 2023-05-18 NOTE — TELEPHONE ENCOUNTER
Pt was in the office today and wanted to know if needs to have Ct scan done before or after her cataract surgery that is scheduled in June   Please call back to advise

## 2023-05-25 ENCOUNTER — HOSPITAL ENCOUNTER (OUTPATIENT)
Dept: MAMMOGRAPHY | Facility: MEDICAL CENTER | Age: 78
Discharge: HOME | End: 2023-05-25

## 2023-05-25 VITALS — HEIGHT: 63 IN | BODY MASS INDEX: 22.27 KG/M2 | WEIGHT: 125.66 LBS

## 2023-05-25 DIAGNOSIS — Z12.31 BREAST CANCER SCREENING BY MAMMOGRAM: ICD-10-CM

## 2023-05-30 ENCOUNTER — PRE-OP CATARACT MEASUREMENTS (OUTPATIENT)
Dept: URBAN - METROPOLITAN AREA CLINIC 6 | Facility: CLINIC | Age: 78
End: 2023-05-30

## 2023-05-30 DIAGNOSIS — Z98.890: ICD-10-CM

## 2023-05-30 DIAGNOSIS — H40.053: ICD-10-CM

## 2023-05-30 DIAGNOSIS — H25.813: ICD-10-CM

## 2023-05-30 LAB
BLOOD WORKUP: NORMAL

## 2023-05-30 PROCEDURE — 92136 OPHTHALMIC BIOMETRY: CPT

## 2023-05-30 PROCEDURE — 92012 INTRM OPH EXAM EST PATIENT: CPT

## 2023-05-30 ASSESSMENT — TONOMETRY
OD_IOP_MMHG: 23
OS_IOP_MMHG: 25

## 2023-05-30 ASSESSMENT — VISUAL ACUITY
OD_CC: 20/70
OU_CC: J1
OS_CC: 20/60

## 2023-05-31 ENCOUNTER — TELEPHONE (OUTPATIENT)
Dept: OTHER | Facility: OTHER | Age: 78
End: 2023-05-31

## 2023-05-31 ENCOUNTER — TELEPHONE (OUTPATIENT)
Dept: FAMILY MEDICINE CLINIC | Facility: CLINIC | Age: 78
End: 2023-05-31

## 2023-05-31 NOTE — TELEPHONE ENCOUNTER
Reviewed Dr Tarik Cordon message  Pt has not been contacted by breast navigators yet but I let her know she will   She has the # for central scheduling to schedule her diagnostic tests

## 2023-05-31 NOTE — TELEPHONE ENCOUNTER
Pt  Was told a nurse would call her about her mammogram follow up tests and she has still not heard from anyone and she is full of anxiety  pls have a nurse return her call

## 2023-06-07 ENCOUNTER — APPOINTMENT (OUTPATIENT)
Dept: LAB | Facility: CLINIC | Age: 78
End: 2023-06-07
Payer: COMMERCIAL

## 2023-06-07 DIAGNOSIS — H40.039 ANATOMICAL NARROW ANGLE: ICD-10-CM

## 2023-06-07 DIAGNOSIS — Z98.890 OTHER STATES FOLLOWING SURGERY OF EYE AND ADNEXA: ICD-10-CM

## 2023-06-07 LAB — ERYTHROCYTE [SEDIMENTATION RATE] IN BLOOD: 7 MM/HOUR (ref 0–29)

## 2023-06-07 PROCEDURE — 85652 RBC SED RATE AUTOMATED: CPT

## 2023-06-07 PROCEDURE — 36415 COLL VENOUS BLD VENIPUNCTURE: CPT

## 2023-06-13 ENCOUNTER — VBI (OUTPATIENT)
Dept: ADMINISTRATIVE | Facility: OTHER | Age: 78
End: 2023-06-13

## 2023-06-15 ENCOUNTER — OFFICE VISIT (OUTPATIENT)
Dept: FAMILY MEDICINE CLINIC | Facility: CLINIC | Age: 78
End: 2023-06-15
Payer: COMMERCIAL

## 2023-06-15 ENCOUNTER — HOSPITAL ENCOUNTER (OUTPATIENT)
Dept: ULTRASOUND IMAGING | Facility: CLINIC | Age: 78
Discharge: HOME | End: 2023-06-15
Payer: COMMERCIAL

## 2023-06-15 ENCOUNTER — HOSPITAL ENCOUNTER (OUTPATIENT)
Dept: MAMMOGRAPHY | Facility: CLINIC | Age: 78
Discharge: HOME | End: 2023-06-15
Payer: COMMERCIAL

## 2023-06-15 VITALS
TEMPERATURE: 97.3 F | WEIGHT: 127.2 LBS | HEART RATE: 94 BPM | BODY MASS INDEX: 22.54 KG/M2 | HEIGHT: 63 IN | OXYGEN SATURATION: 97 % | SYSTOLIC BLOOD PRESSURE: 120 MMHG | DIASTOLIC BLOOD PRESSURE: 68 MMHG

## 2023-06-15 VITALS — HEIGHT: 63 IN | BODY MASS INDEX: 22.15 KG/M2 | WEIGHT: 125 LBS

## 2023-06-15 DIAGNOSIS — I10 ESSENTIAL HYPERTENSION: ICD-10-CM

## 2023-06-15 DIAGNOSIS — Z01.818 PRE-OP EXAM: Primary | ICD-10-CM

## 2023-06-15 DIAGNOSIS — R92.8 ABNORMAL MAMMOGRAM: ICD-10-CM

## 2023-06-15 DIAGNOSIS — E78.5 DYSLIPIDEMIA: ICD-10-CM

## 2023-06-15 DIAGNOSIS — H25.9 AGE-RELATED CATARACT OF BOTH EYES, UNSPECIFIED AGE-RELATED CATARACT TYPE: ICD-10-CM

## 2023-06-15 PROCEDURE — G0279 TOMOSYNTHESIS, MAMMO: HCPCS

## 2023-06-15 PROCEDURE — 99214 OFFICE O/P EST MOD 30 MIN: CPT | Performed by: FAMILY MEDICINE

## 2023-06-15 PROCEDURE — 77065 DX MAMMO INCL CAD UNI: CPT

## 2023-06-15 NOTE — PROGRESS NOTES
Name: Farideh Roldan      :       MRN: [id number]  Encounter Provider: Jonathon Lowry DO  Encounter Date: 6/15/2023   Encounter department: [address] [place] [address]   Patient is 61-year-old female seen for preop exam for cataract surgery  1  Pre-op exam    2  Age-related cataract of both eyes, unspecified age-related cataract type    3  Essential hypertension  Assessment & Plan:  Blood pressure is stable on Amlodipine  4  Dyslipidemia  Assessment & Plan:  Patient is taking statin and Ezetimibe         Physical exam and history reviewed at visit  Patient is medically stable for cataract surgery  Patient did see cardiology in May because of family history of heart disease and it was felt that she is a low risk for cataract surgery  She does have an appointment to see me for an AWV in July  We did discuss her mammogram  Subjective     Chief Complaint   Patient presents with   • Pre-op Exam     cataract       Patient is seen for preop exam   She is controlled for cataract surgery on  and 7/10  Dr Sarah Love  Saw cardiology in May because of family history  No testing needed prior to cataract surgery  She reports that she is taking Latanoprost for open angle glaucoma    Review of Systems   Constitutional: Negative for chills and fever  HENT: Negative for congestion and sore throat  Respiratory: Negative for chest tightness  Cardiovascular: Negative for chest pain and palpitations  Gastrointestinal: Negative for abdominal pain, constipation, diarrhea and nausea  Genitourinary: Negative for difficulty urinating  Skin: Negative  Neurological: Negative for dizziness and headaches  Psychiatric/Behavioral: Positive for dysphoric mood  The patient is nervous/anxious  Patient feels isolated at times since she moved from [place] to live with her daughter         Past Medical History:   Diagnosis Date   • Asthma     variant cough asthma • Cataract    • GERD (gastroesophageal reflux disease)    • Hyperlipidemia    • Hypertension      Past Surgical History:   Procedure Laterality Date   • EYE SURGERY     • HYSTERECTOMY     • OOPHORECTOMY Bilateral    • UTERINE SUSPENSION       Family History   Problem Relation Age of Onset   • Asthma Mother    • Hypertension Mother            • No Known Problems Father    • No Known Problems Sister    • No Known Problems Maternal Grandmother    • No Known Problems Maternal Grandfather    • No Known Problems Paternal Grandmother    • No Known Problems Paternal Grandfather    • No Known Problems Sister    • Hypertension Brother         Pace maker     Social History     Socioeconomic History   • Marital status:      Spouse name: None   • Number of children: None   • Years of education: None   • Highest education level: None   Occupational History   • None   Tobacco Use   • Smoking status: Never   • Smokeless tobacco: Never   Vaping Use   • Vaping Use: Never used   Substance and Sexual Activity   • Alcohol use: Not Currently   • Drug use: Never   • Sexual activity: Yes     Partners: Male   Other Topics Concern   • None   Social History Narrative   • None     Social Determinants of Health     Financial Resource Strain: Not on file   Food Insecurity: Not on file   Transportation Needs: Not on file   Physical Activity: Not on file   Stress: Not on file   Social Connections: Not on file   Intimate Partner Violence: Not on file   Housing Stability: Not on file     Current Outpatient Medications on File Prior to Visit   Medication Sig   • albuterol (2 5 mg/3 mL) 0 083 % nebulizer solution Take 3 mL (2 5 mg total) by nebulization every 6 (six) hours as needed for wheezing or shortness of breath   • albuterol (PROVENTIL HFA,VENTOLIN HFA) 90 mcg/act inhaler Inhale 2 puffs every 6 (six) hours as needed for wheezing   • amLODIPine (NORVASC) 10 mg tablet TAKE 1 TABLET EVERY DAY   • atorvastatin (LIPITOR) 20 mg "tablet TAKE 1 TABLET EVERY DAY   • ezetimibe (ZETIA) 10 mg tablet TAKE 1 TABLET EVERY DAY   • famotidine (PEPCID) 20 mg tablet Take 1 tablet (20 mg total) by mouth 2 (two) times a day as needed for heartburn   • latanoprost (XALATAN) 0 005 % ophthalmic solution      Allergies   Allergen Reactions   • Other Cough     Seasonal, perfumes, colon, chemicals   • Codeine Rash     Immunization History   Administered Date(s) Administered   • COVID-19 PFIZER VACCINE 0 3 ML IM 04/09/2021, 04/30/2021, 12/16/2021   • INFLUENZA 01/01/2020, 10/24/2021, 09/28/2022   • Influenza, high dose seasonal 0 7 mL 11/11/2020, 09/28/2022   • Pneumococcal Conjugate Vaccine 20-valent (Pcv20), Polysace 06/30/2022       Objective     /68 (BP Location: Left arm, Patient Position: Sitting, Cuff Size: Adult)   Pulse 94   Temp (!) 97 3 °F (36 3 °C) (Temporal)   Ht 5' 3\" (1 6 m)   Wt 57 7 kg (127 lb 3 2 oz)   SpO2 97%   BMI 22 53 kg/m²     Physical Exam  Vitals and nursing note reviewed  Constitutional:       General: She is not in acute distress  HENT:      Head: Normocephalic  Right Ear: Tympanic membrane normal       Left Ear: Tympanic membrane normal    Eyes:      General: No scleral icterus  Neck:      Thyroid: No thyromegaly  Vascular: No carotid bruit  Cardiovascular:      Rate and Rhythm: Normal rate and regular rhythm  Heart sounds: Normal heart sounds  Pulmonary:      Effort: Pulmonary effort is normal       Breath sounds: Normal breath sounds  Abdominal:      General: Bowel sounds are normal       Palpations: Abdomen is soft  Tenderness: There is no abdominal tenderness  Musculoskeletal:      Right lower leg: No edema  Left lower leg: No edema  Lymphadenopathy:      Cervical: No cervical adenopathy  Skin:     General: Skin is warm and dry  Neurological:      Mental Status: She is alert and oriented to person, place, and time  Deep Tendon Reflexes: Reflexes are normal and symmetric   " Psychiatric:         Mood and Affect: Mood normal        Tom Rojas DO

## 2023-07-03 ENCOUNTER — 1 DAY POST-OP (OUTPATIENT)
Dept: URBAN - METROPOLITAN AREA CLINIC 6 | Facility: CLINIC | Age: 78
End: 2023-07-03

## 2023-07-03 ENCOUNTER — SURGERY/PROCEDURE (OUTPATIENT)
Dept: URBAN - METROPOLITAN AREA SURGICAL CENTER 6 | Facility: SURGICAL CENTER | Age: 78
End: 2023-07-03

## 2023-07-03 DIAGNOSIS — H25.811: ICD-10-CM

## 2023-07-03 DIAGNOSIS — Z96.1: ICD-10-CM

## 2023-07-03 DIAGNOSIS — H40.053: ICD-10-CM

## 2023-07-03 DIAGNOSIS — H25.812: ICD-10-CM

## 2023-07-03 DIAGNOSIS — Z98.890: ICD-10-CM

## 2023-07-03 PROCEDURE — 92136 OPHTHALMIC BIOMETRY: CPT | Mod: 26,RT

## 2023-07-03 PROCEDURE — 66984 XCAPSL CTRC RMVL W/O ECP: CPT

## 2023-07-03 PROCEDURE — 99024 POSTOP FOLLOW-UP VISIT: CPT

## 2023-07-03 ASSESSMENT — TONOMETRY
OD_IOP_MMHG: 23
OS_IOP_MMHG: 30
OS_IOP_MMHG: 28

## 2023-07-03 ASSESSMENT — VISUAL ACUITY
OS_PH: 20/200
OD_CC: 20/70
OS_SC: 20/400

## 2023-07-10 ENCOUNTER — SURGERY/PROCEDURE (OUTPATIENT)
Dept: URBAN - METROPOLITAN AREA SURGICAL CENTER 6 | Facility: SURGICAL CENTER | Age: 78
End: 2023-07-10

## 2023-07-10 DIAGNOSIS — H25.811: ICD-10-CM

## 2023-07-10 PROCEDURE — 66984 XCAPSL CTRC RMVL W/O ECP: CPT | Mod: 79,RT

## 2023-07-11 ENCOUNTER — 1 DAY POST-OP (OUTPATIENT)
Dept: URBAN - METROPOLITAN AREA CLINIC 6 | Facility: CLINIC | Age: 78
End: 2023-07-11

## 2023-07-11 DIAGNOSIS — Z98.890: ICD-10-CM

## 2023-07-11 DIAGNOSIS — Z96.1: ICD-10-CM

## 2023-07-11 DIAGNOSIS — H40.053: ICD-10-CM

## 2023-07-11 PROCEDURE — 99024 POSTOP FOLLOW-UP VISIT: CPT

## 2023-07-11 ASSESSMENT — TONOMETRY
OD_IOP_MMHG: 26
OD_IOP_MMHG: 22
OS_IOP_MMHG: 20
OS_IOP_MMHG: 16

## 2023-07-11 ASSESSMENT — VISUAL ACUITY
OD_PH: 20/200
OS_PH: 20/30+1
OD_SC: 20/400
OS_SC: 20/40-2

## 2023-07-18 ENCOUNTER — 1 WEEK POST-OP (OUTPATIENT)
Dept: URBAN - METROPOLITAN AREA CLINIC 6 | Facility: CLINIC | Age: 78
End: 2023-07-18

## 2023-07-18 DIAGNOSIS — Z96.1: ICD-10-CM

## 2023-07-18 PROCEDURE — 99024 POSTOP FOLLOW-UP VISIT: CPT

## 2023-07-18 ASSESSMENT — TONOMETRY
OD_IOP_MMHG: 14
OS_IOP_MMHG: 18

## 2023-07-18 ASSESSMENT — VISUAL ACUITY
OD_SC: 20/40
OS_PH: 20/40
OD_PH: 20/25
OS_SC: 20/60-1

## 2023-07-27 ENCOUNTER — OFFICE VISIT (OUTPATIENT)
Dept: FAMILY MEDICINE CLINIC | Facility: CLINIC | Age: 78
End: 2023-07-27
Payer: COMMERCIAL

## 2023-07-27 VITALS
HEART RATE: 79 BPM | TEMPERATURE: 97.2 F | BODY MASS INDEX: 22.11 KG/M2 | HEIGHT: 63 IN | DIASTOLIC BLOOD PRESSURE: 72 MMHG | WEIGHT: 124.8 LBS | OXYGEN SATURATION: 97 % | SYSTOLIC BLOOD PRESSURE: 126 MMHG

## 2023-07-27 DIAGNOSIS — R32 URINARY INCONTINENCE, UNSPECIFIED TYPE: ICD-10-CM

## 2023-07-27 DIAGNOSIS — Z00.00 MEDICARE ANNUAL WELLNESS VISIT, SUBSEQUENT: ICD-10-CM

## 2023-07-27 DIAGNOSIS — F41.1 GENERALIZED ANXIETY DISORDER: Primary | ICD-10-CM

## 2023-07-27 DIAGNOSIS — Z59.82 INABILITY TO ACQUIRE TRANSPORTATION: ICD-10-CM

## 2023-07-27 DIAGNOSIS — G47.00 INSOMNIA, UNSPECIFIED TYPE: ICD-10-CM

## 2023-07-27 DIAGNOSIS — E78.5 DYSLIPIDEMIA: ICD-10-CM

## 2023-07-27 DIAGNOSIS — R41.3 MEMORY DISTURBANCE: ICD-10-CM

## 2023-07-27 PROCEDURE — 99214 OFFICE O/P EST MOD 30 MIN: CPT | Performed by: FAMILY MEDICINE

## 2023-07-27 PROCEDURE — 3288F FALL RISK ASSESSMENT DOCD: CPT | Performed by: FAMILY MEDICINE

## 2023-07-27 PROCEDURE — G0439 PPPS, SUBSEQ VISIT: HCPCS | Performed by: FAMILY MEDICINE

## 2023-07-27 RX ORDER — SILICONE ADHESIVE 1.5" X 3"
1 SHEET (EA) TOPICAL AS NEEDED
COMMUNITY

## 2023-07-27 SDOH — ECONOMIC STABILITY - TRANSPORTATION SECURITY: TRANSPORTATION INSECURITY: Z59.82

## 2023-07-27 NOTE — PROGRESS NOTES
Assessment and Plan:     Problem List Items Addressed This Visit        Other    Dyslipidemia    Insomnia    Relevant Orders    Lipid Panel with Direct LDL reflex    Comprehensive metabolic panel    Generalized anxiety disorder - Primary   Other Visit Diagnoses     Medicare annual wellness visit, subsequent        Memory disturbance        Relevant Orders    Vitamin B12    Comprehensive metabolic panel    Urinary incontinence, unspecified type        Inability to acquire transportation        Relevant Orders    Ambulatory referral to social work care management program            Depression Screening and Follow-up Plan: Patient was screened for depression during today's encounter. They screened negative with a PHQ-2 score of 2. Urinary Incontinence Plan of Care: counseling topics discussed: use restroom every 2 hours. Preventive health issues were discussed with patient, and age appropriate screening tests were ordered as noted in patient's After Visit Summary. Personalized health advice and appropriate referrals for health education or preventive services given if needed, as noted in patient's After Visit Summary.      History of Present Illness:     Patient presents for a Medicare Wellness Visit      Patient Care Team:  Leonarda Joaquin DO as PCP - General (Family Medicine)     Review of Systems:          Problem List:     Patient Active Problem List   Diagnosis   • Essential hypertension   • Dyslipidemia   • Venous insufficiency   • Mild intermittent asthma without complication   • Arthritis   • Gastroesophageal reflux disease with esophagitis without hemorrhage   • Insomnia   • Anxiety   • Exposure to COVID-19 virus   • Breast pain, left   • Advanced care planning/counseling discussion   • Leukocytosis   • Heart murmur   • Chest pain   • Lightheadedness   • Generalized anxiety disorder   • Upper back pain   • Lipoma of torso   • Dermatitis   • Chronic cough   • Age-related osteoporosis without current pathological fracture   • Abnormal CT of the chest   • Oropharyngeal dysphagia   • Urge incontinence   • Constipation      Past Medical and Surgical History:     Past Medical History:   Diagnosis Date   • Asthma     variant cough asthma   • Cataract    • GERD (gastroesophageal reflux disease)    • Hyperlipidemia    • Hypertension      Past Surgical History:   Procedure Laterality Date   • EYE SURGERY     • HYSTERECTOMY     • OOPHORECTOMY Bilateral    • UTERINE SUSPENSION        Family History:     Family History   Problem Relation Age of Onset   • Asthma Mother    • Hypertension Mother            • No Known Problems Father    • No Known Problems Sister    • No Known Problems Maternal Grandmother    • No Known Problems Maternal Grandfather    • No Known Problems Paternal Grandmother    • No Known Problems Paternal Grandfather    • No Known Problems Sister    • Hypertension Brother         Pace maker      Social History:     Social History     Socioeconomic History   • Marital status:      Spouse name: None   • Number of children: None   • Years of education: None   • Highest education level: None   Occupational History   • None   Tobacco Use   • Smoking status: Never     Passive exposure: Never   • Smokeless tobacco: Never   Vaping Use   • Vaping Use: Never used   Substance and Sexual Activity   • Alcohol use: Not Currently   • Drug use: Never   • Sexual activity: Yes     Partners: Male   Other Topics Concern   • None   Social History Narrative   • None     Social Determinants of Health     Financial Resource Strain: Low Risk  (2023)    Overall Financial Resource Strain (CARDIA)    • Difficulty of Paying Living Expenses: Not very hard   Food Insecurity: Not on file   Transportation Needs: Unmet Transportation Needs (2023)    PRAPARE - Transportation    • Lack of Transportation (Medical):  Yes    • Lack of Transportation (Non-Medical): Yes   Physical Activity: Not on file   Stress: Not on file   Social Connections: Not on file   Intimate Partner Violence: Not on file   Housing Stability: Not on file      Medications and Allergies:     Current Outpatient Medications   Medication Sig Dispense Refill   • amLODIPine (NORVASC) 10 mg tablet TAKE 1 TABLET EVERY DAY 90 tablet 1   • atorvastatin (LIPITOR) 20 mg tablet TAKE 1 TABLET EVERY DAY 90 tablet 1   • famotidine (PEPCID) 20 mg tablet Take 1 tablet (20 mg total) by mouth 2 (two) times a day as needed for heartburn 180 tablet 3   • latanoprost (XALATAN) 0.005 % ophthalmic solution      • sodium chloride (NICKI 128) 5 % hypertonic ophthalmic solution 1 drop as needed     • albuterol (2.5 mg/3 mL) 0.083 % nebulizer solution Take 3 mL (2.5 mg total) by nebulization every 6 (six) hours as needed for wheezing or shortness of breath (Patient not taking: Reported on 7/27/2023) 180 mL 4   • albuterol (PROVENTIL HFA,VENTOLIN HFA) 90 mcg/act inhaler Inhale 2 puffs every 6 (six) hours as needed for wheezing (Patient not taking: Reported on 7/27/2023) 18 g      No current facility-administered medications for this visit.      Allergies   Allergen Reactions   • Nsaids Other (See Comments)   • Other Cough     Seasonal, perfumes, colon, chemicals   • Sulfa Antibiotics Other (See Comments)   • Codeine Rash   • Shellfish Allergy - Food Allergy Rash      Immunizations:     Immunization History   Administered Date(s) Administered   • COVID-19 PFIZER VACCINE 0.3 ML IM 04/09/2021, 04/30/2021, 12/16/2021   • INFLUENZA 01/01/2020, 10/24/2021, 09/28/2022   • Influenza, high dose seasonal 0.7 mL 11/11/2020, 09/28/2022   • Pneumococcal Conjugate Vaccine 20-valent (Pcv20), Polysace 06/30/2022      Health Maintenance:         Topic Date Due   • Colorectal Cancer Screening  Never done   • Breast Cancer Screening: Mammogram  05/25/2024   • Hepatitis C Screening  Completed         Topic Date Due   • COVID-19 Vaccine (4 - Pfizer series) 02/10/2022   • Influenza Vaccine (1) 09/01/2023 Medicare Screening Tests and Risk Assessments:     Gallo Thomas is here for her Subsequent Wellness visit. Last Medicare Wellness visit information reviewed, patient interviewed and updates made to the record today. Health Risk Assessment:   Patient rates overall health as good. Patient feels that their physical health rating is slightly better. Patient is satisfied with their life. Eyesight was rated as slightly worse. Hearing was rated as same. Patient feels that their emotional and mental health rating is slightly worse. Patients states they are never, rarely angry. Patient states they are sometimes unusually tired/fatigued. Pain experienced in the last 7 days has been none. Depression Screening:   PHQ-2 Score: 2      Fall Risk Screening: In the past year, patient has experienced: no history of falling in past year      Urinary Incontinence Screening:   Patient has leaked urine accidently in the last six months. Home Safety:  Patient does not have trouble with stairs inside or outside of their home. Patient has working smoke alarms and has no working carbon monoxide detector. Home safety hazards include: none. Nutrition:   Current diet is Regular. Medications:   Patient is currently taking over-the-counter supplements. OTC medications include: see medication list. Patient is able to manage medications. Activities of Daily Living (ADLs)/Instrumental Activities of Daily Living (IADLs):   Walk and transfer into and out of bed and chair?: Yes  Dress and groom yourself?: Yes    Bathe or shower yourself?: Yes    Feed yourself?  Yes  Do your laundry/housekeeping?: Yes  Manage your money, pay your bills and track your expenses?: Yes  Make your own meals?: Yes    Do your own shopping?: Yes    Previous Hospitalizations:   Any hospitalizations or ED visits within the last 12 months?: No      Advance Care Planning:   Living will: No    Durable POA for healthcare: No    Advanced directive counseling given: No    Five wishes given: Yes    End of Life Decisions reviewed with patient: No      Cognitive Screening:   Provider or family/friend/caregiver concerned regarding cognition?: No    PREVENTIVE SCREENINGS      Cardiovascular Screening:    General: Screening Current      Diabetes Screening:     General: Screening Current      Colorectal Cancer Screening:     General: Risks and Benefits Discussed      Breast Cancer Screening:     General: Screening Current      Cervical Cancer Screening:    General: Screening Not Indicated      Osteoporosis Screening:    General: Screening Not Indicated and History Osteoporosis      Abdominal Aortic Aneurysm (AAA) Screening:        General: Screening Not Indicated      Lung Cancer Screening:     General: Screening Not Indicated      Hepatitis C Screening:    General: Screening Current    Screening, Brief Intervention, and Referral to Treatment (SBIRT)    Screening  Typical number of drinks in a day: 0  Typical number of drinks in a week: 0  Interpretation: Low risk drinking behavior. AUDIT-C Screenin) How often did you have a drink containing alcohol in the past year? never  2) How many drinks did you have on a typical day when you were drinking in the past year? 0  3) How often did you have 6 or more drinks on one occasion in the past year? never    AUDIT-C Score: 0  Interpretation: Score 0-2 (female): Negative screen for alcohol misuse    Single Item Drug Screening:  How often have you used an illegal drug (including marijuana) or a prescription medication for non-medical reasons in the past year? never    Single Item Drug Screen Score: 0  Interpretation: Negative screen for possible drug use disorder    Brief Intervention  Alcohol & drug use screenings were reviewed. No concerns regarding substance use disorder identified. No results found.      Physical Exam:     /72 (BP Location: Right arm, Patient Position: Sitting)   Pulse 79   Temp (!) 97.2 °F (36.2 °C) (Tympanic)   Ht 5' 3.19" (1.605 m)   Wt 56.6 kg (124 lb 12.8 oz)   SpO2 97%   BMI 21.98 kg/m²          Lin Raygoza DO

## 2023-07-27 NOTE — PATIENT INSTRUCTIONS
Try Melatonin three to five mg at bedtime. Stop Zetia. Continue Atorvastatin. Check blood work in about three months. Medicare Preventive Visit Patient Instructions  Thank you for completing your Welcome to Medicare Visit or Medicare Annual Wellness Visit today. Your next wellness visit will be due in one year (7/27/2024). The screening/preventive services that you may require over the next 5-10 years are detailed below. Some tests may not apply to you based off risk factors and/or age. Screening tests ordered at today's visit but not completed yet may show as past due. Also, please note that scanned in results may not display below. Preventive Screenings:  Service Recommendations Previous Testing/Comments   Colorectal Cancer Screening  * Colonoscopy    * Fecal Occult Blood Test (FOBT)/Fecal Immunochemical Test (FIT)  * Fecal DNA/Cologuard Test  * Flexible Sigmoidoscopy Age: 43-73 years old   Colonoscopy: every 10 years (may be performed more frequently if at higher risk)  OR  FOBT/FIT: every 1 year  OR  Cologuard: every 3 years  OR  Sigmoidoscopy: every 5 years  Screening may be recommended earlier than age 39 if at higher risk for colorectal cancer. Also, an individualized decision between you and your healthcare provider will decide whether screening between the ages of 77-80 would be appropriate. Colonoscopy: Not on file  FOBT/FIT: Not on file  Cologuard: Not on file  Sigmoidoscopy: Not on file          Breast Cancer Screening Age: 36 years old  Frequency: every 1-2 years  Not required if history of left and right mastectomy Mammogram: 05/25/2023    Screening Current   Cervical Cancer Screening Between the ages of 21-29, pap smear recommended once every 3 years. Between the ages of 32-69, can perform pap smear with HPV co-testing every 5 years.    Recommendations may differ for women with a history of total hysterectomy, cervical cancer, or abnormal pap smears in past. Pap Smear: Not on file    Screening Not Indicated   Hepatitis C Screening Once for adults born between 1945 and 1965  More frequently in patients at high risk for Hepatitis C Hep C Antibody: 07/26/2022    Screening Current   Diabetes Screening 1-2 times per year if you're at risk for diabetes or have pre-diabetes Fasting glucose: 95 mg/dL (3/31/2023)  A1C: 5.4 % (6/26/2020)  Screening Current   Cholesterol Screening Once every 5 years if you don't have a lipid disorder. May order more often based on risk factors. Lipid panel: 03/31/2023    Screening Current     Other Preventive Screenings Covered by Medicare:  Abdominal Aortic Aneurysm (AAA) Screening: covered once if your at risk. You're considered to be at risk if you have a family history of AAA. Lung Cancer Screening: covers low dose CT scan once per year if you meet all of the following conditions: (1) Age 48-67; (2) No signs or symptoms of lung cancer; (3) Current smoker or have quit smoking within the last 15 years; (4) You have a tobacco smoking history of at least 20 pack years (packs per day multiplied by number of years you smoked); (5) You get a written order from a healthcare provider. Glaucoma Screening: covered annually if you're considered high risk: (1) You have diabetes OR (2) Family history of glaucoma OR (3)  aged 48 and older OR (3)  American aged 72 and older  Osteoporosis Screening: covered every 2 years if you meet one of the following conditions: (1) You're estrogen deficient and at risk for osteoporosis based off medical history and other findings; (2) Have a vertebral abnormality; (3) On glucocorticoid therapy for more than 3 months; (4) Have primary hyperparathyroidism; (5) On osteoporosis medications and need to assess response to drug therapy. Last bone density test (DXA Scan): 06/13/2022. HIV Screening: covered annually if you're between the age of 14-79.  Also covered annually if you are younger than 13 and older than 72 with risk factors for HIV infection. For pregnant patients, it is covered up to 3 times per pregnancy. Immunizations:  Immunization Recommendations   Influenza Vaccine Annual influenza vaccination during flu season is recommended for all persons aged >= 6 months who do not have contraindications   Pneumococcal Vaccine   * Pneumococcal conjugate vaccine = PCV13 (Prevnar 13), PCV15 (Vaxneuvance), PCV20 (Prevnar 20)  * Pneumococcal polysaccharide vaccine = PPSV23 (Pneumovax) Adults 20-63 years old: 1-3 doses may be recommended based on certain risk factors  Adults 72 years old: 1-2 doses may be recommended based off what pneumonia vaccine you previously received   Hepatitis B Vaccine 3 dose series if at intermediate or high risk (ex: diabetes, end stage renal disease, liver disease)   Tetanus (Td) Vaccine - COST NOT COVERED BY MEDICARE PART B Following completion of primary series, a booster dose should be given every 10 years to maintain immunity against tetanus. Td may also be given as tetanus wound prophylaxis. Tdap Vaccine - COST NOT COVERED BY MEDICARE PART B Recommended at least once for all adults. For pregnant patients, recommended with each pregnancy. Shingles Vaccine (Shingrix) - COST NOT COVERED BY MEDICARE PART B  2 shot series recommended in those aged 48 and above     Health Maintenance Due:      Topic Date Due    Colorectal Cancer Screening  Never done    Breast Cancer Screening: Mammogram  05/25/2024    Hepatitis C Screening  Completed     Immunizations Due:      Topic Date Due    COVID-19 Vaccine (4 - Pfizer series) 02/10/2022    Influenza Vaccine (1) 09/01/2023     Advance Directives   What are advance directives? Advance directives are legal documents that state your wishes and plans for medical care. These plans are made ahead of time in case you lose your ability to make decisions for yourself.  Advance directives can apply to any medical decision, such as the treatments you want, and if you want to donate organs. What are the types of advance directives? There are many types of advance directives, and each state has rules about how to use them. You may choose a combination of any of the following:  Living will: This is a written record of the treatment you want. You can also choose which treatments you do not want, which to limit, and which to stop at a certain time. This includes surgery, medicine, IV fluid, and tube feedings. Durable power of  for healthcare [facility]): This is a written record that states who you want to make healthcare choices for you when you are unable to make them for yourself. This person, called a proxy, is usually a family member or a friend. You may choose more than 1 proxy. Do not resuscitate (DNR) order:  A DNR order is used in case your heart stops beating or you stop breathing. It is a request not to have certain forms of treatment, such as CPR. A DNR order may be included in other types of advance directives. Medical directive: This covers the care that you want if you are in a coma, near death, or unable to make decisions for yourself. You can list the treatments you want for each condition. Treatment may include pain medicine, surgery, blood transfusions, dialysis, IV or tube feedings, and a ventilator (breathing machine). Values history: This document has questions about your views, beliefs, and how you feel and think about life. This information can help others choose the care that you would choose. Why are advance directives important? An advance directive helps you control your care. Although spoken wishes may be used, it is better to have your wishes written down. Spoken wishes can be misunderstood, or not followed. Treatments may be given even if you do not want them. An advance directive may make it easier for your family to make difficult choices about your care.    Urinary Incontinence   Urinary incontinence (UI)  is when you lose control of your bladder. UI develops because your bladder cannot store or empty urine properly. The 3 most common types of UI are stress incontinence, urge incontinence, or both. Medicines:   May be given to help strengthen your bladder control. Report any side effects of medication to your healthcare provider. Do pelvic muscle exercises often:  Your pelvic muscles help you stop urinating. Squeeze these muscles tight for 5 seconds, then relax for 5 seconds. Gradually work up to squeezing for 10 seconds. Do 3 sets of 15 repetitions a day, or as directed. This will help strengthen your pelvic muscles and improve bladder control. Train your bladder:  Go to the bathroom at set times, such as every 2 hours, even if you do not feel the urge to go. You can also try to hold your urine when you feel the urge to go. For example, hold your urine for 5 minutes when you feel the urge to go. As that becomes easier, hold your urine for 10 minutes. Self-care:   Keep a UI record. Write down how often you leak urine and how much you leak. Make a note of what you were doing when you leaked urine. Drink liquids as directed. You may need to limit the amount of liquid you drink to help control your urine leakage. Do not drink any liquid right before you go to bed. Limit or do not have drinks that contain caffeine or alcohol. Prevent constipation. Eat a variety of high-fiber foods. Good examples are high-fiber cereals, beans, vegetables, and whole-grain breads. Walking is the best way to trigger your intestines to have a bowel movement. Exercise regularly and maintain a healthy weight. Weight loss and exercise will decrease pressure on your bladder and help you control your leakage. Use a catheter as directed  to help empty your bladder. A catheter is a tiny, plastic tube that is put into your bladder to drain your urine. Go to behavior therapy as directed.   Behavior therapy may be used to help you learn to control your urge to urinate. © Copyright "[employer] 2018 Information is for End User's use only and may not be sold, redistributed or otherwise used for commercial purposes.  All illustrations and images included in CareNotes® are the copyrighted property of [employer].A[employer]M., Inc. or [address] [place] [address]

## 2023-07-27 NOTE — PROGRESS NOTES
Name: Carmelo Hurst      : [id number]      MRN: [id number]  Encounter Provider: Anastasia Spring DO  Encounter Date: 2023   Encounter department: [address] [place] [address]   Patient is seen regarding anxiety and trouble sleeping. I did give her the phone number for therapy outside of [facility] since she has been having problems scheduling. I did explain that there has been quite a backlog of patients needing therapy. She is taking atorvastatin for hyperlipidemia. 1. Generalized anxiety disorder    2. Insomnia, unspecified type  -     Lipid Panel with Direct LDL reflex; Future; Expected date: 10/23/2023  -     Comprehensive metabolic panel; Future; Expected date: 10/23/2023    3. Medicare annual wellness visit, subsequent    4. Memory disturbance  -     Vitamin B12; Future; Expected date: 10/23/2023  -     Comprehensive metabolic panel; Future; Expected date: 10/23/2023    5. Dyslipidemia    6. Urinary incontinence, unspecified type    7. Inability to acquire transportation  -     Ambulatory referral to social work care management program; Future; Expected date: 2023       Recheck 4 months. Will check mental status exam.  Subjective      Patient is very stressed, anxiety is high wants to seek talk therapy. Still having issues with eyes. Feeling lonely. Isolated. Having pressure frontal    Review of Systems   Eyes: Positive for visual disturbance. Neurological: Positive for headaches. Psychiatric/Behavioral: Positive for sleep disturbance. The patient is nervous/anxious.         Current Outpatient Medications on File Prior to Visit   Medication Sig   • amLODIPine (NORVASC) 10 mg tablet TAKE 1 TABLET EVERY DAY   • atorvastatin (LIPITOR) 20 mg tablet TAKE 1 TABLET EVERY DAY   • famotidine (PEPCID) 20 mg tablet Take 1 tablet (20 mg total) by mouth 2 (two) times a day as needed for heartburn   • latanoprost (XALATAN) 0.005 % ophthalmic solution    • sodium chloride (NICKI 128) 5 % hypertonic ophthalmic solution 1 drop as needed   • albuterol (2.5 mg/3 mL) 0.083 % nebulizer solution Take 3 mL (2.5 mg total) by nebulization every 6 (six) hours as needed for wheezing or shortness of breath (Patient not taking: Reported on 7/27/2023)   • albuterol (PROVENTIL HFA,VENTOLIN HFA) 90 mcg/act inhaler Inhale 2 puffs every 6 (six) hours as needed for wheezing (Patient not taking: Reported on 7/27/2023)       Objective     /72 (BP Location: Right arm, Patient Position: Sitting)   Pulse 79   Temp (!) 97.2 °F (36.2 °C) (Tympanic)   Ht 5' 3.19" (1.605 m)   Wt 56.6 kg (124 lb 12.8 oz)   SpO2 97%   BMI 21.98 kg/m²     Physical Exam  Vitals and nursing note reviewed. Constitutional:       General: She is not in acute distress. HENT:      Head: Normocephalic. Neck:      Thyroid: No thyromegaly. Vascular: No carotid bruit. Cardiovascular:      Rate and Rhythm: Normal rate and regular rhythm. Heart sounds: Normal heart sounds. Pulmonary:      Effort: Pulmonary effort is normal.      Breath sounds: Normal breath sounds. Musculoskeletal:      Right lower leg: No edema. Left lower leg: No edema. Lymphadenopathy:      Cervical: No cervical adenopathy. Skin:     General: Skin is warm and dry. Neurological:      Mental Status: She is alert and oriented to person, place, and time.    Psychiatric:         Mood and Affect: Mood normal.       Miriam Mckeon,

## 2023-08-01 ENCOUNTER — PATIENT OUTREACH (OUTPATIENT)
Dept: FAMILY MEDICINE CLINIC | Facility: CLINIC | Age: 78
End: 2023-08-01

## 2023-08-01 NOTE — PROGRESS NOTES
ORLANDO VALERIO received a [facility] referral from patient's PCP. ORLANDO VALERIO reviewed patient's chart and called patient ([phone]). Patient did not answer. ORLANDO VALERIO left a message including ORLANDO VALERIO contact information and requested a call back. ORLANDO VALERIO will attempt to contact again at a later date.

## 2023-08-02 ENCOUNTER — TELEPHONE (OUTPATIENT)
Dept: PSYCHIATRY | Facility: CLINIC | Age: 78
End: 2023-08-02

## 2023-08-03 ENCOUNTER — TELEPHONE (OUTPATIENT)
Dept: FAMILY MEDICINE CLINIC | Facility: CLINIC | Age: 78
End: 2023-08-03

## 2023-08-03 ENCOUNTER — PATIENT OUTREACH (OUTPATIENT)
Dept: FAMILY MEDICINE CLINIC | Facility: CLINIC | Age: 78
End: 2023-08-03

## 2023-08-03 NOTE — LETTER
[address]   [facility] [address]  [phone]    Re: Monika Washington to Reach   8/3/2023       Dear Maggie Egan am a 1957 [address]  and wanted to be certain you had information to contact me should you desire assistance with or have questions about non-medical aspects of your care such as [but not limited to] medical insurance, housing, transportation, material needs, or emergency needs. If I do not have an answer I will assist you in finding the appropriate agency or individual who can help. Please feel free to contact me at [phone] You.     Sincerely,         MATTHEW Nguyen

## 2023-08-03 NOTE — PROGRESS NOTES
ORLANDO VALERIO received a SDOH referral from patient's PCP. ORLANDO VALERIO reviewed patient's chart and called patient ([phone]) a second time . Patient did not answer. ORLANDO VALERIO left a message including ORLANDO VALERIO contact information and requested a call back. ORLANDO VALERIO will send unable to reach letter via [employer] and close due to being unable to reach. Please re consult ORLANDO Valerio as needed.

## 2023-08-03 NOTE — TELEPHONE ENCOUNTER
Pt left voicemail stating:    I saw her on the 27th and I need to know is she wants me to start the vitamin B12 now or in October. And the same question for the lab work if I need to have the lab work done now or in October. So that is my question.

## 2023-08-04 ENCOUNTER — PROBLEM (OUTPATIENT)
Dept: URBAN - METROPOLITAN AREA CLINIC 6 | Facility: CLINIC | Age: 78
End: 2023-08-04

## 2023-08-04 DIAGNOSIS — Z96.1: ICD-10-CM

## 2023-08-04 PROCEDURE — 99024 POSTOP FOLLOW-UP VISIT: CPT

## 2023-08-04 ASSESSMENT — TONOMETRY
OD_IOP_MMHG: 15
OS_IOP_MMHG: 15

## 2023-08-04 ASSESSMENT — VISUAL ACUITY
OS_SC: 20/40
OD_SC: 20/40

## 2023-08-14 ENCOUNTER — PATIENT OUTREACH (OUTPATIENT)
Dept: FAMILY MEDICINE CLINIC | Facility: CLINIC | Age: 78
End: 2023-08-14

## 2023-08-14 NOTE — PROGRESS NOTES
ORLANDO VALERIO received a voicemail from patient requesting a call back from ORLANDO VALERIO. ORLANDO VALERIO returned patient's call ([phone]). Patient did not answer, ORLANDO VALERIO left a message including ORLANDO VALERIO contact information.

## 2023-08-17 ENCOUNTER — TELEPHONE (OUTPATIENT)
Dept: FAMILY MEDICINE CLINIC | Facility: CLINIC | Age: 78
End: 2023-08-17

## 2023-08-17 ENCOUNTER — OFFICE VISIT (OUTPATIENT)
Dept: URGENT CARE | Facility: CLINIC | Age: 78
End: 2023-08-17
Payer: COMMERCIAL

## 2023-08-17 VITALS
BODY MASS INDEX: 22.22 KG/M2 | HEIGHT: 63 IN | HEART RATE: 90 BPM | DIASTOLIC BLOOD PRESSURE: 71 MMHG | SYSTOLIC BLOOD PRESSURE: 144 MMHG | WEIGHT: 125.4 LBS | OXYGEN SATURATION: 99 % | RESPIRATION RATE: 18 BRPM | TEMPERATURE: 97.1 F

## 2023-08-17 DIAGNOSIS — R68.89 MULTIPLE COMPLAINTS: Primary | ICD-10-CM

## 2023-08-17 PROCEDURE — G0463 HOSPITAL OUTPT CLINIC VISIT: HCPCS

## 2023-08-17 PROCEDURE — 99213 OFFICE O/P EST LOW 20 MIN: CPT

## 2023-08-17 NOTE — PROGRESS NOTES
[facility]        NAME: Fatmata Nolasco is a 78 y.o. female  : 1945    MRN: [id number]  DATE: 2023  TIME: 7:29 PM    Assessment and Plan   Multiple complaints [R68.89]  1. Multiple complaints          Patient presents with several complaints. Called PCP however no doctor available. Advised did not want NP or PA. C/O right knee pain ( no injury), feeling " funny" for a month in the mornings. Asking about B12 levels and vitamins. Weak at times. After advising patient I was not a doctor, she stated " oh really, I thought I was going to see a doctor". Advised most UC are advanced practitioners. Regardless of who in UC is providing treatment, UC is not a PCP and unable to follow/manage chronic illness and order labs. Patient was not aware and stated would walk upstairs to find her doctor. Patient Instructions       Follow up with PCP    Chief Complaint     Chief Complaint   Patient presents with   • Cold Like Symptoms     Lots of life stress. Over last several weeks increasing fatigue and weak. States that she sleeps and when she wakes up she feels increasingly weak. Has tried taking vitamins wants to make sure she's not overdosing on Vit D. Also has heaviness in head. Also experiencing sharp pain in R knee. No specific injury. History of Present Illness       Patient presents with several complaints. Called PCP however no doctor available. Advised did not want NP or PA. C/O right knee pain ( no injury), feeling " funny" for a month in the mornings. Asking about B12 levels and vitamins. Weak at times. After advising patient I was not a doctor, she stated " oh really, I thought I was going to see a doctor". Advised most UC are advanced practitioners. Regardless of who in UC is providing treatment, UC is not a PCP and unable to follow/manage chronic illness and order labs. Patient was not aware and stated would walk upstairs to find her doctor.        Review of Systems   Review of Systems   Constitutional: Positive for fatigue. Musculoskeletal: Positive for arthralgias and myalgias. Neurological: Positive for weakness and headaches.          Current Medications       Current Outpatient Medications:   •  amLODIPine (NORVASC) 10 mg tablet, TAKE 1 TABLET EVERY DAY, Disp: 90 tablet, Rfl: 1  •  atorvastatin (LIPITOR) 20 mg tablet, TAKE 1 TABLET EVERY DAY, Disp: 90 tablet, Rfl: 1  •  famotidine (PEPCID) 20 mg tablet, Take 1 tablet (20 mg total) by mouth 2 (two) times a day as needed for heartburn, Disp: 180 tablet, Rfl: 3  •  albuterol (2.5 mg/3 mL) 0.083 % nebulizer solution, Take 3 mL (2.5 mg total) by nebulization every 6 (six) hours as needed for wheezing or shortness of breath (Patient not taking: Reported on 7/27/2023), Disp: 180 mL, Rfl: 4  •  albuterol (PROVENTIL HFA,VENTOLIN HFA) 90 mcg/act inhaler, Inhale 2 puffs every 6 (six) hours as needed for wheezing (Patient not taking: Reported on 7/27/2023), Disp: 18 g, Rfl:   •  latanoprost (XALATAN) 0.005 % ophthalmic solution, , Disp: , Rfl:   •  sodium chloride (NICKI 128) 5 % hypertonic ophthalmic solution, 1 drop as needed (Patient not taking: Reported on 8/17/2023), Disp: , Rfl:     Current Allergies     Allergies as of 08/17/2023 - Reviewed 08/17/2023   Allergen Reaction Noted   • Nsaids Other (See Comments) 07/27/2023   • Other Cough 05/25/2022   • Sulfa antibiotics Other (See Comments) 07/27/2023   • Codeine Rash 02/21/2020   • Shellfish allergy - food allergy Rash 07/27/2023            The following portions of the patient's history were reviewed and updated as appropriate: allergies, current medications, past family history, past medical history, past social history, past surgical history and problem list.     Past Medical History:   Diagnosis Date   • Asthma     variant cough asthma   • Cataract    • GERD (gastroesophageal reflux disease) 1989   • Hyperlipidemia    • Hypertension        Past Surgical History:   Procedure Laterality Date   • EYE SURGERY     • HYSTERECTOMY     • OOPHORECTOMY Bilateral    • UTERINE SUSPENSION         Family History   Problem Relation Age of Onset   • Asthma Mother    • Hypertension Mother            • No Known Problems Father    • No Known Problems Sister    • No Known Problems Maternal Grandmother    • No Known Problems Maternal Grandfather    • No Known Problems Paternal Grandmother    • No Known Problems Paternal Grandfather    • No Known Problems Sister    • Hypertension Brother         Pace maker         Medications have been verified. Objective   /71 (BP Location: Left arm, Patient Position: Sitting, Cuff Size: Standard)   Pulse 90   Temp (!) 97.1 °F (36.2 °C) (Tympanic)   Resp 18   Ht 5' 3" (1.6 m)   Wt 56.9 kg (125 lb 6.4 oz)   SpO2 99%   BMI 22.21 kg/m²   No LMP recorded. Patient is postmenopausal.       Physical Exam     Physical Exam  Vitals reviewed. Constitutional:       Appearance: Normal appearance. Musculoskeletal:         General: No swelling. Normal range of motion. Skin:     General: Skin is warm and dry. Capillary Refill: Capillary refill takes less than 2 seconds. Findings: No erythema. Neurological:      General: No focal deficit present. Mental Status: She is alert and oriented to person, place, and time. Mental status is at baseline.

## 2023-08-17 NOTE — TELEPHONE ENCOUNTER
Pt came in after going downstairs and refusing to see a PA or NP and asked to see Dr. Omi Connelly. Was informed there were no appts with any doctor until Tuesday. Appt made with Dr. Boyd Ferrer. Asked for a note to be put out to Dr. Omi Connelly.

## 2023-08-17 NOTE — TELEPHONE ENCOUNTER
Pt called to see if she could be seen tonight pt is feeling weak, funny in the head, nauseous and not sleeping maybe 3 hrs at night. Has left temporal pain off/on since cataract surg. Pt is going to try urgent care if she can see a dr and not a pa or nurse pract.

## 2023-08-19 DIAGNOSIS — E78.5 DYSLIPIDEMIA: ICD-10-CM

## 2023-08-21 RX ORDER — ATORVASTATIN CALCIUM 20 MG/1
TABLET, FILM COATED ORAL
Qty: 90 TABLET | Refills: 1 | Status: SHIPPED | OUTPATIENT
Start: 2023-08-21

## 2023-08-28 ENCOUNTER — TELEPHONE (OUTPATIENT)
Dept: FAMILY MEDICINE CLINIC | Facility: CLINIC | Age: 78
End: 2023-08-28

## 2023-08-28 NOTE — TELEPHONE ENCOUNTER
Pt voice message: My name is Elvis Hopkins [birth year]. I left a message. I sent a message to Dr. Reinaldo Becerra to earlier today but another DrEloy responded Dr. Meek Hicks and he said I should come in for evaluation. Dr. Reinaldo Becerra is not there today, so I would like to make an appointment either with Dr. Reinaldo Becerra or any of the other Dr's because I have not been feeling well. When I wake up in the morning I'm disoriented, weak, fatigued and nauseated and it lasts through all the days. Sometimes it will be late in the evening, so I would like to make an appointment to see any one of the Dr's if Dr. Reinaldo Becerra is not available. Thank you. Attempted to reach pt to schedule. LVM requesting pt call the office back to schedule.

## 2023-08-29 ENCOUNTER — APPOINTMENT (OUTPATIENT)
Dept: LAB | Facility: CLINIC | Age: 78
End: 2023-08-29
Payer: COMMERCIAL

## 2023-08-29 ENCOUNTER — OFFICE VISIT (OUTPATIENT)
Dept: FAMILY MEDICINE CLINIC | Facility: CLINIC | Age: 78
End: 2023-08-29
Payer: COMMERCIAL

## 2023-08-29 VITALS
WEIGHT: 125 LBS | SYSTOLIC BLOOD PRESSURE: 120 MMHG | BODY MASS INDEX: 22.15 KG/M2 | DIASTOLIC BLOOD PRESSURE: 70 MMHG | TEMPERATURE: 97.2 F | HEIGHT: 63 IN | HEART RATE: 75 BPM | RESPIRATION RATE: 16 BRPM

## 2023-08-29 DIAGNOSIS — G47.00 INSOMNIA, UNSPECIFIED TYPE: ICD-10-CM

## 2023-08-29 DIAGNOSIS — M25.50 POLYARTHRALGIA: ICD-10-CM

## 2023-08-29 DIAGNOSIS — R41.3 MEMORY DISTURBANCE: ICD-10-CM

## 2023-08-29 DIAGNOSIS — F32.A DEPRESSION, UNSPECIFIED DEPRESSION TYPE: ICD-10-CM

## 2023-08-29 DIAGNOSIS — R53.83 FATIGUE, UNSPECIFIED TYPE: ICD-10-CM

## 2023-08-29 DIAGNOSIS — E55.9 VITAMIN D DEFICIENCY: ICD-10-CM

## 2023-08-29 DIAGNOSIS — J34.89 SINUS PRESSURE: ICD-10-CM

## 2023-08-29 DIAGNOSIS — R53.83 FATIGUE, UNSPECIFIED TYPE: Primary | ICD-10-CM

## 2023-08-29 LAB
25(OH)D3 SERPL-MCNC: 35.6 NG/ML (ref 30–100)
ALBUMIN SERPL BCP-MCNC: 4.5 G/DL (ref 3.5–5)
ALP SERPL-CCNC: 72 U/L (ref 34–104)
ALT SERPL W P-5'-P-CCNC: 15 U/L (ref 7–52)
ANA SER QL IA: NEGATIVE
ANION GAP SERPL CALCULATED.3IONS-SCNC: 9 MMOL/L
AST SERPL W P-5'-P-CCNC: 20 U/L (ref 13–39)
BASOPHILS # BLD AUTO: 0.03 THOUSANDS/ÂΜL (ref 0–0.1)
BASOPHILS NFR BLD AUTO: 0 % (ref 0–1)
BILIRUB SERPL-MCNC: 0.62 MG/DL (ref 0.2–1)
BUN SERPL-MCNC: 15 MG/DL (ref 5–25)
CALCIUM SERPL-MCNC: 9.4 MG/DL (ref 8.4–10.2)
CHLORIDE SERPL-SCNC: 102 MMOL/L (ref 96–108)
CHOLEST SERPL-MCNC: 173 MG/DL
CO2 SERPL-SCNC: 31 MMOL/L (ref 21–32)
CREAT SERPL-MCNC: 0.61 MG/DL (ref 0.6–1.3)
EOSINOPHIL # BLD AUTO: 0.15 THOUSAND/ÂΜL (ref 0–0.61)
EOSINOPHIL NFR BLD AUTO: 2 % (ref 0–6)
ERYTHROCYTE [DISTWIDTH] IN BLOOD BY AUTOMATED COUNT: 12.2 % (ref 11.6–15.1)
GFR SERPL CREATININE-BSD FRML MDRD: 88 ML/MIN/1.73SQ M
GLUCOSE P FAST SERPL-MCNC: 101 MG/DL (ref 65–99)
HCT VFR BLD AUTO: 49.2 % (ref 34.8–46.1)
HDLC SERPL-MCNC: 77 MG/DL
HGB BLD-MCNC: 16.3 G/DL (ref 11.5–15.4)
IMM GRANULOCYTES # BLD AUTO: 0.02 THOUSAND/UL (ref 0–0.2)
IMM GRANULOCYTES NFR BLD AUTO: 0 % (ref 0–2)
LDLC SERPL CALC-MCNC: 73 MG/DL (ref 0–100)
LYMPHOCYTES # BLD AUTO: 2.67 THOUSANDS/ÂΜL (ref 0.6–4.47)
LYMPHOCYTES NFR BLD AUTO: 32 % (ref 14–44)
MCH RBC QN AUTO: 32 PG (ref 26.8–34.3)
MCHC RBC AUTO-ENTMCNC: 33.1 G/DL (ref 31.4–37.4)
MCV RBC AUTO: 97 FL (ref 82–98)
MONOCYTES # BLD AUTO: 0.4 THOUSAND/ÂΜL (ref 0.17–1.22)
MONOCYTES NFR BLD AUTO: 5 % (ref 4–12)
NEUTROPHILS # BLD AUTO: 5.06 THOUSANDS/ÂΜL (ref 1.85–7.62)
NEUTS SEG NFR BLD AUTO: 61 % (ref 43–75)
NRBC BLD AUTO-RTO: 0 /100 WBCS
PLATELET # BLD AUTO: 219 THOUSANDS/UL (ref 149–390)
PMV BLD AUTO: 10.9 FL (ref 8.9–12.7)
POTASSIUM SERPL-SCNC: 4.1 MMOL/L (ref 3.5–5.3)
PROT SERPL-MCNC: 7.6 G/DL (ref 6.4–8.4)
RBC # BLD AUTO: 5.1 MILLION/UL (ref 3.81–5.12)
SODIUM SERPL-SCNC: 142 MMOL/L (ref 135–147)
TRIGL SERPL-MCNC: 113 MG/DL
VIT B12 SERPL-MCNC: 669 PG/ML (ref 180–914)
WBC # BLD AUTO: 8.33 THOUSAND/UL (ref 4.31–10.16)

## 2023-08-29 PROCEDURE — 86038 ANTINUCLEAR ANTIBODIES: CPT

## 2023-08-29 PROCEDURE — 80053 COMPREHEN METABOLIC PANEL: CPT

## 2023-08-29 PROCEDURE — 82607 VITAMIN B-12: CPT

## 2023-08-29 PROCEDURE — 99214 OFFICE O/P EST MOD 30 MIN: CPT | Performed by: FAMILY MEDICINE

## 2023-08-29 PROCEDURE — 82306 VITAMIN D 25 HYDROXY: CPT

## 2023-08-29 PROCEDURE — 80061 LIPID PANEL: CPT

## 2023-08-29 PROCEDURE — 36415 COLL VENOUS BLD VENIPUNCTURE: CPT

## 2023-08-29 PROCEDURE — 85025 COMPLETE CBC W/AUTO DIFF WBC: CPT

## 2023-08-29 NOTE — ASSESSMENT & PLAN NOTE
Patient with pressure over frontal sinuses that has been chronic and has not improved with antihistamine. Physical exam with no significant findings. She was referred to ENT for recommendations and evaluation.

## 2023-08-29 NOTE — ASSESSMENT & PLAN NOTE
Patient with chronic worsening fatigue. Obtain blood tests and start B12 supplement daily. There may be some correlation to her depression. I encouraged her to continue therapy and provided patient with a list of providers in the area.

## 2023-08-29 NOTE — ASSESSMENT & PLAN NOTE
Addressed self-care methods for lessening anxiety such as decreased alcohol intake, decreased caffeine intake, healthy diet, stress management, and relaxation techniques. Patient referred to Psychology for cognitive behavioral therapy. Patient assessed for underlying major depression. They have no active suicidal ideations. Brief counseling provided and recommend additional follow-up/re-evaluation next office visit.

## 2023-08-29 NOTE — PROGRESS NOTES
Assessment/Plan:    1. Fatigue, unspecified type  Assessment & Plan:  Patient with chronic worsening fatigue. Obtain blood tests and start B12 supplement daily. There may be some correlation to her depression. I encouraged her to continue therapy and provided patient with a list of providers in the area. Orders:  -     CBC and differential; Future; Expected date: 08/29/2023    2. Sinus pressure  Assessment & Plan:  Patient with pressure over frontal sinuses that has been chronic and has not improved with antihistamine. Physical exam with no significant findings. She was referred to ENT for recommendations and evaluation. Orders:  -     Ambulatory Referral to Otolaryngology; Future    3. Vitamin D deficiency  Assessment & Plan:  Continue vitamin D3 daily     Orders:  -     Vitamin D 25 hydroxy; Future    4. Polyarthralgia  Assessment & Plan:  Patient with complaint of intermittent and worsening joint pain. We will obtain blood work for further evaluation. Monitor symptoms and follow up if they worsen. Orders:  -     ALLA Screen w/ Reflex to Titer/Pattern; Future    5. Depression, unspecified depression type  Assessment & Plan:  Addressed self-care methods for lessening anxiety such as decreased alcohol intake, decreased caffeine intake, healthy diet, stress management, and relaxation techniques. Patient referred to Psychology for cognitive behavioral therapy. Patient assessed for underlying major depression. They have no active suicidal ideations. Brief counseling provided and recommend additional follow-up/re-evaluation next office visit. Subjective:      Patient ID: Charo Rockwell is a 78 y.o. female. HPI    Patient reports fatigue and unwell over the past 6 weeks. She had blood work ordered by her PCP which she plans to get done today. She was supposed to start B12 supplement but has not done so yet. Patient has been having heaviness in her forehead.   She states that it is not painful. It happens every day for the past year and has worsened over the last month. She tried claritin which did not make any difference. She feels nausea in the morning and fatigue. She also feels general weakness and brain fog. She feels disoriented all day due to the heaviness. She has been under a lot more stress lately. She has negative thoughts and was referred to therapist but she has been having trouble getting appoinrmtnets. She called [facility] and is going to have a therapy appointment tomorrow for the first time. She is aware that she is depressed and needs to speak with a therapist about her feelings. She denies suicidal ideations or plan. Patient has history of anxiety and is always nervous about her health. She also states that she feels all her joints are always hurting. The following portions of the patient's history were reviewed and updated as appropriate: allergies, current medications, past family history, past medical history, past social history, past surgical history, and problem list.      Current Outpatient Medications:   •  amLODIPine (NORVASC) 10 mg tablet, TAKE 1 TABLET EVERY DAY, Disp: 90 tablet, Rfl: 1  •  atorvastatin (LIPITOR) 20 mg tablet, TAKE 1 TABLET EVERY DAY, Disp: 90 tablet, Rfl: 1  •  famotidine (PEPCID) 20 mg tablet, Take 1 tablet (20 mg total) by mouth 2 (two) times a day as needed for heartburn, Disp: 180 tablet, Rfl: 3      Review of Systems   Constitutional: Positive for fatigue. Negative for chills and fever. HENT: Positive for sinus pressure. Negative for congestion, ear pain, hearing loss, postnasal drip, rhinorrhea, sinus pain and sore throat. Eyes: Negative for pain and visual disturbance. Respiratory: Negative for cough and shortness of breath. Cardiovascular: Negative for chest pain and palpitations. Gastrointestinal: Negative for abdominal pain and vomiting. Genitourinary: Negative for dysuria and hematuria.    Musculoskeletal: Negative for arthralgias and back pain. Skin: Negative for color change and rash. Neurological: Negative for seizures and syncope. Psychiatric/Behavioral: Positive for dysphoric mood. Negative for self-injury, sleep disturbance and suicidal ideas. The patient is nervous/anxious. All other systems reviewed and are negative. Objective:      /70 (BP Location: Left arm, Patient Position: Sitting, Cuff Size: Adult)   Pulse 75   Temp (!) 97.2 °F (36.2 °C) (Temporal)   Resp 16   Ht 5' 2.99" (1.6 m)   Wt 56.7 kg (125 lb)   BMI 22.15 kg/m²          Physical Exam  Vitals and nursing note reviewed. Constitutional:       General: She is not in acute distress. Appearance: Normal appearance. She is not ill-appearing or toxic-appearing. HENT:      Head: Normocephalic and atraumatic. Right Ear: Tympanic membrane, ear canal and external ear normal.      Left Ear: Tympanic membrane, ear canal and external ear normal.      Nose: Nose normal. No congestion. Mouth/Throat:      Mouth: Mucous membranes are moist.      Pharynx: Oropharynx is clear. No oropharyngeal exudate or posterior oropharyngeal erythema. Eyes:      Extraocular Movements: Extraocular movements intact. Conjunctiva/sclera: Conjunctivae normal.      Pupils: Pupils are equal, round, and reactive to light. Neck:      Vascular: No carotid bruit. Cardiovascular:      Rate and Rhythm: Normal rate and regular rhythm. Heart sounds: Normal heart sounds. No murmur heard. Pulmonary:      Effort: Pulmonary effort is normal. No respiratory distress. Breath sounds: Normal breath sounds. No wheezing. Musculoskeletal:      Cervical back: Normal range of motion. Lymphadenopathy:      Cervical: No cervical adenopathy. Skin:     General: Skin is warm. Neurological:      General: No focal deficit present. Mental Status: She is alert and oriented to person, place, and time. Mental status is at baseline. Cranial Nerves: No cranial nerve deficit. Psychiatric:         Attention and Perception: Attention normal.         Mood and Affect: Affect normal. Mood is anxious. Speech: Speech normal.         Behavior: Behavior normal. Behavior is cooperative. Thought Content: Thought content normal. Thought content does not include suicidal ideation. Thought content does not include suicidal plan.          Cognition and Memory: Cognition normal.         Judgment: Judgment normal.

## 2023-08-29 NOTE — ASSESSMENT & PLAN NOTE
Patient with complaint of intermittent and worsening joint pain. We will obtain blood work for further evaluation. Monitor symptoms and follow up if they worsen.

## 2023-08-30 DIAGNOSIS — I10 ESSENTIAL HYPERTENSION: ICD-10-CM

## 2023-08-30 RX ORDER — AMLODIPINE BESYLATE 10 MG/1
TABLET ORAL
Qty: 90 TABLET | Refills: 1 | Status: SHIPPED | OUTPATIENT
Start: 2023-08-30

## 2023-09-21 NOTE — PROGRESS NOTES
[facility]  Sleep Medicine New Patient Evaluation    PATIENT NAME: Ileana Moyer  DATE OF SERVICE: September 22, 2023    CONSULTING PROVIDER:  Tino Constantino DO  [address] [place] [address]  [address]  Annie Beal      Assessment/Plan:  Diagnoses and all orders for this visit:    Sleep disorder  -     Ambulatory referral to Sleep Medicine  -     Cancel: Diagnostic Sleep Study; Future  -     Diagnostic Sleep Study; Future    Morning headache  -     Cancel: Diagnostic Sleep Study; Future  -     Diagnostic Sleep Study; Future    Other fatigue  -     Cancel: Diagnostic Sleep Study; Future  -     Diagnostic Sleep Study; Future    Snoring  -     Cancel: Diagnostic Sleep Study; Future  -     Diagnostic Sleep Study; Future    Chronic insomnia  -     Cancel: Diagnostic Sleep Study; Future  -     Diagnostic Sleep Study; Future    Other orders  -     vitamin B-12 (VITAMIN B-12) 1,000 mcg tablet; Take by mouth daily  -     cholecalciferol (VITAMIN D3) 1,000 units tablet; Take 1,000 Units by mouth daily      Adan Chaney is a pleasant 80-year-old female with PMHx of GERD, asthma, HTN, dyslipidemia, anxiety who presents for evaluation of multiple concerns. Chief among these is a complaint of an almost constant sensation of brain fog with a headache described as a "pressure-like sensation" on her forehead. This is not a new phenomenon, and she tells me this has been going on for about a year, however has gotten worse since her cataract surgery in July. She tells me that it tends to wear off by the evening, and in fact is worse upon awakening from sleep. She is quite adamant that there is no positional component to this headache, which is reassuring and argues against an intracranial process such as neoplasm.   She also endorses what sounds to be a chronic insomnia, however, with her adopting a more regimented sleep schedule, her sleep seems to have become easier and more consistent, with her getting a fairly consistent 6 to 7 hours of sleep each night. At present, given her symptoms of insomnia, headache worse in the morning after waking (it is important to note that on the nights in the past when she would not sleep at all, the headache was NOT any worse than it had been the previous day, unlike when awakening from sleep), reported snoring, and brain fog, I would like to evaluate for sleep disordered breathing, specifically obstructive sleep apnea and/or sleep-related hypoventilation. Regarding the pain in her left temple that will sometimes shoot across her head to the other side, she tells me this is brief and will quickly dissipate once it hits. 1 previous neurology note mentions the possibility of Short lasting Unilateral Neuralgiform headache with Conjunctival injection and Tearing (SUNCT), however she fairly adamantly denies any lacrimation, conjunctival injection, or other autonomic symptoms, which also argues against its relative, CRYSTAL. Reassuringly, she has had multiple ESR and/or CRP lab draws since this began, which have been quite normal, arguing against a giant cell arteritis. She also has no tenderness to palpation of the temporal arteries bilaterally. Regarding the episodes of loss of consciousness that she describes, the last one that she experienced was in 2021, and has had none since; moreover, these sound to have occurred after acute sleep deprivation and excessive activity possibly with dehydration, thus arguing for more of a syncopal event than epileptiform, and regardless, if we are able to normalize her sleep and keep it as such, it seems we will then be able to avoid any more of these episodes.     1.  Discussed with patient the pathophysiology of NED and medical conditions associated with NED such as DM, HTN, CAD, Depression, Stroke, Headache.   -Also discussed that in the female and elderly population, NED more commonly presents as a chronic insomnia then with the more "classic" symptoms of NED. 2.  Treatment options including surgery, Dental appliances, positional therapy, and CPAP were discussed. 3.  I have ordered an in lab PSG with transcutaneous CO2 monitoring to evaluate for sleep-disordered breathing, including obstructive sleep apnea and sleep-related hypoventilation (the latter of which would especially contribute to morning headaches and brain fog). 4.  I strongly considered obtaining an MRI of her brain at today's visit, however given her general lack of any neurologic symptoms or abnormalities on my exam as well as the lack of a positional component to her headache, I have a lower suspicion for an intracranial process at present. However, I will continue to consider this at her next appointment pending the results of her sleep study. 5.  I encouraged her to continue to keep a regular sleep schedule, as she is doing with excellent success over the past few weeks. I stressed that the most important part of this is to keep the same wake time every day. 6.  Advised patient to avoid activities that could harm self or others when tired/sleepy, including driving. 7.  Encouraged maintaining normal weight  8. Discussed importance of good sleep hygiene. 9.  Follow-up 2-3 weeks following completion of sleep study          Thank you for allowing me to participate in the care of your patient. If there are any questions regarding evaluation please feel free to reach out. HPI: Arsenio Oseguera is a 78 y.o. femalewith PMHx of GERD, asthma, HTN, dyslipidemia, anxiety who presents for evaluation regarding poor quality sleep and frontal headaches worse when waking. Sleep-Related History: She was evaluated by ENT 9/12/2023, who performed a nasal endoscopy that was unremarkable. Suspicion was raised for a sleep disorder as the cause of her symptoms, thus she was referred to us.     Endorses that when she wakes up in the morning, she feels disoriented, nauseous, with almost a "heaviness in my forehead." This has been going on for ~1 year, or at least got worse following her cataract surgery in July. Very frequent, either every day or every other day. Has not thrown up. Sometimes it wears off by evening, sometimes present all day. She tells me that it is much better after she sleeps and wakes up, and on nights when she didn't sleep at all, it is still present but not as bad. Has had problems sleeping for years, >10, but got worse in the past year. Moved to [place] from [place], went through severe depression due to the mixture of the lockdown and moving to a new place and not knowing anyone. Had been having severe trouble falling asleep, sometimes staying up for multiple hours all night; this has been better lately, since trying to keep a schedule (see below), this has been better. Has been diagnosed with anxiety, waiting to get in with Psychiatry. She had stated that she told multiple doctors that she was having a sharp pain in her L temple along with some visual changes (present for past year); she has had 2 ESRs, both normal, latest in June (7 mm/hr). No worse since this test. Describes a pain that would "shoot" across the top of her head to her R temple, then goes away. Sometimes slight headache in that region. Seizure-like events: eyes rolled back, passed out, shook and made a gagging noise. Hands shook, urinated on herself. Endorsed that these each happened when she had not had sleep the night before. Said that beforehand, she felt "hot or warm." Saw multiple neurologists, multiple EEGs, was told not epilepsy. Last time was in 2021. Hyseterectomy and menopause when 50years of age. Pertinent Medications: Has tried hydroxyzine in the past, with success, however does not wish to try again. SLEEP-WAKE SCHEDULE  Sleep Schedule (last 2 weeks or so):  Weekdays:  Bedtime: 2330/0000   Asleep in ~30 minutes. Nighttime awakenings: 2-3, restroom.  Now, falls back asleep fairly quickly, although rarely will be unable to fall back asleep. Wake: 0630/0700    Naps: 0    Average total sleep time (in a 24 hour period): 6-7 hours. Weekends:  Same as weekdays      SLEEP-RELATED DETAILS  Breathing disturbances and other behaviors during sleep: Snore, maybe choking/gasping. Bruxism: Denies  MELCHOR or aspiration: Denies  Waking up with heart pounding or racing: Deneis   Anxiety or Rumination: Yes  RLS Symptoms: Denies  PLM Symptoms: Denies    Parasomnia History: Denies    Daytime Sleepiness: Not really, moreso fatigued or "drained."   She does not report sleep paralysis or sleep-related hallucinations or cataplexy. WAKE-RELATED DETAILS  Work Schedule: Retired, worked as a nanny  She does have difficulty with memory or concentration. Sleepiness while driving: Does not drive      PAST TREATMENTS:  None    PRIOR SLEEP STUDIES:  None    OTHER RELEVANT LABS AND STUDIES:  Did undergo video EEG/EMU stay back in ~2018/2021; see scanned documentation. This was reportedly normal per report.     Past Medical History:   Diagnosis Date   • Asthma     variant cough asthma   • Cataract    • GERD (gastroesophageal reflux disease) 1989   • Hyperlipidemia    • Hypertension       Past Surgical History:   Procedure Laterality Date   • EYE SURGERY     • HYSTERECTOMY     • OOPHORECTOMY Bilateral    • UTERINE SUSPENSION        Patient Active Problem List   Diagnosis   • Essential hypertension   • Dyslipidemia   • Venous insufficiency   • Mild intermittent asthma without complication   • Arthritis   • Gastroesophageal reflux disease with esophagitis without hemorrhage   • Insomnia   • Anxiety   • Exposure to COVID-19 virus   • Breast pain, left   • Advanced care planning/counseling discussion   • Leukocytosis   • Heart murmur   • Chest pain   • Lightheadedness   • Generalized anxiety disorder   • Upper back pain   • Lipoma of torso   • Dermatitis   • Chronic cough   • Age-related osteoporosis without current pathological fracture   • Abnormal CT of the chest   • Oropharyngeal dysphagia   • Urge incontinence   • Constipation   • Polyarthralgia   • Sinus pressure   • Vitamin D deficiency   • Fatigue   • Depression      Allergies as of 2023 - Reviewed 2023   Allergen Reaction Noted   • Nsaids Other (See Comments) 2023   • Other Cough 2022   • Sulfa antibiotics Other (See Comments) 2023   • Codeine Rash 2020   • Shellfish allergy - food allergy Rash 2023           CURRENT MEDICATIONS:  Current Outpatient Medications   Medication Instructions   • amLODIPine (NORVASC) 10 mg tablet TAKE 1 TABLET EVERY DAY   • atorvastatin (LIPITOR) 20 mg tablet TAKE 1 TABLET EVERY DAY   • cholecalciferol (VITAMIN D3) 1,000 Units, Oral, Daily   • famotidine (PEPCID) 20 mg, Oral, 2 times daily PRN   • vitamin B-12 (VITAMIN B-12) 1,000 mcg tablet Oral, Daily         SOCIAL HISTORY:  Social History     Tobacco Use   • Smoking status: Never     Passive exposure: Never   • Smokeless tobacco: Never   Vaping Use   • Vaping Use: Never used   Substance Use Topics   • Alcohol use: Not Currently   • Drug use: Never       FAMILY HISTORY:  Family History   Problem Relation Age of Onset   • Asthma Mother    • Hypertension Mother            • No Known Problems Father    • No Known Problems Sister    • No Known Problems Maternal Grandmother    • No Known Problems Maternal Grandfather    • No Known Problems Paternal Grandmother    • No Known Problems Paternal Grandfather    • No Known Problems Sister    • Hypertension Brother         Pace maker      Family History of Sleep Disorders: Sister has insomnia as well.         PHYSICAL EXAMINATION:  Vital Signs:  /76   Pulse 78   Ht 5' 2" (1.575 m)   Wt 57.1 kg (125 lb 12.8 oz)   SpO2 95%   BMI 23.01 kg/m²     PHYSICAL EXAM:  Constitutional: NAD, well appearing, Mental Status: A&Ox3  Neck Circumference: 12.5cm  Skin: Warm, dry, no rashes noted   Eyes: PERRL, normal conjunctiva  ENT: Nasal congestion absent, Nasal valve incompetence absent. Posterior airspace: Johnson tongue position 4 with some macroglossia, retrognathia absent. Overbite absent. High arched palate absent. Tongue scalloping/ridging absent. Uvula: Not visualized  Chest: No respiratory distress, no accessory muscle use. No evidence of peripheral cyanosis. Abdomen: Soft, NT/ND  Extremities: No digital clubbing or pedal edema    NEUROLOGICAL EXAM:  General: Awake, alert, speech fluent, comprehension, naming, repetition intact. Short and long term memory intact. CN: PERRL, EOMI without nystagmus, facial sensation and strength are normal and symmetric, hearing is intact to conversational tone, palate and tongue movements are intact and symmetric. Motor: Normal tone, bulk and strength bilaterally. Sensation: LT intact throughout. Gait: Able to walk without difficulty. Stance normal.      Electronically signed by:    Len Saldana.  Diya Chaney [facility]  Neurology and Sleep Medicine  [address] [place] [address]  09/22/23

## 2023-09-22 ENCOUNTER — OFFICE VISIT (OUTPATIENT)
Dept: SLEEP CENTER | Facility: CLINIC | Age: 78
End: 2023-09-22
Payer: COMMERCIAL

## 2023-09-22 VITALS
HEART RATE: 78 BPM | OXYGEN SATURATION: 95 % | DIASTOLIC BLOOD PRESSURE: 76 MMHG | HEIGHT: 62 IN | SYSTOLIC BLOOD PRESSURE: 110 MMHG | BODY MASS INDEX: 23.15 KG/M2 | WEIGHT: 125.8 LBS

## 2023-09-22 DIAGNOSIS — F51.04 CHRONIC INSOMNIA: ICD-10-CM

## 2023-09-22 DIAGNOSIS — R06.83 SNORING: ICD-10-CM

## 2023-09-22 DIAGNOSIS — G47.9 SLEEP DISORDER: Primary | ICD-10-CM

## 2023-09-22 DIAGNOSIS — R53.83 OTHER FATIGUE: ICD-10-CM

## 2023-09-22 DIAGNOSIS — R51.9 MORNING HEADACHE: ICD-10-CM

## 2023-09-22 PROCEDURE — 99205 OFFICE O/P NEW HI 60 MIN: CPT | Performed by: STUDENT IN AN ORGANIZED HEALTH CARE EDUCATION/TRAINING PROGRAM

## 2023-09-22 RX ORDER — MELATONIN
1000 DAILY
COMMUNITY

## 2023-09-22 RX ORDER — LANOLIN ALCOHOL/MO/W.PET/CERES
CREAM (GRAM) TOPICAL DAILY
COMMUNITY

## 2023-09-22 NOTE — PATIENT INSTRUCTIONS
NED Evaluation: We suspect you may have sleep apnea. - Sleep apnea is a serious sleep disorder that occurs when a person's breathing is interrupted during sleep. People with untreated sleep apnea stop breathing repeatedly during their sleep, sometimes hundreds of times during the night. The most common type of sleep apnea is obstructive sleep apnea. - If left untreated, obstructive sleep apnea can result in a number of health problems including hypertension, stroke, arrhythmias, cardiomyopathy (enlargement of the muscle tissue of the heart), heart failure, diabetes, obesity, and heart attacks. In women, this can classically present more as an insomnia. - In addition, I would like to evaluate for elevated CO2 levels while you sleep, which can cause morning headaches and brain fog.  - Treatment options for obstructive sleep apnea may include positive airway pressure (PAP) therapy, oral appliance, hypoglossal nerve stimulator, nose/throat surgery, weight loss, side sleeping, or avoidance of medications or substances that can relax the airway muscles (alcohol, benzodiazepines, and opioids). I have ordered a polysomnogram (PSG) to evaluate for sleep apnea. This test should be scheduled at your earliest convenience. - We could consider an MRI at your next appointment, pending the results of the sleep study  - Avoid driving if drowsy. We recommend that if you are dozing off while driving, that you do not drive until your sleepiness is appropriately treated. We encourage a healthy lifestyle with adequate sleep (7-9 hours per night), diet and exercise. Keep your current sleep schedule, as it is allowing you to get sleep. The most important aspect is that you keep the same WAKE time every day. - Please schedule a follow up clinic visit now for 2-3 weeks after sleep study is done to review results.

## 2023-09-27 ENCOUNTER — TELEPHONE (OUTPATIENT)
Dept: SLEEP CENTER | Facility: CLINIC | Age: 78
End: 2023-09-27

## 2023-09-27 DIAGNOSIS — R51.9 CHRONIC NONINTRACTABLE HEADACHE, UNSPECIFIED HEADACHE TYPE: Primary | ICD-10-CM

## 2023-09-27 DIAGNOSIS — G89.29 CHRONIC NONINTRACTABLE HEADACHE, UNSPECIFIED HEADACHE TYPE: Primary | ICD-10-CM

## 2023-09-27 DIAGNOSIS — R51.9 MORNING HEADACHE: ICD-10-CM

## 2023-09-27 DIAGNOSIS — F41.9 ANXIETY: ICD-10-CM

## 2023-09-27 NOTE — TELEPHONE ENCOUNTER
Patient called office to schedule MRI appointment- I advised patient there is no order in her chart for an MRI but if there was she would need to call central scheduling as our office does not schedule those appointments - patient stated when she saw Dr. Vee Oviedo he spoke with her about said MRI - patient is requesting an order for an MRI in addition to the sleep study she is already scheduled for-

## 2023-09-27 NOTE — TELEPHONE ENCOUNTER
Good afternoon,    We had discussed obtaining both the MRI and the sleep study at the same time versus obtaining the sleep study first, then obtaining the MRI at a follow-up appointment. If she would like to obtain the MRI now before her next appointment, I do not have any opposition to this. I will go ahead and place that order, and she may obtain that whenever is convenient for her prior to her next appointment with me.     Dr. Hilda Sotelo

## 2023-09-28 NOTE — TELEPHONE ENCOUNTER
Returned call to patient. Left message to advise order for MRI has been placed by Dr. Anita Fuentes. Provided telephone number to central scheduling.

## 2023-09-29 RX ORDER — LORAZEPAM 0.5 MG/1
TABLET ORAL
Qty: 1 TABLET | Refills: 0 | Status: SHIPPED | OUTPATIENT
Start: 2023-09-29

## 2023-09-29 NOTE — TELEPHONE ENCOUNTER
Patient left message on the nurse line stating she scheduled the MRI and during the process of scheduling it came to her attention that she is claustrophobic. Patient is requesting a one-time dose of an anti-anxiety medication for the MRI scheduled 10/5/2023. Patient requesting Rx to be sent to [address] [place] [address] #[id number]. Dr. Abhi Chen, please advise.

## 2023-09-29 NOTE — TELEPHONE ENCOUNTER
Call placed to patient. Left message a medication has been ordered and sent to her requested pharmacy. Provided call back number to nursing with any questions.

## 2023-10-04 ENCOUNTER — OFFICE VISIT (OUTPATIENT)
Dept: FAMILY MEDICINE CLINIC | Facility: CLINIC | Age: 78
End: 2023-10-04
Payer: COMMERCIAL

## 2023-10-04 ENCOUNTER — APPOINTMENT (OUTPATIENT)
Dept: RADIOLOGY | Facility: CLINIC | Age: 78
End: 2023-10-04
Payer: COMMERCIAL

## 2023-10-04 VITALS
RESPIRATION RATE: 18 BRPM | TEMPERATURE: 97.4 F | DIASTOLIC BLOOD PRESSURE: 80 MMHG | BODY MASS INDEX: 22.5 KG/M2 | HEART RATE: 89 BPM | WEIGHT: 123 LBS | SYSTOLIC BLOOD PRESSURE: 128 MMHG | OXYGEN SATURATION: 98 %

## 2023-10-04 DIAGNOSIS — R10.11 RIGHT UPPER QUADRANT PAIN: ICD-10-CM

## 2023-10-04 DIAGNOSIS — R07.89 RIGHT-SIDED CHEST WALL PAIN: Primary | ICD-10-CM

## 2023-10-04 DIAGNOSIS — R07.89 RIGHT-SIDED CHEST WALL PAIN: ICD-10-CM

## 2023-10-04 PROCEDURE — 99214 OFFICE O/P EST MOD 30 MIN: CPT | Performed by: NURSE PRACTITIONER

## 2023-10-04 PROCEDURE — 71101 X-RAY EXAM UNILAT RIBS/CHEST: CPT

## 2023-10-04 NOTE — TELEPHONE ENCOUNTER
Typically the MRI's with [facility] are considered "large bore," so not as bad in the confining sense as others, so she can be reassured that way. I could also add a second 0.5mg Ativan to take ~1 hour beforehand if she thinks that will be necessary; I would be very hesitant to add any more, however.      Dr. Moe Basket

## 2023-10-04 NOTE — TELEPHONE ENCOUNTER
Patient called office and stated she is scheduled for her MRI on 10/5/23-Patient stated she can not go through the MRI tunnel and the thought is giving her great anxiety - Patient stated she called the location where her MRI will be preformed and she was instructed call the ordering doctors office with her concerns. Patient is requesting a call back from nursing.

## 2023-10-04 NOTE — TELEPHONE ENCOUNTER
I spoke with the patient at length. Advised her that her options are   1 to try having the MRI with the Ativan 0.5 mgs    2 Dr. Sherrill Zabala will order additional dose of Ativan 0.5 mg to take one hour beforr but she will need a  to from the MRI    3 she can cancel the MRI at [facility] and have the study done at the [facility] in [facility].      Patient is planning on calling the [facility] facility for more information

## 2023-10-04 NOTE — TELEPHONE ENCOUNTER
Did she receive the Ativan prescription? Does she still not believe that she can get through it with that? Alternatively, there is an Open MRI in hospitals ([address] I believe). She would need to call and schedule for this location instead.

## 2023-10-05 ENCOUNTER — HOSPITAL ENCOUNTER (OUTPATIENT)
Facility: MEDICAL CENTER | Age: 78
Discharge: HOME | End: 2023-10-05
Payer: COMMERCIAL

## 2023-10-05 DIAGNOSIS — G89.29 CHRONIC NONINTRACTABLE HEADACHE, UNSPECIFIED HEADACHE TYPE: ICD-10-CM

## 2023-10-05 DIAGNOSIS — R51.9 CHRONIC NONINTRACTABLE HEADACHE, UNSPECIFIED HEADACHE TYPE: ICD-10-CM

## 2023-10-05 DIAGNOSIS — R51.9 MORNING HEADACHE: ICD-10-CM

## 2023-10-05 PROBLEM — R07.89 RIGHT-SIDED CHEST WALL PAIN: Status: ACTIVE | Noted: 2023-10-05

## 2023-10-05 PROBLEM — R10.11 RIGHT UPPER QUADRANT PAIN: Status: ACTIVE | Noted: 2023-10-05

## 2023-10-05 PROCEDURE — G1004 CDSM NDSC: HCPCS

## 2023-10-05 PROCEDURE — 70553 MRI BRAIN STEM W/O & W/DYE: CPT

## 2023-10-05 PROCEDURE — A9585 GADOBUTROL INJECTION: HCPCS | Performed by: STUDENT IN AN ORGANIZED HEALTH CARE EDUCATION/TRAINING PROGRAM

## 2023-10-05 RX ORDER — GADOBUTROL 604.72 MG/ML
6 INJECTION INTRAVENOUS
Status: COMPLETED | OUTPATIENT
Start: 2023-10-05 | End: 2023-10-05

## 2023-10-05 RX ADMIN — GADOBUTROL 6 ML: 604.72 INJECTION INTRAVENOUS at 16:13

## 2023-10-05 NOTE — ASSESSMENT & PLAN NOTE
Suspect right upper quadrant pain is muscular as well with her axillary/right chest wall pain. We will check ultrasound, to rule out any internal causes. Patient with history of mild fatty liver, with liver ultrasound completed in 2022:  Mild hepatic steatosis. Liver cysts measuring up to 2.5 cm.

## 2023-10-05 NOTE — ASSESSMENT & PLAN NOTE
Reproducible today, with palpation and movement. Discussed likely muscular; costochondritis. Discussed conservative treatments: Ice, heat, anti-inflammatories (recent GFR in August: 88), Lidoderm patch, muscle rub creams. Encourage bracing with deep breathing and movement  With patient's history of osteoporosis, will check x-ray to rule out rib fracture.   Order placed

## 2023-10-05 NOTE — PROGRESS NOTES
[facility]    ASSESSMENT AND PLAN     1. Right-sided chest wall pain  Assessment & Plan:  Reproducible today, with palpation and movement. Discussed likely muscular; costochondritis. Discussed conservative treatments: Ice, heat, anti-inflammatories (recent GFR in August: 88), Lidoderm patch, muscle rub creams. Encourage bracing with deep breathing and movement  With patient's history of osteoporosis, will check x-ray to rule out rib fracture. Order placed      2. Right upper quadrant pain  Assessment & Plan:  Suspect right upper quadrant pain is muscular as well with her axillary/right chest wall pain. We will check ultrasound, to rule out any internal causes. Patient with history of mild fatty liver, with liver ultrasound completed in 2022:  Mild hepatic steatosis. Liver cysts measuring up to 2.5 cm. Orders:  -     US right upper quadrant; Future; Expected date: 10/04/2023  -     Comprehensive metabolic panel; Future  -     CBC and differential; Future         SUBJECTIVE       Patient ID: Steph Scott is a 78 y.o. female. Chief Complaint   Patient presents with   • Abdominal Pain       HISTORY OF PRESENT ILLNESS    Patient presents today with right upper quadrant, right chest wall pain. Started a few days ago. Reports she was lifting heavy boxes and doing a lot of moving prior to the pain starting. The pain is midline axillary jail down her rib cage to the base. Feels it does radiate sometimes into her right anterior chest wall, up and under her rib cage. She does have concern for liver, as she was told she has a fatty liver in the past.  No change in bowel and bladder. No change in eating. No nausea.   No other chest pain or shortness of breath        The following portions of the patient's history were reviewed and updated as appropriate: allergies, current medications, past family history, past medical history, past social history, past surgical history, and problem list.    REVIEW OF SYSTEMS  Review of Systems   Constitutional: Negative for activity change, appetite change, fatigue and fever. Respiratory: Negative. Cardiovascular: Negative. Gastrointestinal: Abdominal pain: RUQ - right chest wall pain as in HPI. Musculoskeletal: Negative for back pain. Right chest wall pain as in HPI   Neurological: Negative. Psychiatric/Behavioral: Negative. OBJECTIVE      VITAL SIGNS  /80 (BP Location: Right arm, Patient Position: Sitting, Cuff Size: Standard)   Pulse 89   Temp (!) 97.4 °F (36.3 °C) (Temporal)   Resp 18   Wt 55.8 kg (123 lb)   SpO2 98%   BMI 22.50 kg/m²     CURRENT MEDICATIONS    Current Outpatient Medications:   •  amLODIPine (NORVASC) 10 mg tablet, TAKE 1 TABLET EVERY DAY, Disp: 90 tablet, Rfl: 1  •  atorvastatin (LIPITOR) 20 mg tablet, TAKE 1 TABLET EVERY DAY, Disp: 90 tablet, Rfl: 1  •  cholecalciferol (VITAMIN D3) 1,000 units tablet, Take 1,000 Units by mouth daily, Disp: , Rfl:   •  famotidine (PEPCID) 20 mg tablet, Take 1 tablet (20 mg total) by mouth 2 (two) times a day as needed for heartburn, Disp: 180 tablet, Rfl: 3  •  LORazepam (Ativan) 0.5 mg tablet, Take 1 tablet (0.5mg) prior to MRI for claustrophobia., Disp: 1 tablet, Rfl: 0  •  vitamin B-12 (VITAMIN B-12) 1,000 mcg tablet, Take by mouth daily, Disp: , Rfl:       PHYSICAL EXAMINATION   Physical Exam  Vitals and nursing note reviewed. Constitutional:       General: She is not in acute distress. Appearance: Normal appearance. She is not ill-appearing. HENT:      Head: Normocephalic. Cardiovascular:      Rate and Rhythm: Normal rate and regular rhythm. Pulmonary:      Effort: Pulmonary effort is normal. No respiratory distress. Breath sounds: Normal breath sounds and air entry. Chest:          Comments: Achy pain. Notes when she applies pressure to the area. Reports it radiates sometimes up and under the rib cage as further described in HPI.   The pain is reproducible today for me with gentle palpation to the site and when she twists her torso. Note skin is intact. No bruising, redness or swelling noted  Skin:     General: Skin is warm and dry. Neurological:      General: No focal deficit present. Mental Status: She is alert and oriented to person, place, and time.    Psychiatric:         Attention and Perception: Attention normal.         Mood and Affect: Mood normal.         Behavior: Behavior normal.

## 2023-10-12 ENCOUNTER — APPOINTMENT (OUTPATIENT)
Dept: LAB | Facility: HOSPITAL | Age: 78
End: 2023-10-12
Payer: COMMERCIAL

## 2023-10-12 ENCOUNTER — HOSPITAL ENCOUNTER (OUTPATIENT)
Dept: ULTRASOUND IMAGING | Facility: HOSPITAL | Age: 78
Discharge: HOME | End: 2023-10-12
Payer: COMMERCIAL

## 2023-10-12 DIAGNOSIS — R10.11 RIGHT UPPER QUADRANT PAIN: ICD-10-CM

## 2023-10-12 LAB
ALBUMIN SERPL BCP-MCNC: 4.3 G/DL (ref 3.5–5)
ALP SERPL-CCNC: 69 U/L (ref 34–104)
ALT SERPL W P-5'-P-CCNC: 14 U/L (ref 7–52)
ANION GAP SERPL CALCULATED.3IONS-SCNC: 5 MMOL/L
AST SERPL W P-5'-P-CCNC: 18 U/L (ref 13–39)
BASOPHILS # BLD AUTO: 0.04 THOUSANDS/ÂΜL (ref 0–0.1)
BASOPHILS NFR BLD AUTO: 0 % (ref 0–1)
BILIRUB SERPL-MCNC: 0.57 MG/DL (ref 0.2–1)
BUN SERPL-MCNC: 14 MG/DL (ref 5–25)
CALCIUM SERPL-MCNC: 9.4 MG/DL (ref 8.4–10.2)
CHLORIDE SERPL-SCNC: 103 MMOL/L (ref 96–108)
CO2 SERPL-SCNC: 32 MMOL/L (ref 21–32)
CREAT SERPL-MCNC: 0.6 MG/DL (ref 0.6–1.3)
EOSINOPHIL # BLD AUTO: 0.23 THOUSAND/ÂΜL (ref 0–0.61)
EOSINOPHIL NFR BLD AUTO: 3 % (ref 0–6)
ERYTHROCYTE [DISTWIDTH] IN BLOOD BY AUTOMATED COUNT: 12.4 % (ref 11.6–15.1)
GFR SERPL CREATININE-BSD FRML MDRD: 89 ML/MIN/1.73SQ M
GLUCOSE P FAST SERPL-MCNC: 90 MG/DL (ref 65–99)
HCT VFR BLD AUTO: 45.3 % (ref 34.8–46.1)
HGB BLD-MCNC: 15.2 G/DL (ref 11.5–15.4)
IMM GRANULOCYTES # BLD AUTO: 0.02 THOUSAND/UL (ref 0–0.2)
IMM GRANULOCYTES NFR BLD AUTO: 0 % (ref 0–2)
LYMPHOCYTES # BLD AUTO: 2.66 THOUSANDS/ÂΜL (ref 0.6–4.47)
LYMPHOCYTES NFR BLD AUTO: 29 % (ref 14–44)
MCH RBC QN AUTO: 31.8 PG (ref 26.8–34.3)
MCHC RBC AUTO-ENTMCNC: 33.6 G/DL (ref 31.4–37.4)
MCV RBC AUTO: 95 FL (ref 82–98)
MONOCYTES # BLD AUTO: 0.46 THOUSAND/ÂΜL (ref 0.17–1.22)
MONOCYTES NFR BLD AUTO: 5 % (ref 4–12)
NEUTROPHILS # BLD AUTO: 5.74 THOUSANDS/ÂΜL (ref 1.85–7.62)
NEUTS SEG NFR BLD AUTO: 63 % (ref 43–75)
NRBC BLD AUTO-RTO: 0 /100 WBCS
PLATELET # BLD AUTO: 218 THOUSANDS/UL (ref 149–390)
PMV BLD AUTO: 10 FL (ref 8.9–12.7)
POTASSIUM SERPL-SCNC: 3.8 MMOL/L (ref 3.5–5.3)
PROT SERPL-MCNC: 7.1 G/DL (ref 6.4–8.4)
RBC # BLD AUTO: 4.78 MILLION/UL (ref 3.81–5.12)
SODIUM SERPL-SCNC: 140 MMOL/L (ref 135–147)
WBC # BLD AUTO: 9.15 THOUSAND/UL (ref 4.31–10.16)

## 2023-10-12 PROCEDURE — 76705 ECHO EXAM OF ABDOMEN: CPT

## 2023-10-12 PROCEDURE — 80053 COMPREHEN METABOLIC PANEL: CPT

## 2023-10-12 PROCEDURE — 85025 COMPLETE CBC W/AUTO DIFF WBC: CPT

## 2023-10-12 PROCEDURE — 36415 COLL VENOUS BLD VENIPUNCTURE: CPT

## 2023-10-23 ENCOUNTER — TELEPHONE (OUTPATIENT)
Dept: SLEEP CENTER | Facility: CLINIC | Age: 78
End: 2023-10-23

## 2023-10-23 NOTE — TELEPHONE ENCOUNTER
Returned call to patient's daughter Amilcar Kraft (communication consent in media). Answered questions regarding sleep study and provided instructions.

## 2023-10-24 ENCOUNTER — HOSPITAL ENCOUNTER (OUTPATIENT)
Dept: SLEEP CENTER | Facility: CLINIC | Age: 78
Discharge: HOME | End: 2023-10-24
Payer: COMMERCIAL

## 2023-10-24 DIAGNOSIS — G47.9 SLEEP DISORDER: ICD-10-CM

## 2023-10-24 DIAGNOSIS — R06.83 SNORING: ICD-10-CM

## 2023-10-24 DIAGNOSIS — R53.83 OTHER FATIGUE: ICD-10-CM

## 2023-10-24 DIAGNOSIS — F51.04 CHRONIC INSOMNIA: ICD-10-CM

## 2023-10-24 DIAGNOSIS — R51.9 MORNING HEADACHE: ICD-10-CM

## 2023-10-24 PROCEDURE — 95810 POLYSOM 6/> YRS 4/> PARAM: CPT

## 2023-10-25 PROBLEM — G47.33 OSA (OBSTRUCTIVE SLEEP APNEA): Status: ACTIVE | Noted: 2023-10-25

## 2023-10-25 NOTE — PROGRESS NOTES
Sleep Study Documentation    Pre-Sleep Study       Sleep testing procedure explained to patient:YES    Patient napped prior to study:NO    Caffeine:Dayshift worker after 12PM.  Caffeine use:NO    Alcohol:Dayshift workers after 5PM: Alcohol use:NO    Typical day for patient:YES       Study Documentation    Sleep Study Indications: EDS, Chronic or AM headaches, unrefreshing sleep    Sleep Study: Diagnostic   Snore: Moderate  Supplemental O2: no    O2 flow rate (L/min) range   O2 flow rate (L/min) final   Minimum SaO2 91%  Baseline SaO2 95%    Mode of Therapy:    EKG abnormalities: no     EEG abnormalities: no    Were abnormal behaviors in sleep observed:NO    Is Total Sleep Study Recording Time < 2 hours: N/A    Is Total Sleep Study Recording Time > 2 hours but study is incomplete: N/A    Is Total Sleep Study Recording Time 6 hours or more but sleep was not obtained: NO    Patient classification: retired       Post-Sleep Study    Medication used at bedtime or during sleep study:NO    Patient reports time it took to fall asleep:greater than 60 minutes    Patient reports waking up during study:1 to 2 times. Patient reports returning to sleep in 10 to 30 minutes. Patient reports sleeping 4 to 6 hours without dreaming. Does the Patient feel this is a typical night of sleep:typical    Patient rated sleepiness: Not sleepy or tired    PAP treatment:no.

## 2023-10-27 ENCOUNTER — TELEPHONE (OUTPATIENT)
Dept: SLEEP CENTER | Facility: CLINIC | Age: 78
End: 2023-10-27

## 2023-10-27 PROCEDURE — 95810 POLYSOM 6/> YRS 4/> PARAM: CPT | Performed by: STUDENT IN AN ORGANIZED HEALTH CARE EDUCATION/TRAINING PROGRAM

## 2023-10-30 PROBLEM — G47.9 SLEEP DISORDER: Status: ACTIVE | Noted: 2023-10-30

## 2023-10-30 PROBLEM — R51.9 MORNING HEADACHE: Status: ACTIVE | Noted: 2023-10-30

## 2023-10-30 PROBLEM — R06.83 SNORING: Status: ACTIVE | Noted: 2023-10-30

## 2023-10-30 NOTE — TELEPHONE ENCOUNTER
I do not typically recommend any medication long-term for sleep/insomnia; benadryl in particular can be associated with decreased memory, confusion, and trouble thinking, as well as an increased risk for developing dementia, if used chronically, so I would advise against it. Melatonin is fine, although response to it varies; as it's not really a "sleep aid," moreso the hormone our body uses to denote day vs night/wake vs sleep timing, it does not have a large "hypnotic effect" on everyone. If she wishes to try it, starting at ~1mg and going up to no more than 10mg would be fine, although usually there isn't much--if any--effect for most people for doses higher than 3mg.

## 2023-10-30 NOTE — TELEPHONE ENCOUNTER
Patient left message on nurse line stating she had an allergic reaction to something they put on her face for the sleep study. She has been taking Benadryl for it and it has subsided. She noticed the Benadryl helped her sleep and wants to know if she can take this for sleep. Also asking if the doctor would be agreeable to her taking melatonin gummies and if so, how many milligrams. Dr. Mehrdad Mcgregor, please advise.

## 2023-10-31 DIAGNOSIS — Z82.49 FAMILY HISTORY OF CARDIOMYOPATHY: Primary | ICD-10-CM

## 2023-10-31 DIAGNOSIS — I10 BENIGN ESSENTIAL HTN: ICD-10-CM

## 2023-10-31 NOTE — TELEPHONE ENCOUNTER
Called patient and advised of Dr. Alhaji Bronson response regarding Benadryl and melatonin. Patient verbalized understanding. Scheduled follow up appointment with Dr. Citlalli Patricio 11/3/23 to review sleep study results.

## 2023-11-02 NOTE — PROGRESS NOTES
[address] [facility]  Sleep Medicine Follow up/ Established Patient Visit      Assessment/Plan:  Cornel Kate was seen today for follow-up. Diagnoses and all orders for this visit:    Sleep disorder  -     Diagnostic Sleep Study; Future    Morning headache  -     Diagnostic Sleep Study; Future    Other fatigue  -     Diagnostic Sleep Study; Future    Snoring  -     Diagnostic Sleep Study; Future    Chronic insomnia  -     Diagnostic Sleep Study; Future  -     zaleplon (SONATA) 5 MG capsule; Take 1 capsule before bedtime for sleep study. If not asleep within 30 minutes, can take second capsule    Chronic nonintractable headache, unspecified headache type  -     Diagnostic Sleep Study; Future    Anxiety  -     Diagnostic Sleep Study; Future      Cornel Kate is a pleasant 33-year-old female with PMHx of GERD, asthma, HTN, dyslipidemia, anxiety who presents in follow-up for multiple concerns. Reassuringly, with improved sleep subsequent to an improved sleep schedule, the disorientation/headache type feeling she had described at her last appointment with me has improved, however she is concerned as it is still present. There was no evidence of sleep-related hypoventilation on her sleep study, however I do believe that she does have obstructive sleep apnea. The limiting factor here is that this is per the more accurate AASM endorsed 3% hypopnea criteria, however we are limited as her insurance utilizes the outdated CMS 4% criteria. Interestingly, however, even per the CMS criteria, she did have severe sleep apnea when sleeping on her back. I do suspect, or at least would be curious to see, if treating her NED may improve her symptoms.     Of note, her brain MRI was negative for any substantial pathology, however it did show what I feel is chronic microvascular changes rather than a demyelinating pathology; were at the latter, to have expect her to have many more neurologic symptoms, and she does not endorse any symptoms, either presently or in the past, that would fit with a demyelinating pathology. -- Discussed with patient the pathophysiology of OSAS and medical conditions associated with OSAS such as DM, HTN, CAD, Depression, Stroke, Headache.  -- Treatment options including surgery, Dental appliances, positional therapy, and CPAP were discussed. -- I have ordered a repeat polysomnogram given night to night variability as well as instructing her to try to sleep more on her back to get a more accurate representation of her sleep apnea, and hopefully qualify her for treatment with the criteria we are unfortunately forced to use. -- Given that she endorsed that she had some difficulty falling asleep that night, I have also provided a one-time prescription for 2 tablets of Sonata to help her with that night of sleep specifically  -- She was having some difficulty understanding our instructions and plan, and asked whether I would be willing to speak with her daughter over the phone. I reassured her that I certainly am, and asked that her daughter give the office a call when she is free, and I will return her phone call as soon as I am able. -- Based on her MRI findings, I would recommend, from a neurology standpoint, that she resume a daily 81 mg aspirin dose in addition to her atorvastatin for prevention of significant vascular pathology. -- I may consider a formal referral to neurology in the future  -- I am okay with her continuing melatonin up to 3 mg nightly for now. -- Advised patient to avoid activities that could harm self or others when tired/sleepy, including driving.  -- Encouraged maintaining normal weight  -- Discussed importance of good sleep hygiene. -- I would like to see her back 2 to 3 weeks after her study is completed. ________________________________________________________________________________________________    Per Last Visit Note (Date: 9/22/2023):   Fermín Quesada is a pleasant 81-year-old female with PMHx of GERD, asthma, HTN, dyslipidemia, anxiety who presents for evaluation of multiple concerns. Chief among these is a complaint of an almost constant sensation of brain fog with a headache described as a "pressure-like sensation" on her forehead. This is not a new phenomenon, and she tells me this has been going on for about a year, however has gotten worse since her cataract surgery in July. She tells me that it tends to wear off by the evening, and in fact is worse upon awakening from sleep. She is quite adamant that there is no positional component to this headache, which is reassuring and argues against an intracranial process such as neoplasm. She also endorses what sounds to be a chronic insomnia, however, with her adopting a more regimented sleep schedule, her sleep seems to have become easier and more consistent, with her getting a fairly consistent 6 to 7 hours of sleep each night. At present, given her symptoms of insomnia, headache worse in the morning after waking (it is important to note that on the nights in the past when she would not sleep at all, the headache was NOT any worse than it had been the previous day, unlike when awakening from sleep), reported snoring, and brain fog, I would like to evaluate for sleep disordered breathing, specifically obstructive sleep apnea and/or sleep-related hypoventilation. Regarding the pain in her left temple that will sometimes shoot across her head to the other side, she tells me this is brief and will quickly dissipate once it hits. 1 previous neurology note mentions the possibility of Short lasting Unilateral Neuralgiform headache with Conjunctival injection and Tearing (SUNCT), however she fairly adamantly denies any lacrimation, conjunctival injection, or other autonomic symptoms, which also argues against its relative, CRYSTAL.   Reassuringly, she has had multiple ESR and/or CRP lab draws since this began, which have been quite normal, arguing against a giant cell arteritis. She also has no tenderness to palpation of the temporal arteries bilaterally. Regarding the episodes of loss of consciousness that she describes, the last one that she experienced was in 2021, and has had none since; moreover, these sound to have occurred after acute sleep deprivation and excessive activity possibly with dehydration, thus arguing for more of a syncopal event than epileptiform, and regardless, if we are able to normalize her sleep and keep it as such, it seems we will then be able to avoid any more of these episodes. 1.  Discussed with patient the pathophysiology of NED and medical conditions associated with NED such as DM, HTN, CAD, Depression, Stroke, Headache.             -Also discussed that in the female and elderly population, NED more commonly presents as a chronic insomnia then with the more "classic" symptoms of NED. 2.  Treatment options including surgery, Dental appliances, positional therapy, and CPAP were discussed. 3.  I have ordered an in lab PSG with transcutaneous CO2 monitoring to evaluate for sleep-disordered breathing, including obstructive sleep apnea and sleep-related hypoventilation (the latter of which would especially contribute to morning headaches and brain fog). 4.  I strongly considered obtaining an MRI of her brain at today's visit, however given her general lack of any neurologic symptoms or abnormalities on my exam as well as the lack of a positional component to her headache, I have a lower suspicion for an intracranial process at present. However, I will continue to consider this at her next appointment pending the results of her sleep study. 5.  I encouraged her to continue to keep a regular sleep schedule, as she is doing with excellent success over the past few weeks. I stressed that the most important part of this is to keep the same wake time every day.   6.  Advised patient to avoid activities that could harm self or others when tired/sleepy, including driving. 7.  Encouraged maintaining normal weight  8. Discussed importance of good sleep hygiene. 9.  Follow-up 2-3 weeks following completion of sleep study      Sleep Studies:  -PSG with transcutaneous 10/24/2023: .0 minutes, sleep efficiency 68.2%. Appropriate sleep stages. AHI 0.8, supine AHI 42.1 (13.5% of evening in supine position), REM AHI 3.9. RDI 21.4. O2 ariela 91%. No evidence of sleep-related hypoventilation/hypercapnia. PLM index 38, PLM arousal index 10.3. Other Studies:  -MRI brain with and without contrast 10/5/2023:  IMPRESSION:     White matter disease could relate to chronic vascular disease and/or demyelinating disease. No MR evidence of acute ischemia. No enhancing lesions. ________________________________________________________________________________________________      Interval History: Glenna Marmolejo is a 78 y.o. female with a PMHx of GERD, asthma, HTN, dyslipidemia, anxiety who presents in follow up for multiple concerns. She is still sleeping somewhat inconsistently. Staying on the schedule is still helping. She tells me that the "disoriented feeling" is better than before in that it is not as frequent as it was, but intensity is the same. No new symptoms. [place]oo Sleepiness Scale:  What are your chances of dozing?    0= no chance  1= slight chance  2= moderate chance  3= high chance    Sitting and readin   Watching TV: 0  Sitting, inactive in a public place (e.g. a theatre or a meeting):0  As a passenger in a car for an hour without a break: 0  Lying down to rest in the afternoon when circumstances permit: 0   Sitting and talking to someone: 0  Sitting quietly after a lunch without alcohol: 0  In a car, while stopped for a few minutes in the traffic: 0       TOTAL    Greater or equal to 10 is positive for excessive daytime sleepiness        SLEEP HYGIENE QUESTIONS:  Bedtime: ~2230   Time it takes to fall sleep: ~30-60 minutes  Wake up Time: 0630/0700. She does feel well-rested/normal.  Number of times patient wakes up per night: ~1 time  Reason (s) why patient wakes up during the night: restroom   Estimated total sleep time ( in a 24 hour period of time): ~7 hours   Naps: 0    PMH, PSH, SH: No changes     SLEEP RELATED ROS     Allergies   Allergen Reactions    Nsaids Other (See Comments)    Other Cough     Seasonal, perfumes, colon, chemicals    Sulfa Antibiotics Other (See Comments)    Codeine Rash    Shellfish Allergy - Food Allergy Rash     CURRENT MEDICATIONS:  Current Outpatient Medications   Medication Instructions    amLODIPine (NORVASC) 10 mg tablet TAKE 1 TABLET EVERY DAY    atorvastatin (LIPITOR) 20 mg tablet TAKE 1 TABLET EVERY DAY    cholecalciferol (VITAMIN D3) 1,000 Units, Oral, Daily    famotidine (PEPCID) 20 mg, Oral, 2 times daily PRN    LORazepam (Ativan) 0.5 mg tablet Take 1 tablet (0.5mg) prior to MRI for claustrophobia. vitamin B-12 (VITAMIN B-12) 1,000 mcg tablet Oral, Daily    zaleplon (SONATA) 5 MG capsule Take 1 capsule before bedtime for sleep study. If not asleep within 30 minutes, can take second capsule       Prior Sleep Medicine Medications: Melatonin    PHYSICAL EXAMINATION:  Vital Signs: /80   Ht 5' 2" (1.575 m)   Wt 56.7 kg (125 lb)   BMI 22.86 kg/m²   PHYSICAL EXAM:  Constitutional: NATA, well appearing   Mental Status: A&Ox3  Skin: Warm, dry, no rashes noted   Eyes: PERRL, normal conjunctiva  ENT: Nasal congestion absent, Nasal valve incompetence absent. Posterior airspace: Johnson tongue position 4 with some macroglossia, retrognathia absent. Overbite absent. High arched palate absent. Tongue scalloping/ridging absent.  Uvula: Unable to visualized  Chest: No evidence of respiratory distress, no accessory muscle use; no evidence of peripheral cyanosis  Abdomen: Soft, NT/ND  Extremities: No digital clubbing or pedal edema  Neuro: Strength 5/5 throughout, sensation grossly intact      I have spent a total time of >50 minutes on 11/03/23 in caring for this patient including Diagnostic results, Prognosis, Risks and benefits of tx options, Instructions for management, Patient and family education, Importance of tx compliance, Risk factor reductions, Impressions, Documenting in the medical record, Reviewing / ordering tests, medicine, procedures  , and Obtaining or reviewing history  . Electronically signed by:    Christine Deleon [address] [facility]  [address] [place] [address]  11/03/23

## 2023-11-03 ENCOUNTER — OFFICE VISIT (OUTPATIENT)
Dept: SLEEP CENTER | Facility: CLINIC | Age: 78
End: 2023-11-03
Payer: COMMERCIAL

## 2023-11-03 VITALS
DIASTOLIC BLOOD PRESSURE: 80 MMHG | HEIGHT: 62 IN | SYSTOLIC BLOOD PRESSURE: 132 MMHG | WEIGHT: 125 LBS | BODY MASS INDEX: 23 KG/M2

## 2023-11-03 DIAGNOSIS — R51.9 CHRONIC NONINTRACTABLE HEADACHE, UNSPECIFIED HEADACHE TYPE: ICD-10-CM

## 2023-11-03 DIAGNOSIS — G89.29 CHRONIC NONINTRACTABLE HEADACHE, UNSPECIFIED HEADACHE TYPE: ICD-10-CM

## 2023-11-03 DIAGNOSIS — R06.83 SNORING: ICD-10-CM

## 2023-11-03 DIAGNOSIS — G47.9 SLEEP DISORDER: Primary | ICD-10-CM

## 2023-11-03 DIAGNOSIS — R53.83 OTHER FATIGUE: ICD-10-CM

## 2023-11-03 DIAGNOSIS — R51.9 MORNING HEADACHE: ICD-10-CM

## 2023-11-03 DIAGNOSIS — F41.9 ANXIETY: ICD-10-CM

## 2023-11-03 DIAGNOSIS — F51.04 CHRONIC INSOMNIA: ICD-10-CM

## 2023-11-03 PROCEDURE — 99215 OFFICE O/P EST HI 40 MIN: CPT | Performed by: STUDENT IN AN ORGANIZED HEALTH CARE EDUCATION/TRAINING PROGRAM

## 2023-11-03 RX ORDER — ZALEPLON 5 MG/1
CAPSULE ORAL
Qty: 2 CAPSULE | Refills: 0 | Status: SHIPPED | OUTPATIENT
Start: 2023-11-03

## 2023-11-03 NOTE — PATIENT INSTRUCTIONS
-Recommend a baby aspirin a day (81mg) to prevent blockages/stroke. Can cause increased risk of bleeding, but this is minimal compared to reduction in risk of stroke. Recommend discussing this with your PCP. -Continue statin (cholesterol medication)  -I do believe that you have sleep apnea, however your study did not qualify for treatment based on the criteria we have to use based on your insurance.  -I would recommend a repeat sleep study where you try to lay on your back as much as possible, to get a more accurate result and be able to pursue treatment. I will also write a prescription for a 1-time dose of a sleep medication called Sonata, to help you sleep for the sleep study. ---Take 1 capsule before bedtime for sleep study. If not asleep within 30 minutes, can take second capsule  -See me back 2-3 weeks after sleep study is completed.

## 2023-11-05 ENCOUNTER — TELEPHONE (OUTPATIENT)
Dept: OTHER | Facility: OTHER | Age: 78
End: 2023-11-05

## 2023-11-05 NOTE — TELEPHONE ENCOUNTER
Pt would like a call back to discuss medication management with the doctor. Was recommended that she takes baby aspirin to prevent blockages and stroke, she wants to know if she should take this.

## 2023-11-06 ENCOUNTER — TELEPHONE (OUTPATIENT)
Dept: FAMILY MEDICINE CLINIC | Facility: CLINIC | Age: 78
End: 2023-11-06

## 2023-11-06 NOTE — TELEPHONE ENCOUNTER
Pt saw your urologist for MRI for brain, they advised her to start taking asprin, but she recalls that Dr. De La Vega Sees told her to stop taking it so she isn't sure what to do.

## 2023-11-10 ENCOUNTER — HOSPITAL ENCOUNTER (OUTPATIENT)
Dept: NON INVASIVE DIAGNOSTICS | Facility: HOSPITAL | Age: 78
Discharge: HOME | End: 2023-11-10
Attending: INTERNAL MEDICINE
Payer: COMMERCIAL

## 2023-11-10 VITALS
BODY MASS INDEX: 22.63 KG/M2 | DIASTOLIC BLOOD PRESSURE: 80 MMHG | HEART RATE: 81 BPM | HEIGHT: 62 IN | SYSTOLIC BLOOD PRESSURE: 132 MMHG | WEIGHT: 123 LBS

## 2023-11-10 DIAGNOSIS — Z82.49 FAMILY HISTORY OF CARDIOMYOPATHY: ICD-10-CM

## 2023-11-10 LAB
AORTIC ROOT: 2.6 CM
APICAL FOUR CHAMBER EJECTION FRACTION: 65 %
E WAVE DECELERATION TIME: 222 MS
E/A RATIO: 0.93
FRACTIONAL SHORTENING: 37 (ref 28–44)
INTERVENTRICULAR SEPTUM IN DIASTOLE (PARASTERNAL SHORT AXIS VIEW): 0.9 CM
INTERVENTRICULAR SEPTUM: 0.9 CM (ref 0.6–1.1)
LAAS-AP2: 11.7 CM2
LAAS-AP4: 12.3 CM2
LEFT ATRIUM SIZE: 3.5 CM
LEFT ATRIUM VOLUME (MOD BIPLANE): 26 ML
LEFT ATRIUM VOLUME INDEX (MOD BIPLANE): 16.6 ML/M2
LEFT INTERNAL DIMENSION IN SYSTOLE: 2.2 CM (ref 2.1–4)
LEFT VENTRICULAR INTERNAL DIMENSION IN DIASTOLE: 3.5 CM (ref 3.5–6)
LEFT VENTRICULAR POSTERIOR WALL IN END DIASTOLE: 0.9 CM
LEFT VENTRICULAR STROKE VOLUME: 35 ML
LVSV (TEICH): 35 ML
MV E'TISSUE VEL-SEP: 8 CM/S
MV PEAK A VEL: 0.74 M/S
MV PEAK E VEL: 69 CM/S
MV STENOSIS PRESSURE HALF TIME: 64 MS
MV VALVE AREA P 1/2 METHOD: 3.44
RIGHT ATRIUM AREA SYSTOLE A4C: 8.7 CM2
RIGHT VENTRICLE ID DIMENSION: 2.4 CM
SL CV LEFT ATRIUM LENGTH A2C: 4.3 CM
SL CV PED ECHO LEFT VENTRICLE DIASTOLIC VOLUME (MOD BIPLANE) 2D: 52 ML
SL CV PED ECHO LEFT VENTRICLE SYSTOLIC VOLUME (MOD BIPLANE) 2D: 17 ML
TR MAX PG: 23 MMHG
TR PEAK VELOCITY: 2.4 M/S
TRICUSPID ANNULAR PLANE SYSTOLIC EXCURSION: 2.3 CM
TRICUSPID VALVE PEAK REGURGITATION VELOCITY: 2.42 M/S

## 2023-11-10 PROCEDURE — 93306 TTE W/DOPPLER COMPLETE: CPT | Performed by: INTERNAL MEDICINE

## 2023-11-10 PROCEDURE — 93306 TTE W/DOPPLER COMPLETE: CPT

## 2023-11-13 ENCOUNTER — TELEPHONE (OUTPATIENT)
Dept: FAMILY MEDICINE CLINIC | Facility: CLINIC | Age: 78
End: 2023-11-13

## 2023-11-13 NOTE — TELEPHONE ENCOUNTER
Patient experiencing pain in her right side radiating from her upper right side to her lower right side near her pelvis area . Patient was instructed to check with her doctor before she goes on aspirin ~ Patient states she is not getting any better . Patient was taken off the aspirin . Patient had an xray lab work and additional testing. Patient would like to speak with Dr. Lawson Henriquez about taking aspirin. Noticed the pain in October and it has increased.    Thank you

## 2023-11-14 NOTE — TELEPHONE ENCOUNTER
Defer to PCP for further advisement. Pt had US completed which showed no concerns in regards to her abdomen.

## 2023-11-17 ENCOUNTER — RA CDI HCC (OUTPATIENT)
Dept: OTHER | Facility: HOSPITAL | Age: 78
End: 2023-11-17

## 2023-11-17 NOTE — PROGRESS NOTES
[address] [facility] coding opportunities       Chart reviewed, no opportunity found: [id number]        Patients Insurance     Medicare Insurance: [facility]

## 2023-11-27 ENCOUNTER — TELEPHONE (OUTPATIENT)
Dept: CARDIOLOGY CLINIC | Facility: CLINIC | Age: 78
End: 2023-11-27

## 2023-11-27 NOTE — TELEPHONE ENCOUNTER
----- Message from Nick Baxter DO sent at 11/25/2023  8:52 PM EST -----  Patient's echocardiogram showed normal function of the heart without significant valvular disease. Overall very good study. Please let the patient know this good news. Thank you.

## 2023-11-28 ENCOUNTER — OFFICE VISIT (OUTPATIENT)
Dept: FAMILY MEDICINE CLINIC | Facility: CLINIC | Age: 78
End: 2023-11-28
Payer: COMMERCIAL

## 2023-11-28 VITALS
WEIGHT: 128 LBS | BODY MASS INDEX: 23.41 KG/M2 | TEMPERATURE: 97.3 F | HEART RATE: 83 BPM | RESPIRATION RATE: 20 BRPM | SYSTOLIC BLOOD PRESSURE: 132 MMHG | OXYGEN SATURATION: 97 % | DIASTOLIC BLOOD PRESSURE: 74 MMHG

## 2023-11-28 DIAGNOSIS — R35.1 NOCTURIA: ICD-10-CM

## 2023-11-28 DIAGNOSIS — I10 ESSENTIAL HYPERTENSION: Primary | ICD-10-CM

## 2023-11-28 DIAGNOSIS — R39.89 BLADDER PAIN: ICD-10-CM

## 2023-11-28 DIAGNOSIS — R09.81 CONGESTION OF NASAL SINUS: ICD-10-CM

## 2023-11-28 DIAGNOSIS — R06.7 SNEEZING: ICD-10-CM

## 2023-11-28 DIAGNOSIS — F41.9 ANXIETY: ICD-10-CM

## 2023-11-28 DIAGNOSIS — G47.00 INSOMNIA, UNSPECIFIED TYPE: ICD-10-CM

## 2023-11-28 DIAGNOSIS — E78.5 DYSLIPIDEMIA: ICD-10-CM

## 2023-11-28 PROBLEM — N64.4 BREAST PAIN, LEFT: Status: RESOLVED | Noted: 2021-06-15 | Resolved: 2023-11-28

## 2023-11-28 LAB
SARS-COV-2 AG UPPER RESP QL IA: NEGATIVE
VALID CONTROL: NORMAL

## 2023-11-28 PROCEDURE — 99214 OFFICE O/P EST MOD 30 MIN: CPT | Performed by: FAMILY MEDICINE

## 2023-11-28 PROCEDURE — 87811 SARS-COV-2 COVID19 W/OPTIC: CPT | Performed by: FAMILY MEDICINE

## 2023-11-28 RX ORDER — ASPIRIN 81 MG/1
81 TABLET, CHEWABLE ORAL DAILY
Start: 2023-11-28

## 2023-11-28 RX ORDER — LATANOPROST 50 UG/ML
SOLUTION/ DROPS OPHTHALMIC
COMMUNITY
Start: 2023-10-15

## 2023-11-28 RX ORDER — MULTIVITAMIN
1 CAPSULE ORAL DAILY
COMMUNITY

## 2023-11-28 NOTE — PROGRESS NOTES
Name: Paula Paniagua      : 1945      MRN: [id number]  Encounter Provider: Michelle Rg DO  Encounter Date: 2023   Encounter department: [facility]    Assessment & Plan   Patient seen for follow-up of chronic medical conditions and multiple concerns  1. Essential hypertension  -     aspirin 81 mg chewable tablet; Chew 1 tablet (81 mg total) daily  -     Comprehensive metabolic panel; Future; Expected date: 2024  -     TSH, 3rd generation with Free T4 reflex; Future; Expected date: 2024  -     CBC and differential; Future; Expected date: 2024    2. Dyslipidemia  -     aspirin 81 mg chewable tablet; Chew 1 tablet (81 mg total) daily  -     Lipid Panel with Direct LDL reflex; Future; Expected date: 2024  -     Comprehensive metabolic panel; Future; Expected date: 2024  -     TSH, 3rd generation with Free T4 reflex; Future; Expected date: 2024  -     CBC and differential; Future; Expected date: 2024    3. Insomnia, unspecified type    4. Anxiety    5. Congestion of nasal sinus  -     Poct Covid 19 Rapid Antigen Test    6. Sneezing  -     Poct Covid 19 Rapid Antigen Test    7. Bladder pain  -     Ambulatory Referral to Urology; Future    8. Nocturia  -     Ambulatory Referral to Urology; Future    Follow-up in       Patient is seen for follow up for chronic medical conditions.  She woke up today with runny nose and congestion. Denies fever, chills. No cough.  Also complains of sensation of vibration in proximal left calf up to lower thigh posteriorly. Feels like she has a phone in her pants pocket. She has had a similar sensation in the past in the vaginal area.      Review of Systems   Constitutional:  Negative for chills and fever.   HENT:  Positive for congestion and rhinorrhea. Negative for sore throat.    Respiratory:  Negative for chest tightness.    Cardiovascular:  Negative for chest pain and palpitations.    Gastrointestinal:  Negative for abdominal pain, constipation, diarrhea and nausea.   Genitourinary:  Positive for frequency. Negative for difficulty urinating.   Skin: Negative.    Neurological:  Negative for dizziness and headaches.   Psychiatric/Behavioral: Negative.         Current Outpatient Medications on File Prior to Visit   Medication Sig   • amLODIPine (NORVASC) 10 mg tablet TAKE 1 TABLET EVERY DAY   • atorvastatin (LIPITOR) 20 mg tablet TAKE 1 TABLET EVERY DAY   • cholecalciferol (VITAMIN D3) 1,000 units tablet Take 1,000 Units by mouth daily   • famotidine (PEPCID) 20 mg tablet Take 1 tablet (20 mg total) by mouth 2 (two) times a day as needed for heartburn   • latanoprost (XALATAN) 0.005 % ophthalmic solution    • Multiple Vitamin (multivitamin) capsule Take 1 capsule by mouth daily   • vitamin B-12 (VITAMIN B-12) 1,000 mcg tablet Take by mouth daily   • LORazepam (Ativan) 0.5 mg tablet Take 1 tablet (0.5mg) prior to MRI for claustrophobia.       Objective     /74 (BP Location: Right arm, Patient Position: Sitting, Cuff Size: Standard)   Pulse 83   Temp (!) 97.3 °F (36.3 °C) (Temporal)   Resp 20   Wt 58.1 kg (128 lb)   SpO2 97%   BMI 23.41 kg/m²     Physical Exam  Vitals and nursing note reviewed.   Constitutional:       General: She is not in acute distress.  Neck:      Thyroid: No thyromegaly.   Cardiovascular:      Rate and Rhythm: Normal rate and regular rhythm.      Heart sounds: Normal heart sounds.   Pulmonary:      Effort: Pulmonary effort is normal.      Breath sounds: Normal breath sounds.   Musculoskeletal:      Right lower leg: No edema.      Left lower leg: No edema.   Lymphadenopathy:      Cervical: No cervical adenopathy.   Skin:     General: Skin is warm and dry.   Neurological:      Mental Status: She is alert and oriented to person, place, and time.   Psychiatric:         Mood and Affect: Mood is anxious.       Michelle Rg DO

## 2023-12-29 ENCOUNTER — TELEPHONE (OUTPATIENT)
Dept: FAMILY MEDICINE CLINIC | Facility: CLINIC | Age: 78
End: 2023-12-29

## 2023-12-29 NOTE — TELEPHONE ENCOUNTER
Pt called presenting with lower back pain. Since there wasn't any appts until Thursday she asked if she could take her daughter's muscle relaxers, she was advised that it is never a good idea to take someone else's prescription medication. She was advised to go to  if she couldn't wait, she made an appt on Thursday.

## 2024-01-04 ENCOUNTER — APPOINTMENT (OUTPATIENT)
Dept: RADIOLOGY | Facility: CLINIC | Age: 79
End: 2024-01-04
Payer: COMMERCIAL

## 2024-01-04 ENCOUNTER — OFFICE VISIT (OUTPATIENT)
Dept: FAMILY MEDICINE CLINIC | Facility: CLINIC | Age: 79
End: 2024-01-04
Payer: COMMERCIAL

## 2024-01-04 VITALS
BODY MASS INDEX: 23.48 KG/M2 | DIASTOLIC BLOOD PRESSURE: 66 MMHG | SYSTOLIC BLOOD PRESSURE: 124 MMHG | TEMPERATURE: 98.5 F | OXYGEN SATURATION: 96 % | WEIGHT: 128.4 LBS | HEART RATE: 81 BPM

## 2024-01-04 DIAGNOSIS — S39.012A STRAIN OF LUMBAR REGION, INITIAL ENCOUNTER: Primary | ICD-10-CM

## 2024-01-04 DIAGNOSIS — S39.012A STRAIN OF LUMBAR REGION, INITIAL ENCOUNTER: ICD-10-CM

## 2024-01-04 PROCEDURE — 72100 X-RAY EXAM L-S SPINE 2/3 VWS: CPT

## 2024-01-04 PROCEDURE — 99213 OFFICE O/P EST LOW 20 MIN: CPT | Performed by: FAMILY MEDICINE

## 2024-01-04 RX ORDER — CYCLOBENZAPRINE HCL 5 MG
TABLET ORAL
Qty: 20 TABLET | Refills: 1 | Status: SHIPPED | OUTPATIENT
Start: 2024-01-04

## 2024-01-04 NOTE — PROGRESS NOTES
Name: Paula Paniagua      : 1945      MRN: [id number]  Encounter Provider: Jaya Torres DO  Encounter Date: 2024   Encounter department: [facility]    Assessment & Plan     1. Strain of lumbar region, initial encounter  -     XR spine lumbar minimum 4 views non injury; Future; Expected date: 2024  -     cyclobenzaprine (FLEXERIL) 5 mg tablet; 1 tab BID PRN    Patient presents with 1 week history of bilateral low back pain (right greater than left).  Patient denies any radiculopathy.  Pain started soon after she had to lift to 40 pound bags of salt for her water softener across her apartment.  She does have a history of previous back problems, but has not required any treatment beyond conservative care in the past.  She has been taking OTC Tylenol and using Lidoderm patches with some improvement.  Exam today reveals bilateral point tenderness paravertebral musculature.  Negative straight leg raising.  Recommend continue OTC acetaminophen and Lidoderm patches.  Will add cyclobenzaprine 5 mg twice daily as needed.  Also recommend applying heat 15 minutes, 3 times daily. Also recommend stretching exercises (handout printed).  Lastly , will send for x-ray lumbar spine.  Will call with results.  Possible referral to physical therapy if symptoms persist       Subjective     Patient presents with 1 week history of bilateral low back pain (right greater than left).  Patient denies any radiculopathy.  Pain started soon after she had to lift to 40 pound bags of salt for her water softener across her apartment.  She does have a history of previous back problems, but has not required any treatment beyond conservative care in the past.  She has been taking OTC Tylenol and using Lidoderm patches with some improvement.    Back Pain      Review of Systems   Musculoskeletal:  Positive for back pain.       Past Medical History:   Diagnosis Date   • Asthma     variant cough asthma   •  Cataract    • GERD (gastroesophageal reflux disease)    • Hyperlipidemia    • Hypertension      Past Surgical History:   Procedure Laterality Date   • EYE SURGERY     • HYSTERECTOMY     • OOPHORECTOMY Bilateral    • UTERINE SUSPENSION       Family History   Problem Relation Age of Onset   • Asthma Mother    • Hypertension Mother            • No Known Problems Father    • No Known Problems Sister    • No Known Problems Maternal Grandmother    • No Known Problems Maternal Grandfather    • No Known Problems Paternal Grandmother    • No Known Problems Paternal Grandfather    • No Known Problems Sister    • Hypertension Brother         Pace maker     Social History     Socioeconomic History   • Marital status:      Spouse name: None   • Number of children: None   • Years of education: None   • Highest education level: None   Occupational History   • None   Tobacco Use   • Smoking status: Never     Passive exposure: Never   • Smokeless tobacco: Never   Vaping Use   • Vaping status: Never Used   Substance and Sexual Activity   • Alcohol use: Not Currently   • Drug use: Never   • Sexual activity: Yes     Partners: Male   Other Topics Concern   • None   Social History Narrative   • None     Social Determinants of Health     Financial Resource Strain: Low Risk  (2023)    Overall Financial Resource Strain (CARDIA)    • Difficulty of Paying Living Expenses: Not very hard   Food Insecurity: Not on file   Transportation Needs: Unmet Transportation Needs (2023)    PRAPARE - Transportation    • Lack of Transportation (Medical): Yes    • Lack of Transportation (Non-Medical): Yes   Physical Activity: Not on file   Stress: Not on file   Social Connections: Not on file   Intimate Partner Violence: Not on file   Housing Stability: Not on file     Current Outpatient Medications on File Prior to Visit   Medication Sig   • amLODIPine (NORVASC) 10 mg tablet TAKE 1 TABLET EVERY DAY   • aspirin 81 mg chewable  tablet Chew 1 tablet (81 mg total) daily   • atorvastatin (LIPITOR) 20 mg tablet TAKE 1 TABLET EVERY DAY   • cholecalciferol (VITAMIN D3) 1,000 units tablet Take 1,000 Units by mouth daily   • famotidine (PEPCID) 20 mg tablet Take 1 tablet (20 mg total) by mouth 2 (two) times a day as needed for heartburn   • latanoprost (XALATAN) 0.005 % ophthalmic solution    • Multiple Vitamin (multivitamin) capsule Take 1 capsule by mouth daily   • vitamin B-12 (VITAMIN B-12) 1,000 mcg tablet Take by mouth daily   • [DISCONTINUED] LORazepam (Ativan) 0.5 mg tablet Take 1 tablet (0.5mg) prior to MRI for claustrophobia.     Allergies   Allergen Reactions   • Nsaids Other (See Comments)   • Other Cough     Seasonal, perfumes, colon, chemicals   • Sulfa Antibiotics Other (See Comments)   • Codeine Rash   • Shellfish Allergy - Food Allergy Rash     Immunization History   Administered Date(s) Administered   • COVID-19 PFIZER VACCINE 0.3 ML IM 04/09/2021, 04/30/2021, 12/16/2021   • INFLUENZA 01/01/2020, 10/24/2021, 09/28/2022, 09/14/2023   • Influenza, high dose seasonal 0.7 mL 11/11/2020, 09/28/2022   • Pneumococcal Conjugate Vaccine 20-valent (Pcv20), Polysace 06/30/2022       Objective     /66 (BP Location: Left arm, Patient Position: Sitting, Cuff Size: Adult)   Pulse 81   Temp 98.5 °F (36.9 °C)   Wt 58.2 kg (128 lb 6.4 oz)   SpO2 96%   BMI 23.48 kg/m²     Physical Exam  Musculoskeletal:      Comments: Lumbar: Mild point tenderness bilateral paravertebral musculature.  Range of motion somewhat decreased with flexion.  Negative straight leg raising.       Jaya Torres, DO

## 2024-02-09 ENCOUNTER — OFFICE VISIT (OUTPATIENT)
Dept: FAMILY MEDICINE CLINIC | Facility: CLINIC | Age: 79
End: 2024-02-09
Payer: COMMERCIAL

## 2024-02-09 VITALS
BODY MASS INDEX: 22.36 KG/M2 | SYSTOLIC BLOOD PRESSURE: 130 MMHG | DIASTOLIC BLOOD PRESSURE: 64 MMHG | HEIGHT: 63 IN | TEMPERATURE: 97.7 F | OXYGEN SATURATION: 97 % | HEART RATE: 89 BPM | WEIGHT: 126.2 LBS

## 2024-02-09 DIAGNOSIS — R10.9 FLANK PAIN: ICD-10-CM

## 2024-02-09 DIAGNOSIS — R10.9 RIGHT SIDED ABDOMINAL PAIN: Primary | ICD-10-CM

## 2024-02-09 DIAGNOSIS — R31.29 OTHER MICROSCOPIC HEMATURIA: ICD-10-CM

## 2024-02-09 LAB
SL AMB  POCT GLUCOSE, UA: NEGATIVE
SL AMB LEUKOCYTE ESTERASE,UA: ABNORMAL
SL AMB POCT BILIRUBIN,UA: NEGATIVE
SL AMB POCT BLOOD,UA: ABNORMAL
SL AMB POCT CLARITY,UA: CLEAR
SL AMB POCT COLOR,UA: YELLOW
SL AMB POCT KETONES,UA: NEGATIVE
SL AMB POCT NITRITE,UA: NEGATIVE
SL AMB POCT PH,UA: 6
SL AMB POCT SPECIFIC GRAVITY,UA: 1.02
SL AMB POCT URINE PROTEIN: NEGATIVE
SL AMB POCT UROBILINOGEN: 0.2

## 2024-02-09 PROCEDURE — 87086 URINE CULTURE/COLONY COUNT: CPT | Performed by: FAMILY MEDICINE

## 2024-02-09 PROCEDURE — 99214 OFFICE O/P EST MOD 30 MIN: CPT | Performed by: FAMILY MEDICINE

## 2024-02-09 PROCEDURE — 81003 URINALYSIS AUTO W/O SCOPE: CPT | Performed by: FAMILY MEDICINE

## 2024-02-09 RX ORDER — CHLORHEXIDINE GLUCONATE ORAL RINSE 1.2 MG/ML
SOLUTION DENTAL
COMMUNITY
Start: 2024-02-08

## 2024-02-09 NOTE — ASSESSMENT & PLAN NOTE
No RLQ pain; appears to vary in location from radiation to flank to RUQ to umbilical region; u/a shows trace blood and leuks, will hold on abx given no urinary sx; check urine culture and CT; f/u with results; ER guidance given

## 2024-02-09 NOTE — PROGRESS NOTES
"Assessment/Plan:     1. Right sided abdominal pain  Assessment & Plan:  No RLQ pain; appears to vary in location from radiation to flank to RUQ to umbilical region; u/a shows trace blood and leuks, will hold on abx given no urinary sx; check urine culture and CT; f/u with results; ER guidance given    Orders:  -     POCT urine dip auto non-scope  -     CT abdomen pelvis w wo contrast; Future; Expected date: 02/09/2024    2. Flank pain  -     CT abdomen pelvis w wo contrast; Future; Expected date: 02/09/2024  -     Urine culture; Future  -     Urine culture    3. Other microscopic hematuria  -     Urine culture; Future  -     Urine culture          Subjective:      Patient ID: Paula Paniagua is a 78 y.o. female.    Patient is here with concerns of right sided abdominal pain and right sided back pain.Seems to come and go and more severe this time. Today she has a little bit of nausea. No fever or chills. Seems to radiate down the side. Unsure if coming from back as she has back issues. Has had similar symptoms in past had RUQ ultrasound and x-rays that were unremarkable. Denies any bowel changes. Denies any dysuria or urinary frequency. Seems to vary in location but when comes on is more severe. Does have upcoming colonoscopy due.         The following portions of the patient's history were reviewed and updated as appropriate: allergies, current medications, past family history, past medical history, past social history, past surgical history, and problem list.    Review of Systems   Constitutional:  Negative for chills and fever.   Cardiovascular:  Negative for chest pain.   Gastrointestinal:  Positive for abdominal pain and nausea. Negative for blood in stool, constipation, diarrhea and vomiting.   Genitourinary:  Negative for dysuria, frequency and hematuria.         Objective:      /64 (BP Location: Left arm, Patient Position: Sitting, Cuff Size: Adult)   Pulse 89   Temp 97.7 °F (36.5 °C)   Ht 5' 3\" " (1.6 m)   Wt 57.2 kg (126 lb 3.2 oz)   SpO2 97%   BMI 22.36 kg/m²          Physical Exam  Vitals reviewed.   Constitutional:       General: She is not in acute distress.     Appearance: Normal appearance. She is not ill-appearing, toxic-appearing or diaphoretic.   HENT:      Head: Normocephalic and atraumatic.   Eyes:      General: No scleral icterus.        Right eye: No discharge.         Left eye: No discharge.      Conjunctiva/sclera: Conjunctivae normal.   Cardiovascular:      Rate and Rhythm: Normal rate and regular rhythm.      Pulses: Normal pulses.      Heart sounds: Normal heart sounds. No murmur heard.     No gallop.   Pulmonary:      Effort: Pulmonary effort is normal. No respiratory distress.      Breath sounds: Normal breath sounds. No stridor. No wheezing, rhonchi or rales.   Abdominal:      General: Bowel sounds are normal. There is no distension.      Palpations: Abdomen is soft.      Tenderness: There is abdominal tenderness in the right upper quadrant. There is no right CVA tenderness, left CVA tenderness, guarding or rebound.   Musculoskeletal:      Right lower leg: No edema.      Left lower leg: No edema.   Neurological:      General: No focal deficit present.      Mental Status: She is alert and oriented to person, place, and time.   Psychiatric:         Mood and Affect: Mood normal.         Behavior: Behavior normal.         Thought Content: Thought content normal.         Judgment: Judgment normal.

## 2024-02-11 LAB
BACTERIA UR CULT: ABNORMAL
BACTERIA UR CULT: ABNORMAL

## 2024-02-15 ENCOUNTER — APPOINTMENT (OUTPATIENT)
Dept: LAB | Facility: HOSPITAL | Age: 79
End: 2024-02-15
Payer: COMMERCIAL

## 2024-02-15 DIAGNOSIS — E78.5 DYSLIPIDEMIA: ICD-10-CM

## 2024-02-15 DIAGNOSIS — I10 ESSENTIAL HYPERTENSION: ICD-10-CM

## 2024-02-15 LAB
ALBUMIN SERPL BCP-MCNC: 4.3 G/DL (ref 3.5–5)
ALP SERPL-CCNC: 69 U/L (ref 34–104)
ALT SERPL W P-5'-P-CCNC: 14 U/L (ref 7–52)
ANION GAP SERPL CALCULATED.3IONS-SCNC: 9 MMOL/L
AST SERPL W P-5'-P-CCNC: 18 U/L (ref 13–39)
BASOPHILS # BLD AUTO: 0.04 THOUSANDS/ÂΜL (ref 0–0.1)
BASOPHILS NFR BLD AUTO: 1 % (ref 0–1)
BILIRUB SERPL-MCNC: 0.66 MG/DL (ref 0.2–1)
BUN SERPL-MCNC: 13 MG/DL (ref 5–25)
CALCIUM SERPL-MCNC: 9.1 MG/DL (ref 8.4–10.2)
CHLORIDE SERPL-SCNC: 106 MMOL/L (ref 96–108)
CHOLEST SERPL-MCNC: 142 MG/DL
CO2 SERPL-SCNC: 27 MMOL/L (ref 21–32)
CREAT SERPL-MCNC: 0.55 MG/DL (ref 0.6–1.3)
EOSINOPHIL # BLD AUTO: 0.19 THOUSAND/ÂΜL (ref 0–0.61)
EOSINOPHIL NFR BLD AUTO: 2 % (ref 0–6)
ERYTHROCYTE [DISTWIDTH] IN BLOOD BY AUTOMATED COUNT: 12.2 % (ref 11.6–15.1)
GFR SERPL CREATININE-BSD FRML MDRD: 91 ML/MIN/1.73SQ M
GLUCOSE P FAST SERPL-MCNC: 91 MG/DL (ref 65–99)
HCT VFR BLD AUTO: 43.8 % (ref 34.8–46.1)
HDLC SERPL-MCNC: 68 MG/DL
HGB BLD-MCNC: 15.2 G/DL (ref 11.5–15.4)
IMM GRANULOCYTES # BLD AUTO: 0.01 THOUSAND/UL (ref 0–0.2)
IMM GRANULOCYTES NFR BLD AUTO: 0 % (ref 0–2)
LDLC SERPL CALC-MCNC: 53 MG/DL (ref 0–100)
LYMPHOCYTES # BLD AUTO: 2.74 THOUSANDS/ÂΜL (ref 0.6–4.47)
LYMPHOCYTES NFR BLD AUTO: 34 % (ref 14–44)
MCH RBC QN AUTO: 32.8 PG (ref 26.8–34.3)
MCHC RBC AUTO-ENTMCNC: 34.7 G/DL (ref 31.4–37.4)
MCV RBC AUTO: 94 FL (ref 82–98)
MONOCYTES # BLD AUTO: 0.42 THOUSAND/ÂΜL (ref 0.17–1.22)
MONOCYTES NFR BLD AUTO: 5 % (ref 4–12)
NEUTROPHILS # BLD AUTO: 4.76 THOUSANDS/ÂΜL (ref 1.85–7.62)
NEUTS SEG NFR BLD AUTO: 58 % (ref 43–75)
NRBC BLD AUTO-RTO: 0 /100 WBCS
PLATELET # BLD AUTO: 205 THOUSANDS/UL (ref 149–390)
PMV BLD AUTO: 9.2 FL (ref 8.9–12.7)
POTASSIUM SERPL-SCNC: 3.7 MMOL/L (ref 3.5–5.3)
PROT SERPL-MCNC: 7.3 G/DL (ref 6.4–8.4)
RBC # BLD AUTO: 4.64 MILLION/UL (ref 3.81–5.12)
SODIUM SERPL-SCNC: 142 MMOL/L (ref 135–147)
TRIGL SERPL-MCNC: 106 MG/DL
TSH SERPL DL<=0.05 MIU/L-ACNC: 1.32 UIU/ML (ref 0.45–4.5)
WBC # BLD AUTO: 8.16 THOUSAND/UL (ref 4.31–10.16)

## 2024-02-15 PROCEDURE — 85025 COMPLETE CBC W/AUTO DIFF WBC: CPT

## 2024-02-15 PROCEDURE — 36415 COLL VENOUS BLD VENIPUNCTURE: CPT

## 2024-02-15 PROCEDURE — 84443 ASSAY THYROID STIM HORMONE: CPT

## 2024-02-15 PROCEDURE — 80053 COMPREHEN METABOLIC PANEL: CPT

## 2024-02-15 PROCEDURE — 80061 LIPID PANEL: CPT

## 2024-02-16 ENCOUNTER — HOSPITAL ENCOUNTER (OUTPATIENT)
Dept: CT IMAGING | Facility: HOSPITAL | Age: 79
End: 2024-02-16
Payer: COMMERCIAL

## 2024-02-16 DIAGNOSIS — R10.9 FLANK PAIN: ICD-10-CM

## 2024-02-16 DIAGNOSIS — R10.9 RIGHT SIDED ABDOMINAL PAIN: ICD-10-CM

## 2024-02-16 PROCEDURE — G1004 CDSM NDSC: HCPCS

## 2024-02-16 PROCEDURE — 74178 CT ABD&PLV WO CNTR FLWD CNTR: CPT

## 2024-02-16 RX ADMIN — IOHEXOL 100 ML: 350 INJECTION, SOLUTION INTRAVENOUS at 09:54

## 2024-02-16 NOTE — LETTER
[facility]  [address] [place] [address]      February 28, 2024    MRN: [id number]     Phone: [phone]     Dear Ms. Paniagua,    You recently had a(n) Cat Scan performed on 2/16/2024 at  [facility] that was requested by Justine Coronado DO. The study was reviewed by a radiologist, which is a physician who specializes in medical imaging. The radiologist issued a report describing his or her findings. In that report there was a finding that the radiologist felt warranted further discussion with your health care provider and that discussion would be beneficial to you.      The results were sent to Justine Coronado DO on 02/25/2024 11:58 AM. We recommend that you contact Justine Coronado DO at [phone] or set up an appointment to discuss the results of the imaging test. If you have already heard from Justine Coronado DO regarding the results of your study, you can disregard this letter.     This letter is not meant to alarm you, but intended to encourage you to follow-up on your results with the provider that sent you for the imaging study. In addition, we have enclosed answers to frequently asked questions by other patients who have also received a letter to review results with their health care provider (see page two).      Thank you for choosing [facility] for your medical imaging needs.                                                                                                                                                        FREQUENTLY ASKED QUESTIONS    Why am I receiving this letter?  [place] State Law requires us to notify patients who have findings on imaging exams that may require more testing or follow-up with a health professional within the next 3 months.        How serious is the finding on the imaging test?  This letter is sent to all patients who may need follow-up or more testing within  the next 3 months.  Receiving this letter does not necessarily mean you have a life-threatening imaging finding or disease.  Recommendations in the radiologist’s imaging report are general in nature and it is up to your healthcare provider to say whether those recommendations make sense for your situation.  You are strongly encouraged to talk to your health care provider about the results and ask whether additional steps need to be taken.    Where can I get a copy of the final report for my recent radiology exam?  To get a full copy of the report you can access your records online at [email or web address] or please contact [facility] Medical Records Department at [phone] Monday through Friday between 8 am and 6 pm.         What do I need to do now?           Please contact your health care provider who requested the imaging study to discuss what further actions (if any) are needed.  You may have already heard from (your ordering provider) in regard to this test in which case you can disregard this letter.        NOTICE IN ACCORDANCE WITH THE [place] STATE “PATIENT TEST RESULT INFORMATION ACT OF 2018”    You are receiving this notice as a result of a determination by your diagnostic imaging service that further discussions of your test results are warranted and would be beneficial to you.    The complete results of your test or tests have been or will be sent to the health care practitioner that ordered the test or tests. It is recommended that you contact your health care practitioner to discuss your results as soon as possible.

## 2024-02-22 ENCOUNTER — TELEPHONE (OUTPATIENT)
Age: 79
End: 2024-02-22

## 2024-02-23 DIAGNOSIS — R05.3 CHRONIC COUGH: ICD-10-CM

## 2024-02-23 DIAGNOSIS — J45.20 MILD INTERMITTENT ASTHMA WITHOUT COMPLICATION: Primary | ICD-10-CM

## 2024-02-23 RX ORDER — BUDESONIDE AND FORMOTEROL FUMARATE DIHYDRATE 160; 4.5 UG/1; UG/1
2 AEROSOL RESPIRATORY (INHALATION) 2 TIMES DAILY
Qty: 10.2 G | Refills: 1 | Status: SHIPPED | OUTPATIENT
Start: 2024-02-23

## 2024-02-26 NOTE — TELEPHONE ENCOUNTER
Reviewed results with patient today with referral to GI. Advised if having issues with sob should schedule an appt.

## 2024-02-28 ENCOUNTER — OFFICE VISIT (OUTPATIENT)
Dept: GASTROENTEROLOGY | Facility: MEDICAL CENTER | Age: 79
End: 2024-02-28
Payer: COMMERCIAL

## 2024-02-28 VITALS
WEIGHT: 125.4 LBS | BODY MASS INDEX: 22.21 KG/M2 | HEART RATE: 100 BPM | DIASTOLIC BLOOD PRESSURE: 81 MMHG | SYSTOLIC BLOOD PRESSURE: 149 MMHG | TEMPERATURE: 98.5 F

## 2024-02-28 DIAGNOSIS — R10.9 RIGHT SIDED ABDOMINAL PAIN: ICD-10-CM

## 2024-02-28 DIAGNOSIS — R10.13 EPIGASTRIC PAIN: ICD-10-CM

## 2024-02-28 DIAGNOSIS — K31.9 GASTRIC LESION: Primary | ICD-10-CM

## 2024-02-28 DIAGNOSIS — K21.9 GASTROESOPHAGEAL REFLUX DISEASE, UNSPECIFIED WHETHER ESOPHAGITIS PRESENT: ICD-10-CM

## 2024-02-28 PROCEDURE — 99214 OFFICE O/P EST MOD 30 MIN: CPT | Performed by: INTERNAL MEDICINE

## 2024-02-28 RX ORDER — CHOLECALCIFEROL (VITAMIN D3) 125 MCG
CAPSULE ORAL
COMMUNITY

## 2024-02-28 RX ORDER — OMEPRAZOLE 40 MG/1
40 CAPSULE, DELAYED RELEASE ORAL DAILY
Qty: 30 CAPSULE | Refills: 2 | Status: SHIPPED | OUTPATIENT
Start: 2024-02-28

## 2024-02-28 NOTE — PROGRESS NOTES
[facility] - Outpatient Consultation  Paula Paniagua 78 y.o. female MRN: [id number]  Encounter: [id number]          ASSESSMENT AND PLAN:      1. Right sided abdominal pain    2. Gastric lesion  3. Epigastric pain  - Ambulatory Referral to Gastroenterology  - Endoscopic ultrasonography, GI (Upper); Future    Patient has chronic right side abdominal pain, CT showed gastric lesion, her recent EGD showed no signs of gastric lesion  Will plan for EUS/EGD to evaluate gastric lesion on the CT    4. Gastroesophageal reflux disease, unspecified whether esophagitis present  Patient could have atypical symptoms of acid reflux  Trial of PPI  Patient and daughter were counseled on the benefits and risks of PPI    - omeprazole (PriLOSEC) 40 MG capsule; Take 1 capsule (40 mg total) by mouth daily  Dispense: 30 capsule; Refill: 2  Follow up after EGD. Will adjust PPI treatment based on EGD findings.   ______________________________________________________________________    HPI: 78-year-old female accompanied by her daughter.  She was referred for evaluation of abnormal CT scan.  Patient reported right-sided back pain for a year.  Pain was intermittent.  She underwent a CT scan, CT revealed 1.3 cm gastric lesion and patient was referred to our service for visit evaluation.  Patient reported she has intermittent epigastric pain when she felt food stuck there and she could not breathe.  She underwent an upper endoscopy in 2021 showing essentially normal EGD findings.  She denies family history of gastric cancer.  She has been having acid reflux for many years.  She does not take any antacid.  She recently was evaluated by cardiologist for atypical chest pain.  Patient underwent an echo which was normal.  Patient has yet to follow-up on stress test.      REVIEW OF SYSTEMS:    CONSTITUTIONAL: Denies any fever, chills, rigors, and weight loss.  HEENT: No earache or tinnitus. Denies hearing loss or visual  disturbances.  CARDIOVASCULAR: No chest pain or palpitations.   RESPIRATORY: Denies any cough, hemoptysis, shortness of breath or dyspnea on exertion.  GASTROINTESTINAL: As noted in the History of Present Illness.   GENITOURINARY: No problems with urination. Denies any hematuria or dysuria.  NEUROLOGIC: No dizziness or vertigo, denies headaches.   MUSCULOSKELETAL: Denies any muscle or joint pain.   SKIN: Denies skin rashes or itching.   ENDOCRINE: Denies excessive thirst. Denies intolerance to heat or cold.  PSYCHOSOCIAL: Denies depression or anxiety. Denies any recent memory loss.       Historical Information   Past Medical History:   Diagnosis Date    Asthma     variant cough asthma    Cataract     GERD (gastroesophageal reflux disease)     Hyperlipidemia     Hypertension      Past Surgical History:   Procedure Laterality Date    EYE SURGERY      HYSTERECTOMY      OOPHORECTOMY Bilateral     UTERINE SUSPENSION       Social History   Social History     Substance and Sexual Activity   Alcohol Use Not Currently     Social History     Substance and Sexual Activity   Drug Use Never     Social History     Tobacco Use   Smoking Status Never    Passive exposure: Never   Smokeless Tobacco Never     Family History   Problem Relation Age of Onset    Asthma Mother     Hypertension Mother             No Known Problems Father     No Known Problems Sister     No Known Problems Maternal Grandmother     No Known Problems Maternal Grandfather     No Known Problems Paternal Grandmother     No Known Problems Paternal Grandfather     No Known Problems Sister     Hypertension Brother         Pace maker       Meds/Allergies       Current Outpatient Medications:     amLODIPine (NORVASC) 10 mg tablet    aspirin 81 mg chewable tablet    atorvastatin (LIPITOR) 20 mg tablet    budesonide-formoterol (SYMBICORT) 160-4.5 mcg/act inhaler    cholecalciferol (VITAMIN D3) 1,000 units tablet    latanoprost (XALATAN) 0.005 % ophthalmic  solution    Melatonin 5 MG TABS    Multiple Vitamin (multivitamin) capsule    omeprazole (PriLOSEC) 40 MG capsule    vitamin B-12 (VITAMIN B-12) 1,000 mcg tablet    chlorhexidine (PERIDEX) 0.12 % solution    cyclobenzaprine (FLEXERIL) 5 mg tablet    famotidine (PEPCID) 20 mg tablet    Allergies   Allergen Reactions    Nsaids Other (See Comments)    Other Cough     Seasonal, perfumes, colon, chemicals    Sulfa Antibiotics Other (See Comments)    Codeine Rash    Shellfish Allergy - Food Allergy Rash           Objective     Blood pressure 149/81, pulse 100, temperature 98.5 °F (36.9 °C), weight 56.9 kg (125 lb 6.4 oz), not currently breastfeeding. Body mass index is 22.21 kg/m².        PHYSICAL EXAM:      General Appearance:   Alert, cooperative, no distress   HEENT:   Normocephalic, atraumatic, anicteric.     Neck:  Supple, symmetrical, trachea midline   Lungs:   Clear to auscultation bilaterally; no rales, rhonchi or wheezing; respirations unlabored    Heart::   Regular rate and rhythm; no murmur, rub, or gallop.   Abdomen:   Soft, non-tender, non-distended; normal bowel sounds; no masses, no organomegaly    Genitalia:   Deferred    Rectal:   Deferred    Extremities:  No cyanosis, clubbing or edema    Pulses:  2+ and symmetric    Skin:  No jaundice, rashes, or lesions    Lymph nodes:  No palpable cervical lymphadenopathy        Lab Results:   No visits with results within 1 Day(s) from this visit.   Latest known visit with results is:   Appointment on 02/15/2024   Component Date Value    Cholesterol 02/15/2024 142     Triglycerides 02/15/2024 106     HDL, Direct 02/15/2024 68     LDL Calculated 02/15/2024 53     Sodium 02/15/2024 142     Potassium 02/15/2024 3.7     Chloride 02/15/2024 106     CO2 02/15/2024 27     ANION GAP 02/15/2024 9     BUN 02/15/2024 13     Creatinine 02/15/2024 0.55 (L)     Glucose, Fasting 02/15/2024 91     Calcium 02/15/2024 9.1     AST 02/15/2024 18     ALT 02/15/2024 14     Alkaline  Phosphatase 02/15/2024 69     Total Protein 02/15/2024 7.3     Albumin 02/15/2024 4.3     Total Bilirubin 02/15/2024 0.66     eGFR 02/15/2024 91     TSH 3RD GENERATON 02/15/2024 1.322     WBC 02/15/2024 8.16     RBC 02/15/2024 4.64     Hemoglobin 02/15/2024 15.2     Hematocrit 02/15/2024 43.8     MCV 02/15/2024 94     MCH 02/15/2024 32.8     MCHC 02/15/2024 34.7     RDW 02/15/2024 12.2     MPV 02/15/2024 9.2     Platelets 02/15/2024 205     nRBC 02/15/2024 0     Neutrophils Relative 02/15/2024 58     Immat GRANS % 02/15/2024 0     Lymphocytes Relative 02/15/2024 34     Monocytes Relative 02/15/2024 5     Eosinophils Relative 02/15/2024 2     Basophils Relative 02/15/2024 1     Neutrophils Absolute 02/15/2024 4.76     Immature Grans Absolute 02/15/2024 0.01     Lymphocytes Absolute 02/15/2024 2.74     Monocytes Absolute 02/15/2024 0.42     Eosinophils Absolute 02/15/2024 0.19     Basophils Absolute 02/15/2024 0.04          Radiology Results:   CT abdomen pelvis w wo contrast    Result Date: 2/25/2024  Narrative: CT ABDOMEN AND PELVIS WITH AND WITHOUT IV CONTRAST INDICATION: R10.9: Unspecified abdominal pain. COMPARISON: Right upper quadrant ultrasound 10/12/2023, 6/8/2022 TECHNIQUE: Initial CT of the abdomen and pelvis was performed without intravenous contrast. Subsequent dynamic CT evaluation of the abdomen and pelvis was performed after the administration of intravenous contrast in both nephrographic and delayed phases. Multiplanar 2D reformatted images were created from the source data. Precontrast and delayed phase images of the abdomen and pelvis were also obtained. This examination, like all CT scans performed in the [facility], was performed utilizing techniques to minimize radiation dose exposure, including the use of iterative reconstruction and automated exposure control. Radiation dose length product (DLP) for this visit: 755 mGy-cm IV Contrast: 100 mL of iohexol (OMNIPAQUE) Enteric  Contrast: Administered. FINDINGS: ABDOMEN RIGHT KIDNEY AND URETER: No solid renal mass or detectable urothelial mass. Subcentimeter hypoattenuating renal lesion(s), too small to characterize but statistically likely benign, which do not warrant follow-up (Radiology June 2019). No hydronephrosis or hydroureter. No urinary tract calculi. No perinephric collection. LEFT KIDNEY AND URETER: No solid renal mass or detectable urothelial mass. 5.3 cm upper pole simple cyst. A couple additional subcentimeter hypodensities too small to characterize. No hydronephrosis or hydroureter. No urinary tract calculi. No perinephric collection. URINARY BLADDER: No bladder wall mass. No calculi. LOWER CHEST: No clinically significant abnormality in the visualized lower chest. LIVER/BILIARY TREE: 11 mm hypodensity in the right lobe segment 6 (3/47) demonstrating some peripheral calcification on precontrast imaging. This is less conspicuous on the delayed phase, suggesting possible enhancement. Possible partially calcified hemangioma? Another 9 mm hypodense focus within the right lobe near the junction of segments 8 and 5 (3/43) is also technically indeterminate. 2.6 cm simple cyst inferior right lobe adjacent to the gallbladder fossa. Another small cyst seen near the liver dome measuring about 1.4 cm with additional subcentimeter hypodensities too small to characterize although persist on delayed imaging, favoring tiny cysts. CBD is dilated measuring about 9 mm, similar to previous imaging and tapering distally without obstructive etiology. GALLBLADDER: No calcified gallstones. No pericholecystic inflammatory change. SPLEEN: Unremarkable. Small splenules. PANCREAS: Unremarkable. ADRENAL GLANDS: Unremarkable. STOMACH AND BOWEL: Evaluation of the stomach is limited by underdistention. Indeterminate 1.4 x 1.0 cm soft tissue nodule abutting the medial gastric body wall (3/36). Unclear if this represents a lymph node or possibly gastric  lesion such as a GIST tumor. Small bowel is normal caliber. Descending and sigmoid colon diverticulosis without evidence of diverticulitis. APPENDIX: No findings to suggest appendicitis. ABDOMINOPELVIC CAVITY: No ascites. No pneumoperitoneum. No lymphadenopathy. VESSELS: Unremarkable for patient's age. PELVIS REPRODUCTIVE ORGANS: Status post hysterectomy. ABDOMINAL WALL/INGUINAL REGIONS: Tiny fat-containing umbilical hernia. BONES: No acute fracture or suspicious osseous lesion. Mild, chronic deformity superior endplate of L2. Lumbar dextroscoliosis with multilevel degenerative change of the spine.     Impression: 1. No acute findings in the abdomen or pelvis. 2. Indeterminate 1.4 x 1.0 cm soft tissue nodule abutting the medial gastric body body wall (3/36). Unclear if this represents a lymph node or possibly gastric lesion such as a GIST tumor. Consider EUS with sampling versus short interval CT or MRI imaging in 3 months to assess stability. 3. Hepatic cysts as well as small indeterminate right lobe lesion(s), possibly partially calcified hemangioma? This can be reevaluated at follow up cross-sectional imaging above versus surveillance ultrasound. 4. Colonic diverticulosis without diverticulitis. 5. Chronic CBD dilatation tapering distally without obstructive etiology. The study was marked in EPIC for significant notification and follow-up. Workstation performed: [id number]

## 2024-02-29 ENCOUNTER — TELEPHONE (OUTPATIENT)
Dept: CARDIOLOGY CLINIC | Facility: CLINIC | Age: 79
End: 2024-02-29

## 2024-02-29 DIAGNOSIS — R06.02 SHORTNESS OF BREATH ON EXERTION: Primary | ICD-10-CM

## 2024-02-29 NOTE — PROGRESS NOTES
Patient has been having progressive dyspnea on exertion and reportedly due to issues with respiratory and lung issues, is difficult for her to ambulate.  Given her symptoms and findings of aortic sclerosis on her CT scan as well as risk factors including hypertension and dyslipidemia, recommend pharmacologic nuclear stress test to assess for any evidence of underlying myocardial ischemia.  Would perform it as a pharmacologic test due to the above listed reasons.

## 2024-02-29 NOTE — TELEPHONE ENCOUNTER
Once patient schedules stress test, can you please reach out to patient to schedule follow up, as per Dr Agosto.   Thank you!

## 2024-02-29 NOTE — TELEPHONE ENCOUNTER
"Patient called, states she had Echo done in Oct 2023 and got a call that everything was ok, but she doesn't see it in her chart, just calling to make sure everything is ok. Advised result as per Dr Agosto.  Patient also asking if she should get stress test that he ordered at office visit 5/2023  Advised as per Dr Agosto, for completeness she should have it completed.   Patient concerned that she is having some SOB, she has \"coughing asthma\" and not sure if she will be able to walk on the treadmill.    Patient also had visit with GI yesterday, she has a \"tumor\" and the GI doctor also recommended that she have stress test. Patient  scheduled for EGD 4/17/24    Advised will discuss with Dr Agosto.   "

## 2024-02-29 NOTE — TELEPHONE ENCOUNTER
Discussed with Dr Agosto, he recommends nuclear stress test, and ordered. Also requesting follow up after study to discuss.     Spoke with patient, advised above. Patient very anxious, had multiple questions, I tried to answer to the best of my ability. Offered office visit to discuss with Dr Agosto, patient refused.  Advised to call central scheduling to schedule, phone # provided.

## 2024-03-05 ENCOUNTER — TELEPHONE (OUTPATIENT)
Age: 79
End: 2024-03-05

## 2024-03-05 NOTE — TELEPHONE ENCOUNTER
Patients GI provider:  Dr. MASON    Number to return call: ([phone]     Reason for call: Pt calling questioning if stress test is needed prior to EUS. She also has further questions regarding EUS. Patient states she feels out of breath many times and states she thinks Dr. Mason told her she wants it completed prior to procedure.     Please return patients call to further discuss.    Scheduled procedure/appointment date if applicable: 04/17/2024 EUS

## 2024-03-06 NOTE — TELEPHONE ENCOUNTER
Called patient and left message for her to call back the [place] location to discuss stress test being performed prior to endoscopic ultrasound.

## 2024-03-27 DIAGNOSIS — E78.5 DYSLIPIDEMIA: ICD-10-CM

## 2024-03-27 RX ORDER — ATORVASTATIN CALCIUM 20 MG/1
TABLET, FILM COATED ORAL
Qty: 90 TABLET | Refills: 1 | Status: SHIPPED | OUTPATIENT
Start: 2024-03-27

## 2024-03-28 ENCOUNTER — OFFICE VISIT (OUTPATIENT)
Dept: FAMILY MEDICINE CLINIC | Facility: CLINIC | Age: 79
End: 2024-03-28
Payer: COMMERCIAL

## 2024-03-28 ENCOUNTER — TELEPHONE (OUTPATIENT)
Dept: FAMILY MEDICINE CLINIC | Facility: CLINIC | Age: 79
End: 2024-03-28

## 2024-03-28 VITALS
TEMPERATURE: 97.5 F | DIASTOLIC BLOOD PRESSURE: 74 MMHG | HEIGHT: 63 IN | OXYGEN SATURATION: 98 % | BODY MASS INDEX: 22.32 KG/M2 | HEART RATE: 83 BPM | WEIGHT: 126 LBS | SYSTOLIC BLOOD PRESSURE: 138 MMHG

## 2024-03-28 DIAGNOSIS — J45.20 MILD INTERMITTENT ASTHMA WITHOUT COMPLICATION: ICD-10-CM

## 2024-03-28 DIAGNOSIS — K21.00 GASTROESOPHAGEAL REFLUX DISEASE WITH ESOPHAGITIS WITHOUT HEMORRHAGE: ICD-10-CM

## 2024-03-28 DIAGNOSIS — F41.1 GENERALIZED ANXIETY DISORDER: ICD-10-CM

## 2024-03-28 DIAGNOSIS — I10 ESSENTIAL HYPERTENSION: Primary | ICD-10-CM

## 2024-03-28 PROCEDURE — 99213 OFFICE O/P EST LOW 20 MIN: CPT | Performed by: FAMILY MEDICINE

## 2024-03-28 PROCEDURE — G2211 COMPLEX E/M VISIT ADD ON: HCPCS | Performed by: FAMILY MEDICINE

## 2024-03-28 RX ORDER — PREDNISOLONE ACETATE 10 MG/ML
1 SUSPENSION/ DROPS OPHTHALMIC 4 TIMES DAILY
COMMUNITY
End: 2024-05-09 | Stop reason: ALTCHOICE

## 2024-03-28 NOTE — PROGRESS NOTES
Name: Paula Paniagua      : 1945      MRN: [id number]  Encounter Provider: Michelle Rg DO  Encounter Date: 3/28/2024   Encounter department: [facility]    Assessment & Plan   Patient is 78-year-old female seen for follow-up of chronic medical conditions. Also patient with persistent cough. We have tried Mucinex, Tessalon in the past. She either has side effects or doesn't help. Has been to pulmonary, had CT. Right now undergoing GI work up.  1. Essential hypertension  -     Comprehensive metabolic panel; Future; Expected date: 2024  -     Magnesium; Future; Expected date: 2024    2. Mild intermittent asthma without complication    3. Gastroesophageal reflux disease with esophagitis without hemorrhage    4. Generalized anxiety disorder       Due for AWV at end of July.  Blood work orders given.  Subjective      Chief Complaint   Patient presents with   • Follow-up       Patient with mucous in throat and feels like cough when she has a chest cold. And still has same dry cough.  Has concerns about Omeprazole   Has appt for nuclear stress test for .  She asked about taking Melatonin, ASA.  Asked about taking cyclobenzaprine.      Review of Systems   Constitutional:  Negative for chills and fever.   HENT:  Negative for congestion and sore throat.    Respiratory:  Positive for cough. Negative for chest tightness.    Cardiovascular:  Negative for chest pain and palpitations.   Gastrointestinal:  Negative for abdominal pain, constipation, diarrhea and nausea.   Genitourinary:  Negative for difficulty urinating.   Skin: Negative.    Neurological:  Negative for dizziness and headaches.   Psychiatric/Behavioral:  The patient is nervous/anxious.        Current Outpatient Medications on File Prior to Visit   Medication Sig   • aspirin 81 mg chewable tablet Chew 1 tablet (81 mg total) daily   • atorvastatin (LIPITOR) 20 mg tablet TAKE 1 TABLET EVERY DAY   • budesonide-formoterol  "(SYMBICORT) 160-4.5 mcg/act inhaler Inhale 2 puffs 2 (two) times a day Rinse mouth after use.   • cholecalciferol (VITAMIN D3) 1,000 units tablet Take 1,000 Units by mouth daily   • cyclobenzaprine (FLEXERIL) 5 mg tablet 1 tab BID PRN   • latanoprost (XALATAN) 0.005 % ophthalmic solution    • Melatonin 5 MG TABS Take by mouth Prn   • Multiple Vitamin (multivitamin) capsule Take 1 capsule by mouth daily   • omeprazole (PriLOSEC) 40 MG capsule Take 1 capsule (40 mg total) by mouth daily   • prednisoLONE acetate (PRED FORTE) 1 % ophthalmic suspension 1 drop 4 (four) times a day   • vitamin B-12 (VITAMIN B-12) 1,000 mcg tablet Take by mouth daily   • chlorhexidine (PERIDEX) 0.12 % solution    • famotidine (PEPCID) 20 mg tablet Take 1 tablet (20 mg total) by mouth 2 (two) times a day as needed for heartburn       Objective     /74 (BP Location: Left arm, Patient Position: Sitting, Cuff Size: Standard)   Pulse 83   Temp 97.5 °F (36.4 °C) (Temporal)   Ht 5' 3\" (1.6 m)   Wt 57.2 kg (126 lb)   SpO2 98%   BMI 22.32 kg/m²     Physical Exam  Vitals and nursing note reviewed.   Constitutional:       General: She is not in acute distress.  HENT:      Head: Normocephalic.   Neck:      Thyroid: No thyromegaly.   Cardiovascular:      Rate and Rhythm: Normal rate and regular rhythm.      Heart sounds: Normal heart sounds.   Pulmonary:      Effort: Pulmonary effort is normal.      Breath sounds: Normal breath sounds.   Musculoskeletal:      Right lower leg: No edema.      Left lower leg: No edema.   Lymphadenopathy:      Cervical: No cervical adenopathy.   Skin:     General: Skin is warm and dry.   Neurological:      Mental Status: She is alert and oriented to person, place, and time.   Psychiatric:         Mood and Affect: Mood normal.       Michelle Rg, DO    "

## 2024-03-28 NOTE — TELEPHONE ENCOUNTER
Pt is asking for something for her cough and mucus please send to [facility] if deemed ok.    Please call to inform [phone]

## 2024-04-01 ENCOUNTER — TELEPHONE (OUTPATIENT)
Dept: CARDIOLOGY CLINIC | Facility: CLINIC | Age: 79
End: 2024-04-01

## 2024-04-01 NOTE — TELEPHONE ENCOUNTER
"Insurance will not cover Paula's nuclear stress test and it has been cancelled for tomorrow. I talked with pre cert department and they did not know why.  It is possible patient will need a regular stress test first but I could not confirm that due to \"a pod\" is now working on our pre cert rather than Radha in our office, and I don't know where to get the answers. Patient is requesting a regular stress test to be ordered or pre cert says a peer to peer review is required to push the nuclear one through. Please advise.    "

## 2024-04-05 DIAGNOSIS — R06.02 SHORTNESS OF BREATH ON EXERTION: Primary | ICD-10-CM

## 2024-04-06 DIAGNOSIS — I10 ESSENTIAL HYPERTENSION: ICD-10-CM

## 2024-04-06 RX ORDER — AMLODIPINE BESYLATE 10 MG/1
TABLET ORAL
Qty: 90 TABLET | Refills: 3 | Status: SHIPPED | OUTPATIENT
Start: 2024-04-06

## 2024-04-11 ENCOUNTER — NURSE TRIAGE (OUTPATIENT)
Age: 79
End: 2024-04-11

## 2024-04-11 ENCOUNTER — TELEPHONE (OUTPATIENT)
Dept: CARDIOLOGY CLINIC | Facility: CLINIC | Age: 79
End: 2024-04-11

## 2024-04-11 NOTE — TELEPHONE ENCOUNTER
"Patient with persistent cough, has had the cough for years, she is also concerned about sometimes having chills with no fever in relation to the Omeprazole. Last OV was 3/28/24 where she discussed this same issues, no changes or medication orders made at that time. Advised patient to increase fluids and cough drops, suggested Mucinex OTC but patient states PCP told her not to take, but cannot remember why. Patient would like to know what PCP recommends taking for expectorant.         Reason for Disposition   Cough with no complications    Answer Assessment - Initial Assessment Questions  1. ONSET: \"When did the cough begin?\"       1 month ago  2. SEVERITY: \"How bad is the cough today?\"       Comes and goes, dry cough  3. SPUTUM: \"Describe the color of your sputum\" (none, dry cough; clear, white, yellow, green)      clear  4. HEMOPTYSIS: \"Are you coughing up any blood?\" If so ask: \"How much?\" (flecks, streaks, tablespoons, etc.)      denies  5. DIFFICULTY BREATHING: \"Are you having difficulty breathing?\" If Yes, ask: \"How bad is it?\" (e.g., mild, moderate, severe)     - MILD: No SOB at rest, mild SOB with walking, speaks normally in sentences, can lay down, no retractions, pulse < 100.     - MODERATE: SOB at rest, SOB with minimal exertion and prefers to sit, cannot lie down flat, speaks in phrases, mild retractions, audible wheezing, pulse 100-120.     - SEVERE: Very SOB at rest, speaks in single words, struggling to breathe, sitting hunched forward, retractions, pulse > 120       denies  6. FEVER: \"Do you have a fever?\" If Yes, ask: \"What is your temperature, how was it measured, and when did it start?\"      denies  7. CARDIAC HISTORY: \"Do you have any history of heart disease?\" (e.g., heart attack, congestive heart failure)       yes  8. LUNG HISTORY: \"Do you have any history of lung disease?\"  (e.g., pulmonary embolus, asthma, emphysema)      yes  9. PE RISK FACTORS: \"Do you have a history of blood clots?\" (or: " "recent major surgery, recent prolonged travel, bedridden)      no  10. OTHER SYMPTOMS: \"Do you have any other symptoms?\" (e.g., runny nose, wheezing, chest pain)        Chills    Protocols used: Cough-ADULT-[place]    "

## 2024-04-11 NOTE — TELEPHONE ENCOUNTER
"Regarding: Mucous in throat  ----- Message from Dayan Hutchinson sent at 4/11/2024 11:00 AM EDT -----  \"Would like the doctor to call something called in for the mucous in  my throat. When I cough its a clear mucous and just feel like something is stuck. I have a stress test on Monday and and Endoscopy on the 17th.\"    "

## 2024-04-11 NOTE — TELEPHONE ENCOUNTER
LV calling to verify patient is scheduled for stress test on 4/15/24 having GI procedure on 4/17 and will want results...told GI she is scheduled but the patient has not been seen since 5/23 and there is no request for a clearance.or knowledge of planned procedure with cardiology. GI stated only verifying stress test scheduled.

## 2024-04-15 ENCOUNTER — HOSPITAL ENCOUNTER (OUTPATIENT)
Dept: NON INVASIVE DIAGNOSTICS | Facility: CLINIC | Age: 79
Discharge: HOME | End: 2024-04-15
Payer: COMMERCIAL

## 2024-04-15 VITALS
WEIGHT: 126 LBS | HEIGHT: 63 IN | DIASTOLIC BLOOD PRESSURE: 76 MMHG | BODY MASS INDEX: 22.32 KG/M2 | SYSTOLIC BLOOD PRESSURE: 138 MMHG | OXYGEN SATURATION: 98 % | HEART RATE: 118 BPM

## 2024-04-15 DIAGNOSIS — R06.02 SHORTNESS OF BREATH ON EXERTION: ICD-10-CM

## 2024-04-15 LAB
CHEST PAIN STATEMENT: NORMAL
MAX DIASTOLIC BP: 84 MMHG
MAX HR PERCENT: 111 %
MAX HR: 160 BPM
MAX PREDICTED HEART RATE: 144 BPM
PROTOCOL NAME: NORMAL
RATE PRESSURE PRODUCT: NORMAL
SL CV STRESS RECOVERY BP: NORMAL MMHG
SL CV STRESS RECOVERY HR: 117 BPM
SL CV STRESS RECOVERY O2 SAT: 98 %
SL CV STRESS STAGE REACHED: 3
STRESS ANGINA INDEX: 0
STRESS BASELINE BP: NORMAL MMHG
STRESS BASELINE HR: 118 BPM
STRESS O2 SAT REST: 98 %
STRESS PEAK HR: 160 BPM
STRESS POST ESTIMATED WORKLOAD: 8.5 METS
STRESS POST EXERCISE DUR MIN: 6 MIN
STRESS POST EXERCISE DUR MIN: 6 MIN
STRESS POST EXERCISE DUR SEC: 31 SEC
STRESS POST EXERCISE DUR SEC: 31 SEC
STRESS POST O2 SAT PEAK: 97 %
STRESS POST PEAK BP: 146 MMHG
STRESS POST PEAK HR: 164 BPM
STRESS POST PEAK SYSTOLIC BP: 146 MMHG
TARGET HR FORMULA: NORMAL
TEST INDICATION: NORMAL

## 2024-04-15 PROCEDURE — 93017 CV STRESS TEST TRACING ONLY: CPT

## 2024-04-15 PROCEDURE — 93018 CV STRESS TEST I&R ONLY: CPT | Performed by: INTERNAL MEDICINE

## 2024-04-15 PROCEDURE — 93016 CV STRESS TEST SUPVJ ONLY: CPT | Performed by: INTERNAL MEDICINE

## 2024-04-16 ENCOUNTER — TELEPHONE (OUTPATIENT)
Dept: GASTROENTEROLOGY | Facility: CLINIC | Age: 79
End: 2024-04-16

## 2024-04-16 ENCOUNTER — TELEPHONE (OUTPATIENT)
Age: 79
End: 2024-04-16

## 2024-04-16 NOTE — TELEPHONE ENCOUNTER
Patient called and was asking if she could take aspirin tomorrow morning, I spoke with EDUAR Boo and she advised she is able to take it in the morning with a sip of water thank you

## 2024-04-16 NOTE — TELEPHONE ENCOUNTER
Patient called and I went over the details of the procedure several times . She was concerned about eating . She is good to go and will bring her meds with her to the procedure to take after.

## 2024-04-16 NOTE — TELEPHONE ENCOUNTER
Patient contacting office as she states the call was disconnected. Relayed patient message in regards to aspirin. Patient understood.

## 2024-04-17 ENCOUNTER — HOSPITAL ENCOUNTER (OUTPATIENT)
Dept: GASTROENTEROLOGY | Facility: HOSPITAL | Age: 79
Setting detail: OUTPATIENT SURGERY
Discharge: HOME | End: 2024-04-17
Attending: INTERNAL MEDICINE
Payer: COMMERCIAL

## 2024-04-17 ENCOUNTER — ANESTHESIA (OUTPATIENT)
Dept: GASTROENTEROLOGY | Facility: HOSPITAL | Age: 79
End: 2024-04-17

## 2024-04-17 ENCOUNTER — ANESTHESIA EVENT (OUTPATIENT)
Dept: GASTROENTEROLOGY | Facility: HOSPITAL | Age: 79
End: 2024-04-17

## 2024-04-17 VITALS
RESPIRATION RATE: 18 BRPM | HEIGHT: 63 IN | OXYGEN SATURATION: 97 % | SYSTOLIC BLOOD PRESSURE: 120 MMHG | TEMPERATURE: 96 F | WEIGHT: 123 LBS | HEART RATE: 80 BPM | DIASTOLIC BLOOD PRESSURE: 62 MMHG | BODY MASS INDEX: 21.79 KG/M2

## 2024-04-17 DIAGNOSIS — K31.9 GASTRIC LESION: ICD-10-CM

## 2024-04-17 PROCEDURE — C1889 IMPLANT/INSERT DEVICE, NOC: HCPCS

## 2024-04-17 PROCEDURE — 88112 CYTOPATH CELL ENHANCE TECH: CPT | Performed by: STUDENT IN AN ORGANIZED HEALTH CARE EDUCATION/TRAINING PROGRAM

## 2024-04-17 PROCEDURE — 43242 EGD US FINE NEEDLE BX/ASPIR: CPT | Performed by: INTERNAL MEDICINE

## 2024-04-17 RX ORDER — SODIUM CHLORIDE 9 MG/ML
INJECTION, SOLUTION INTRAVENOUS CONTINUOUS PRN
Status: DISCONTINUED | OUTPATIENT
Start: 2024-04-17 | End: 2024-04-17

## 2024-04-17 RX ORDER — PROPOFOL 10 MG/ML
INJECTION, EMULSION INTRAVENOUS AS NEEDED
Status: DISCONTINUED | OUTPATIENT
Start: 2024-04-17 | End: 2024-04-17

## 2024-04-17 RX ORDER — LIDOCAINE HYDROCHLORIDE 20 MG/ML
INJECTION, SOLUTION EPIDURAL; INFILTRATION; INTRACAUDAL; PERINEURAL AS NEEDED
Status: DISCONTINUED | OUTPATIENT
Start: 2024-04-17 | End: 2024-04-17

## 2024-04-17 RX ADMIN — SODIUM CHLORIDE: 0.9 INJECTION, SOLUTION INTRAVENOUS at 12:09

## 2024-04-17 RX ADMIN — LIDOCAINE HYDROCHLORIDE 100 MG: 20 INJECTION, SOLUTION EPIDURAL; INFILTRATION; INTRACAUDAL at 11:36

## 2024-04-17 RX ADMIN — PROPOFOL 130 MCG/KG/MIN: 10 INJECTION, EMULSION INTRAVENOUS at 11:41

## 2024-04-17 RX ADMIN — PROPOFOL 100 MG: 10 INJECTION, EMULSION INTRAVENOUS at 11:36

## 2024-04-17 RX ADMIN — SODIUM CHLORIDE: 0.9 INJECTION, SOLUTION INTRAVENOUS at 11:32

## 2024-04-17 NOTE — H&P
History and Physical - [facility]  Paula Paniagua 78 y.o. female MRN: [id number]                  HPI: Paula Paniagua is a 78 y.o. year old female who presents for EUS for gastric lesion      REVIEW OF SYSTEMS: Per the HPI, and otherwise unremarkable.    Historical Information   Past Medical History:   Diagnosis Date    Asthma     variant cough asthma    Cataract     GERD (gastroesophageal reflux disease)     Hyperlipidemia     Hypertension      Past Surgical History:   Procedure Laterality Date    EYE SURGERY      HYSTERECTOMY      OOPHORECTOMY Bilateral     UTERINE SUSPENSION       Social History   Social History     Substance and Sexual Activity   Alcohol Use Not Currently     Social History     Substance and Sexual Activity   Drug Use Never     Social History     Tobacco Use   Smoking Status Never    Passive exposure: Never   Smokeless Tobacco Never     Family History   Problem Relation Age of Onset    Asthma Mother     Hypertension Mother             No Known Problems Father     No Known Problems Sister     No Known Problems Maternal Grandmother     No Known Problems Maternal Grandfather     No Known Problems Paternal Grandmother     No Known Problems Paternal Grandfather     No Known Problems Sister     Hypertension Brother         Pace maker       Meds/Allergies       Current Outpatient Medications:     amLODIPine (NORVASC) 10 mg tablet    aspirin 81 mg chewable tablet    atorvastatin (LIPITOR) 20 mg tablet    budesonide-formoterol (SYMBICORT) 160-4.5 mcg/act inhaler    cholecalciferol (VITAMIN D3) 1,000 units tablet    cyclobenzaprine (FLEXERIL) 5 mg tablet    latanoprost (XALATAN) 0.005 % ophthalmic solution    Melatonin 5 MG TABS    Multiple Vitamin (multivitamin) capsule    omeprazole (PriLOSEC) 40 MG capsule    prednisoLONE acetate (PRED FORTE) 1 % ophthalmic suspension    vitamin B-12 (VITAMIN B-12) 1,000 mcg tablet    chlorhexidine (PERIDEX) 0.12 % solution     "famotidine (PEPCID) 20 mg tablet    Allergies   Allergen Reactions    Nsaids Other (See Comments)    Other Cough     Seasonal, perfumes, colon, chemicals    Sulfa Antibiotics Other (See Comments)    Codeine Rash    Shellfish Allergy - Food Allergy Rash       Objective     Pulse 95   Temp (!) 97 °F (36.1 °C) (Tympanic)   Resp 16   Ht 5' 3\" (1.6 m)   Wt 55.8 kg (123 lb)   SpO2 98%   BMI 21.79 kg/m²       PHYSICAL EXAM    Gen: NAD  Head: NCAT  CV: RRR  CHEST: Clear  ABD: soft, NT/ND  EXT: no edema      ASSESSMENT/PLAN:  This is a 78 y.o. year old female here for eus gastric lesion seen on CT, and she is stable and optimized for her procedure.        "

## 2024-04-17 NOTE — DISCHARGE INSTRUCTIONS
Upper Endoscopic Gastrointestinal Ultrasonography   WHAT YOU NEED TO KNOW:   An upper gastrointestinal endoscopic ultrasound is done to look at the different parts of your upper gastrointestinal (GI) tract. The upper GI tract includes the esophagus, stomach, and duodenum (first part of the small intestine). This procedure is used to help diagnose and treat diseases that affect the upper GI tract.           DISCHARGE INSTRUCTIONS:   Seek care immediately if:   You have sudden, severe abdominal pain.     You have problems swallowing.     You have a large amount of black, sticky bowel movements or blood in your bowel movements.     You have sudden trouble breathing.     You feel weak, lightheaded, or faint or your heart beats faster than normal for you.     Contact your healthcare provider if:   You have a fever and chills.      You have nausea or are vomiting.      Your abdomen is bloated or feels full and hard.     You have abdominal pain.    You have a large amount of black, sticky bowel movements or blood in your bowel movements.    You have not had a bowel movement for 3 days after your procedure.    You have rash or hives.    You lose your appetite, your skin feels itchy, and your skin turns yellow.    You have questions or concerns about your procedure.        Self-care:   ·      Rest when you feel it is needed. You may be drowsy for up to 24 hours after your procedure. Return to your daily activities as directed.      ·       Ask when you can eat regular foods. Healthy foods include fruits, vegetables, whole-grain breads, low-fat dairy products, beans, lean meats, and fish.     ·       Relieve a sore throat with ice chips, liquids, or lozenges as directed.     Follow up with your healthcare provider as directed: Write down your questions so you remember to ask them during your visits.      If you take a “blood thinner”, please review the specific instructions from your endoscopist about when you should resume  it. These can be found in the “Recommendation” and “Your Medication list” sections of this After Visit Summary.

## 2024-04-17 NOTE — ANESTHESIA PREPROCEDURE EVALUATION
Procedure:  ENDOSCOPIC ULTRASOUND (UPPER)    Relevant Problems   CARDIO   (+) Chest pain   (+) Essential hypertension   (+) Heart murmur   (+) Right-sided chest wall pain      GI/HEPATIC   (+) Gastroesophageal reflux disease with esophagitis without hemorrhage   (+) Oropharyngeal dysphagia      MUSCULOSKELETAL   (+) Arthritis   (+) Upper back pain      NEURO/PSYCH   (+) Anxiety   (+) Depression   (+) Generalized anxiety disorder   (+) Morning headache      PULMONARY   (+) Mild intermittent asthma without complication   (+) NED (obstructive sleep apnea)      Sinus tachycardia  Otherwise normal ECG  When compared with ECG of 16-MAR-2022 12:54, (unconfirmed)  No significant change was found  Confirmed by Arden Salazar ([id number]) on 3/17/2022 12:39:22 PM    Physical Exam    Airway    Mallampati score: II  TM Distance: >3 FB  Neck ROM: full     Dental        Cardiovascular      Pulmonary      Other Findings        Anesthesia Plan  ASA Score- 2     Anesthesia Type- IV sedation with anesthesia with ASA Monitors.         Additional Monitors:     Airway Plan:            Plan Factors-Exercise tolerance (METS): >4 METS.    Chart reviewed.    Patient summary reviewed.    Patient is not a current smoker.  Patient did not smoke on day of surgery.            Induction- intravenous.    Postoperative Plan-     Informed Consent- Anesthetic plan and risks discussed with patient.  I personally reviewed this patient with the CRNA. Discussed and agreed on the Anesthesia Plan with the CRNA..

## 2024-04-17 NOTE — ANESTHESIA POSTPROCEDURE EVALUATION
Post-Op Assessment Note    CV Status:  Stable  Pain Score: 0    Pain management: adequate       Mental Status:  Sleepy   Hydration Status:  Euvolemic   PONV Controlled:  Controlled   Airway Patency:  Patent     Post Op Vitals Reviewed: Yes    No anethesia notable event occurred.    Staff: Anesthesiologist, CRNA               /59 (04/17/24 1217)    Temp (!) 96 °F (35.6 °C) (04/17/24 1217)    Pulse 82 (04/17/24 1217)   Resp 16 (04/17/24 1217)    SpO2 97 % (04/17/24 1217)

## 2024-04-18 ENCOUNTER — TELEPHONE (OUTPATIENT)
Dept: CARDIOLOGY CLINIC | Facility: CLINIC | Age: 79
End: 2024-04-18

## 2024-04-18 NOTE — TELEPHONE ENCOUNTER
"Called patient and gave her the good news about her stress test per Dr Agosto.  She does not have an appointment to go over it as of yet, for she is \"dealing with other doctors and other issues and will call for an appointment on her own when she finishes up other things first\".   "

## 2024-04-24 ENCOUNTER — TELEMEDICINE (OUTPATIENT)
Dept: FAMILY MEDICINE CLINIC | Facility: CLINIC | Age: 79
End: 2024-04-24
Payer: COMMERCIAL

## 2024-04-24 DIAGNOSIS — R05.3 PERSISTENT COUGH: Primary | ICD-10-CM

## 2024-04-24 PROCEDURE — G2211 COMPLEX E/M VISIT ADD ON: HCPCS | Performed by: FAMILY MEDICINE

## 2024-04-24 PROCEDURE — 1159F MED LIST DOCD IN RCRD: CPT | Performed by: FAMILY MEDICINE

## 2024-04-24 PROCEDURE — 99213 OFFICE O/P EST LOW 20 MIN: CPT | Performed by: FAMILY MEDICINE

## 2024-04-24 PROCEDURE — 1160F RVW MEDS BY RX/DR IN RCRD: CPT | Performed by: FAMILY MEDICINE

## 2024-04-24 NOTE — PROGRESS NOTES
Virtual Regular Visit    Verification of patient location:    Patient is located at Home in the following state in which I hold an active license [place]      Assessment/Plan:    Problem List Items Addressed This Visit    None  Visit Diagnoses     Persistent cough    -  Primary      Patient is to take a non-sedating antihistamine like Claritin, use steroid nasal spray, and her inhaler - Symbicort.Try adding Mucinex.         Reason for visit is   Chief Complaint   Patient presents with   • Virtual Regular Visit          Encounter provider Michelle Rg DO    Provider located at [facility]  [address]  [facility] [address] [phone]      Recent Visits  Date Type Provider Dept   04/24/24 Telemedicine Michelle Rg DO [facility]   Showing recent visits within past 7 days and meeting all other requirements  Future Appointments  No visits were found meeting these conditions.  Showing future appointments within next 150 days and meeting all other requirements       The patient was identified by name and date of birth. Paula Paniagua was informed that this is a telemedicine visit and that the visit is being conducted through the [employer] platform. She agrees to proceed..  My office door was closed. No one else was in the room.  She acknowledged consent and understanding of privacy and security of the video platform. The patient has agreed to participate and understands they can discontinue the visit at any time.    Patient is aware this is a billable service.     Subjective  Paula Paniagua is a 78 y.o. female with complaint of cough - this has been an ongoing complaint for patient, although it has been intermittent. It seems to come on and last for months and then goes away for awhile and then returns. .      Patient complains of persistent cough - she says that she is using inhaler and Claritin.  She feels this started up again after she inhaled cold  air.         Past Medical History:   Diagnosis Date   • Asthma     variant cough asthma   • Cataract    • GERD (gastroesophageal reflux disease) 1989   • Hyperlipidemia    • Hypertension        Past Surgical History:   Procedure Laterality Date   • EYE SURGERY     • HYSTERECTOMY     • OOPHORECTOMY Bilateral    • UTERINE SUSPENSION         Current Outpatient Medications   Medication Sig Dispense Refill   • amLODIPine (NORVASC) 10 mg tablet TAKE 1 TABLET EVERY DAY 90 tablet 3   • aspirin 81 mg chewable tablet Chew 1 tablet (81 mg total) daily     • atorvastatin (LIPITOR) 20 mg tablet TAKE 1 TABLET EVERY DAY 90 tablet 1   • chlorhexidine (PERIDEX) 0.12 % solution      • cholecalciferol (VITAMIN D3) 1,000 units tablet Take 1,000 Units by mouth daily     • cyclobenzaprine (FLEXERIL) 5 mg tablet 1 tab BID PRN 20 tablet 1   • famotidine (PEPCID) 20 mg tablet Take 1 tablet (20 mg total) by mouth 2 (two) times a day as needed for heartburn 180 tablet 3   • latanoprost (XALATAN) 0.005 % ophthalmic solution      • Melatonin 5 MG TABS Take by mouth Prn     • Multiple Vitamin (multivitamin) capsule Take 1 capsule by mouth daily     • omeprazole (PriLOSEC) 40 MG capsule Take 1 capsule (40 mg total) by mouth daily 30 capsule 2   • prednisoLONE acetate (PRED FORTE) 1 % ophthalmic suspension 1 drop 4 (four) times a day     • Symbicort 160-4.5 MCG/ACT inhaler INHALE 2 PUFFS BY MOUTH TWICE DAILY. RINSE MOUTH AFTER USE 10.2 g 1   • vitamin B-12 (VITAMIN B-12) 1,000 mcg tablet Take by mouth daily       No current facility-administered medications for this visit.        Allergies   Allergen Reactions   • Nsaids Other (See Comments)   • Other Cough     Seasonal, perfumes, colon, chemicals   • Sulfa Antibiotics Other (See Comments)   • Codeine Rash   • Shellfish Allergy - Food Allergy Rash       Review of Systems   Constitutional:  Negative for chills and fever.   HENT:  Positive for congestion.    Respiratory:  Positive for cough.  Negative for chest tightness.    Cardiovascular:  Negative for chest pain.   Gastrointestinal:         Denies heartburn.   Neurological:  Negative for dizziness and headaches.   Psychiatric/Behavioral:  The patient is nervous/anxious.        Video Exam    There were no vitals filed for this visit.    Physical Exam  Constitutional:       General: She is not in acute distress.  HENT:      Mouth/Throat:      Comments: Clearing throat  Pulmonary:      Effort: Pulmonary effort is normal. No respiratory distress.   Neurological:      Mental Status: She is alert and oriented to person, place, and time.   Psychiatric:         Mood and Affect: Mood is anxious.          Visit Time  Total Visit Duration: 10 minutes    It was my intent to perform this visit via video technology but the patient was not able to do a video connection so the visit was completed via audio telephone only. Attempted to reach patient casimiro Mcgowan and carol Jacobs without success. Ended up doing phone call through TEAMS.

## 2024-04-28 DIAGNOSIS — R05.3 CHRONIC COUGH: ICD-10-CM

## 2024-04-28 DIAGNOSIS — J45.20 MILD INTERMITTENT ASTHMA WITHOUT COMPLICATION: ICD-10-CM

## 2024-04-29 ENCOUNTER — TELEPHONE (OUTPATIENT)
Age: 79
End: 2024-04-29

## 2024-04-29 RX ORDER — BUDESONIDE AND FORMOTEROL FUMARATE DIHYDRATE 160; 4.5 UG/1; UG/1
2 AEROSOL RESPIRATORY (INHALATION) 2 TIMES DAILY
Qty: 10.2 G | Refills: 1 | Status: SHIPPED | OUTPATIENT
Start: 2024-04-29

## 2024-04-29 NOTE — TELEPHONE ENCOUNTER
Patients GI provider:  Dr. Buster Evans MD    Number to return call: ( [phone]     Reason for call: Pt calling for results of  Endoscopic Ultrasound     Scheduled procedure/appointment date if applicable: Apt/procedure  4/17

## 2024-05-08 ENCOUNTER — TELEPHONE (OUTPATIENT)
Age: 79
End: 2024-05-08

## 2024-05-08 NOTE — TELEPHONE ENCOUNTER
Patient has tried the mucinex for 8 days now and is making her sick and nauseous, weakness and disoriented and is asking if there is something else she can take for her cough and mucus.

## 2024-05-08 NOTE — TELEPHONE ENCOUNTER
If she is truly disoriented by this, she may benefit from additional triage, I don't see she complained of this before. Will forward to provider for recommendations.

## 2024-05-09 DIAGNOSIS — R05.3 PERSISTENT COUGH: Primary | ICD-10-CM

## 2024-05-09 RX ORDER — PREDNISONE 10 MG/1
TABLET ORAL
Qty: 20 TABLET | Refills: 0 | Status: SHIPPED | OUTPATIENT
Start: 2024-05-09

## 2024-05-10 ENCOUNTER — TELEPHONE (OUTPATIENT)
Age: 79
End: 2024-05-10

## 2024-05-10 NOTE — TELEPHONE ENCOUNTER
Patients GI provider:  Dr. MASON     Number to return call: ( [phone]     Reason for call: Pt calling wants to know how long she is to take the Omeprazole she has been on this for 2 mths       Scheduled procedure/appointment date if applicable: Apt/procedure

## 2024-05-11 ENCOUNTER — APPOINTMENT (OUTPATIENT)
Dept: RADIOLOGY | Facility: MEDICAL CENTER | Age: 79
End: 2024-05-11
Payer: COMMERCIAL

## 2024-05-11 DIAGNOSIS — R05.3 PERSISTENT COUGH: ICD-10-CM

## 2024-05-11 PROCEDURE — 71046 X-RAY EXAM CHEST 2 VIEWS: CPT

## 2024-05-14 NOTE — TELEPHONE ENCOUNTER
I spoke to pt gave her 's recommendation -she wanted the results she had completed on 4/17/24 upper GI please advise

## 2024-05-17 ENCOUNTER — NURSE TRIAGE (OUTPATIENT)
Age: 79
End: 2024-05-17

## 2024-05-17 NOTE — TELEPHONE ENCOUNTER
PT calling to speak with a nurse about 2 new additional medications she is to start; Melatonin and Prednisone. She wants to make sure they are ok to take along side the Omeprazole and her 81mg Asprin.   Transferred to nurse Alejandro

## 2024-05-17 NOTE — TELEPHONE ENCOUNTER
Patient tapering off of omeprazole. Coughing for 3 months since February. Patient states that this is something that she's had for years. States that she need prednisone to take care of the cough. Family doc started on short 5 days of taper pack prednisone.  Confirming it is ok to take the prednisone and omeprazole together and that it is ok to take melatonin and omeprazole together.          Reason for Disposition   Caller has NON-URGENT medicine question about med that PCP or specialist prescribed and triager unable to answer question    Answer Assessment - Initial Assessment Questions  1. NAME of MEDICATION: Patient tapering off of omeprazole. Coughing for 3 months since February. Patient states that this is something that she's had for years. States that she need prednisone to take care of the cough. Family doc started on short 5 days of taper pack prednisone.  Confirming it is ok to take the prednisone and omeprazole together and that it is ok to take melatonin and omeprazole together.    Protocols used: Medication Question Call-ADULT-[place]

## 2024-05-28 DIAGNOSIS — R05.3 CHRONIC COUGH: ICD-10-CM

## 2024-05-28 DIAGNOSIS — J45.20 MILD INTERMITTENT ASTHMA WITHOUT COMPLICATION: ICD-10-CM

## 2024-05-29 RX ORDER — BUDESONIDE AND FORMOTEROL FUMARATE DIHYDRATE 160; 4.5 UG/1; UG/1
2 AEROSOL RESPIRATORY (INHALATION) 2 TIMES DAILY
Qty: 10.2 G | Refills: 5 | Status: SHIPPED | OUTPATIENT
Start: 2024-05-29

## 2024-06-06 ENCOUNTER — OFFICE VISIT (OUTPATIENT)
Dept: URGENT CARE | Facility: CLINIC | Age: 79
End: 2024-06-06
Payer: COMMERCIAL

## 2024-06-06 VITALS
WEIGHT: 124.8 LBS | HEIGHT: 63 IN | OXYGEN SATURATION: 99 % | SYSTOLIC BLOOD PRESSURE: 140 MMHG | HEART RATE: 87 BPM | BODY MASS INDEX: 22.11 KG/M2 | RESPIRATION RATE: 18 BRPM | DIASTOLIC BLOOD PRESSURE: 66 MMHG | TEMPERATURE: 97.9 F

## 2024-06-06 DIAGNOSIS — R39.9 UTI SYMPTOMS: ICD-10-CM

## 2024-06-06 DIAGNOSIS — N30.01 ACUTE CYSTITIS WITH HEMATURIA: Primary | ICD-10-CM

## 2024-06-06 LAB
SL AMB  POCT GLUCOSE, UA: ABNORMAL
SL AMB LEUKOCYTE ESTERASE,UA: ABNORMAL
SL AMB POCT BILIRUBIN,UA: ABNORMAL
SL AMB POCT BLOOD,UA: ABNORMAL
SL AMB POCT CLARITY,UA: CLEAR
SL AMB POCT COLOR,UA: YELLOW
SL AMB POCT KETONES,UA: ABNORMAL
SL AMB POCT NITRITE,UA: ABNORMAL
SL AMB POCT PH,UA: 7
SL AMB POCT SPECIFIC GRAVITY,UA: 1.01
SL AMB POCT URINE PROTEIN: ABNORMAL
SL AMB POCT UROBILINOGEN: 1

## 2024-06-06 PROCEDURE — 81002 URINALYSIS NONAUTO W/O SCOPE: CPT

## 2024-06-06 PROCEDURE — 99213 OFFICE O/P EST LOW 20 MIN: CPT

## 2024-06-06 PROCEDURE — 87086 URINE CULTURE/COLONY COUNT: CPT

## 2024-06-06 PROCEDURE — 87077 CULTURE AEROBIC IDENTIFY: CPT

## 2024-06-06 PROCEDURE — G0463 HOSPITAL OUTPT CLINIC VISIT: HCPCS

## 2024-06-06 PROCEDURE — 87186 SC STD MICRODIL/AGAR DIL: CPT

## 2024-06-06 RX ORDER — CEPHALEXIN 500 MG/1
500 CAPSULE ORAL 2 TIMES DAILY
Qty: 14 CAPSULE | Refills: 0 | Status: SHIPPED | OUTPATIENT
Start: 2024-06-06 | End: 2024-06-13

## 2024-06-06 NOTE — PROGRESS NOTES
[facility]        NAME: Paula Paniagua is a 78 y.o. female  : 1945    MRN: [id number]  DATE: 2024  TIME: 7:11 PM    Assessment and Plan   Acute cystitis with hematuria [N30.01]  1. Acute cystitis with hematuria  cephalexin (KEFLEX) 500 mg capsule      2. UTI symptoms  POCT urine dip    Urine culture    Urine culture            Patient Instructions       Follow up with PCP in 3-5 days.  Proceed to  ER if symptoms worsen.    If tests have been performed at [facility], our office will contact you with results if changes need to be made to the care plan discussed with you at the visit.  You can review your full results on [facility].    Chief Complaint     Chief Complaint   Patient presents with    Female Dysuria     Starting this morning, frequency and burning with urination.          History of Present Illness       77 y/o F presents for dysuria for 2 days.  History of UTI in the past.  Patient admits she did experience fecal incontinence with a chronic cough.  This has resolved.  Has not taken any medications.  No hematuria or flank pain.         Review of Systems   Review of Systems   Constitutional:  Negative for chills and fever.   Gastrointestinal:  Negative for abdominal pain, nausea and vomiting.   Genitourinary:  Positive for dysuria and urgency. Negative for flank pain and hematuria.         Current Medications       Current Outpatient Medications:     cephalexin (KEFLEX) 500 mg capsule, Take 1 capsule (500 mg total) by mouth 2 (two) times a day for 7 days, Disp: 14 capsule, Rfl: 0    amLODIPine (NORVASC) 10 mg tablet, TAKE 1 TABLET EVERY DAY, Disp: 90 tablet, Rfl: 3    aspirin 81 mg chewable tablet, Chew 1 tablet (81 mg total) daily, Disp: , Rfl:     atorvastatin (LIPITOR) 20 mg tablet, TAKE 1 TABLET EVERY DAY, Disp: 90 tablet, Rfl: 1    budesonide-formoterol (Symbicort) 160-4.5 mcg/act inhaler, Inhale 2 puffs 2 (two) times a day Rinse mouth after use, Disp: 10.2 g, Rfl:  5    cholecalciferol (VITAMIN D3) 1,000 units tablet, Take 1,000 Units by mouth daily, Disp: , Rfl:     cyclobenzaprine (FLEXERIL) 5 mg tablet, 1 tab BID PRN, Disp: 20 tablet, Rfl: 1    latanoprost (XALATAN) 0.005 % ophthalmic solution, , Disp: , Rfl:     Melatonin 5 MG TABS, Take by mouth Prn, Disp: , Rfl:     Multiple Vitamin (multivitamin) capsule, Take 1 capsule by mouth daily, Disp: , Rfl:     omeprazole (PriLOSEC) 40 MG capsule, Take 1 capsule (40 mg total) by mouth daily, Disp: 30 capsule, Rfl: 2    predniSONE 10 mg tablet, 4 tabs x 2days, 3 tabs x 2 days, 2 tabs x 2days, 1 tab x 2 days, Disp: 20 tablet, Rfl: 0    vitamin B-12 (VITAMIN B-12) 1,000 mcg tablet, Take by mouth daily, Disp: , Rfl:     Current Allergies     Allergies as of 2024 - Reviewed 2024   Allergen Reaction Noted    Nsaids Other (See Comments) 2023    Other Cough 2022    Sulfa antibiotics Other (See Comments) 2023    Codeine Rash 2020    Shellfish allergy - food allergy Rash 2023            The following portions of the patient's history were reviewed and updated as appropriate: allergies, current medications, past family history, past medical history, past social history, past surgical history and problem list.     Past Medical History:   Diagnosis Date    Asthma     variant cough asthma    Cataract     GERD (gastroesophageal reflux disease)     Hyperlipidemia     Hypertension        Past Surgical History:   Procedure Laterality Date    EYE SURGERY      HYSTERECTOMY      OOPHORECTOMY Bilateral     UTERINE SUSPENSION         Family History   Problem Relation Age of Onset    Asthma Mother     Hypertension Mother             No Known Problems Father     No Known Problems Sister     No Known Problems Maternal Grandmother     No Known Problems Maternal Grandfather     No Known Problems Paternal Grandmother     No Known Problems Paternal Grandfather     No Known Problems Sister     Hypertension  "Brother         Pace maker         Medications have been verified.        Objective   /66   Pulse 87   Temp 97.9 °F (36.6 °C) (Tympanic)   Resp 18   Ht 5' 3\" (1.6 m)   Wt 56.6 kg (124 lb 12.8 oz)   SpO2 99%   BMI 22.11 kg/m²   No LMP recorded. Patient is postmenopausal.       Physical Exam     Physical Exam  Vitals and nursing note reviewed.   Constitutional:       General: She is not in acute distress.     Appearance: Normal appearance. She is not toxic-appearing.   HENT:      Head: Normocephalic and atraumatic.   Eyes:      Conjunctiva/sclera: Conjunctivae normal.   Pulmonary:      Effort: Pulmonary effort is normal.   Abdominal:      Palpations: Abdomen is soft.      Tenderness: There is no right CVA tenderness or left CVA tenderness.   Neurological:      Mental Status: She is alert.   Psychiatric:         Mood and Affect: Mood normal.         Behavior: Behavior normal.                   "

## 2024-06-08 LAB — BACTERIA UR CULT: ABNORMAL

## 2024-06-21 ENCOUNTER — TELEPHONE (OUTPATIENT)
Age: 79
End: 2024-06-21

## 2024-06-21 NOTE — TELEPHONE ENCOUNTER
Pt needs her bone density test ordered. She is trying to get it scheduled for the same day that her mammo is scheduled 8/8. She is aware Dr. Rg is not in the office until Monday afternoon.

## 2024-06-24 DIAGNOSIS — M81.0 AGE-RELATED OSTEOPOROSIS WITHOUT CURRENT PATHOLOGICAL FRACTURE: Primary | ICD-10-CM

## 2024-06-24 NOTE — TELEPHONE ENCOUNTER
Pt returned call stating missed the call from practice. Did check on the chart and relayed the same message to the patient she understood she will schedule her DEXA scan soon. Thanks

## 2024-07-02 ENCOUNTER — TELEPHONE (OUTPATIENT)
Dept: GASTROENTEROLOGY | Facility: CLINIC | Age: 79
End: 2024-07-02

## 2024-07-03 ENCOUNTER — TELEPHONE (OUTPATIENT)
Age: 79
End: 2024-07-03

## 2024-07-03 NOTE — TELEPHONE ENCOUNTER
Pt called requesting an appointment. Pt stated that her dog ran into her ankle and it is now swollen and she would like to get an x-ray.    Pt was offered 2 same day appointment slots with [facility] at 3 or 3:30 PM and declined both appointments.    Suggested pt try [facility]. Pt didn't want to go to [facility] because of the charge for urgent care.     Pt offered appointment again w/[facility] and declined.    Pt asked when would be the next availability. Pt was advised that due to the holiday we were booked fully the rest of the week but she could call Friday to see if there is a cancellation.    Pt agreed to call back on Friday.

## 2024-07-09 ENCOUNTER — OFFICE VISIT (OUTPATIENT)
Dept: GASTROENTEROLOGY | Facility: MEDICAL CENTER | Age: 79
End: 2024-07-09
Payer: COMMERCIAL

## 2024-07-09 ENCOUNTER — OFFICE VISIT (OUTPATIENT)
Dept: URGENT CARE | Facility: MEDICAL CENTER | Age: 79
End: 2024-07-09
Payer: COMMERCIAL

## 2024-07-09 VITALS
WEIGHT: 124 LBS | HEART RATE: 81 BPM | OXYGEN SATURATION: 96 % | BODY MASS INDEX: 21.97 KG/M2 | TEMPERATURE: 97.5 F | SYSTOLIC BLOOD PRESSURE: 128 MMHG | HEIGHT: 63 IN | DIASTOLIC BLOOD PRESSURE: 68 MMHG

## 2024-07-09 VITALS
HEART RATE: 106 BPM | RESPIRATION RATE: 18 BRPM | TEMPERATURE: 98.6 F | HEIGHT: 63 IN | BODY MASS INDEX: 21.97 KG/M2 | SYSTOLIC BLOOD PRESSURE: 147 MMHG | DIASTOLIC BLOOD PRESSURE: 82 MMHG | OXYGEN SATURATION: 97 % | WEIGHT: 124 LBS

## 2024-07-09 DIAGNOSIS — K59.09 OTHER CONSTIPATION: ICD-10-CM

## 2024-07-09 DIAGNOSIS — R15.9 INCONTINENCE OF FECES, UNSPECIFIED FECAL INCONTINENCE TYPE: ICD-10-CM

## 2024-07-09 DIAGNOSIS — D21.4 BENIGN GASTROINTESTINAL STROMAL TUMOR (GIST): ICD-10-CM

## 2024-07-09 DIAGNOSIS — K21.9 GASTROESOPHAGEAL REFLUX DISEASE, UNSPECIFIED WHETHER ESOPHAGITIS PRESENT: Primary | ICD-10-CM

## 2024-07-09 DIAGNOSIS — M54.42 ACUTE LEFT-SIDED LOW BACK PAIN WITH LEFT-SIDED SCIATICA: Primary | ICD-10-CM

## 2024-07-09 DIAGNOSIS — S90.01XA CONTUSION OF RIGHT ANKLE, INITIAL ENCOUNTER: ICD-10-CM

## 2024-07-09 PROCEDURE — G0463 HOSPITAL OUTPT CLINIC VISIT: HCPCS | Performed by: NURSE PRACTITIONER

## 2024-07-09 PROCEDURE — 99214 OFFICE O/P EST MOD 30 MIN: CPT | Performed by: PHYSICIAN ASSISTANT

## 2024-07-09 PROCEDURE — 99213 OFFICE O/P EST LOW 20 MIN: CPT | Performed by: NURSE PRACTITIONER

## 2024-07-09 RX ORDER — PREDNISONE 20 MG/1
40 TABLET ORAL DAILY
Qty: 10 TABLET | Refills: 0 | Status: SHIPPED | OUTPATIENT
Start: 2024-07-09 | End: 2024-07-14

## 2024-07-09 RX ORDER — FAMOTIDINE 40 MG/1
40 TABLET, FILM COATED ORAL DAILY PRN
Qty: 30 TABLET | Refills: 11 | Status: SHIPPED | OUTPATIENT
Start: 2024-07-09 | End: 2024-07-17

## 2024-07-09 RX ORDER — ACETAMINOPHEN 500 MG
500 TABLET ORAL EVERY 6 HOURS PRN
COMMUNITY

## 2024-07-09 NOTE — PROGRESS NOTES
[facility] Now        NAME: Paula Paniagua is a 78 y.o. female  : 1945    MRN: [id number]  DATE: 2024  TIME: 12:37 PM    Assessment and Plan   Acute left-sided low back pain with left-sided sciatica [M54.42]  1. Acute left-sided low back pain with left-sided sciatica  predniSONE 20 mg tablet      2. Contusion of right ankle, initial encounter          Patient in NAD and VSS upon exam. Discussed with patient exam findings, patient with FROM to ankle and NTTP, able to ambulate without pain, xray not indicated, patient agreeable. Back pain with sciatica, no bony tenderness, will continue Flexeril, patient reports does not need refill. Will add prednisone to start if symptoms are not improving in a few days. Patient will f/u with PCP in 1 week if symptoms are not improving, discussed possible PT. Gave exercises for sciatica pain. Discussed supportive care and return precautions. Patient agreeable to plan of care.    Patient Instructions       Follow up with PCP in 3-5 days.  Proceed to  ER if symptoms worsen.    If tests have been performed at [facility], our office will contact you with results if changes need to be made to the care plan discussed with you at the visit.  You can review your full results on [facility].    Chief Complaint     Chief Complaint   Patient presents with    Back Pain     Pt states she was playing with grandson and he ran by her and pushed her.  She fell against the bed and hit her lower back and fell backwards on .  Her dog was running and ran into her right foot.  Her foot has been painful and swollen ever since.  Pt states she took her old prescription of Flexeril for the pain.           History of Present Illness       Started: 7 days ago  Location: left lower back with sciatica and right ankle pain  Injury: reports was playing with her grandson and he went to run past her and pushed her onto the bed, immediately after the dog ran by her to go after her  "grandson and ran over her right ankle  Denies numbness, tingling, loss of sensation, LOC  Treatment: heating pad, tylenol, flexeril with some relief  Patient was afraid to keep taking Flexeril without being checked out  Patient reports \"years\" of back problems        Review of Systems   Review of Systems   Musculoskeletal:  Positive for back pain. Negative for gait problem and joint swelling.        Right ankle pain   Neurological:  Negative for weakness.   All other systems reviewed and are negative.        Current Medications       Current Outpatient Medications:     acetaminophen (TYLENOL) 500 mg tablet, Take 500 mg by mouth every 6 (six) hours as needed for mild pain, Disp: , Rfl:     amLODIPine (NORVASC) 10 mg tablet, TAKE 1 TABLET EVERY DAY, Disp: 90 tablet, Rfl: 3    atorvastatin (LIPITOR) 20 mg tablet, TAKE 1 TABLET EVERY DAY, Disp: 90 tablet, Rfl: 1    cholecalciferol (VITAMIN D3) 1,000 units tablet, Take 1,000 Units by mouth daily, Disp: , Rfl:     Multiple Vitamin (multivitamin) capsule, Take 1 capsule by mouth daily, Disp: , Rfl:     predniSONE 20 mg tablet, Take 2 tablets (40 mg total) by mouth daily for 5 days, Disp: 10 tablet, Rfl: 0    aspirin 81 mg chewable tablet, Chew 1 tablet (81 mg total) daily (Patient not taking: Reported on 7/9/2024), Disp: , Rfl:     budesonide-formoterol (Symbicort) 160-4.5 mcg/act inhaler, Inhale 2 puffs 2 (two) times a day Rinse mouth after use (Patient not taking: Reported on 7/9/2024), Disp: 10.2 g, Rfl: 5    cyclobenzaprine (FLEXERIL) 5 mg tablet, 1 tab BID PRN (Patient not taking: Reported on 7/9/2024), Disp: 20 tablet, Rfl: 1    famotidine (PEPCID) 40 MG tablet, Take 1 tablet (40 mg total) by mouth daily as needed for heartburn (for heartburn) (Patient not taking: Reported on 7/9/2024), Disp: 30 tablet, Rfl: 11    latanoprost (XALATAN) 0.005 % ophthalmic solution, , Disp: , Rfl:     Melatonin 5 MG TABS, Take by mouth Prn (Patient not taking: Reported on 7/9/2024), " "Disp: , Rfl:     vitamin B-12 (VITAMIN B-12) 1,000 mcg tablet, Take by mouth daily (Patient not taking: Reported on 2024), Disp: , Rfl:     Current Allergies     Allergies as of 2024 - Reviewed 2024   Allergen Reaction Noted    Nsaids Other (See Comments) 2023    Other Cough 2022    Sulfa antibiotics Other (See Comments) 2023    Codeine Rash 2020    Shellfish allergy - food allergy Rash 2023            The following portions of the patient's history were reviewed and updated as appropriate: allergies, current medications, past family history, past medical history, past social history, past surgical history and problem list.     Past Medical History:   Diagnosis Date    Asthma     variant cough asthma    Cataract     GERD (gastroesophageal reflux disease)     Hyperlipidemia     Hypertension        Past Surgical History:   Procedure Laterality Date    EYE SURGERY      HYSTERECTOMY      OOPHORECTOMY Bilateral     UTERINE SUSPENSION         Family History   Problem Relation Age of Onset    Asthma Mother     Hypertension Mother             No Known Problems Father     No Known Problems Sister     No Known Problems Maternal Grandmother     No Known Problems Maternal Grandfather     No Known Problems Paternal Grandmother     No Known Problems Paternal Grandfather     No Known Problems Sister     Hypertension Brother         Pace maker         Medications have been verified.        Objective   /82 (BP Location: Left arm, Patient Position: Sitting)   Pulse (!) 106   Temp 98.6 °F (37 °C) (Tympanic)   Resp 18   Ht 5' 3\" (1.6 m)   Wt 56.2 kg (124 lb)   SpO2 97%   BMI 21.97 kg/m²   No LMP recorded. Patient is postmenopausal.       Physical Exam     Physical Exam  Constitutional:       General: She is not in acute distress.     Appearance: Normal appearance. She is normal weight. She is not ill-appearing.   HENT:      Head: Normocephalic and atraumatic. "   Cardiovascular:      Rate and Rhythm: Normal rate.      Pulses:           Dorsalis pedis pulses are 1+ on the right side and 1+ on the left side.   Pulmonary:      Effort: Pulmonary effort is normal.   Musculoskeletal:      Cervical back: No bony tenderness. No pain with movement.      Thoracic back: No tenderness or bony tenderness.      Lumbar back: No spasms, tenderness or bony tenderness. Normal range of motion. Negative right straight leg raise test and negative left straight leg raise test.        Back:       Right ankle: Swelling (trace to lateral) and ecchymosis (to medial) present. No deformity or lacerations. No tenderness. Normal range of motion. Normal pulse.      Right Achilles Tendon: Normal.   Skin:     General: Skin is warm and dry.   Neurological:      Mental Status: She is alert.      Sensory: Sensation is intact.

## 2024-07-09 NOTE — PATIENT INSTRUCTIONS
"Patient Education     High-fiber diet   The Basics   Written by the doctors and editors at [facility]   What is fiber? -- Fiber is a substance found in some fruits, vegetables, and grains. Most fiber passes through your body without being digested. But it can affect how you digest other foods, and it can also improve your bowel movements.  There are 2 kinds of fiber. One kind is called \"soluble fiber\" and is found in fruits, oats, barley, beans, and peas. The other kind is called \"insoluble fiber,\" and is found in wheat, rye, and other grains.  Both kinds of fiber that you eat are called \"dietary fiber.\"  Why is fiber important to my health? -- Fiber can help make your bowel movements softer and more regular. Adding fiber to your diet can help with problems including constipation, hemorrhoids, and diarrhea. Plus, it can help prevent \"accidents\" if you have trouble controlling your bowel movements.  Getting enough fiber can also help lower your risk of heart disease, stroke, and type 2 diabetes. That's because fiber can help lower cholesterol and help control blood sugar.  How much fiber do I need? -- The recommended amount of fiber is 20 to 25 grams a day. The nutrition label on packaged foods can show you how much fiber you are getting in each serving (figure 1).  How can I make sure I'm getting enough fiber? -- To make sure that you're getting enough fiber, eat plenty of the fruits, vegetables, and grains that contain fiber (table 1 and figure 2). Many breakfast cereals also have a lot of fiber.  If you can't get enough fiber from food, you can add wheat bran to the foods you do eat. Or you can take fiber supplements. These come in the form of powders, wafers, or pills. They include psyllium seed (sample brand names: Metamucil, Konsyl), methylcellulose (sample brand name: Citrucel), polycarbophil (sample brand name: FiberCon), and wheat dextrin (sample brand name: Benefiber). If you take a fiber supplement, be sure " "to read the label so you know how much to take. If you're not sure, ask your doctor or nurse.  What are the side effects of fiber? -- When you start eating more fiber, your belly might feel bloated, or you might have gas or cramps. You can avoid these side effects by adding fiber to your diet slowly.  Some people feel worse when they eat more fiber or take fiber supplements. If you feel worse after adding more fiber to your diet, you can try decreasing the amount of fiber to see if that helps.  All topics are updated as new evidence becomes available and our peer review process is complete.  This topic retrieved from [employer] on: Feb 28, 2024.  Topic 58480 Version 10.0  Release: 32.2.4 - C32.58  © 2024 UpToDate, Inc. and/or its affiliates. All rights reserved.  figure 1: Nutrition label - Fiber     This is an example of a nutrition label. To figure out how much fiber is in a food, look for the line that says \"Dietary Fiber.\" It's also important to look at the serving size. This food has 7 grams of fiber in each serving, and each serving is 1 cup.  Graphic 71646 Version 8.0  table 1: Amount of fiber in different foods  Food  Serving  Grams of fiber    Fruits    Apple (with skin) 1 medium apple 4.4   Banana 1 medium banana 3.1   Oranges 1 orange 3.1   Prunes 1 cup, pitted 12.4   Juices    Apple, unsweetened, with added ascorbic acid 1 cup 0.5   Grapefruit, white, canned, sweetened 1 cup 0.2   Grape, unsweetened, with added ascorbic acid 1 cup 0.5   Orange 1 cup 0.7   Vegetables    Cooked   Green beans 1 cup 4.0   Carrots 1/2 cup sliced 2.3   Peas 1 cup 8.8   Potato (baked, with skin) 1 medium potato 3.8   Raw   Cucumber (with peel) 1 cucumber 1.5   Lettuce 1 cup shredded 0.5   Tomato 1 medium tomato 1.5   Spinach 1 cup 0.7   Legumes   Baked beans, canned, no salt added 1 cup 13.9   Kidney beans, canned 1 cup 13.6   Lima beans, canned 1 cup 11.6   Lentils, boiled 1 cup 15.6   Breads, pastas, flours    Bran muffins 1 " medium muffin 5.2   Oatmeal, cooked 1 cup 4.0   White bread 1 slice 0.6   Whole-wheat bread 1 slice 1.9   Pasta and rice, cooked   Macaroni 1 cup 2.5   Rice, brown 1 cup 3.5   Rice, white 1 cup 0.6   Spaghetti (regular) 1 cup 2.5   Nuts    Almonds 1/2 cup 8.7   Peanuts 1/2 cup 7.9   To learn how much fiber and other nutrients are in different foods, visit the [place] Department of Agriculture ([employer]) Food[employer] website.  Graphic 63456 Version 6.0  figure 2: Foods with fiber     Foods with a lot of fiber include prunes, apples, oranges, bananas, peas, green beans, kidney beans, cooked oatmeal, almonds, peanuts, and whole-wheat bread.  Graphic 81158 Version 1.0  Consumer Information Use and Disclaimer   Disclaimer: This generalized information is a limited summary of diagnosis, treatment, and/or medication information. It is not meant to be comprehensive and should be used as a tool to help the user understand and/or assess potential diagnostic and treatment options. It does NOT include all information about conditions, treatments, medications, side effects, or risks that may apply to a specific patient. It is not intended to be medical advice or a substitute for the medical advice, diagnosis, or treatment of a health care provider based on the health care provider's examination and assessment of a patient's specific and unique circumstances. Patients must speak with a health care provider for complete information about their health, medical questions, and treatment options, including any risks or benefits regarding use of medications. This information does not endorse any treatments or medications as safe, effective, or approved for treating a specific patient. UpToDate, Inc. and its affiliates disclaim any warranty or liability relating to this information or the use thereof.The use of this information is governed by the Terms of Use, available at  https://www.[employer]de[employer]wer.com/en/know/clinical-effectiveness-terms. 2024© UpToDate, Inc. and its affiliates and/or licensors. All rights reserved.  Copyright   © 2024 UpToDate, Inc. and/or its affiliates. All rights reserved.        Patient Education     Gastrointestinal Stromal Tumor   About this topic   A gastrointestinal stromal tumor, or GIST, is a rare kind of tumor that may or may not be cancer. Your gastrointestinal or GI tract starts at your mouth and ends at your anus. The anus is the opening where stool leaves the body. Your food tube or esophagus, stomach, and bowels are in between and are also parts of your GI tract.  GIST most often happens in the stomach or small bowels. It is not common, but a GIST may also happen other places along the GI tract. With this kind of tumor, the cells in the body start to grow out of control. The tumor may just stay in one place or you may have more than one tumor.   What are the causes?   GIST most often happens because of changes in your genes. You cannot prevent these changes.  What can make this more likely to happen?   Being male  Being age 50 or older  Having family members with GISTs  Smoking  Having some types of rare inherited diseases, such as neurofibromatosis  What are the main signs?   People with a GIST may have these signs or no signs at all. Some of these signs may also be caused by other problems.  Bright red or very dark blood in the stool  Throwing up blood  Belly pain, swelling, or cramps  Upset stomach or loose stools  Not feeling hungry or feeling full after eating a very small amount  Weight loss  Problems or pain with swallowing  Lump in the belly  How does the doctor diagnose this health problem?   The doctor will ask questions about your history and will do an exam. The doctor may order:  Lab tests  MRI or CT scan  Endoscopy  X-rays  Biopsy  Bone scan  How does the doctor treat this health problem?   Your treatment will be based on the type of  tumor cells that are found. Treatment will also be based on the kind of tumor, stage of the cancer, and if the cancer has spread. Your treatment may include surgery, radiation, or drugs called chemotherapy or immunotherapy.  You may need surgery to:  Take out any tumor or explore where the cancer is in your belly  Take out lymph nodes that have cancer cells  What lifestyle changes are needed?   Eat healthy foods. Proper nutrition and healthy eating will help you recover. Ask your dietitian to help you make a balanced eating plan. Drink at least 8 glasses of water each day.  Stop smoking.  Avoid drinking beer, wine, and mixed drinks (alcohol) and caffeine.  Physical activity or exercise builds up your energy and helps strengthen your body.  Get lots of rest and sleep. Try to take naps if needed.  What drugs may be needed?   The doctor may order drugs to:  Kill cancer cells  Control pain  Prevent infection  Help with side effects like upset stomach and throwing up  Helpful tips   Your doctor may ask you to make visits to the office to check on your progress. Be sure to keep your visits.  Join a support group. Support can help you understand and deal with your illness.  Last Reviewed Date   2021-06-04  Consumer Information Use and Disclaimer   This generalized information is a limited summary of diagnosis, treatment, and/or medication information. It is not meant to be comprehensive and should be used as a tool to help the user understand and/or assess potential diagnostic and treatment options. It does NOT include all information about conditions, treatments, medications, side effects, or risks that may apply to a specific patient. It is not intended to be medical advice or a substitute for the medical advice, diagnosis, or treatment of a health care provider based on the health care provider's examination and assessment of a patient’s specific and unique circumstances. Patients must speak with a health care provider  for complete information about their health, medical questions, and treatment options, including any risks or benefits regarding use of medications. This information does not endorse any treatments or medications as safe, effective, or approved for treating a specific patient. UpToDate, Inc. and its affiliates disclaim any warranty or liability relating to this information or the use thereof. The use of this information is governed by the Terms of Use, available at https://www.woltersCounterStormuwer.com/en/know/clinical-effectiveness-terms   Copyright   Copyright © 2024 UpToDate, Inc. and its affiliates and/or licensors. All rights reserved.

## 2024-07-09 NOTE — PROGRESS NOTES
[facility] - Outpatient Follow-up Note  Paula Paniagua 78 y.o. female MRN: [id number]  Encounter: [id number]        Assessment and Plan    1. GERD  The patient's reflux is relatively well-controlled with diet however she states that a few times a week she gets heartburn.  She is currently not taking any medication for this.  -Continue antireflux diet  -Start as needed Pepcid    2. GIST tumor   The patient had a recent endoscopic ultrasound as she had a CT scan in February revealing an indeterminate 1.4 x 1.0 cm soft tissue nodule abutting the medial gastric body body wall.  Endoscopic ultrasound revealed a solid oval-shaped 1.4 x 1.4cm lesion in the mid body.  Fine-needle aspiration was obtained and revealed rare clusters of bland appearing spindle cells consistent with GIST tumor.  -Advanced endoscopy is recommending repeat endoscopic ultrasound in 1 year    3.  Constipation  4.  Fecal incontinence  Patient states that she feels as though she is mildly constipated explained as the sensation of incomplete bowel movements and when she was taking omeprazole she was noticing some fecal incontinence.  Her last colonoscopy was in 2015 with 1 diminutive polyp which was removed and pathology revealed normal mucosa. Recall 10 years  -Start 20-25g fiber daily  -If persistent constipation and fecal incontinence colonoscopy    Follow up 3-4 months     ______________________________________________________________________    History of Present Illness  Paula Paniagua is a 78 y.o. female here for follow up evaluation of GERD and an abnormal CT scan.  The patient's reflux is relatively well-controlled with diet however she states that a few times a week she gets heartburn.  She is currently not taking any medication for this.  The patient had a recent endoscopic ultrasound as she had a CT scan in February revealing an indeterminate 1.4 x 1.0 cm soft tissue nodule abutting the medial gastric body  body wall.  Endoscopic ultrasound revealed a solid oval-shaped 1.4 x 1.4cm lesion in the mid body.  Fine-needle aspiration was obtained and revealed rare clusters of bland appearing spindle cells consistent with GIST tumor.  The patient has no complaints at her visit aside for some abnormal bowels.        Review of Systems   Constitutional:  Negative for activity change, appetite change, chills, fatigue and unexpected weight change.       Past Medical History  Past Medical History:   Diagnosis Date    Asthma     variant cough asthma    Cataract     GERD (gastroesophageal reflux disease) 1989    Hyperlipidemia     Hypertension        Past Social history  Past Surgical History:   Procedure Laterality Date    EYE SURGERY      HYSTERECTOMY      OOPHORECTOMY Bilateral     UTERINE SUSPENSION       Social History     Socioeconomic History    Marital status:      Spouse name: Not on file    Number of children: Not on file    Years of education: Not on file    Highest education level: Not on file   Occupational History    Not on file   Tobacco Use    Smoking status: Never     Passive exposure: Never    Smokeless tobacco: Never   Vaping Use    Vaping status: Never Used   Substance and Sexual Activity    Alcohol use: Not Currently    Drug use: Never    Sexual activity: Yes     Partners: Male   Other Topics Concern    Not on file   Social History Narrative    Not on file     Social Determinants of Health     Financial Resource Strain: Low Risk  (7/27/2023)    Overall Financial Resource Strain (CARDIA)     Difficulty of Paying Living Expenses: Not very hard   Food Insecurity: Not on file   Transportation Needs: Unmet Transportation Needs (7/27/2023)    PRAPARE - Transportation     Lack of Transportation (Medical): Yes     Lack of Transportation (Non-Medical): Yes   Physical Activity: Not on file   Stress: Not on file   Social Connections: Not on file   Intimate Partner Violence: Not on file   Housing Stability: Not on  file     Social History     Substance and Sexual Activity   Alcohol Use Not Currently     Social History     Substance and Sexual Activity   Drug Use Never     Social History     Tobacco Use   Smoking Status Never    Passive exposure: Never   Smokeless Tobacco Never       Past Family History  Family History   Problem Relation Age of Onset    Asthma Mother     Hypertension Mother             No Known Problems Father     No Known Problems Sister     No Known Problems Maternal Grandmother     No Known Problems Maternal Grandfather     No Known Problems Paternal Grandmother     No Known Problems Paternal Grandfather     No Known Problems Sister     Hypertension Brother         Pace maker       Current Medications  Current Outpatient Medications   Medication Sig Dispense Refill    amLODIPine (NORVASC) 10 mg tablet TAKE 1 TABLET EVERY DAY 90 tablet 3    aspirin 81 mg chewable tablet Chew 1 tablet (81 mg total) daily      atorvastatin (LIPITOR) 20 mg tablet TAKE 1 TABLET EVERY DAY 90 tablet 1    budesonide-formoterol (Symbicort) 160-4.5 mcg/act inhaler Inhale 2 puffs 2 (two) times a day Rinse mouth after use 10.2 g 5    cholecalciferol (VITAMIN D3) 1,000 units tablet Take 1,000 Units by mouth daily      cyclobenzaprine (FLEXERIL) 5 mg tablet 1 tab BID PRN 20 tablet 1    latanoprost (XALATAN) 0.005 % ophthalmic solution       Melatonin 5 MG TABS Take by mouth Prn      Multiple Vitamin (multivitamin) capsule Take 1 capsule by mouth daily      omeprazole (PriLOSEC) 40 MG capsule Take 1 capsule (40 mg total) by mouth daily 30 capsule 2    predniSONE 10 mg tablet 4 tabs x 2days, 3 tabs x 2 days, 2 tabs x 2days, 1 tab x 2 days 20 tablet 0    vitamin B-12 (VITAMIN B-12) 1,000 mcg tablet Take by mouth daily       No current facility-administered medications for this visit.       Allergies  Allergies   Allergen Reactions    Nsaids Other (See Comments)    Other Cough     Seasonal, perfumes, colon, chemicals    Sulfa  Antibiotics Other (See Comments)    Codeine Rash    Shellfish Allergy - Food Allergy Rash         The following portions of the patient's history were reviewed and updated as appropriate: allergies, current medications, past medical history, past social history, past surgical history and problem list.      Vitals  There were no vitals filed for this visit.      Physical Exam  Constitutional   General appearance: Patient is seated and in no acute distress, well appearing and well nourished.   Head and Face   Head and face: Normal.    Eyes   Conjunctiva and lids: No erythema, swelling or discharge.  Anicteric.  Ears, Nose, Mouth, and Throat   Hearing: Normal.    Neck: Supple, trachea midline.  Pulmonary   Respiratory effort: No increased work of breathing or signs of respiratory distress.    Cardiovascular   Examination of extremities for edema and/or varicosities: Normal.    Musculoskeletal   Gait and station: Normal    Skin   Skin and subcutaneous tissue: Warm, dry, and intact. No visible jaundice, lesions or rashes.  Psychiatric   Judgment and insight: Normal  Recent and remote memory:  Normal  Mood and affect: Normal      Results  No visits with results within 1 Day(s) from this visit.   Latest known visit with results is:   Office Visit on 06/06/2024   Component Date Value    LEUKOCYTE ESTERASE,UA 06/06/2024 Large     NITRITE,UA 06/06/2024 Neg     SL AMB POCT UROBILINOGEN 06/06/2024 1.0     POCT URINE PROTEIN 06/06/2024 trace      PH,UA 06/06/2024 7.0     BLOOD,UA 06/06/2024 small     SPECIFIC GRAVITY,UA 06/06/2024 1.010     KETONES,UA 06/06/2024 Neg     BILIRUBIN,UA 06/06/2024 Neg     GLUCOSE, UA 06/06/2024 Neg      COLOR,UA 06/06/2024 yellow     CLARITY,UA 06/06/2024 clear     Urine Culture 06/06/2024 50,000-59,000 cfu/ml Escherichia coli (A)        Radiology Results  No results found.    Orders  No orders of the defined types were placed in this encounter.

## 2024-07-09 NOTE — PATIENT INSTRUCTIONS
Continue Flexeril as needed  Tylenol as needed for pain to back and ankle  If symptoms are not improving in a few days, can start Prednisone  Heating pad to back for comfort  Lidocaine patches  Stretches  Continue ace wrap for ankle support until feeling better  Can continue ice to ankle for comfort  If symptoms are not improving in 1 week, please follow up with PCP

## 2024-07-17 ENCOUNTER — NURSE TRIAGE (OUTPATIENT)
Age: 79
End: 2024-07-17

## 2024-07-17 DIAGNOSIS — K21.9 GASTROESOPHAGEAL REFLUX DISEASE, UNSPECIFIED WHETHER ESOPHAGITIS PRESENT: Primary | ICD-10-CM

## 2024-07-17 RX ORDER — FAMOTIDINE 20 MG/1
20 TABLET, FILM COATED ORAL AS NEEDED
Qty: 30 TABLET | Refills: 11 | Status: SHIPPED | OUTPATIENT
Start: 2024-07-17

## 2024-07-17 NOTE — TELEPHONE ENCOUNTER
Okay to take 20 mg which has been sent to her pharmacy.  Also okay to take prednisone and Mucinex while on famotidine

## 2024-07-17 NOTE — TELEPHONE ENCOUNTER
"SPOKE WITH PT, HX GERD, GIST TUMOR, CONSTIPATION. LAST OV 7/9/24. PT WAS PRESCRIBED FAMOTIDINE 40 MG, HAS BEEN TAKING PRN, NOTICES WHEN SHE TAKES IT, SHE FEELS NAUSEATED, WONDERING IF IT IS TOO STRONG, SHE TOOK 20 MG IN THE PAST. PT ALSO INQUIRING IF SHE CAN TAKE MUCINEX 12 HR, AND PREDNISONE IF NEEDED WHILE TAKING FAMOTIDINE.           Reason for Disposition   Information only question and nurse able to answer    Answer Assessment - Initial Assessment Questions  1. REASON FOR CALL or QUESTION: \"What is your reason for calling today?\" or \"How can I best help you?\" or \"What question do you have that I can help answer?\"      SPOKE WITH PT, HX GERD, GIST TUMOR, CONSTIPATION. LAST OV 7/9/24. PT WAS PRESCRIBED FAMOTIDINE 40 MG, HAS BEEN TAKING PRN, NOTICES WHEN SHE TAKES IT, SHE FEELS NAUSEATED, WONDERING IF IT IS TOO STRONG, SHE TOOK 20 MG IN THE PAST. PT ALSO INQUIRING IF SHE CAN TAKE MUCINEX 12 HR, AND PREDNISONE IF NEEDED WHILE TAKING FAMOTIDINE.    Protocols used: Information Only Call - No Triage-ADULT-[place]    "

## 2024-07-17 NOTE — TELEPHONE ENCOUNTER
Called pt and relayed Lorene's response. Pt understood, will have daughter  new dosage and thanked us.

## 2024-07-18 ENCOUNTER — APPOINTMENT (OUTPATIENT)
Dept: LAB | Facility: CLINIC | Age: 79
End: 2024-07-18
Payer: COMMERCIAL

## 2024-07-18 DIAGNOSIS — I10 ESSENTIAL HYPERTENSION: ICD-10-CM

## 2024-07-18 LAB
ALBUMIN SERPL BCG-MCNC: 4.3 G/DL (ref 3.5–5)
ALP SERPL-CCNC: 81 U/L (ref 34–104)
ALT SERPL W P-5'-P-CCNC: 12 U/L (ref 7–52)
ANION GAP SERPL CALCULATED.3IONS-SCNC: 12 MMOL/L (ref 4–13)
AST SERPL W P-5'-P-CCNC: 20 U/L (ref 13–39)
BILIRUB SERPL-MCNC: 0.56 MG/DL (ref 0.2–1)
BUN SERPL-MCNC: 13 MG/DL (ref 5–25)
CALCIUM SERPL-MCNC: 9.5 MG/DL (ref 8.4–10.2)
CHLORIDE SERPL-SCNC: 103 MMOL/L (ref 96–108)
CO2 SERPL-SCNC: 29 MMOL/L (ref 21–32)
CREAT SERPL-MCNC: 0.63 MG/DL (ref 0.6–1.3)
GFR SERPL CREATININE-BSD FRML MDRD: 87 ML/MIN/1.73SQ M
GLUCOSE P FAST SERPL-MCNC: 109 MG/DL (ref 65–99)
MAGNESIUM SERPL-MCNC: 2.3 MG/DL (ref 1.9–2.7)
POTASSIUM SERPL-SCNC: 4 MMOL/L (ref 3.5–5.3)
PROT SERPL-MCNC: 7.4 G/DL (ref 6.4–8.4)
SODIUM SERPL-SCNC: 144 MMOL/L (ref 135–147)

## 2024-07-18 PROCEDURE — 83735 ASSAY OF MAGNESIUM: CPT

## 2024-07-18 PROCEDURE — 36415 COLL VENOUS BLD VENIPUNCTURE: CPT

## 2024-07-18 PROCEDURE — 80053 COMPREHEN METABOLIC PANEL: CPT

## 2024-07-26 ENCOUNTER — OFFICE VISIT (OUTPATIENT)
Dept: FAMILY MEDICINE CLINIC | Facility: CLINIC | Age: 79
End: 2024-07-26
Payer: COMMERCIAL

## 2024-07-26 VITALS
DIASTOLIC BLOOD PRESSURE: 86 MMHG | OXYGEN SATURATION: 97 % | SYSTOLIC BLOOD PRESSURE: 140 MMHG | TEMPERATURE: 97.7 F | HEIGHT: 63 IN | HEART RATE: 96 BPM | BODY MASS INDEX: 21.12 KG/M2 | WEIGHT: 119.2 LBS

## 2024-07-26 DIAGNOSIS — F41.1 GENERALIZED ANXIETY DISORDER: ICD-10-CM

## 2024-07-26 DIAGNOSIS — I10 ESSENTIAL HYPERTENSION: ICD-10-CM

## 2024-07-26 DIAGNOSIS — R42 LIGHTHEADEDNESS: Primary | ICD-10-CM

## 2024-07-26 DIAGNOSIS — R05.3 CHRONIC COUGH: ICD-10-CM

## 2024-07-26 PROCEDURE — G2211 COMPLEX E/M VISIT ADD ON: HCPCS | Performed by: FAMILY MEDICINE

## 2024-07-26 PROCEDURE — 99214 OFFICE O/P EST MOD 30 MIN: CPT | Performed by: FAMILY MEDICINE

## 2024-07-26 RX ORDER — FLUTICASONE PROPIONATE 50 MCG
SPRAY, SUSPENSION (ML) NASAL
COMMUNITY
Start: 2024-05-08

## 2024-07-26 NOTE — PATIENT INSTRUCTIONS
Complete blood work and monitor blood pressure daily, especially when having symptoms. Bring in blood pressure log for follow up. Complete Holter monitor.

## 2024-07-26 NOTE — PROGRESS NOTES
Assessment/Plan:     1. Lightheadedness  Assessment & Plan:  Recurrent but not worsening per patient; MRI of brain has been unremarkable; and exam WNL today in office; advised check Holter; monitor BP to see if BP abnormal as it seems to be more consistent with getting out of bed in am and resolves by afternoon; also discussed that sx may be related to her anxiety and that if BP and Holter were normal to consider sx for anxiety; f/u with PCP as scheduled; ER guidance reviewed  Orders:  -     Holter monitor; Future; Expected date: 07/26/2024  -     CBC and differential; Future  -     Hemoglobin A1C With EAG; Future  -     Basic metabolic panel; Future  -     Blood Pressure Monitor IRMA; Use in the morning  2. Generalized anxiety disorder  Assessment & Plan:  We did discuss that her symptoms may be related to anxiety as testing thus far has not given cause to her symptoms; advised if BP normal and no concerning findings in regards to Holter would recommend tx for her anxiety  3. Essential hypertension  Assessment & Plan:  C/w current tx for now; advised home monitoring of BP to see if related to her symptoms  4. Chronic cough  Assessment & Plan:  Lungs clear on exam; advised to restart symbicort and f/u with pulmonary        Subjective:      Patient ID: Paula Paniagua is a 78 y.o. female.    Patient is here to follow up on her recurrent episodes of lightheadedness. Started 3 years ago. Patient states it happens when she doesn't sleep well. She feels lightheaded in am. She states she feels weird in her head around her eyes. No vision changes. No tunnel vision. No headache. Seems to happen on and off since the pandemic. Yesterday, it happened and panicked and scheduled appt. She states coughing has returned.Prednisone does seem to help but was told she should keep taking that if not needed. Has hx of intermittent asthma.   Lightheadedness seems to last a few hours from when she wakes up in am and resolves often by  afternoon.   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 Stress test and Echo were unremarkable for cause of sx.  Denies worsening of symptoms in frequency or severity. Admits increased anxiety related to findings of stomach.   Denies any sinus congestion or sinus pressure. States she used Symbicort but didn't seem to help much so stopped.   Cough seems to be triggered by cold air.     Lab Results       Component                Value               Date                       WBC                      8.16                02/15/2024                 HGB                      15.2                02/15/2024                 HCT                      43.8                02/15/2024                 MCV                      94                  02/15/2024                 PLT                      205                 02/15/2024            Lab Results       Component                Value               Date                       SODIUM                   144                 07/18/2024                 K                        4.0                 07/18/2024                 CL                       103                 07/18/2024                 CO2                      29                  07/18/2024                 AGAP                     12                  07/18/2024                 BUN                      13                  07/18/2024                 CREATININE               0.63                07/18/2024                 GLUC                     113                 02/03/2022                 GLUF                     109 (H)             07/18/2024                 CALCIUM                  9.5                 07/18/2024                 AST                      20                  07/18/2024                 ALT                      12                  07/18/2024                 ALKPHOS                  81                  07/18/2024                 TP                       7.4                  "07/18/2024                 TBILI                    0.56                07/18/2024                 EGFR                     87                  07/18/2024                      The following portions of the patient's history were reviewed and updated as appropriate: allergies, current medications, past family history, past medical history, past social history, past surgical history, and problem list.    Review of Systems   Constitutional:  Negative for chills and fever.   Eyes:  Negative for pain and visual disturbance.   Neurological:  Positive for light-headedness and headaches.   Psychiatric/Behavioral:  The patient is nervous/anxious.          Objective:      /86 (BP Location: Left arm, Patient Position: Sitting, Cuff Size: Adult)   Pulse 96   Temp 97.7 °F (36.5 °C)   Ht 5' 3\" (1.6 m)   Wt 54.1 kg (119 lb 3.2 oz)   SpO2 97%   BMI 21.12 kg/m²          Physical Exam  Vitals reviewed.   Constitutional:       General: She is not in acute distress.     Appearance: Normal appearance. She is not ill-appearing, toxic-appearing or diaphoretic.   HENT:      Head: Normocephalic and atraumatic.   Eyes:      General: No scleral icterus.        Right eye: No discharge.         Left eye: No discharge.      Conjunctiva/sclera: Conjunctivae normal.   Cardiovascular:      Rate and Rhythm: Normal rate and regular rhythm.      Pulses: Normal pulses.      Heart sounds: Normal heart sounds. No murmur heard.     No gallop.   Pulmonary:      Effort: Pulmonary effort is normal. No respiratory distress.      Breath sounds: Normal breath sounds. No stridor. No wheezing, rhonchi or rales.   Musculoskeletal:      Right lower leg: No edema.      Left lower leg: No edema.   Neurological:      General: No focal deficit present.      Mental Status: She is alert and oriented to person, place, and time.      Cranial Nerves: No cranial nerve deficit.      Coordination: Coordination normal.   Psychiatric:         Mood and Affect: Mood " normal.         Behavior: Behavior normal.         Thought Content: Thought content normal.         Judgment: Judgment normal.

## 2024-07-26 NOTE — ASSESSMENT & PLAN NOTE
We did discuss that her symptoms may be related to anxiety as testing thus far has not given cause to her symptoms; advised if BP normal and no concerning findings in regards to Holter would recommend tx for her anxiety

## 2024-07-26 NOTE — ASSESSMENT & PLAN NOTE
Recurrent but not worsening per patient; MRI of brain has been unremarkable; and exam WNL today in office; advised check Holter; monitor BP to see if BP abnormal as it seems to be more consistent with getting out of bed in am and resolves by afternoon; also discussed that sx may be related to her anxiety and that if BP and Holter were normal to consider sx for anxiety; f/u with PCP as scheduled; ER guidance reviewed

## 2024-07-31 ENCOUNTER — TELEPHONE (OUTPATIENT)
Age: 79
End: 2024-07-31

## 2024-07-31 NOTE — TELEPHONE ENCOUNTER
Contacted Pt . in regards to TT//MM Wait Listv , LVM for pt. to contact [phone], option 3 in regards to scheduling.

## 2024-08-01 ENCOUNTER — NURSE TRIAGE (OUTPATIENT)
Age: 79
End: 2024-08-01

## 2024-08-01 ENCOUNTER — OFFICE VISIT (OUTPATIENT)
Dept: FAMILY MEDICINE CLINIC | Facility: CLINIC | Age: 79
End: 2024-08-01
Payer: COMMERCIAL

## 2024-08-01 VITALS
HEART RATE: 80 BPM | SYSTOLIC BLOOD PRESSURE: 110 MMHG | TEMPERATURE: 97.9 F | WEIGHT: 119.8 LBS | DIASTOLIC BLOOD PRESSURE: 80 MMHG | BODY MASS INDEX: 21.23 KG/M2 | HEIGHT: 63 IN | OXYGEN SATURATION: 97 %

## 2024-08-01 DIAGNOSIS — E78.5 DYSLIPIDEMIA: ICD-10-CM

## 2024-08-01 DIAGNOSIS — I10 ESSENTIAL HYPERTENSION: ICD-10-CM

## 2024-08-01 DIAGNOSIS — Z00.00 MEDICARE ANNUAL WELLNESS VISIT, SUBSEQUENT: Primary | ICD-10-CM

## 2024-08-01 DIAGNOSIS — R05.3 CHRONIC COUGH: ICD-10-CM

## 2024-08-01 DIAGNOSIS — F41.9 ANXIETY: ICD-10-CM

## 2024-08-01 PROCEDURE — G0439 PPPS, SUBSEQ VISIT: HCPCS | Performed by: FAMILY MEDICINE

## 2024-08-01 PROCEDURE — 99214 OFFICE O/P EST MOD 30 MIN: CPT | Performed by: FAMILY MEDICINE

## 2024-08-01 NOTE — TELEPHONE ENCOUNTER
"SPOKE WITH PT, HX GIST TUMOR, CONSTIPATION. PT HAD STOPPED TAKING BABY ASPIRIN, SAW PCP TODAY, WAS ADVISED TO RESTART. PT WANTS TO MAKE SURE FROM A GI STANDPOINT THIS IS OK, CONCERNED ABOUT \"MICRO BLEEDING\". 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 PT TRIED ENSURE PLUS, BUT WAS WORRIED HER GLUCOSE WAS ELEVATED. PT WOULD LIKE ANY RECOMMENDATIONS, DOES NOT WISHTO SEE NUTRITIONIST AT THIS TIME.           Reason for Disposition   Information only question and nurse able to answer    Answer Assessment - Initial Assessment Questions  1. REASON FOR CALL or QUESTION: \"What is your reason for calling today?\" or \"How can I best help you?\" or \"What question do you have that I can help answer?\"      SPOKE WITH PT, HX GIST TUMOR, CONSTIPATION. PT HAD STOPPED TAKING BABY ASPIRIN, SAW PCP TODAY, WAS ADVISED TO RESTART. PT WANTS TO MAKE SURE FROM A GI STANDPOINT THIS IS OK, CONCERNED ABOUT \"MICRO BLEEDING\". 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 PT TRIED ENSURE PLUS, BUT WAS WORRIED HER GLUCOSE WAS ELEVATED. PT WOULD LIKE ANY RECOMMENDATIONS, DOES NOT WITH TO SEE NUTRITIONIST AT THIS TIME.    Protocols used: Information Only Call - No Triage-ADULT-[place]    "

## 2024-08-01 NOTE — PROGRESS NOTES
Ambulatory Visit  Name: Paula Paniagua      : 1945      MRN: [id number]  Encounter Provider: Michelle Rg DO  Encounter Date: 2024   Encounter department: [facility]    Assessment & Plan   1. Medicare annual wellness visit, subsequent      2. Chronic cough  Patient has had cough off and on for years. Tried inhalers, cough meds. Reports side effects. Explained that I am not sure what else to prescribe for cough because of medications side effects that she reports. I ddi recommend going back to pulmonary (last seen in  ) - she did not want to go back.    3. Essential hypertension  Stable on BP meds.      4. Dyslipidemia  Last lipids in Feb - cholesterol 142 and LDL 53.      Patient is very anxious - spent time discussing previous testing and the fact that she thinks that there must be something seriously wrong physically, even though testing has not pointed to this. She lives with daughter in basement of her home since COVID. Patient has not felt well [place] I tried to point out that she was probably less anxious there.      Preventive health issues were discussed with patient, and age appropriate screening tests were ordered as noted in patient's After Visit Summary. Personalized health advice and appropriate referrals for health education or preventive services given if needed, as noted in patient's After Visit Summary.    History of Present Illness     Patient is here for Medicare wellness.  Patient continues with cough, and feeling weak and feeling strange in her head.  When she coughs, sometimes, she incontinent of stool.  Had a fall around the  - fell onto bed - continues with pain in left SI area. Never took Prednisone.       Patient Care Team:  Michelle Rg DO as PCP - General (Family Medicine)    Review of Systems   Constitutional:  Negative for chills and fever.        Feeling weak   HENT:  Negative for congestion  and sore throat.    Respiratory:  Positive for cough. Negative for chest tightness.    Cardiovascular:  Negative for chest pain and palpitations.   Gastrointestinal:  Negative for abdominal pain, constipation, diarrhea and nausea.   Genitourinary:  Negative for difficulty urinating.   Skin: Negative.    Neurological:  Positive for light-headedness. Negative for dizziness and headaches.   Psychiatric/Behavioral: Negative.       Medical History Reviewed by provider this encounter:  Tobacco  Allergies  Meds  Problems  Med Hx  Surg Hx  Fam Hx       Annual Wellness Visit Questionnaire   Paula is here for her Subsequent Wellness visit. Last Medicare Wellness visit information reviewed, patient interviewed and updates made to the record today.      Health Risk Assessment:   Patient rates overall health as poor. Patient feels that their physical health rating is slightly better. Patient is very dissatisfied with their life. Eyesight was rated as same. Hearing was rated as same. Patient feels that their emotional and mental health rating is much worse. Patients states they are never, rarely angry. Patient states they are sometimes unusually tired/fatigued. Pain experienced in the last 7 days has been some. Patient's pain rating has been 4/10. Patient states that she has experienced no weight loss or gain in last 6 months.     Fall Risk Screening:   In the past year, patient has experienced: history of falling in past year    Number of falls: 1  Injured during fall?: No    Feels unsteady when standing or walking?: Yes    Worried about falling?: No      Urinary Incontinence Screening:   Patient has leaked urine accidently in the last six months.     Home Safety:  Patient does not have trouble with stairs inside or outside of their home. Patient has working smoke alarms and has working carbon monoxide detector. Home safety hazards include: none.     Nutrition:   Current diet is Regular.     Medications:   Patient is  currently taking over-the-counter supplements. OTC medications include: see medication list. Patient is able to manage medications.     Activities of Daily Living (ADLs)/Instrumental Activities of Daily Living (IADLs):   Walk and transfer into and out of bed and chair?: Yes  Dress and groom yourself?: Yes    Bathe or shower yourself?: Yes    Feed yourself? Yes  Do your laundry/housekeeping?: Yes  Manage your money, pay your bills and track your expenses?: Yes  Make your own meals?: Yes    Do your own shopping?: Yes    Previous Hospitalizations:   Any hospitalizations or ED visits within the last 12 months?: No      Advance Care Planning:   Living will: No    Durable POA for healthcare: No    Advanced directive: Yes    ACP document given: Yes      Cognitive Screening:   Provider or family/friend/caregiver concerned regarding cognition?: No    PREVENTIVE SCREENINGS      Cardiovascular Screening:    General: Screening Current      Diabetes Screening:     General: Screening Current      Breast Cancer Screening:     General: Screening Current      Cervical Cancer Screening:    General: Screening Not Indicated      Osteoporosis Screening:    General: Screening Not Indicated and History Osteoporosis      Lung Cancer Screening:     General: Screening Not Indicated      Hepatitis C Screening:    General: Screening Current    Screening, Brief Intervention, and Referral to Treatment (SBIRT)    Screening  Typical number of drinks in a day: 0  Typical number of drinks in a week: 0  Interpretation: Low risk drinking behavior.    Single Item Drug Screening:  How often have you used an illegal drug (including marijuana) or a prescription medication for non-medical reasons in the past year? never    Single Item Drug Screen Score: 0  Interpretation: Negative screen for possible drug use disorder    SDOH Risk Assessment  Social determinants of health (SDOH) risk assesment tool was completed. The tool at a minimum covered housing  "stability, food insecurity, transportation needs, and utility difficulty. Patient had at risk responses for the following SDOH domains: transportation needs.     Social Determinants of Health     Financial Resource Strain: Low Risk  (7/27/2023)    Overall Financial Resource Strain (CARDIA)    • Difficulty of Paying Living Expenses: Not very hard   Food Insecurity: No Food Insecurity (8/1/2024)    Hunger Vital Sign    • Worried About Running Out of Food in the Last Year: Never true    • Ran Out of Food in the Last Year: Never true   Transportation Needs: Unmet Transportation Needs (8/1/2024)    PRAPARE - Transportation    • Lack of Transportation (Medical): Yes    • Lack of Transportation (Non-Medical): Yes   Housing Stability: Low Risk  (8/1/2024)    Housing Stability Vital Sign    • Unable to Pay for Housing in the Last Year: No    • Number of Times Moved in the Last Year: 0    • Homeless in the Last Year: No   Utilities: Not At Risk (8/1/2024)    [facility] Utilities    • Threatened with loss of utilities: No     No results found.    Objective     /80 (BP Location: Left arm, Patient Position: Sitting, Cuff Size: Adult)   Pulse 80   Temp 97.9 °F (36.6 °C)   Ht 5' 3\" (1.6 m)   Wt 54.3 kg (119 lb 12.8 oz)   SpO2 97%   BMI 21.22 kg/m²     Physical Exam  Vitals and nursing note reviewed.   Constitutional:       General: She is not in acute distress.     Appearance: She is well-developed.   HENT:      Head: Normocephalic.      Right Ear: Tympanic membrane normal.      Left Ear: Tympanic membrane normal.      Mouth/Throat:      Pharynx: No posterior oropharyngeal erythema.   Eyes:      General: No scleral icterus.  Neck:      Thyroid: No thyromegaly.      Vascular: No carotid bruit.   Cardiovascular:      Rate and Rhythm: Normal rate and regular rhythm.      Heart sounds: Normal heart sounds. No murmur heard.  Pulmonary:      Effort: Pulmonary effort is normal.      Breath sounds: Normal breath sounds.   Abdominal: "      General: Bowel sounds are normal.      Palpations: Abdomen is soft.   Musculoskeletal:      Right lower leg: No edema.      Left lower leg: No edema.   Lymphadenopathy:      Cervical: No cervical adenopathy.   Skin:     General: Skin is warm and dry.   Neurological:      Mental Status: She is alert and oriented to person, place, and time.   Psychiatric:         Mood and Affect: Mood is anxious.

## 2024-08-01 NOTE — PATIENT INSTRUCTIONS
Can take cyclobenzaprine for pain in left lower back.  Will try Mucinex for cough again.    Medicare Preventive Visit Patient Instructions  Thank you for completing your Welcome to Medicare Visit or Medicare Annual Wellness Visit today. Your next wellness visit will be due in one year (8/2/2025).  The screening/preventive services that you may require over the next 5-10 years are detailed below. Some tests may not apply to you based off risk factors and/or age. Screening tests ordered at today's visit but not completed yet may show as past due. Also, please note that scanned in results may not display below.  Preventive Screenings:  Service Recommendations Previous Testing/Comments   Colorectal Cancer Screening  * Colonoscopy    * Fecal Occult Blood Test (FOBT)/Fecal Immunochemical Test (FIT)  * Fecal DNA/Cologuard Test  * Flexible Sigmoidoscopy Age: 45-75 years old   Colonoscopy: every 10 years (may be performed more frequently if at higher risk)  OR  FOBT/FIT: every 1 year  OR  Cologuard: every 3 years  OR  Sigmoidoscopy: every 5 years  Screening may be recommended earlier than age 45 if at higher risk for colorectal cancer. Also, an individualized decision between you and your healthcare provider will decide whether screening between the ages of 76-85 would be appropriate. Colonoscopy: Not on file  FOBT/FIT: Not on file  Cologuard: Not on file  Sigmoidoscopy: Not on file          Breast Cancer Screening Age: 40+ years old  Frequency: every 1-2 years  Not required if history of left and right mastectomy Mammogram: 05/25/2023    Screening Current   Cervical Cancer Screening Between the ages of 21-29, pap smear recommended once every 3 years.   Between the ages of 30-65, can perform pap smear with HPV co-testing every 5 years.   Recommendations may differ for women with a history of total hysterectomy, cervical cancer, or abnormal pap smears in past. Pap Smear: Not on file    Screening Not Indicated   Hepatitis C  Screening Once for adults born between 1945 and 1965  More frequently in patients at high risk for Hepatitis C Hep C Antibody: 07/26/2022    Screening Current   Diabetes Screening 1-2 times per year if you're at risk for diabetes or have pre-diabetes Fasting glucose: 109 mg/dL (7/18/2024)  A1C: 5.4 % (6/26/2020)  Screening Current   Cholesterol Screening Once every 5 years if you don't have a lipid disorder. May order more often based on risk factors. Lipid panel: 02/15/2024    Screening Current     Other Preventive Screenings Covered by Medicare:  Abdominal Aortic Aneurysm (AAA) Screening: covered once if your at risk. You're considered to be at risk if you have a family history of AAA.  Lung Cancer Screening: covers low dose CT scan once per year if you meet all of the following conditions: (1) Age 55-77; (2) No signs or symptoms of lung cancer; (3) Current smoker or have quit smoking within the last 15 years; (4) You have a tobacco smoking history of at least 20 pack years (packs per day multiplied by number of years you smoked); (5) You get a written order from a healthcare provider.  Glaucoma Screening: covered annually if you're considered high risk: (1) You have diabetes OR (2) Family history of glaucoma OR (3)  aged 50 and older OR (4)  American aged 65 and older  Osteoporosis Screening: covered every 2 years if you meet one of the following conditions: (1) You're estrogen deficient and at risk for osteoporosis based off medical history and other findings; (2) Have a vertebral abnormality; (3) On glucocorticoid therapy for more than 3 months; (4) Have primary hyperparathyroidism; (5) On osteoporosis medications and need to assess response to drug therapy.   Last bone density test (DXA Scan): 06/13/2022.  HIV Screening: covered annually if you're between the age of 15-65. Also covered annually if you are younger than 15 and older than 65 with risk factors for HIV infection. For  pregnant patients, it is covered up to 3 times per pregnancy.    Immunizations:  Immunization Recommendations   Influenza Vaccine Annual influenza vaccination during flu season is recommended for all persons aged >= 6 months who do not have contraindications   Pneumococcal Vaccine   * Pneumococcal conjugate vaccine = PCV13 (Prevnar 13), PCV15 (Vaxneuvance), PCV20 (Prevnar 20)  * Pneumococcal polysaccharide vaccine = PPSV23 (Pneumovax) Adults 19-65 yo with certain risk factors or if 65+ yo  If never received any pneumonia vaccine: recommend Prevnar 20 (PCV20)  Give PCV20 if previously received 1 dose of PCV13 or PPSV23   Hepatitis B Vaccine 3 dose series if at intermediate or high risk (ex: diabetes, end stage renal disease, liver disease)   Respiratory syncytial virus (RSV) Vaccine - COVERED BY MEDICARE PART D  * RSVPreF3 (Arexvy) CDC recommends that adults 60 years of age and older may receive a single dose of RSV vaccine using shared clinical decision-making (SCDM)   Tetanus (Td) Vaccine - COST NOT COVERED BY MEDICARE PART B Following completion of primary series, a booster dose should be given every 10 years to maintain immunity against tetanus. Td may also be given as tetanus wound prophylaxis.   Tdap Vaccine - COST NOT COVERED BY MEDICARE PART B Recommended at least once for all adults. For pregnant patients, recommended with each pregnancy.   Shingles Vaccine (Shingrix) - COST NOT COVERED BY MEDICARE PART B  2 shot series recommended in those 19 years and older who have or will have weakened immune systems or those 50 years and older     Health Maintenance Due:      Topic Date Due    Breast Cancer Screening: Mammogram  05/25/2024    Hepatitis C Screening  Completed     Immunizations Due:      Topic Date Due    COVID-19 Vaccine (4 - 2023-24 season) 09/01/2023    Influenza Vaccine (1) 09/01/2024     Advance Directives   What are advance directives?  Advance directives are legal documents that state your wishes  and plans for medical care. These plans are made ahead of time in case you lose your ability to make decisions for yourself. Advance directives can apply to any medical decision, such as the treatments you want, and if you want to donate organs.   What are the types of advance directives?  There are many types of advance directives, and each state has rules about how to use them. You may choose a combination of any of the following:  Living will:  This is a written record of the treatment you want. You can also choose which treatments you do not want, which to limit, and which to stop at a certain time. This includes surgery, medicine, IV fluid, and tube feedings.   Durable power of  for healthcare (DPAHC):  This is a written record that states who you want to make healthcare choices for you when you are unable to make them for yourself. This person, called a proxy, is usually a family member or a friend. You may choose more than 1 proxy.  Do not resuscitate (DNR) order:  A DNR order is used in case your heart stops beating or you stop breathing. It is a request not to have certain forms of treatment, such as CPR. A DNR order may be included in other types of advance directives.  Medical directive:  This covers the care that you want if you are in a coma, near death, or unable to make decisions for yourself. You can list the treatments you want for each condition. Treatment may include pain medicine, surgery, blood transfusions, dialysis, IV or tube feedings, and a ventilator (breathing machine).  Values history:  This document has questions about your views, beliefs, and how you feel and think about life. This information can help others choose the care that you would choose.  Why are advance directives important?  An advance directive helps you control your care. Although spoken wishes may be used, it is better to have your wishes written down. Spoken wishes can be misunderstood, or not followed.  Treatments may be given even if you do not want them. An advance directive may make it easier for your family to make difficult choices about your care.   Fall Prevention    Fall prevention  includes ways to make your home and other areas safer. It also includes ways you can move more carefully to prevent a fall. Health conditions that cause changes in your blood pressure, vision, or muscle strength and coordination may increase your risk for falls. Medicines may also increase your risk for falls if they make you dizzy, weak, or sleepy.   Fall prevention tips:   Stand or sit up slowly.    Use assistive devices as directed.    Wear shoes that fit well and have soles that .    Wear a personal alarm.    Stay active.    Manage your medical conditions.    Home Safety Tips:  Add items to prevent falls in the bathroom.    Keep paths clear.    Install bright lights in your home.    Keep items you use often on shelves within reach.    Paint or place reflective tape on the edges of your stairs.    Urinary Incontinence   Urinary incontinence (UI)  is when you lose control of your bladder. UI develops because your bladder cannot store or empty urine properly. The 3 most common types of UI are stress incontinence, urge incontinence, or both.  Medicines:   May be given to help strengthen your bladder control. Report any side effects of medication to your healthcare provider.  Do pelvic muscle exercises often:  Your pelvic muscles help you stop urinating. Squeeze these muscles tight for 5 seconds, then relax for 5 seconds. Gradually work up to squeezing for 10 seconds. Do 3 sets of 15 repetitions a day, or as directed. This will help strengthen your pelvic muscles and improve bladder control.  Train your bladder:  Go to the bathroom at set times, such as every 2 hours, even if you do not feel the urge to go. You can also try to hold your urine when you feel the urge to go. For example, hold your urine for 5 minutes when you  feel the urge to go. As that becomes easier, hold your urine for 10 minutes.   Self-care:   Keep a UI record.  Write down how often you leak urine and how much you leak. Make a note of what you were doing when you leaked urine.  Drink liquids as directed. You may need to limit the amount of liquid you drink to help control your urine leakage. Do not drink any liquid right before you go to bed. Limit or do not have drinks that contain caffeine or alcohol.   Prevent constipation.  Eat a variety of high-fiber foods. Good examples are high-fiber cereals, beans, vegetables, and whole-grain breads. Walking is the best way to trigger your intestines to have a bowel movement.  Exercise regularly and maintain a healthy weight.  Weight loss and exercise will decrease pressure on your bladder and help you control your leakage.   Use a catheter as directed  to help empty your bladder. A catheter is a tiny, plastic tube that is put into your bladder to drain your urine.   Go to behavior therapy as directed.  Behavior therapy may be used to help you learn to control your urge to urinate.     © Copyright [employer] 2018 Information is for End User's use only and may not be sold, redistributed or otherwise used for commercial purposes. All illustrations and images included in CareNotes® are the copyrighted property of [employer] or [employer]

## 2024-08-01 NOTE — TELEPHONE ENCOUNTER
Okay to start a baby aspirin from a GI standpoint.  I would recommend the patient monitor her weight carefully and if she has continued weight loss she should let us know so we can do appropriate evaluation

## 2024-08-02 ENCOUNTER — TELEPHONE (OUTPATIENT)
Age: 79
End: 2024-08-02

## 2024-08-02 NOTE — TELEPHONE ENCOUNTER
I spoke with patient, reviewed provider recommendations. Advised she weigh herself as recommended and keep track. Patient states she weighs herself every morning. She will follow instructions.

## 2024-08-02 NOTE — TELEPHONE ENCOUNTER
"Behavioral Health Outpatient Intake Questions    Referred By   : PCP    Please advise interviewee that they need to answer all questions truthfully to allow for best care, and any misrepresentations of information may affect their ability to be seen at this clinic   => Was this discussed? Yes         Behavioral Health Outpatient Intake History -     Presenting Problem (in patient's own words):   Anxiety, sleep problems, medication/health anxiety    Are there any communication barriers for this patient?     No                                                   Are you taking any psychiatric medications? NO       Has the Patient previously received outpatient Talk Therapy or Medication Management from [facility]  No       Has the Patient abused alcohol or other substances in the last 6 months ? No        Legal History-     Is this treatment court ordered? No       Has the Patient been convicted of a felony?  NO      ACCEPTED as a patient No  If \"Yes\" Appointment Date: 11/11 at 11:00    Referred Elsewhere? No      Name of Insurance Co:Medicare  Insurance ID# [id number]   Insurance Phone #  If ins is primary or secondary? Primary        "

## 2024-08-05 ENCOUNTER — HOSPITAL ENCOUNTER (OUTPATIENT)
Dept: NON INVASIVE DIAGNOSTICS | Facility: HOSPITAL | Age: 79
Discharge: HOME | End: 2024-08-05
Payer: COMMERCIAL

## 2024-08-05 DIAGNOSIS — R42 LIGHTHEADEDNESS: ICD-10-CM

## 2024-08-05 PROCEDURE — 93225 XTRNL ECG REC<48 HRS REC: CPT

## 2024-08-05 PROCEDURE — 93226 XTRNL ECG REC<48 HR SCAN A/R: CPT

## 2024-08-08 ENCOUNTER — HOSPITAL ENCOUNTER (OUTPATIENT)
Dept: MAMMOGRAPHY | Facility: MEDICAL CENTER | Age: 79
Discharge: HOME | End: 2024-08-08
Payer: COMMERCIAL

## 2024-08-08 VITALS — HEIGHT: 63 IN | WEIGHT: 119.71 LBS | BODY MASS INDEX: 21.21 KG/M2

## 2024-08-08 DIAGNOSIS — Z12.31 ENCOUNTER FOR SCREENING MAMMOGRAM FOR MALIGNANT NEOPLASM OF BREAST: ICD-10-CM

## 2024-08-08 PROCEDURE — 77063 BREAST TOMOSYNTHESIS BI: CPT

## 2024-08-08 PROCEDURE — 77067 SCR MAMMO BI INCL CAD: CPT

## 2024-08-13 ENCOUNTER — TELEPHONE (OUTPATIENT)
Age: 79
End: 2024-08-13

## 2024-08-13 DIAGNOSIS — R05.3 CHRONIC COUGH: Primary | ICD-10-CM

## 2024-08-13 RX ORDER — METHYLPREDNISOLONE 4 MG
TABLET, DOSE PACK ORAL
Qty: 21 EACH | Refills: 0 | Status: SHIPPED | OUTPATIENT
Start: 2024-08-13

## 2024-08-13 NOTE — TELEPHONE ENCOUNTER
Called pt back she answered with no talking for a couple seconds then phone hung up called pt back and no answer so left voicemail to call office back regarding her question she had.

## 2024-08-13 NOTE — TELEPHONE ENCOUNTER
Patient getting Vit B 12 sublingual OTC and taking 500mcg daily. Her old script said 1000mcg so she wants to know what the correct supplement is that she should be taking and what the correct dose is. She is also taking Aspirin 81mg OTC which was previously a chewable. Making sure this is okay as sublingual.     She is also concerned about her cough that she has had for the past few months. She states she stuck her face in her freezer and the cold air made her cough come back a few days later. She is asking for prednisone again to help with this. She is using OTC mucinex but its not helping. Patient states the only thing that helps is the prednisone.

## 2024-08-15 ENCOUNTER — OFFICE VISIT (OUTPATIENT)
Dept: URGENT CARE | Facility: CLINIC | Age: 79
End: 2024-08-15
Payer: COMMERCIAL

## 2024-08-15 VITALS
RESPIRATION RATE: 18 BRPM | SYSTOLIC BLOOD PRESSURE: 128 MMHG | TEMPERATURE: 98.5 F | OXYGEN SATURATION: 98 % | DIASTOLIC BLOOD PRESSURE: 78 MMHG | HEART RATE: 82 BPM

## 2024-08-15 DIAGNOSIS — R00.2 PALPITATIONS: Primary | ICD-10-CM

## 2024-08-15 DIAGNOSIS — R05.3 CHRONIC COUGH: ICD-10-CM

## 2024-08-15 PROCEDURE — 99213 OFFICE O/P EST LOW 20 MIN: CPT | Performed by: NURSE PRACTITIONER

## 2024-08-15 PROCEDURE — G0463 HOSPITAL OUTPT CLINIC VISIT: HCPCS | Performed by: NURSE PRACTITIONER

## 2024-08-15 NOTE — PROGRESS NOTES
[facility]        NAME: Paula Paniagua is a 78 y.o. female  : 1945    MRN: [id number]  DATE: August 15, 2024  TIME: 2:29 PM    Assessment and Plan   Palpitations [R00.2]  1. Palpitations  POCT ECG      2. Chronic cough          EKG in office nl   Exam benign  Reviewed with pt recommend  medications prescribed by pcp -   F/u with pcp any concerns   Proceed to ER with any new or worsening cardiac concerns  Pt in agreement with plan of care    Patient Instructions     Follow up with PCP in 3-5 days.  Proceed to  ER if symptoms worsen.    Chief Complaint     Chief Complaint   Patient presents with    Palpitations     Patient states that she feels that her heart is palpitations when she coughs         History of Present Illness   Paula Paniagua presents to the clinic c/o    Palpitations (Patient states that she feels that her heart is palpitations when she coughs)  Recently completed a 48 hour halter monitor - awaiting read by cards   H/o chronic cough/reactive asthma -   States steroid pack usually fixes the problem.   Called pcp who called it in for her but she hasn't started yet as she didn't have transportation to pharmacy   Now she states cough is worse and harsh and causes heart to race after a coughing fit.  Sometimes feels a fluttering with coughing spell.   Has a h/o anxiety - this makes her anxiety worse  Plans on picking up steroid after appt here today,        Review of Systems   Review of Systems   All other systems reviewed and are negative.        Current Medications     Long-Term Medications   Medication Sig Dispense Refill    amLODIPine (NORVASC) 10 mg tablet TAKE 1 TABLET EVERY DAY 90 tablet 3    aspirin 81 mg chewable tablet Chew 1 tablet (81 mg total) daily      atorvastatin (LIPITOR) 20 mg tablet TAKE 1 TABLET EVERY DAY 90 tablet 1    budesonide-formoterol (Symbicort) 160-4.5 mcg/act inhaler Inhale 2 puffs 2 (two) times a day Rinse mouth after use 10.2 g 5     cyclobenzaprine (FLEXERIL) 5 mg tablet 1 tab BID PRN (Patient not taking: Reported on 7/9/2024) 20 tablet 1    famotidine (PEPCID) 20 mg tablet Take 1 tablet (20 mg total) by mouth if needed for heartburn (Patient not taking: Reported on 8/1/2024) 30 tablet 11    Multiple Vitamin (multivitamin) capsule Take 1 capsule by mouth daily      vitamin B-12 (VITAMIN B-12) 1,000 mcg tablet Take by mouth daily         Current Allergies     Allergies as of 08/15/2024 - Reviewed 08/15/2024   Allergen Reaction Noted    Nsaids Other (See Comments) 07/27/2023    Other Cough 05/25/2022    Sulfa antibiotics Other (See Comments) 07/27/2023    Codeine Rash 02/21/2020    Shellfish allergy - food allergy Rash 07/27/2023            The following portions of the patient's history were reviewed and updated as appropriate: allergies, current medications, past family history, past medical history, past social history, past surgical history and problem list.    Objective   /78   Pulse 82   Temp 98.5 °F (36.9 °C) (Tympanic)   Resp 18   SpO2 98%        Physical Exam     Physical Exam  Vitals and nursing note reviewed.   Constitutional:       Appearance: Normal appearance. She is well-developed.   HENT:      Head: Normocephalic and atraumatic.      Right Ear: Hearing, tympanic membrane, ear canal and external ear normal.      Left Ear: Hearing, tympanic membrane, ear canal and external ear normal.      Nose: Nose normal.      Mouth/Throat:      Lips: Pink.      Mouth: Mucous membranes are moist.      Pharynx: Oropharynx is clear.   Eyes:      General: Lids are normal.      Conjunctiva/sclera: Conjunctivae normal.      Pupils: Pupils are equal, round, and reactive to light.   Cardiovascular:      Rate and Rhythm: Normal rate and regular rhythm.      Pulses: Normal pulses.      Heart sounds: Normal heart sounds, S1 normal and S2 normal.   Pulmonary:      Effort: Pulmonary effort is normal.      Breath sounds: Normal breath sounds.    Abdominal:      General: Abdomen is flat. Bowel sounds are normal.      Palpations: Abdomen is soft.   Musculoskeletal:         General: Normal range of motion.      Cervical back: Full passive range of motion without pain, normal range of motion and neck supple.   Skin:     General: Skin is warm and dry.   Neurological:      General: No focal deficit present.      Mental Status: She is alert and oriented to person, place, and time.   Psychiatric:         Mood and Affect: Mood normal.         Speech: Speech normal.         Behavior: Behavior normal. Behavior is cooperative.         Thought Content: Thought content normal.         Judgment: Judgment normal.

## 2024-08-16 PROCEDURE — 93227 XTRNL ECG REC<48 HR R&I: CPT | Performed by: INTERNAL MEDICINE

## 2024-08-17 DIAGNOSIS — E78.5 DYSLIPIDEMIA: ICD-10-CM

## 2024-08-17 RX ORDER — ATORVASTATIN CALCIUM 20 MG/1
TABLET, FILM COATED ORAL
Qty: 90 TABLET | Refills: 1 | Status: SHIPPED | OUTPATIENT
Start: 2024-08-17

## 2024-08-19 ENCOUNTER — TELEPHONE (OUTPATIENT)
Dept: FAMILY MEDICINE CLINIC | Facility: CLINIC | Age: 79
End: 2024-08-19

## 2024-08-19 ENCOUNTER — APPOINTMENT (OUTPATIENT)
Dept: LAB | Facility: CLINIC | Age: 79
End: 2024-08-19
Payer: COMMERCIAL

## 2024-08-19 DIAGNOSIS — R42 LIGHTHEADEDNESS: ICD-10-CM

## 2024-08-19 LAB
ANION GAP SERPL CALCULATED.3IONS-SCNC: 11 MMOL/L (ref 4–13)
BASOPHILS # BLD AUTO: 0.04 THOUSANDS/ÂΜL (ref 0–0.1)
BASOPHILS NFR BLD AUTO: 1 % (ref 0–1)
BUN SERPL-MCNC: 14 MG/DL (ref 5–25)
CALCIUM SERPL-MCNC: 9.4 MG/DL (ref 8.4–10.2)
CHLORIDE SERPL-SCNC: 103 MMOL/L (ref 96–108)
CO2 SERPL-SCNC: 29 MMOL/L (ref 21–32)
CREAT SERPL-MCNC: 0.67 MG/DL (ref 0.6–1.3)
EOSINOPHIL # BLD AUTO: 0.15 THOUSAND/ÂΜL (ref 0–0.61)
EOSINOPHIL NFR BLD AUTO: 2 % (ref 0–6)
ERYTHROCYTE [DISTWIDTH] IN BLOOD BY AUTOMATED COUNT: 12.4 % (ref 11.6–15.1)
EST. AVERAGE GLUCOSE BLD GHB EST-MCNC: 117 MG/DL
GFR SERPL CREATININE-BSD FRML MDRD: 85 ML/MIN/1.73SQ M
GLUCOSE P FAST SERPL-MCNC: 91 MG/DL (ref 65–99)
HBA1C MFR BLD: 5.7 %
HCT VFR BLD AUTO: 46.3 % (ref 34.8–46.1)
HGB BLD-MCNC: 15.5 G/DL (ref 11.5–15.4)
IMM GRANULOCYTES # BLD AUTO: 0.03 THOUSAND/UL (ref 0–0.2)
IMM GRANULOCYTES NFR BLD AUTO: 0 % (ref 0–2)
LYMPHOCYTES # BLD AUTO: 2.06 THOUSANDS/ÂΜL (ref 0.6–4.47)
LYMPHOCYTES NFR BLD AUTO: 26 % (ref 14–44)
MCH RBC QN AUTO: 32.2 PG (ref 26.8–34.3)
MCHC RBC AUTO-ENTMCNC: 33.5 G/DL (ref 31.4–37.4)
MCV RBC AUTO: 96 FL (ref 82–98)
MONOCYTES # BLD AUTO: 0.4 THOUSAND/ÂΜL (ref 0.17–1.22)
MONOCYTES NFR BLD AUTO: 5 % (ref 4–12)
NEUTROPHILS # BLD AUTO: 5.34 THOUSANDS/ÂΜL (ref 1.85–7.62)
NEUTS SEG NFR BLD AUTO: 66 % (ref 43–75)
NRBC BLD AUTO-RTO: 0 /100 WBCS
PLATELET # BLD AUTO: 207 THOUSANDS/UL (ref 149–390)
PMV BLD AUTO: 10.9 FL (ref 8.9–12.7)
POTASSIUM SERPL-SCNC: 4 MMOL/L (ref 3.5–5.3)
RBC # BLD AUTO: 4.82 MILLION/UL (ref 3.81–5.12)
SODIUM SERPL-SCNC: 143 MMOL/L (ref 135–147)
WBC # BLD AUTO: 8.02 THOUSAND/UL (ref 4.31–10.16)

## 2024-08-19 PROCEDURE — 85025 COMPLETE CBC W/AUTO DIFF WBC: CPT

## 2024-08-19 PROCEDURE — 80048 BASIC METABOLIC PNL TOTAL CA: CPT

## 2024-08-19 PROCEDURE — 83036 HEMOGLOBIN GLYCOSYLATED A1C: CPT

## 2024-08-19 PROCEDURE — 36415 COLL VENOUS BLD VENIPUNCTURE: CPT

## 2024-08-19 NOTE — TELEPHONE ENCOUNTER
Pt had requested for albuterol inhaler to be refilled to this pharmacy but it is not active on med list looks like it was discontinued. She also requested True Metrix glucose meter with the lancets and test strips that I also don't see active on med list.

## 2024-08-20 DIAGNOSIS — R05.3 CHRONIC COUGH: ICD-10-CM

## 2024-08-20 DIAGNOSIS — J45.20 MILD INTERMITTENT ASTHMA WITHOUT COMPLICATION: ICD-10-CM

## 2024-08-20 RX ORDER — FLUTICASONE FUROATE AND VILANTEROL TRIFENATATE 100; 25 UG/1; UG/1
1 POWDER RESPIRATORY (INHALATION) DAILY
Qty: 180 BLISTER | Refills: 1 | Status: SHIPPED | OUTPATIENT
Start: 2024-08-20

## 2024-08-27 DIAGNOSIS — R73.03 PRE-DIABETES: Primary | ICD-10-CM

## 2024-08-27 RX ORDER — LANCETS 33 GAUGE
EACH MISCELLANEOUS
Qty: 100 EACH | Refills: 1 | Status: SHIPPED | OUTPATIENT
Start: 2024-08-27

## 2024-09-04 ENCOUNTER — NURSE TRIAGE (OUTPATIENT)
Age: 79
End: 2024-09-04

## 2024-09-04 DIAGNOSIS — R63.4 WEIGHT LOSS, ABNORMAL: Primary | ICD-10-CM

## 2024-09-04 NOTE — TELEPHONE ENCOUNTER
" SPOKE WITH PT, HX GIST TUMOR, CONSTIPATION. PT CONCERNED ABOUT HER WEIGHT LOSS, WOULD LIKE TO GET REFERRAL FOR DIETICIAN. PT IS DUE FOR COLONOSCOPY 1/2025, PLEASE PLACE ORDER, PT WOULD LIKE TO SCHEDULE. THANK YOU.          Reason for Disposition   Information only question and nurse able to answer    Answer Assessment - Initial Assessment Questions  1. REASON FOR CALL or QUESTION: \"What is your reason for calling today?\" or \"How can I best help you?\" or \"What question do you have that I can help answer?\"      SPOKE WITH PT, HX GIST TUMOR, CONSTIPATION. PT CONCERNED ABOUT HER WEIGHT LOSS, WOULD LIKE TO GET REFERRAL FOR DIETICIAN. PT IS DUE FOR COLONOSCOPY 1/2025, PLEASE PLACE ORDER, PT WOULD LIKE TO SCHEDULE. THANK YOU.    Protocols used: Information Only Call - No Triage-ADULT-[place]    "

## 2024-09-04 NOTE — TELEPHONE ENCOUNTER
Orders placed. How much has the patient lost? If significant we will need to order other tests as well such as possible CT scan

## 2024-10-07 ENCOUNTER — TELEPHONE (OUTPATIENT)
Age: 79
End: 2024-10-07

## 2024-10-07 NOTE — TELEPHONE ENCOUNTER
Patient called and stated she would like to know if she should continue with the Mucinex 600mg. Patient states she has 11 pills left at this time. Patient states she has also been using the Symbicort inhaler as well. Please advise and return patient call to discuss.

## 2024-10-16 ENCOUNTER — TELEPHONE (OUTPATIENT)
Dept: OTHER | Facility: OTHER | Age: 79
End: 2024-10-16

## 2024-10-16 NOTE — TELEPHONE ENCOUNTER
Patient is calling because she is not feeling well and she had to cancel her appointment tomorrow morning. Patient would like office to call her back to schedule another appointment . Per patient she had some testing done and would like to go over results. Thank you in advance. Per patient she would prefer and afternoon appointment and would like to be seen before the end of the year.

## 2024-10-17 ENCOUNTER — TELEPHONE (OUTPATIENT)
Age: 79
End: 2024-10-17

## 2024-10-17 NOTE — TELEPHONE ENCOUNTER
Called and let pt know that lyfts aren't scheduled until a week or less prior to appt date. Pt also would like appts after 12 pm. Writer advised pt to let provider know once they have their appt on 11/11

## 2024-10-17 NOTE — TELEPHONE ENCOUNTER
Patient called again to verify if Lyft services will be called for her. Pt would like a call back confirming if [employer] has been set up for her upcoming appointment 11/11/24 at 10am with Raisa Daniel. Please call patient back as she has bad anxiety.

## 2024-10-17 NOTE — TELEPHONE ENCOUNTER
Patient called to confirm her appointment with Raisa Daniel 11/11/24 at 10am. Pt was asking if she need to pay a copay. Pt's card does not say she has to pay a copay. Pt was advised to wait until the billing process to see if she has any out of pocket. Pt express her understanding.

## 2024-10-25 ENCOUNTER — TELEPHONE (OUTPATIENT)
Age: 79
End: 2024-10-25

## 2024-10-25 NOTE — TELEPHONE ENCOUNTER
Received call from patient stating that she has been losing a lot of weight since August. Down to 110 lbs. She was 117 lbs in August.  States that her appetite has not changed but she is feeling weak.   Patient would like to discuss this with Dr. Rg as she is very concerned.  Scheduled for appointment on Weds 10/30.

## 2024-10-28 ENCOUNTER — RA CDI HCC (OUTPATIENT)
Dept: OTHER | Facility: HOSPITAL | Age: 79
End: 2024-10-28

## 2024-10-30 ENCOUNTER — OFFICE VISIT (OUTPATIENT)
Dept: FAMILY MEDICINE CLINIC | Facility: CLINIC | Age: 79
End: 2024-10-30
Payer: COMMERCIAL

## 2024-10-30 ENCOUNTER — APPOINTMENT (OUTPATIENT)
Dept: LAB | Facility: CLINIC | Age: 79
End: 2024-10-30
Payer: COMMERCIAL

## 2024-10-30 ENCOUNTER — APPOINTMENT (OUTPATIENT)
Dept: RADIOLOGY | Facility: CLINIC | Age: 79
End: 2024-10-30
Payer: COMMERCIAL

## 2024-10-30 VITALS
SYSTOLIC BLOOD PRESSURE: 120 MMHG | HEIGHT: 63 IN | OXYGEN SATURATION: 96 % | WEIGHT: 114 LBS | DIASTOLIC BLOOD PRESSURE: 66 MMHG | TEMPERATURE: 97.5 F | HEART RATE: 81 BPM | BODY MASS INDEX: 20.2 KG/M2

## 2024-10-30 DIAGNOSIS — C49.A0 GASTROINTESTINAL STROMAL TUMOR (GIST) (HCC): ICD-10-CM

## 2024-10-30 DIAGNOSIS — R05.1 ACUTE COUGH: ICD-10-CM

## 2024-10-30 DIAGNOSIS — R63.4 WEIGHT LOSS: Primary | ICD-10-CM

## 2024-10-30 DIAGNOSIS — R63.4 WEIGHT LOSS: ICD-10-CM

## 2024-10-30 LAB
ALBUMIN SERPL BCG-MCNC: 4.4 G/DL (ref 3.5–5)
ALP SERPL-CCNC: 80 U/L (ref 34–104)
ALT SERPL W P-5'-P-CCNC: 16 U/L (ref 7–52)
ANION GAP SERPL CALCULATED.3IONS-SCNC: 14 MMOL/L (ref 4–13)
AST SERPL W P-5'-P-CCNC: 21 U/L (ref 13–39)
BASOPHILS # BLD AUTO: 0.04 THOUSANDS/ΜL (ref 0–0.1)
BASOPHILS NFR BLD AUTO: 1 % (ref 0–1)
BILIRUB SERPL-MCNC: 0.45 MG/DL (ref 0.2–1)
BUN SERPL-MCNC: 16 MG/DL (ref 5–25)
CALCIUM SERPL-MCNC: 9.5 MG/DL (ref 8.4–10.2)
CHLORIDE SERPL-SCNC: 100 MMOL/L (ref 96–108)
CO2 SERPL-SCNC: 27 MMOL/L (ref 21–32)
CREAT SERPL-MCNC: 0.63 MG/DL (ref 0.6–1.3)
EOSINOPHIL # BLD AUTO: 0.26 THOUSAND/ΜL (ref 0–0.61)
EOSINOPHIL NFR BLD AUTO: 4 % (ref 0–6)
ERYTHROCYTE [DISTWIDTH] IN BLOOD BY AUTOMATED COUNT: 12.3 % (ref 11.6–15.1)
GFR SERPL CREATININE-BSD FRML MDRD: 87 ML/MIN/1.73SQ M
GLUCOSE P FAST SERPL-MCNC: 95 MG/DL (ref 65–99)
HCT VFR BLD AUTO: 45 % (ref 34.8–46.1)
HGB BLD-MCNC: 14.9 G/DL (ref 11.5–15.4)
IMM GRANULOCYTES # BLD AUTO: 0.01 THOUSAND/UL (ref 0–0.2)
IMM GRANULOCYTES NFR BLD AUTO: 0 % (ref 0–2)
LYMPHOCYTES # BLD AUTO: 2.17 THOUSANDS/ΜL (ref 0.6–4.47)
LYMPHOCYTES NFR BLD AUTO: 34 % (ref 14–44)
MCH RBC QN AUTO: 31.7 PG (ref 26.8–34.3)
MCHC RBC AUTO-ENTMCNC: 33.1 G/DL (ref 31.4–37.4)
MCV RBC AUTO: 96 FL (ref 82–98)
MONOCYTES # BLD AUTO: 0.36 THOUSAND/ΜL (ref 0.17–1.22)
MONOCYTES NFR BLD AUTO: 6 % (ref 4–12)
NEUTROPHILS # BLD AUTO: 3.53 THOUSANDS/ΜL (ref 1.85–7.62)
NEUTS SEG NFR BLD AUTO: 55 % (ref 43–75)
NRBC BLD AUTO-RTO: 0 /100 WBCS
PLATELET # BLD AUTO: 200 THOUSANDS/UL (ref 149–390)
PMV BLD AUTO: 11.2 FL (ref 8.9–12.7)
POTASSIUM SERPL-SCNC: 4.1 MMOL/L (ref 3.5–5.3)
PREALB SERPL-MCNC: 22.6 MG/DL (ref 17–34)
PROT SERPL-MCNC: 7.3 G/DL (ref 6.4–8.4)
RBC # BLD AUTO: 4.7 MILLION/UL (ref 3.81–5.12)
SODIUM SERPL-SCNC: 141 MMOL/L (ref 135–147)
TSH SERPL DL<=0.05 MIU/L-ACNC: 1.66 UIU/ML (ref 0.45–4.5)
WBC # BLD AUTO: 6.37 THOUSAND/UL (ref 4.31–10.16)

## 2024-10-30 PROCEDURE — 85025 COMPLETE CBC W/AUTO DIFF WBC: CPT

## 2024-10-30 PROCEDURE — 84134 ASSAY OF PREALBUMIN: CPT

## 2024-10-30 PROCEDURE — 84443 ASSAY THYROID STIM HORMONE: CPT

## 2024-10-30 PROCEDURE — 99214 OFFICE O/P EST MOD 30 MIN: CPT | Performed by: FAMILY MEDICINE

## 2024-10-30 PROCEDURE — 71046 X-RAY EXAM CHEST 2 VIEWS: CPT

## 2024-10-30 PROCEDURE — G2211 COMPLEX E/M VISIT ADD ON: HCPCS | Performed by: FAMILY MEDICINE

## 2024-10-30 PROCEDURE — 80053 COMPREHEN METABOLIC PANEL: CPT

## 2024-10-30 PROCEDURE — 36415 COLL VENOUS BLD VENIPUNCTURE: CPT

## 2024-10-30 NOTE — ASSESSMENT & PLAN NOTE
Patient had a biopsy earlier in the year.  It was decided to monitor the tumor and repeat testing in 1 year.  She does have follow-up with gastroenterology next week.  Orders:    CBC and differential; Future    Comprehensive metabolic panel; Future    Prealbumin; Future    TSH, 3rd generation with Free T4 reflex; Future

## 2024-10-30 NOTE — PROGRESS NOTES
Ambulatory Visit  Name: Paula Paniagua      : 1945      MRN: [id number]  Encounter Provider: Michelle Rg DO  Encounter Date: 10/30/2024   Encounter department: [facility]    Assessment & Plan  Weight loss  Patient is very concerned about weight loss.  She has lost 5 pounds since her last visit in August and probably about a total of 10 pounds since March.  She had seen that her last hemoglobin A1c was 5.7 and that this is prediabetic range so she has been cutting out her carbs and so this may contribute to some of her weight loss.  She does see nutrition on Thursday and hopefully they can help her learn the proper amount of carbohydrates so that her sugar does not go up.  I reassured her that she is at the low end of prediabetes and her fasting blood sugar has not come near diabetic range in the past.  Orders:    CBC and differential; Future    Comprehensive metabolic panel; Future    Prealbumin; Future    TSH, 3rd generation with Free T4 reflex; Future    Gastrointestinal stromal tumor (GIST) (HCC)  Patient had a biopsy earlier in the year.  It was decided to monitor the tumor and repeat testing in 1 year.  She does have follow-up with gastroenterology next week.  Orders:    CBC and differential; Future    Comprehensive metabolic panel; Future    Prealbumin; Future    TSH, 3rd generation with Free T4 reflex; Future    Acute cough  Patient has cough.  This has been recurrent symptom for her.  She was given a Breo inhaler and I did demonstrate how to use this.  Orders:    XR chest pa and lateral; Future      Keep appointment with nutritionist.  Appointment with GI on      History of Present Illness     Farhana has lost 5 pounds since her last visit with me and about ten pounds since March.  She denies change in bowel habits.  She denies vomiting.  She was diagnosed with a GIST tumor earlier this year.  Because of the smaller size, GI elected to monitor it          Review of  "Systems   Constitutional:  Positive for unexpected weight change. Negative for chills and fever.   HENT:  Negative for congestion and sore throat.    Respiratory:  Positive for cough. Negative for chest tightness.    Cardiovascular:  Negative for chest pain and palpitations.   Gastrointestinal:  Negative for abdominal pain, constipation, diarrhea, nausea and vomiting.   Genitourinary:  Negative for difficulty urinating.   Skin: Negative.    Neurological:  Negative for dizziness and headaches.   Psychiatric/Behavioral: Negative.             Objective     /66 (BP Location: Left arm, Patient Position: Sitting, Cuff Size: Adult)   Pulse 81   Temp 97.5 °F (36.4 °C) (Temporal)   Ht 5' 3\" (1.6 m)   Wt 51.7 kg (114 lb)   SpO2 96%   BMI 20.19 kg/m²     Physical Exam  Vitals and nursing note reviewed.   Constitutional:       General: She is not in acute distress.  HENT:      Head: Normocephalic.   Neck:      Thyroid: No thyromegaly.      Vascular: No carotid bruit.   Cardiovascular:      Rate and Rhythm: Normal rate and regular rhythm.      Heart sounds: Normal heart sounds.   Pulmonary:      Effort: Pulmonary effort is normal.      Breath sounds: Normal breath sounds.   Abdominal:      Palpations: Abdomen is soft.      Tenderness: There is no abdominal tenderness.   Musculoskeletal:      Right lower leg: No edema.      Left lower leg: No edema.   Lymphadenopathy:      Cervical: No cervical adenopathy.   Skin:     General: Skin is warm and dry.   Neurological:      Mental Status: She is alert and oriented to person, place, and time.   Psychiatric:         Mood and Affect: Mood normal.         "

## 2024-10-31 ENCOUNTER — CLINICAL SUPPORT (OUTPATIENT)
Dept: NUTRITION | Facility: HOSPITAL | Age: 79
End: 2024-10-31
Payer: COMMERCIAL

## 2024-10-31 VITALS — WEIGHT: 110.2 LBS | BODY MASS INDEX: 19.52 KG/M2

## 2024-10-31 DIAGNOSIS — R63.4 WEIGHT LOSS, ABNORMAL: ICD-10-CM

## 2024-10-31 NOTE — PROGRESS NOTES
" Nutrition Assessment Form    Patient Name: Paula Paniagua    YOB: 1945    Sex: Female     Assessment Date: 10/31/2024  Start Time: 2 Stop Time: 3 Total Minutes: 60     Data:  Present at session: self   Parent/Patient Concerns/reason for visit: \"I am just dropping weight I am eating less- not as much as I usually do with the prediabetes\"   Medical Dx/Reason for Referral: Wt loss    Past Medical History:   Diagnosis Date    Allergic 90’s    Allergic to codine    Anxiety     Asthma     variant cough asthma    Cataract     Chronic kidney disease 90’s    Kidney and liver cyst    Clotting disorder (HCC)     Gums    Diverticulitis of colon Maybe 2015 or 16    GERD (gastroesophageal reflux disease) 1989    Headache(784.0)     Heart murmur     Hyperlipidemia     Hypertension     Scoliosis     Seizures (HCC) 2014 2018 and 2019 2021    Visual impairment     Early stage Glucoma.  And Cataract       Current Outpatient Medications   Medication Sig Dispense Refill    acetaminophen (TYLENOL) 500 mg tablet Take 500 mg by mouth every 6 (six) hours as needed for mild pain      amLODIPine (NORVASC) 10 mg tablet TAKE 1 TABLET EVERY DAY 90 tablet 3    atorvastatin (LIPITOR) 20 mg tablet TAKE 1 TABLET EVERY DAY 90 tablet 1    Blood Glucose Monitoring Suppl KIT Please use to check blood glucose once daily. 1 kit 0    Blood Pressure Monitor IRMA Use in the morning 1 each 0    cholecalciferol (VITAMIN D3) 1,000 units tablet Take 1,000 Units by mouth daily      famotidine (PEPCID) 20 mg tablet Take 1 tablet (20 mg total) by mouth if needed for heartburn 30 tablet 11    Fluticasone Furoate-Vilanterol (Breo Ellipta) 100-25 mcg/actuation inhaler Inhale 1 puff daily Rinse mouth after use. 180 blister 1    glucose blood test strip Use to check blood glucose once daily. 100 each 1    Lancets 33G MISC PLEASE USE TO CHECK BLOOD GLUCOSE ONCE DAILY 100 each 1    latanoprost (XALATAN) 0.005 % ophthalmic solution  (Patient not " "taking: Reported on 7/9/2024)      Multiple Vitamin (multivitamin) capsule Take 1 capsule by mouth daily      vitamin B-12 (VITAMIN B-12) 1,000 mcg tablet Take by mouth daily       No current facility-administered medications for this visit.        Additional Meds/Supplements: N/a   Special Learning Needs/barriers to learning/any new barriers N/a   Height: Ht Readings from Last 5 Encounters:   10/30/24 5' 3\" (1.6 m)   08/08/24 5' 3\" (1.6 m)   08/01/24 5' 3\" (1.6 m)   07/26/24 5' 3\" (1.6 m)   07/09/24 5' 3\" (1.6 m)      Weight: Wt Readings from Last 10 Encounters:   10/30/24 51.7 kg (114 lb)   08/08/24 54.3 kg (119 lb 11.4 oz)   08/01/24 54.3 kg (119 lb 12.8 oz)   07/26/24 54.1 kg (119 lb 3.2 oz)   07/09/24 56.2 kg (124 lb)   07/09/24 56.2 kg (124 lb)   06/06/24 56.6 kg (124 lb 12.8 oz)   04/17/24 55.8 kg (123 lb)   04/15/24 57.2 kg (126 lb)   03/28/24 57.2 kg (126 lb)     Estimated body mass index is 20.19 kg/m² as calculated from the following:    Height as of 10/30/24: 5' 3\" (1.6 m).    Weight as of 10/30/24: 51.7 kg (114 lb).   Recent Weight Change: [x]Yes     []No  Amount: 12#x 6 months 9.5% loss significant and not desired      Energy Needs:    Allergies   Allergen Reactions    Nsaids Other (See Comments)    Other Cough     Seasonal, perfumes, colon, chemicals    Sulfa Antibiotics Other (See Comments)    Codeine Rash    Shellfish Allergy - Food Allergy Rash    or intolerances    Social History     Substance and Sexual Activity   Alcohol Use Not Currently    N/a   Social History     Tobacco Use   Smoking Status Never    Passive exposure: Never   Smokeless Tobacco Never    N/a   Who shops? patient   Who cooks/cooking methods/Eating out/take out habits   patient  or daughter  Cooking methods: bake/sheppard/air sheppard/grill/boil/other________       Exercise: Since she moved here she was walking 45mins or so- - she is now walking 15minutes maybe once a week     Other: ie: Sleep habits/ stress level/ work habits " household-lives with ?/ food security Bed at 1130- sleeping at  and waking up at 6-630- can now feel weak and tired with wt loss-does not take any naps during the day  Stress level- 8-10/10  Lives alone- dgt and family above   Prior Nutritional Counseling? []Yes     [x]No  When:      Why:         Diet Hx:  water and ovaltine only things   Breakfast: Diet:  945 oatmeal strawberries blueberries and banana with ovaltine with milk  Toast with almond butter   Lunch: Sometimes skips if late breakfast-  Avacoado s/w     Dinner: 530-6 jumbalaya sausage and rice and green beans   Snacks: almonds walnuts - sometimes humus  AM -   PM -   HS -    Other Notes/ Initial Assessment:  Paula is here because she has prediabetes and she is not eating anything and she is very concerned she is losing weight and she doesn't need to lose weight.  She realizes she has anxiety, she does not sleep and she has a lot of stress, she states she just worries about everything.  She is looking into getting some counseling.  She moved to this area 4 years ago, and has not been talking to anyone, except her dgt who lives downstairs, she was from [place] and would talk to everyone.  She weighed 123# when she moved here 4 years ago.  She does have some problems breathing and has asthma.  She is from [place].  She is not sure if being alone, not sleeping or her stress level is causing her wt loss.  She also does not drive.  She states she is consumed with negative thoughts.    Discussed importance of stress management, seeing a therapist, small frequent meals, adequate fluids and calories, adding in ensure plus /glucerna daily to increase calorie and protein intake, adequate sleep.      Updated assessment (Follow up note only):           Nutrition Diagnosis:   Involuntary weight loss  related to Psychological causes such as depression and disordered eating as  evidenced by Weight loss of > 5% within 30 days, > 7.5% in 90 days, or > 10% in 180  days (adults)       Any change or new dx since previous visit:     Nutrition Diagnosis:   Food and nutrition related knowledge deficit  related to Lack of prior exposure to accurate nutrition related information as evidenced by Provides inaccurate or incomplete written response to questionnare/ written tool or is unable to read wriiten material      Medical Nutrition Therapy Intervention:  [x]Individualized Meal Plan-Discussed the importance of eating 3 meals daily and not skipping, and how metabolism is affected.  Also discussed adding in small snacks or 5-6 small meals daily if desired vs 3 larger ones, and appropriate options and portions.  Appropriate timing of meals, including eating within 1 hr of waking and eating meals slowly 20mins/meals, minimizing mindless/boredom or habitual eating, etc was also mentioned.  Fluid intake discussed and appropriate amounts and choices, 16 oz with meals and additional 32oz during the day.  S/S dehydration also covered. []Understanding Lab Values   []Basic Pathophysiology of Disease []Food/Medication Interactions   []Food Diary []Exercise   [x]Lifestyle/Behavior Modification Techniques-Discussed the importance of adequate sleep, and ideal sleeping conditions, including a cool temperature 64-68 degrees F, and a dark and quiet room. Also discussed the importance of a regular and consistent sleep routine, including minimizing blue light exposure an hour before sleeping.  Weekend habits should include staying fairly consistent with weekday sleep habits to minimize disruption during the week.  A connection was made between getting adequate and good quality sleep and the ability to handle stress the next day, make healthy food choices, and be active.  Discussed healthy stress management, techniques including regular physical activity as able, meditation, hobbies, etc.  Also discussed the impact stress has on health, wt, hormones and overall health. []Medication, Mechanism of Action  "  []Label Reading: CHO/ Na/ Fat/ other_________ []Self Blood Glucose Monitoring   [x]Weight/BMI Goals: gain/lose/maintain- she would like to gain back 15# []Other -           Comprehension: []Excellent  []Very Good  [x]Good  []Fair   []Poor    Receptivity: []Excellent  []Very Good  [x]Good  []Fair   []Poor    Expected Compliance: []Excellent  []Very Good  [x]Good  []Fair   []Poor        Goals (initial)/ Progress made on previous goals/new goals:  5-6 small meals daily- no skipping   2.  Ensure plus or glucerna daily   3. Whole milk vs 1 or 2% lactaid       No follow-ups on file.  Labs:  CMP  Lab Results   Component Value Date    K 4.1 10/30/2024     10/30/2024    CO2 27 10/30/2024    BUN 16 10/30/2024    CREATININE 0.63 10/30/2024    GLUF 95 10/30/2024    CALCIUM 9.5 10/30/2024    AST 21 10/30/2024    ALT 16 10/30/2024    ALKPHOS 80 10/30/2024    EGFR 87 10/30/2024       BMP  Lab Results   Component Value Date    CALCIUM 9.5 10/30/2024    K 4.1 10/30/2024    CO2 27 10/30/2024     10/30/2024    BUN 16 10/30/2024    CREATININE 0.63 10/30/2024       Lipids  No results found for: \"CHOL\"  Lab Results   Component Value Date    HDL 68 02/15/2024    HDL 77 08/29/2023    HDL 67 03/31/2023     Lab Results   Component Value Date    LDLCALC 53 02/15/2024    LDLCALC 73 08/29/2023    LDLCALC 43 03/31/2023     Lab Results   Component Value Date    TRIG 106 02/15/2024    TRIG 113 08/29/2023    TRIG 122 03/31/2023     No results found for: \"CHOLHDL\"    Hemoglobin A1C  Lab Results   Component Value Date    HGBA1C 5.7 (H) 08/19/2024       Fasting Glucose  Lab Results   Component Value Date    GLUF 95 10/30/2024       Insulin     Thyroid  No results found for: \"TSH\", \"Z4SKPTS\", \"N1QPDGI\", \"THYROIDAB\"    Hepatic Function Panel  Lab Results   Component Value Date    ALT 16 10/30/2024    AST 21 10/30/2024    ALKPHOS 80 10/30/2024       Celiac Disease Antibody Panel  No results found for: \"ENDOMYSIAL IGA\", \"GLIADIN IGA\", " "\"GLIADIN IGG\", \"IGA\", \"TISSUE TRANSGLUT AB\", \"TTG IGA\"   Iron  No results found for: \"IRON\", \"TIBC\", \"FERRITIN\"         Catrachito Saavedra RD  [facility] CLINICAL [facility]UTRITION [facility]  [address] [id number]    "

## 2024-11-04 ENCOUNTER — TELEPHONE (OUTPATIENT)
Age: 79
End: 2024-11-04

## 2024-11-04 NOTE — TELEPHONE ENCOUNTER
Patient  seen on 10/30/2024, stated she forgot to tell Dr. Rg she has a cold, runny nose, congestion and wanted to know if she can take Mucinex with guaifenesin.    Please review and advice  Thank you

## 2024-11-07 ENCOUNTER — OFFICE VISIT (OUTPATIENT)
Dept: GASTROENTEROLOGY | Facility: MEDICAL CENTER | Age: 79
End: 2024-11-07
Payer: COMMERCIAL

## 2024-11-07 ENCOUNTER — TELEPHONE (OUTPATIENT)
Age: 79
End: 2024-11-07

## 2024-11-07 VITALS
DIASTOLIC BLOOD PRESSURE: 74 MMHG | HEIGHT: 63 IN | SYSTOLIC BLOOD PRESSURE: 128 MMHG | BODY MASS INDEX: 19.84 KG/M2 | WEIGHT: 112 LBS | OXYGEN SATURATION: 98 % | HEART RATE: 71 BPM | TEMPERATURE: 98.1 F

## 2024-11-07 DIAGNOSIS — R63.4 WEIGHT LOSS, ABNORMAL: Primary | ICD-10-CM

## 2024-11-07 DIAGNOSIS — C49.A0 GASTROINTESTINAL STROMAL TUMOR (GIST) (HCC): ICD-10-CM

## 2024-11-07 DIAGNOSIS — K21.9 GASTROESOPHAGEAL REFLUX DISEASE WITHOUT ESOPHAGITIS: ICD-10-CM

## 2024-11-07 DIAGNOSIS — K59.00 CONSTIPATION, UNSPECIFIED CONSTIPATION TYPE: ICD-10-CM

## 2024-11-07 DIAGNOSIS — R05.3 CHRONIC COUGH: ICD-10-CM

## 2024-11-07 PROCEDURE — 99214 OFFICE O/P EST MOD 30 MIN: CPT | Performed by: NURSE PRACTITIONER

## 2024-11-07 NOTE — TELEPHONE ENCOUNTER
Pt called to verify appt and stated she checked in on patient portal. Appt confirmed; no further concerns.

## 2024-11-07 NOTE — PROGRESS NOTES
Ambulatory Visit  Name: Paula Paniagua      : 1945      MRN: [id number]  Encounter Provider: YESSICA Steinberg  Encounter Date: 2024   Encounter department: [facility] [place]    Assessment & Plan  Weight loss, abnormal  She has been losing weight over the past year.  She is lost 12 pounds since .  She started with a nonproductive cough .  Reports that she had a chest x-ray that was normal.  Reports that she is anxious most of the time.  Recently went to a nutritionist to help with increasing her caloric intake.  She denies any nausea, vomiting or dysphagia.  Heartburn and reflux have increased slightly since her last visit.  She is using famotidine 20 mg as needed which is helping.  BMs are brown and formed daily.  Denies any melena hematochezia.  If CT negative may consider repeating EGD and colonoscopy.  She is due for an endoscopic ultrasound for her GIST tumor.  Recent CBC, CMP and TSH were normal.  Orders:    CT abdomen pelvis w contrast; Future  -Will contact patient with results  Chronic cough  Refer above  Orders:    CT chest abdomen pelvis w contrast; Future    Gastrointestinal stromal tumor (GIST) (HCC)  The patient had a recent endoscopic ultrasound as she had a CT scan in February revealing an indeterminate 1.4 x 1.0 cm soft tissue nodule abutting the medial gastric body body wall.  Endoscopic ultrasound revealed a solid oval-shaped 1.4 x 1.4cm lesion in the mid body.  Fine-needle aspiration was obtained and revealed rare clusters of bland appearing spindle cells consistent with GIST tumor.     -Repeat endoscopic ultrasound   Orders:    CT abdomen pelvis w contrast; Future    Constipation, unspecified constipation type  Reports her constipation has been controlled.  No straining to have a BM.  BMs are brown and formed daily.  No melena or hematochezia.       Gastroesophageal reflux disease without esophagitis  Refer above          History of Present Illness     Paula Paniagua is a 78 y.o. female who presents for follow-up.  She was last seen by Betsy Maynard PA-C 7/24 for GERD, GIST tumor, constipation with fecal incontinence.    The patient had a recent endoscopic ultrasound as she had a CT scan in February revealing an indeterminate 1.4 x 1.0 cm soft tissue nodule abutting the medial gastric body body wall.  Endoscopic ultrasound 4/24 revealed a solid oval-shaped 1.4 x 1.4cm lesion in the mid body.  Fine-needle aspiration was obtained and revealed rare clusters of bland appearing spindle cells consistent with GIST tumor.  Advanced endoscopy is recommending a repeat endoscopic ultrasound in 1 year.    Patient states that she feels as though she is mildly constipated explained as the sensation of incomplete bowel movements and when she was taking omeprazole she was noticing some fecal incontinence.  Her last colonoscopy was in 2015 with 1 diminutive polyp which was removed and pathology revealed normal mucosa. Recall 10 years     Interval history: She has been losing weight over the past year.  She is lost 12 pounds since 7/24.  She started with a nonproductive cough 2/24.  Reports that she had a chest x-ray that was normal.  Reports that she is anxious most of the time.  Recently went to a nutritionist to help with increasing her caloric intake.  She denies any nausea, vomiting or dysphagia.  Heartburn and reflux have increased slightly since her last visit.  She is using famotidine 20 mg as needed which is helping.  BMs are brown and formed daily.  Denies any melena hematochezia.    Labs 10/24-CMP normal, prealbumin normal at 22.6, CBC normal, hemoglobin A1c 5.7, TSH 1.65    Previous EGD/colonoscopy    Endoscopic ultrasound 4/24-esophagus and stomach normal.  Duodenal bulb and second part of duodenum normal. Radial echoendoscope was used for this exam. The lesion in question was identified at 45 cm from the incisors in the mid -body  of the stomach. The lesion was hypoechoic and solid with oval shape measuring 1.4 x 1.4 cm. This was well circumscribed and confined to the 4th layer. A linear echoendoscope was then used for this exam. The 25 gauge core biopsy needle was used x 2 passes to obtain core of tissue that was sent for pathology.  Small just (less than 2 cm).  Repeat US in 1 year and can decide whether this should be removed at that time.    Colonoscopy 2015-1 diminutive polyp which was removed and revealed normal mucosa.  Recall colonoscopy recommended in 10 years.    History obtained from : patient  Review of Systems  Medical History Reviewed by provider this encounter:  Tobacco  Allergies  Problems  Med Hx  Surg Hx  Fam Hx       Current Outpatient Medications on File Prior to Visit   Medication Sig Dispense Refill    acetaminophen (TYLENOL) 500 mg tablet Take 500 mg by mouth every 6 (six) hours as needed for mild pain      amLODIPine (NORVASC) 10 mg tablet TAKE 1 TABLET EVERY DAY 90 tablet 3    atorvastatin (LIPITOR) 20 mg tablet TAKE 1 TABLET EVERY DAY 90 tablet 1    Blood Glucose Monitoring Suppl KIT Please use to check blood glucose once daily. 1 kit 0    Blood Pressure Monitor IRMA Use in the morning 1 each 0    cholecalciferol (VITAMIN D3) 1,000 units tablet Take 1,000 Units by mouth daily      famotidine (PEPCID) 20 mg tablet Take 1 tablet (20 mg total) by mouth if needed for heartburn 30 tablet 11    Fluticasone Furoate-Vilanterol (Breo Ellipta) 100-25 mcg/actuation inhaler Inhale 1 puff daily Rinse mouth after use. 180 blister 1    glucose blood test strip Use to check blood glucose once daily. 100 each 1    Lancets 33G MISC PLEASE USE TO CHECK BLOOD GLUCOSE ONCE DAILY 100 each 1    Multiple Vitamin (multivitamin) capsule Take 1 capsule by mouth daily      vitamin B-12 (VITAMIN B-12) 1,000 mcg tablet Take by mouth daily      latanoprost (XALATAN) 0.005 % ophthalmic solution  (Patient not taking: Reported on 7/9/2024)        No current facility-administered medications on file prior to visit.      Social History     Tobacco Use    Smoking status: Never     Passive exposure: Never    Smokeless tobacco: Never   Vaping Use    Vaping status: Never Used   Substance and Sexual Activity    Alcohol use: Not Currently    Drug use: Never    Sexual activity: Not Currently     Partners: Male         Objective     There were no vitals taken for this visit.    Physical Exam

## 2024-11-07 NOTE — ASSESSMENT & PLAN NOTE
The patient had a recent endoscopic ultrasound as she had a CT scan in February revealing an indeterminate 1.4 x 1.0 cm soft tissue nodule abutting the medial gastric body body wall.  Endoscopic ultrasound revealed a solid oval-shaped 1.4 x 1.4cm lesion in the mid body.  Fine-needle aspiration was obtained and revealed rare clusters of bland appearing spindle cells consistent with GIST tumor.     -Repeat endoscopic ultrasound 4/25  Orders:    CT abdomen pelvis w contrast; Future

## 2024-11-07 NOTE — ASSESSMENT & PLAN NOTE
Reports her constipation has been controlled.  No straining to have a BM.  BMs are brown and formed daily.  No melena or hematochezia.

## 2024-11-08 ENCOUNTER — TELEPHONE (OUTPATIENT)
Dept: PSYCHIATRY | Facility: CLINIC | Age: 79
End: 2024-11-08

## 2024-11-08 NOTE — TELEPHONE ENCOUNTER
Called Pt regarding LYFT for 11/11/24 @ 10:00 AM LM to return call. PT does not have a LYFT quantifier or waiver in chart LYFT has not been arranged    Patient Education        Pneumonia in Children: Care Instructions  Your Care Instructions    Pneumonia is a serious lung infection usually caused by viruses or bacteria. Viruses cause most cases of pneumonia in children. The illness may be mild to severe. Your doctor will prescribe antibiotics if your child has bacterial pneumonia. Antibiotics do not help viral pneumonia. In those cases, antiviral medicine may be used. Rest, over-the-counter pain medicine, healthy food, and plenty of fluids will help your child recover at home. Mild pneumonia often goes away in 2 to 3 weeks. Your child may need 6 to 8 weeks or longer to recover from a bad case of pneumonia. Follow-up care is a key part of your child's treatment and safety. Be sure to make and go to all appointments, and call your doctor if your child is having problems. It's also a good idea to know your child's test results and keep a list of the medicines your child takes. How can you care for your child at home? · If the doctor prescribed antibiotics for your child, give them as directed. Do not stop using them just because your child feels better. Your child needs to take the full course of antibiotics. · Be careful with cough and cold medicines. Don't give them to children younger than 6, because they don't work for children that age and can even be harmful. For children 6 and older, always follow all the instructions carefully. Make sure you know how much medicine to give and how long to use it. And use the dosing device if one is included. · Watch for and treat signs of dehydration, which means that the body has lost too much water. Your child's mouth may feel very dry. He or she may have sunken eyes with few tears when crying. Your child may lack energy and want to be held a lot. He or she may not urinate as often as usual.  · Give your child lots of fluids, enough so that the urine is light yellow or clear like water.  This is very important if your

## 2024-11-08 NOTE — TELEPHONE ENCOUNTER
Pt called to confirm CT ordered as Routine vs STAT. Expressed confusion over follow up GI appt in May. Reviewed OV note and advised CT is Routine with plans for EGD and Colonoscopy if CT WNL. Advised May appt is regular follow up. Pt verbalized understanding.

## 2024-11-11 NOTE — TELEPHONE ENCOUNTER
Pt called stating no one ever called her to set up Lyft for her Appt today.   Pt is not feeling well... would like to reschedule her NP Appt... It was explained we do not offer LYFT for first appts. Pt states she was told transportation is provided. Pt was informed that appt was rescheduled for 11/21 at 1200 with ONIEL Pringle.    NP apt with BAIRON Daniel cancelled for 11/11 at 10am, rescheduled for 11/21 with ONIEL Pringle.    Pt needs Lyft set up per previous encounters

## 2024-11-13 ENCOUNTER — TELEPHONE (OUTPATIENT)
Dept: FAMILY MEDICINE CLINIC | Facility: CLINIC | Age: 79
End: 2024-11-13

## 2024-11-13 DIAGNOSIS — J45.20 MILD INTERMITTENT ASTHMA WITHOUT COMPLICATION: Primary | ICD-10-CM

## 2024-11-13 RX ORDER — ALBUTEROL SULFATE 90 UG/1
2 INHALANT RESPIRATORY (INHALATION) EVERY 6 HOURS PRN
Qty: 51 G | Refills: 1 | Status: SHIPPED | OUTPATIENT
Start: 2024-11-13

## 2024-11-14 NOTE — TELEPHONE ENCOUNTER
Received call from POD - Patient was asking about LYFT she was informed could be scheduled for her. Writer informed her of waiver and qualifier that needs to be complete during NP appt for LYFT rides. Explained the new process come January and advised she could inquire with CM about services insurance can offer for transportation. Patient understood. Provided with number, location and appt date/time for confirmation to which patient stated she will call a cab and setup a ride here and home.

## 2024-11-14 NOTE — TELEPHONE ENCOUNTER
Patient called in regarding transportaition to their new patient appointment on 11/21 @12    Writer stated that due to protocol change the patient would need to get in touch with their insurance to see if transportation was provider or look into self transportation.    Patient states they had been told that their would be transportation by someone who scheduled the appointment.     Writer stated they would need to see if they qualified for transportation with the office staff after their first appointment.  Patient requested to speak with someone who spoke with them first.  Writer transferred caller to support staff for further assistance.

## 2024-11-18 ENCOUNTER — TELEPHONE (OUTPATIENT)
Dept: PSYCHIATRY | Facility: CLINIC | Age: 79
End: 2024-11-18

## 2024-11-18 NOTE — TELEPHONE ENCOUNTER
One week follow up call for New Patient appointment with   TREVOR Gallego   on 11/21/2024 was made on 11/18/2024. Writer informed patient of New Patient paperwork needing to be completed 5 days prior to the appointment. Writer confirmed paperwork has been sent via [employer].    Appointment was made on: 11/11/2024

## 2024-11-19 ENCOUNTER — TELEPHONE (OUTPATIENT)
Dept: PSYCHIATRY | Facility: CLINIC | Age: 79
End: 2024-11-19

## 2024-11-19 ENCOUNTER — TELEPHONE (OUTPATIENT)
Age: 79
End: 2024-11-19

## 2024-11-19 NOTE — TELEPHONE ENCOUNTER
Patient contacted office in regards to upcoming appointment for TT wanting to know what their CoPay would be given they already called their insurance who redirected them to the office because it would be and office thing. Writer provided patient with number for billing to have them assist further       [phone]

## 2024-11-19 NOTE — TELEPHONE ENCOUNTER
Spoke with PT regarding LYFT for first visit. PT has a ride and may want to sign up for LYFT at appt

## 2024-11-20 ENCOUNTER — TELEPHONE (OUTPATIENT)
Age: 79
End: 2024-11-20

## 2024-11-20 NOTE — TELEPHONE ENCOUNTER
Pt. Calling stating her psychiatrist referral was cancelled because the place that called to schedule appointment is not in network, advised pt. To call number on back of insurance card and let them know the type of service she is looking for and they should be able to give her a list of providers in network she can make appointment with, advised her to call back if she has any additional questions or has trouble getting in with psychiatrist, pt. Has CT scan scheduled for this coming Monday

## 2024-11-25 ENCOUNTER — HOSPITAL ENCOUNTER (OUTPATIENT)
Dept: CT IMAGING | Facility: HOSPITAL | Age: 79
Discharge: HOME | End: 2024-11-25
Payer: COMMERCIAL

## 2024-11-25 DIAGNOSIS — R63.4 WEIGHT LOSS, ABNORMAL: ICD-10-CM

## 2024-11-25 DIAGNOSIS — R05.3 CHRONIC COUGH: ICD-10-CM

## 2024-11-25 PROCEDURE — 71260 CT THORAX DX C+: CPT

## 2024-11-25 PROCEDURE — 74177 CT ABD & PELVIS W/CONTRAST: CPT

## 2024-11-25 RX ADMIN — IOHEXOL 85 ML: 350 INJECTION, SOLUTION INTRAVENOUS at 12:02

## 2024-11-29 ENCOUNTER — RESULTS FOLLOW-UP (OUTPATIENT)
Dept: GASTROENTEROLOGY | Facility: MEDICAL CENTER | Age: 79
End: 2024-11-29

## 2024-11-29 ENCOUNTER — PREP FOR PROCEDURE (OUTPATIENT)
Dept: GASTROENTEROLOGY | Facility: MEDICAL CENTER | Age: 79
End: 2024-11-29

## 2024-11-29 ENCOUNTER — TELEPHONE (OUTPATIENT)
Age: 79
End: 2024-11-29

## 2024-11-29 DIAGNOSIS — R63.4 WEIGHT LOSS, ABNORMAL: Primary | ICD-10-CM

## 2024-11-29 RX ORDER — POLYETHYLENE GLYCOL 3350, SODIUM SULFATE ANHYDROUS, SODIUM BICARBONATE, SODIUM CHLORIDE, POTASSIUM CHLORIDE 236; 22.74; 6.74; 5.86; 2.97 G/4L; G/4L; G/4L; G/4L; G/4L
4000 POWDER, FOR SOLUTION ORAL ONCE
Qty: 4000 ML | Refills: 0 | Status: SHIPPED | OUTPATIENT
Start: 2024-11-29 | End: 2024-11-29

## 2024-11-29 NOTE — TELEPHONE ENCOUNTER
"Patient requesting to see saw Dr. Rg as soon as possible.  She is concerned about her CT scan done 11/25/24. She is requesting an HIV test to be done.  She is having trouble sleeping \" stated she will not sleep until she talks to Dr. Rg\".    Please review and advice  Thank you  "

## 2024-11-30 ENCOUNTER — TELEPHONE (OUTPATIENT)
Dept: OTHER | Facility: OTHER | Age: 79
End: 2024-11-30

## 2024-11-30 NOTE — TELEPHONE ENCOUNTER
PT. Called in requesting a call back regarding Kevin Velez message about her EGD and colonoscopy. PT has some more questions, and would like to schedule as soon as possible, in the [place] location.

## 2024-12-02 ENCOUNTER — OFFICE VISIT (OUTPATIENT)
Dept: FAMILY MEDICINE CLINIC | Facility: CLINIC | Age: 79
End: 2024-12-02
Payer: COMMERCIAL

## 2024-12-02 ENCOUNTER — TELEPHONE (OUTPATIENT)
Age: 79
End: 2024-12-02

## 2024-12-02 VITALS
BODY MASS INDEX: 20.24 KG/M2 | SYSTOLIC BLOOD PRESSURE: 128 MMHG | TEMPERATURE: 97.5 F | DIASTOLIC BLOOD PRESSURE: 70 MMHG | HEART RATE: 80 BPM | WEIGHT: 110 LBS | OXYGEN SATURATION: 98 % | HEIGHT: 62 IN

## 2024-12-02 DIAGNOSIS — G47.00 INSOMNIA, UNSPECIFIED TYPE: ICD-10-CM

## 2024-12-02 DIAGNOSIS — C49.A0 GASTROINTESTINAL STROMAL TUMOR (GIST) (HCC): ICD-10-CM

## 2024-12-02 DIAGNOSIS — R63.4 WEIGHT LOSS: ICD-10-CM

## 2024-12-02 DIAGNOSIS — R93.89 ABNORMAL CT SCAN: Primary | ICD-10-CM

## 2024-12-02 DIAGNOSIS — R05.3 CHRONIC COUGH: ICD-10-CM

## 2024-12-02 PROCEDURE — G2211 COMPLEX E/M VISIT ADD ON: HCPCS | Performed by: FAMILY MEDICINE

## 2024-12-02 PROCEDURE — 99213 OFFICE O/P EST LOW 20 MIN: CPT | Performed by: FAMILY MEDICINE

## 2024-12-02 NOTE — TELEPHONE ENCOUNTER
Pt called to review CT results and recommendations. Pt states she was unaware of orders. Pt transferred at end of call to scheduled EGD and Colonoscopy.      Pt would like to speak with provider directly as she has concerns of weight loss and potential cancer diagnosis. Reviewed plan of care however pt very anxious and prefers to speak directly with YESSICA Breaux.

## 2024-12-02 NOTE — PROGRESS NOTES
"Name: Paula Paniagua      : 1945      MRN: [id number]  Encounter Provider: Michelle Rg DO  Encounter Date: 2024   Encounter department: [facility]  :  Assessment & Plan  Abnormal CT scan    Orders:  •  Ambulatory Referral to Pulmonology; Future    Gastrointestinal stromal tumor (GIST) (HCC)  Has follow up with GI scheduled.       Chronic cough         Insomnia, unspecified type         Weight loss    Orders:  •  Ambulatory Referral to Pulmonology; Future           History of Present Illness     Patient is concerned about weight loss and right sided chest pain.  Seeing GI regarding GISt tumor.      Review of Systems   Constitutional:  Negative for chills and fever.   HENT:  Negative for congestion and sore throat.    Respiratory:  Positive for shortness of breath. Negative for chest tightness.    Cardiovascular:  Negative for chest pain and palpitations.   Gastrointestinal:  Positive for abdominal pain. Negative for constipation, diarrhea and nausea.   Genitourinary:  Negative for difficulty urinating.   Skin: Negative.    Neurological:  Negative for dizziness and headaches.   Psychiatric/Behavioral:  Positive for sleep disturbance.           Objective   /70 (BP Location: Left arm, Patient Position: Sitting, Cuff Size: Standard)   Pulse 80   Temp 97.5 °F (36.4 °C) (Temporal)   Ht 5' 2.25\" (1.581 m)   Wt 49.9 kg (110 lb)   SpO2 98%   BMI 19.96 kg/m²      Physical Exam  Vitals and nursing note reviewed.   Constitutional:       General: She is not in acute distress.  HENT:      Head: Normocephalic.   Neck:      Thyroid: No thyromegaly.   Cardiovascular:      Rate and Rhythm: Normal rate and regular rhythm.      Heart sounds: Normal heart sounds.   Pulmonary:      Effort: Pulmonary effort is normal.      Breath sounds: Normal breath sounds.   Musculoskeletal:      Right lower leg: No edema.      Left lower leg: No edema.   Lymphadenopathy:      Cervical: No cervical " adenopathy.   Skin:     General: Skin is warm and dry.   Neurological:      Mental Status: She is alert and oriented to person, place, and time.   Psychiatric:         Mood and Affect: Mood is anxious.

## 2024-12-02 NOTE — PATIENT INSTRUCTIONS
Eat smaller more frequent meals.  Eat protein.  Don't be afraid to eat carbohydrates.  Make appointment to see pulmonary doctor regarding CT of lungs.

## 2024-12-02 NOTE — TELEPHONE ENCOUNTER
Patients GI provider:  YESSICA Padilla    Number to return call: [phone]    Reason for call: Pt was transferred by the nurse to schedule colonoscopy EGD. Pt is scheduled for procedures on 12/13/2024 with Dr Robison at [facility]. [facility] prep instructions were sent via letter.    Scheduled procedure/appointment date if applicable: 12/13/2024

## 2024-12-02 NOTE — LETTER
Attached are your prep instructions for your upcoming procedure on 12/13/2024. If you have any questions or concerns please contact us at [phone].            Thank you,      [facility]

## 2024-12-05 NOTE — TELEPHONE ENCOUNTER
Pt scheduled for colon/EGD with  in [facility] for 12/13/24 by access center.    Procedure: Colon/EGD  Date: 12/13/24  Physician performing: Dr. Robison  Location of procedure:  [facility]  Instructions given to patient: [place]  Diabetic: N/A  Clearances: N/A

## 2024-12-06 ENCOUNTER — TELEPHONE (OUTPATIENT)
Age: 79
End: 2024-12-06

## 2024-12-06 NOTE — TELEPHONE ENCOUNTER
Pt. Has scheduled EGD and colon on 12/13/24 pt. Was prescribed steroid taper for 6 days, told by pharmacy to wait until after procedure to take steroids, pt. Has a cough and clear nasal drainage, she is ok with waiting until after procedure to start steroid but is asking if she can take delysum or any other cough medicine otc that wont interfere with anesthesia, no fever, no other symptoms,

## 2024-12-06 NOTE — TELEPHONE ENCOUNTER
Patient calling to make sure she can take Delsym with her other medications.  Advised that she can take.  Patient thankful for call.

## 2024-12-10 ENCOUNTER — NURSE TRIAGE (OUTPATIENT)
Dept: OTHER | Facility: OTHER | Age: 79
End: 2024-12-10

## 2024-12-10 NOTE — TELEPHONE ENCOUNTER
Regarding: Question & concern regarding colonoscopy & endoscopy instruction  ----- Message from Freddie HOUSTON sent at 12/10/2024  6:07 PM EST -----  '' I'm schedule to have a colonoscopy and endoscopy and I have question and concern regarding the instruction.''

## 2024-12-10 NOTE — TELEPHONE ENCOUNTER
Regarding: nausea / lightheaded/ acid reflux  ----- Message from [facility] sent at 9/26/2021  2:14 PM EDT -----  "im having some nausea and lightheaded for a week or more now, along came terrible burning in my stomach like bad acid reflux, id like to get into see my doctor if possible " [de-identified] : A discussion regarding available pain management treatment options occurred with the patient. These included interventional, rehabilitative, pharmacological, and alternative modalities. We will proceed with the following:   Interventional/ Procedures: 1.  Proceed with OSWALDO C7-T1  Treatment options were discussed. The patient has been having persistent, severe neck pain and cervical radicular pain that has minimally improved with conservative therapy thus far (including physical therapy/chiropractic manipulations/home stretching and anti-inflammatory medications for 8 weeks. The patient was given the option of proceeding with a cervical epidural steroid injection to try to get the patient some pain relief.   The risks and benefits were discussed, which included the associated problems with steroids, bleeding, nerve injury, lack of improvement with pain, and 0.5% chance of a post dural puncture headache.    Rehabilitative/alternative modalities: 1. Initiate physician directed activity and lifestyle modifications. 2. Participation in active HEP was discussed and printed. 3. Patient was advised to stay away from any heavy lifting. If needed, she was advised to squat and not bend forward.    Total clinician time spent today on the patient is 30 minutes including preparing to see the patient, obtaining and/or reviewing and confirming history, performing medically necessary and appropriate examination, counseling and educating the patient and/or family, documenting clinical information in the EHR and communicating and/or referring to other healthcare professionals.  F/u 2 weeks post injection  SEBASTIAN Lane DO

## 2024-12-10 NOTE — TELEPHONE ENCOUNTER
"Patient was checking [employer] messages for instructions for colonoscopy- called today and was told it was in \"letters\" so she did not see until today- scope is scheduled for 12/13. She has been doing all the \"do not do a week before your procedure\" things.    She is happy to reschedule, she is not sure what to do.    Advised her to contact office in am if she does not hear from them.     Reason for Disposition   [1] Caller has NON-URGENT question AND [2] triager unable to answer question    Answer Assessment - Initial Assessment Questions  1. DATE/TIME: \"When did you have your colonoscopy?\"       Scheduled for 12/13    2. MAIN CONCERN: \"What is your main concern right now?\" \"What questions do you have?\"      Did not receive instructions until today 12/10 and has been eating grains,veggies with skins, seeds, taking vitamins etc    Protocols used: Colonoscopy Symptoms and Questions-Adult-AH    "

## 2024-12-11 ENCOUNTER — OFFICE VISIT (OUTPATIENT)
Dept: PULMONOLOGY | Facility: CLINIC | Age: 79
End: 2024-12-11
Payer: COMMERCIAL

## 2024-12-11 VITALS
HEART RATE: 80 BPM | HEIGHT: 63 IN | BODY MASS INDEX: 20.02 KG/M2 | DIASTOLIC BLOOD PRESSURE: 68 MMHG | OXYGEN SATURATION: 97 % | SYSTOLIC BLOOD PRESSURE: 106 MMHG | TEMPERATURE: 98.6 F | WEIGHT: 113 LBS

## 2024-12-11 DIAGNOSIS — R93.89 ABNORMAL CT OF THE CHEST: ICD-10-CM

## 2024-12-11 DIAGNOSIS — K21.9 GASTROESOPHAGEAL REFLUX DISEASE, UNSPECIFIED WHETHER ESOPHAGITIS PRESENT: ICD-10-CM

## 2024-12-11 DIAGNOSIS — J45.20 MILD INTERMITTENT ASTHMA WITHOUT COMPLICATION: Primary | ICD-10-CM

## 2024-12-11 DIAGNOSIS — R05.3 CHRONIC COUGH: ICD-10-CM

## 2024-12-11 DIAGNOSIS — F32.A DEPRESSION, UNSPECIFIED DEPRESSION TYPE: ICD-10-CM

## 2024-12-11 DIAGNOSIS — R63.4 WEIGHT LOSS: ICD-10-CM

## 2024-12-11 DIAGNOSIS — R93.89 ABNORMAL CT SCAN: ICD-10-CM

## 2024-12-11 PROCEDURE — G2211 COMPLEX E/M VISIT ADD ON: HCPCS | Performed by: NURSE PRACTITIONER

## 2024-12-11 PROCEDURE — 99214 OFFICE O/P EST MOD 30 MIN: CPT | Performed by: NURSE PRACTITIONER

## 2024-12-11 RX ORDER — FAMOTIDINE 20 MG/1
40 TABLET, FILM COATED ORAL AS NEEDED
Qty: 30 TABLET | Refills: 11 | Status: SHIPPED | OUTPATIENT
Start: 2024-12-11 | End: 2024-12-11 | Stop reason: SDUPTHER

## 2024-12-11 RX ORDER — FAMOTIDINE 20 MG/1
40 TABLET, FILM COATED ORAL AS NEEDED
Qty: 30 TABLET | Refills: 11 | Status: SHIPPED | OUTPATIENT
Start: 2024-12-11 | End: 2024-12-17 | Stop reason: SDUPTHER

## 2024-12-11 NOTE — PROGRESS NOTES
"Pulmonary Follow-Up Note   Paula Paniagua 79 y.o. female MRN: [id number]  12/16/2024      Assessment/Plan:    Problem List Items Addressed This Visit       Mild intermittent asthma without complication - Primary    PFTs 07/26/2022 showed normal spirometry with mildly reduced diffusing capacity.  FEV1 93%, FVC 81%, FEV1/FVC ratio 89%    Home medication regime: Breo 100-25 mcg/ACT 1 puff daily, albuterol 90 mcg/ACT 2 puffs every 6 hours as needed         Chronic cough    Chronic cough is likely multifactorial. She has very symptomatic GERD.  She is not always compliant with her inhalers and endorses coughing spells with exposure to cold air.  Will start claritin daily for PND and allergies.  Can use albuterol rescue inhaler prior to cold air exposure when possible  Continue Breo daily.  Inhaler technique reviewed with patient  Resume famotidine daily, Pt currently uses \"as neede for thed\"  discussed the need for daily use at this time         Abnormal CT of the chest    CT chest abdomen pelvis 11/25/2024 shows mild reticular nodular change in the lateral aspect of the left lower lobe unchanged since 2022 perhaps related to low-grade inflammatory or infectious process.  Additional scattered subsegmental nodules without change no suspicious pulmonary lesions    Reviewed results with patient  She was offered bronchoscopy at previous visit and wanted to reconsider after international travel.  Patient currently following with GI plans for EGD and colonoscopy.  Some concern due to unintended weight loss.  Will revisit the idea of bronchoscopy at next visit as patient would like to complete EGD and colonoscopy first         Depression    Patient discussed during this visit which she believes her weight loss may be partly due to depression.  She has been recently moved from [place] to stay with her daughter and has lost her social network of friends.  Feels like she sits in the house all day long with not much to do and " does not have any friends or on here.  We did discuss different methods to gain and social network.  She continues evaluation for her depression with primary care doctor         Relevant Medications    melatonin 1 mg     Other Visit Diagnoses         Abnormal CT scan          Weight loss          Gastroesophageal reflux disease, unspecified whether esophagitis present        Relevant Medications    famotidine (PEPCID) 20 mg tablet              Return in about 6 months (around 6/11/2025).    All of Paula's questions were answered prior to leaving the office today. She will follow-up in 6 months or sooner should the need arise. She is aware to call our office with any further questions or concerns.    History of Present Illness   Reason for Visit: Follow up  Chief Complaint: persistent dry cough  HPI: Paula Paniagua is a 79 y.o. female who presents to the office today for follow-up for chronic cough and asthma.  She was last seen in the office by Dr. Fajardo-Juan 11/25/2022.  He recommended possible bronchoscopy with concerns of chronic atypical infection  She presents today with continued chronic cough.  She does not appear in exacerbation, denies any recent exacerbations.  She denies any fever chills night sweats chest pain or hemoptysis.  She denies any current throat irritation.  Does have worsening cough with change in air temperature when she goes into really cold air.    Most recent CT chest reviewed with patient.  No significant changes noted.    Chronic cough is likely multifactorial. She has very symptomatic GERD.  She is not always compliant with her inhalers and endorses coughing spells with exposure to cold air.  Will start claritin daily for PND and allergies.  Can use albuterol rescue inhaler prior to cold air exposure when possible  Continue Breo daily.  Inhaler technique reviewed with patient    Pt had had a recent unexplained weight loss.  She is scheduled for EDG and colonoscopy.  Can consider  bronchoscopy if EDG and colonoscopy are unremarkable.  Patient would like to wait until those tests are completed.    We also discussed at length patient's decrease in appetite and current depression due to recent move away from friends in [place].  She is now currently living with daughter and does not have any social outlets or friends in this area.  She states some of her weight loss may be from lack of social connection.  Patient encouraged to look for no social outlets.  But also increase daily caloric intake and calorie dense snacks and higher protein meals    Review of Systems   Respiratory:  Positive for cough. Negative for shortness of breath and wheezing.        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  And Cataract     Past Surgical History:   Procedure Laterality Date    EYE SURGERY      HYSTERECTOMY      OOPHORECTOMY Bilateral     UTERINE SUSPENSION       Family History   Problem Relation Age of Onset    Asthma Mother     Hypertension Mother             Glaucoma Mother     Glaucoma Father             No Known Problems Sister     No Known Problems Maternal Grandmother     No Known Problems Maternal Grandfather     No Known Problems Paternal Grandmother     No Known Problems Paternal Grandfather     No Known Problems Sister     Hypertension Brother         Pace maker     Social History   Social History     Substance and Sexual Activity   Alcohol Use Not Currently     Social History     Substance and Sexual  Activity   Drug Use Never     Social History     Tobacco Use   Smoking Status Never    Passive exposure: Never   Smokeless Tobacco Never     E-Cigarette/Vaping    E-Cigarette Use Never User      E-Cigarette/Vaping Substances    Nicotine No     THC No     CBD No     Flavoring No     Other No     Unknown No        Meds/Allergies     Current Outpatient Medications:     acetaminophen (TYLENOL) 500 mg tablet, Take 500 mg by mouth every 6 (six) hours as needed for mild pain, Disp: , Rfl:     amLODIPine (NORVASC) 10 mg tablet, TAKE 1 TABLET EVERY DAY, Disp: 90 tablet, Rfl: 3    atorvastatin (LIPITOR) 20 mg tablet, TAKE 1 TABLET EVERY DAY, Disp: 90 tablet, Rfl: 1    Blood Glucose Monitoring Suppl KIT, Please use to check blood glucose once daily., Disp: 1 kit, Rfl: 0    Blood Pressure Monitor IRMA, Use in the morning, Disp: 1 each, Rfl: 0    cholecalciferol (VITAMIN D3) 1,000 units tablet, Take 1,000 Units by mouth daily, Disp: , Rfl:     famotidine (PEPCID) 20 mg tablet, Take 2 tablets (40 mg total) by mouth if needed for heartburn, Disp: 30 tablet, Rfl: 11    Fluticasone Furoate-Vilanterol (Breo Ellipta) 100-25 mcg/actuation inhaler, Inhale 1 puff daily Rinse mouth after use., Disp: 180 blister, Rfl: 1    glucose blood test strip, Use to check blood glucose once daily., Disp: 100 each, Rfl: 1    Lancets 33G MISC, PLEASE USE TO CHECK BLOOD GLUCOSE ONCE DAILY, Disp: 100 each, Rfl: 1    melatonin 1 mg, Take 1 mg by mouth daily at bedtime, Disp: , Rfl:     Multiple Vitamin (multivitamin) capsule, Take 1 capsule by mouth daily, Disp: , Rfl:     vitamin B-12 (VITAMIN B-12) 1,000 mcg tablet, Take by mouth daily, Disp: , Rfl:     albuterol (Proventil HFA) 90 mcg/act inhaler, Inhale 2 puffs every 6 (six) hours as needed for wheezing (Patient not taking: Reported on 12/11/2024), Disp: 51 g, Rfl: 1    latanoprost (XALATAN) 0.005 % ophthalmic solution, , Disp: , Rfl:     polyethylene glycol (Golytely) 4000 mL solution, Take 4,000 mL  "by mouth once for 1 dose Take 4000 mL by mouth once for 1 dose. Use as directed, Disp: 4000 mL, Rfl: 0  Allergies   Allergen Reactions    Nsaids Other (See Comments)    Other Cough     Seasonal, perfumes, colon, chemicals    Sulfa Antibiotics Other (See Comments)    Codeine Rash    Shellfish Allergy - Food Allergy Rash       Vitals: Blood pressure 106/68, pulse 80, temperature 98.6 °F (37 °C), temperature source Tympanic, height 5' 2.5\" (1.588 m), weight 51.3 kg (113 lb), SpO2 97%, not currently breastfeeding. Body mass index is 20.34 kg/m². Oxygen Therapy  SpO2: 97 %  Oxygen Therapy: None (Room air)    Physical Exam:  Physical Exam  Vitals reviewed.   Constitutional:       General: She is not in acute distress.     Appearance: She is not ill-appearing.   HENT:      Head: Normocephalic and atraumatic.      Right Ear: External ear normal.      Left Ear: External ear normal.      Nose: Nose normal.      Mouth/Throat:      Mouth: Mucous membranes are moist.      Pharynx: Oropharynx is clear.   Eyes:      Extraocular Movements: Extraocular movements intact.      Pupils: Pupils are equal, round, and reactive to light.   Cardiovascular:      Rate and Rhythm: Normal rate and regular rhythm.      Pulses: Normal pulses.      Heart sounds: Normal heart sounds.   Pulmonary:      Effort: Pulmonary effort is normal.      Breath sounds: Normal breath sounds. No wheezing or rhonchi.   Abdominal:      General: Bowel sounds are normal.      Tenderness: There is no abdominal tenderness.   Musculoskeletal:         General: Normal range of motion.      Cervical back: Normal range of motion and neck supple.      Right lower leg: No edema.      Left lower leg: No edema.   Skin:     General: Skin is warm and dry.   Neurological:      Mental Status: She is alert and oriented to person, place, and time.   Psychiatric:         Mood and Affect: Mood normal.         Behavior: Behavior normal.         Thought Content: Thought content normal.    " "     Judgment: Judgment normal.         Imaging and other studies: Results Review Statement: I reviewed radiology reports from this admission including: CT chest.   CT chest abdomen pelvis 11/25/2024 shows mild reticular nodular change in the lateral aspect of the left lower lobe unchanged since 2022 perhaps related to low-grade inflammatory or infectious process.  Additional scattered subsegmental nodules without change no suspicious pulmonary lesions    Pulmonary Results (PFTs, PSG): Results Review Statement: I reviewed radiology reports from this admission including: PFT.   PFTs 07/26/2022 showed normal spirometry with mildly reduced diffusing capacity.  FEV1 93%, FVC 81%, FEV1/FVC ratio 89%, methacholine challenge test was negative    YESSICA Guerrier  [facility]    Portions of the record may have been created with voice recognition software.  Occasional wrong word or \"sound a like\" substitutions may have occurred due to the inherent limitations of voice recognition software.  Read the chart carefully and recognize, using context, where substitutions have occurred or contact the dictating provider.  "

## 2024-12-11 NOTE — TELEPHONE ENCOUNTER
Patient called in was unsure of prep procedure had to be clemente due to prep issues clemente for 12/19 sent email with Prep

## 2024-12-11 NOTE — PATIENT INSTRUCTIONS
Take famotidine 40 daily to help with GERD and dry cough  Nasal spray twice daily morning and night to help with post nasal drip  Start claritin daily fpr PND and allergies  Continue Breo daily asthma  Recommend Pt use albuterol rescue inhaler prior to going out in cold weather due to cold weather triggering exacerbation  Follow up in 6 months

## 2024-12-12 ENCOUNTER — TELEPHONE (OUTPATIENT)
Age: 79
End: 2024-12-12

## 2024-12-12 NOTE — ASSESSMENT & PLAN NOTE
PFTs 07/26/2022 showed normal spirometry with mildly reduced diffusing capacity.  FEV1 93%, FVC 81%, FEV1/FVC ratio 89%    Home medication regime: Breo 100-25 mcg/ACT 1 puff daily, albuterol 90 mcg/ACT 2 puffs every 6 hours as needed

## 2024-12-12 NOTE — TELEPHONE ENCOUNTER
Incoming call with questions regarding information from yesterday's office visit note.    All questions answered.

## 2024-12-14 ENCOUNTER — NURSE TRIAGE (OUTPATIENT)
Dept: OTHER | Facility: OTHER | Age: 79
End: 2024-12-14

## 2024-12-14 NOTE — TELEPHONE ENCOUNTER
"Reason for Disposition   Bowel prep for colonoscopy, questions about    Answer Assessment - Initial Assessment Questions  1. DATE/TIME: \"When did you have your colonoscopy?\"       12/19    2. MAIN CONCERN: \"What is your main concern right now?\" \"What questions do you have?\"      Wants to review dietary restrictions    Protocols used: Colonoscopy Symptoms and Questions-Adult-    "

## 2024-12-16 ENCOUNTER — TELEPHONE (OUTPATIENT)
Age: 79
End: 2024-12-16

## 2024-12-16 ENCOUNTER — TELEPHONE (OUTPATIENT)
Dept: PULMONOLOGY | Facility: CLINIC | Age: 79
End: 2024-12-16

## 2024-12-16 NOTE — TELEPHONE ENCOUNTER
"I returned call, patient concerned about foods that she can eat prior to upcoming procedure. She admits to anxiety and that she reads too much into things. She states she hasn't been eating much after reading prep instructions and she has lose more weight.    Patient concerned about eating a slice of wheat bread and Activia yogurt. I advised a slice of bread is fine and she can eat the Activia yogurt today (blueberry - she notes some small pieces of fruit in it). I reviewed she can eat chicken breast with her \"green seasonings\" and discussed other food options. I reviewed that we are more concerned with her being on clears the day before her colonoscopy.     Patient apologized for calling in so often and I explained that we are here to help. Patient appreciated call back and patience provided.  "

## 2024-12-16 NOTE — TELEPHONE ENCOUNTER
Patient called the RX Refill Line. Message is being forwarded to the office.     Patient is requesting for someone to call [facility] pharmacy because she received a message stating that the pharmacy is trying to contact the office. Patient is not sure why.     Please contact patient at  [phone]

## 2024-12-17 DIAGNOSIS — K21.9 GASTROESOPHAGEAL REFLUX DISEASE, UNSPECIFIED WHETHER ESOPHAGITIS PRESENT: ICD-10-CM

## 2024-12-17 RX ORDER — FAMOTIDINE 20 MG/1
40 TABLET, FILM COATED ORAL DAILY
Qty: 30 TABLET | Refills: 11 | Status: SHIPPED | OUTPATIENT
Start: 2024-12-17 | End: 2024-12-20 | Stop reason: SDUPTHER

## 2024-12-17 NOTE — ASSESSMENT & PLAN NOTE
Patient discussed during this visit which she believes her weight loss may be partly due to depression.  She has been recently moved from [place] to stay with her daughter and has lost her social network of friends.  Feels like she sits in the house all day long with not much to do and does not have any friends or on here.  We did discuss different methods to gain and social network.  She continues evaluation for her depression with primary care doctor

## 2024-12-17 NOTE — ASSESSMENT & PLAN NOTE
"Chronic cough is likely multifactorial. She has very symptomatic GERD.  She is not always compliant with her inhalers and endorses coughing spells with exposure to cold air.  Will start claritin daily for PND and allergies.  Can use albuterol rescue inhaler prior to cold air exposure when possible  Continue Breo daily.  Inhaler technique reviewed with patient  Resume famotidine daily, Pt currently uses \"as neede for thed\"  discussed the need for daily use at this time  "

## 2024-12-17 NOTE — ASSESSMENT & PLAN NOTE
CT chest abdomen pelvis 11/25/2024 shows mild reticular nodular change in the lateral aspect of the left lower lobe unchanged since 2022 perhaps related to low-grade inflammatory or infectious process.  Additional scattered subsegmental nodules without change no suspicious pulmonary lesions    Reviewed results with patient  She was offered bronchoscopy at previous visit and wanted to reconsider after international travel.  Patient currently following with GI plans for EGD and colonoscopy.  Some concern due to unintended weight loss.  Will revisit the idea of bronchoscopy at next visit as patient would like to complete EGD and colonoscopy first

## 2024-12-19 ENCOUNTER — ANESTHESIA (OUTPATIENT)
Dept: GASTROENTEROLOGY | Facility: HOSPITAL | Age: 79
End: 2024-12-19
Payer: COMMERCIAL

## 2024-12-19 ENCOUNTER — HOSPITAL ENCOUNTER (OUTPATIENT)
Dept: GASTROENTEROLOGY | Facility: HOSPITAL | Age: 79
Setting detail: OUTPATIENT SURGERY
End: 2024-12-19
Payer: COMMERCIAL

## 2024-12-19 ENCOUNTER — ANESTHESIA EVENT (OUTPATIENT)
Dept: GASTROENTEROLOGY | Facility: HOSPITAL | Age: 79
End: 2024-12-19
Payer: COMMERCIAL

## 2024-12-19 VITALS
TEMPERATURE: 96.6 F | SYSTOLIC BLOOD PRESSURE: 109 MMHG | DIASTOLIC BLOOD PRESSURE: 57 MMHG | RESPIRATION RATE: 18 BRPM | HEART RATE: 92 BPM | OXYGEN SATURATION: 92 %

## 2024-12-19 DIAGNOSIS — Z12.11 COLON CANCER SCREENING: ICD-10-CM

## 2024-12-19 DIAGNOSIS — R63.4 WEIGHT LOSS, ABNORMAL: ICD-10-CM

## 2024-12-19 LAB — GLUCOSE SERPL-MCNC: 83 MG/DL (ref 65–140)

## 2024-12-19 PROCEDURE — 88305 TISSUE EXAM BY PATHOLOGIST: CPT | Performed by: PATHOLOGY

## 2024-12-19 PROCEDURE — 88342 IMHCHEM/IMCYTCHM 1ST ANTB: CPT | Performed by: PATHOLOGY

## 2024-12-19 PROCEDURE — 88341 IMHCHEM/IMCYTCHM EA ADD ANTB: CPT | Performed by: PATHOLOGY

## 2024-12-19 PROCEDURE — 82948 REAGENT STRIP/BLOOD GLUCOSE: CPT

## 2024-12-19 RX ORDER — SIMETHICONE 40MG/0.6ML
SUSPENSION, DROPS(FINAL DOSAGE FORM)(ML) ORAL AS NEEDED
Status: COMPLETED | OUTPATIENT
Start: 2024-12-19 | End: 2024-12-19

## 2024-12-19 RX ORDER — SODIUM CHLORIDE, SODIUM LACTATE, POTASSIUM CHLORIDE, CALCIUM CHLORIDE 600; 310; 30; 20 MG/100ML; MG/100ML; MG/100ML; MG/100ML
100 INJECTION, SOLUTION INTRAVENOUS CONTINUOUS
Status: DISCONTINUED | OUTPATIENT
Start: 2024-12-19 | End: 2024-12-23 | Stop reason: HOSPADM

## 2024-12-19 RX ORDER — PROPOFOL 10 MG/ML
INJECTION, EMULSION INTRAVENOUS AS NEEDED
Status: DISCONTINUED | OUTPATIENT
Start: 2024-12-19 | End: 2024-12-19

## 2024-12-19 RX ORDER — SODIUM CHLORIDE, SODIUM LACTATE, POTASSIUM CHLORIDE, CALCIUM CHLORIDE 600; 310; 30; 20 MG/100ML; MG/100ML; MG/100ML; MG/100ML
INJECTION, SOLUTION INTRAVENOUS CONTINUOUS PRN
Status: DISCONTINUED | OUTPATIENT
Start: 2024-12-19 | End: 2024-12-19

## 2024-12-19 RX ORDER — LIDOCAINE HYDROCHLORIDE 10 MG/ML
INJECTION, SOLUTION EPIDURAL; INFILTRATION; INTRACAUDAL; PERINEURAL AS NEEDED
Status: DISCONTINUED | OUTPATIENT
Start: 2024-12-19 | End: 2024-12-19

## 2024-12-19 RX ORDER — SODIUM CHLORIDE, SODIUM LACTATE, POTASSIUM CHLORIDE, CALCIUM CHLORIDE 600; 310; 30; 20 MG/100ML; MG/100ML; MG/100ML; MG/100ML
100 INJECTION, SOLUTION INTRAVENOUS CONTINUOUS
Status: CANCELLED | OUTPATIENT
Start: 2024-12-19

## 2024-12-19 RX ORDER — PHENYLEPHRINE HCL IN 0.9% NACL 1 MG/10 ML
SYRINGE (ML) INTRAVENOUS AS NEEDED
Status: DISCONTINUED | OUTPATIENT
Start: 2024-12-19 | End: 2024-12-19

## 2024-12-19 RX ADMIN — SODIUM CHLORIDE, SODIUM LACTATE, POTASSIUM CHLORIDE, AND CALCIUM CHLORIDE 100 ML/HR: .6; .31; .03; .02 INJECTION, SOLUTION INTRAVENOUS at 08:22

## 2024-12-19 RX ADMIN — Medication 150 MCG: at 09:09

## 2024-12-19 RX ADMIN — PROPOFOL 20 MG: 10 INJECTION, EMULSION INTRAVENOUS at 08:53

## 2024-12-19 RX ADMIN — PROPOFOL 50 MG: 10 INJECTION, EMULSION INTRAVENOUS at 08:57

## 2024-12-19 RX ADMIN — PROPOFOL 20 MG: 10 INJECTION, EMULSION INTRAVENOUS at 08:55

## 2024-12-19 RX ADMIN — PROPOFOL 50 MG: 10 INJECTION, EMULSION INTRAVENOUS at 09:02

## 2024-12-19 RX ADMIN — PROPOFOL 80 MG: 10 INJECTION, EMULSION INTRAVENOUS at 08:45

## 2024-12-19 RX ADMIN — PROPOFOL 20 MG: 10 INJECTION, EMULSION INTRAVENOUS at 08:47

## 2024-12-19 RX ADMIN — SODIUM CHLORIDE, SODIUM LACTATE, POTASSIUM CHLORIDE, AND CALCIUM CHLORIDE: .6; .31; .03; .02 INJECTION, SOLUTION INTRAVENOUS at 08:40

## 2024-12-19 RX ADMIN — Medication 40 MG: at 08:58

## 2024-12-19 RX ADMIN — PROPOFOL 50 MG: 10 INJECTION, EMULSION INTRAVENOUS at 09:10

## 2024-12-19 RX ADMIN — PROPOFOL 50 MG: 10 INJECTION, EMULSION INTRAVENOUS at 09:15

## 2024-12-19 RX ADMIN — LIDOCAINE HYDROCHLORIDE 50 MG: 10 SOLUTION INTRAVENOUS at 08:45

## 2024-12-19 RX ADMIN — PROPOFOL 50 MG: 10 INJECTION, EMULSION INTRAVENOUS at 09:05

## 2024-12-19 RX ADMIN — PROPOFOL 20 MG: 10 INJECTION, EMULSION INTRAVENOUS at 08:46

## 2024-12-19 RX ADMIN — PROPOFOL 20 MG: 10 INJECTION, EMULSION INTRAVENOUS at 08:49

## 2024-12-19 NOTE — ANESTHESIA POSTPROCEDURE EVALUATION
Post-Op Assessment Note    CV Status:  Stable    Pain management: adequate       Mental Status:  Alert and awake   Hydration Status:  Euvolemic   PONV Controlled:  Controlled   Airway Patency:  Patent     Post Op Vitals Reviewed: Yes    No anethesia notable event occurred.    Staff: CRNA           Last Filed PACU Vitals:  Vitals Value Taken Time   Temp 96.6 °F (35.9 °C) 12/19/24 0923   Pulse 92 12/19/24 0941   /57 12/19/24 0941   Resp 18 12/19/24 0941   SpO2 92 % 12/19/24 0941       Modified Holli:  Activity: 2 (12/19/2024  9:43 AM)  Respiration: 2 (12/19/2024  9:43 AM)  Circulation: 2 (12/19/2024  9:43 AM)  Consciousness: 2 (12/19/2024  9:43 AM)  Oxygen Saturation: 2 (12/19/2024  9:43 AM)  Modified Holli Score: 10 (12/19/2024  9:43 AM)

## 2024-12-19 NOTE — H&P
History and Physical - [facility]  Paula Paniagua 79 y.o. female MRN: [id number]        HPI: Paula Paniagua is a 79 y.o. year old female who presents for history of GIST and unintentional weight loss.       REVIEW OF SYSTEMS: Per the HPI, and otherwise unremarkable.    Historical Information   Past Medical History:   Diagnosis Date    Allergic ’s    Allergic to codine    Anxiety     Asthma     variant cough asthma    Cataract     Chronic kidney disease ’s    Kidney and liver cyst    Clotting disorder (HCC)     Gums    Diverticulitis of colon Maybe 2015 or 16    GERD (gastroesophageal reflux disease)     Headache(784.0)     Heart murmur     Hyperlipidemia     Hypertension     Scoliosis     Seizures (HCC) 2014 and 2019    Visual impairment     Early stage Glucoma.  And Cataract     Past Surgical History:   Procedure Laterality Date    EYE SURGERY      HYSTERECTOMY      OOPHORECTOMY Bilateral     UTERINE SUSPENSION       Social History   Social History     Substance and Sexual Activity   Alcohol Use Not Currently     Social History     Substance and Sexual Activity   Drug Use Never     Social History     Tobacco Use   Smoking Status Never    Passive exposure: Never   Smokeless Tobacco Never     Family History   Problem Relation Age of Onset    Asthma Mother     Hypertension Mother             Glaucoma Mother     Glaucoma Father             No Known Problems Sister     No Known Problems Maternal Grandmother     No Known Problems Maternal Grandfather     No Known Problems Paternal Grandmother     No Known Problems Paternal Grandfather     No Known Problems Sister     Hypertension Brother         Pace maker       Meds/Allergies       Current Outpatient Medications:     acetaminophen (TYLENOL) 500 mg tablet    amLODIPine (NORVASC) 10 mg tablet    atorvastatin (LIPITOR) 20 mg tablet    Blood Glucose Monitoring Suppl KIT    Blood Pressure Monitor IRMA     famotidine (PEPCID) 20 mg tablet    Fluticasone Furoate-Vilanterol (Breo Ellipta) 100-25 mcg/actuation inhaler    glucose blood test strip    Lancets 33G MISC    melatonin 1 mg    Multiple Vitamin (multivitamin) capsule    vitamin B-12 (VITAMIN B-12) 1,000 mcg tablet    albuterol (Proventil HFA) 90 mcg/act inhaler    cholecalciferol (VITAMIN D3) 1,000 units tablet    latanoprost (XALATAN) 0.005 % ophthalmic solution    polyethylene glycol (Golytely) 4000 mL solution    Current Facility-Administered Medications:     lactated ringers infusion, 100 mL/hr, Intravenous, Continuous, 100 mL/hr at 12/19/24 0822    Allergies   Allergen Reactions    Nsaids Other (See Comments)    Other Cough     Seasonal, perfumes, colon, chemicals    Sulfa Antibiotics Other (See Comments)    Codeine Rash    Shellfish Allergy - Food Allergy Rash       Objective     /62   Pulse 105   Temp 97.6 °F (36.4 °C) (Temporal)   Resp 15   SpO2 99%       PHYSICAL EXAM    Gen: NAD  Head: NCAT  CV: RRR  CHEST: Clear  ABD: soft, NT/ND  EXT: no edema      ASSESSMENT/PLAN:  This is a 79 y.o. year old female here for EGD/COY, and she is stable and optimized for her procedure.

## 2024-12-19 NOTE — ANESTHESIA PREPROCEDURE EVALUATION
Medical History    History Comments   Asthma variant cough asthma   Hypertension    Hyperlipidemia    GERD (gastroesophageal reflux disease)    Cataract    Allergic Allergic to codine   Anxiety    Clotting disorder (HCC) Gums   Diverticulitis of colon    Headache(784.0)    Heart murmur    Chronic kidney disease Kidney and liver cyst   Scoliosis    Seizures (HCC) 2018 and 2019  2021   Visual impairment Early stage Glucoma.  And Cataract   Procedure:  COLONOSCOPY  EGD    Relevant Problems   ANESTHESIA (within normal limits)      CARDIO   (+) Chest pain   (+) Essential hypertension   (+) Heart murmur   (+) Right-sided chest wall pain   (+) Venous insufficiency      GI/HEPATIC   (+) Gastroesophageal reflux disease with esophagitis without hemorrhage   (+) Oropharyngeal dysphagia      MUSCULOSKELETAL   (+) Arthritis   (+) Upper back pain      NEURO/PSYCH   (+) Anxiety   (+) Depression   (+) Generalized anxiety disorder   (+) Morning headache      PULMONARY   (+) Mild intermittent asthma without complication   (+) NED (obstructive sleep apnea)        Physical Exam    Airway    Mallampati score: II  TM Distance: >3 FB  Neck ROM: full     Dental       Cardiovascular  Rate: normal    Pulmonary  Pulmonary exam normal     Other Findings  Per pt denies anything remaining that is loose or removeablepost-pubertal.      Anesthesia Plan  ASA Score- 3     Anesthesia Type- IV sedation with anesthesia with ASA Monitors.         Additional Monitors:     Airway Plan:            Plan Factors-Exercise tolerance (METS): >4 METS.    Chart reviewed.    Patient summary reviewed.                  Induction- intravenous.    Postoperative Plan-         Informed Consent- Anesthetic plan and risks discussed with patient.  I personally reviewed this patient with the CRNA. Discussed and agreed on the Anesthesia Plan with the CRNA..

## 2024-12-20 DIAGNOSIS — K21.9 GASTROESOPHAGEAL REFLUX DISEASE, UNSPECIFIED WHETHER ESOPHAGITIS PRESENT: ICD-10-CM

## 2024-12-20 RX ORDER — FAMOTIDINE 20 MG/1
40 TABLET, FILM COATED ORAL DAILY
Qty: 60 TABLET | Refills: 5 | Status: SHIPPED | OUTPATIENT
Start: 2024-12-20 | End: 2025-06-18

## 2024-12-31 PROCEDURE — 88341 IMHCHEM/IMCYTCHM EA ADD ANTB: CPT | Performed by: PATHOLOGY

## 2024-12-31 PROCEDURE — 88305 TISSUE EXAM BY PATHOLOGIST: CPT | Performed by: PATHOLOGY

## 2024-12-31 PROCEDURE — 88342 IMHCHEM/IMCYTCHM 1ST ANTB: CPT | Performed by: PATHOLOGY

## 2025-01-02 ENCOUNTER — TELEPHONE (OUTPATIENT)
Age: 80
End: 2025-01-02

## 2025-01-02 NOTE — TELEPHONE ENCOUNTER
Pt calling asking for a call back in regards to her labs results from her colonoscopy. Pt states she still has a pain in her side that comes and goes and is very worried.

## 2025-01-04 ENCOUNTER — RESULTS FOLLOW-UP (OUTPATIENT)
Dept: GASTROENTEROLOGY | Facility: CLINIC | Age: 80
End: 2025-01-04

## 2025-01-04 DIAGNOSIS — E78.5 DYSLIPIDEMIA: ICD-10-CM

## 2025-01-04 DIAGNOSIS — R73.03 PRE-DIABETES: Primary | ICD-10-CM

## 2025-01-04 NOTE — RESULT ENCOUNTER NOTE
I called her with her results. She should have repeat colonoscopy in 3 years and repeat EUS for surveillance of GIST in April 2025. I will schedule her for an office visit in the next month to evaluate her RUQ pain and weight loss as we still have not found a clear etiology.

## 2025-01-08 ENCOUNTER — TELEPHONE (OUTPATIENT)
Age: 80
End: 2025-01-08

## 2025-01-08 NOTE — TELEPHONE ENCOUNTER
Patient calling stating after eating she has been having some discomfort, she has not been taking the famotidine daily. Asking if she should take when she is having symptoms or take 2 tablets at the same time once a day or 1 tablet in the morning and one in the evening. Please advise. Alicia she also wants to give you a message, said you are super sweet and she hopes you had a great New Year.

## 2025-01-10 ENCOUNTER — NURSE TRIAGE (OUTPATIENT)
Age: 80
End: 2025-01-10

## 2025-01-10 NOTE — TELEPHONE ENCOUNTER
Last ov 11/27/24. Pt c/o of eating yogurt and having burning in chest, did have greasy food this morning that may be contributing. Is currently taking famotidine 20 mg BID daily.   Advised can take tums or Gaviscon as needed and call us Monday with an update.

## 2025-01-10 NOTE — TELEPHONE ENCOUNTER
----- Message from Beth LOPEZ sent at 1/10/2025  5:09 PM EST -----  Pt stating she ate yogurt and her stomach has burning feeling she is taking pepcid.  Unable to reach a nurse please contact her back.

## 2025-01-10 NOTE — TELEPHONE ENCOUNTER
Paula follows with gastroenterology.  I would recommend that she speak with them regarding this medication and further management of the symptoms.

## 2025-01-10 NOTE — TELEPHONE ENCOUNTER
Spoke with pt and relayed provider recommendation of following up with GI regarding the medication. Warm transferred to GI.

## 2025-01-10 NOTE — TELEPHONE ENCOUNTER
GERD     Pt currently taking Famotidine 40mg in the AM and still having severe stomach burning.    Pt asking if she can take Famotidine 40mg BID?    Please advise.

## 2025-01-11 DIAGNOSIS — E78.5 DYSLIPIDEMIA: ICD-10-CM

## 2025-01-11 DIAGNOSIS — I10 ESSENTIAL HYPERTENSION: ICD-10-CM

## 2025-01-11 RX ORDER — ATORVASTATIN CALCIUM 20 MG/1
20 TABLET, FILM COATED ORAL DAILY
Qty: 90 TABLET | Refills: 1 | Status: SHIPPED | OUTPATIENT
Start: 2025-01-11

## 2025-01-11 RX ORDER — AMLODIPINE BESYLATE 10 MG/1
10 TABLET ORAL DAILY
Qty: 90 TABLET | Refills: 1 | Status: SHIPPED | OUTPATIENT
Start: 2025-01-11

## 2025-01-13 NOTE — TELEPHONE ENCOUNTER
Pt taking Pepcid (2) 20 mg for 40 mg dose PRN. Was told by pulmonologist best to take daily. Questioning if ok to take daily as she is still having burning. Advised ok to take to daily. Pt verbalized understanding.

## 2025-01-16 NOTE — TELEPHONE ENCOUNTER
"LOV 11/07/24     calling for clarification on when to take famotidine and Gaviscon. Informed pt she may take 2 (two) 20 mg capsules of famotidine before bedtime to manage nighttime symptoms. Gaviscon may be used throughout the day, as needed, per directions on the label.  verbalized understanding and is without further questions.    Reason for Disposition   General information question, no triage required and triager able to answer question    Answer Assessment - Initial Assessment Questions  1. REASON FOR CALL: \"What is the main reason for your call?\" or \"How can I best help you?\"      Please see note    Protocols used: Information Only Call - No Triage-Adult-[place]    "

## 2025-01-24 ENCOUNTER — TELEPHONE (OUTPATIENT)
Age: 80
End: 2025-01-24

## 2025-01-24 NOTE — TELEPHONE ENCOUNTER
Pt called asking if she had an appt with Dr. Rg for the beginning of February. Advised pt that I did not see any appt with Dr. Rg.    Pt stated that Dr. Rg put in labs for her and that she wants to have them completed and needs to see Dr. Rg in the beginning of February. Pt added that Dr. Rg sent her a text stating that she would have someone schedule her and confirm.    Warm transferred to [facility] with clerical due to no appt availability with provider.

## 2025-02-04 ENCOUNTER — APPOINTMENT (OUTPATIENT)
Dept: LAB | Facility: CLINIC | Age: 80
End: 2025-02-04
Payer: COMMERCIAL

## 2025-02-04 ENCOUNTER — RESULTS FOLLOW-UP (OUTPATIENT)
Dept: FAMILY MEDICINE CLINIC | Facility: CLINIC | Age: 80
End: 2025-02-04

## 2025-02-04 ENCOUNTER — TELEPHONE (OUTPATIENT)
Age: 80
End: 2025-02-04

## 2025-02-04 DIAGNOSIS — E78.5 DYSLIPIDEMIA: ICD-10-CM

## 2025-02-04 DIAGNOSIS — R73.03 PRE-DIABETES: ICD-10-CM

## 2025-02-04 LAB
ALBUMIN SERPL BCG-MCNC: 4.4 G/DL (ref 3.5–5)
ALP SERPL-CCNC: 67 U/L (ref 34–104)
ALT SERPL W P-5'-P-CCNC: 14 U/L (ref 7–52)
ANION GAP SERPL CALCULATED.3IONS-SCNC: 10 MMOL/L (ref 4–13)
AST SERPL W P-5'-P-CCNC: 21 U/L (ref 13–39)
BILIRUB SERPL-MCNC: 0.53 MG/DL (ref 0.2–1)
BUN SERPL-MCNC: 14 MG/DL (ref 5–25)
CALCIUM SERPL-MCNC: 9.2 MG/DL (ref 8.4–10.2)
CHLORIDE SERPL-SCNC: 103 MMOL/L (ref 96–108)
CHOLEST SERPL-MCNC: 137 MG/DL (ref ?–200)
CO2 SERPL-SCNC: 30 MMOL/L (ref 21–32)
CREAT SERPL-MCNC: 0.63 MG/DL (ref 0.6–1.3)
EST. AVERAGE GLUCOSE BLD GHB EST-MCNC: 117 MG/DL
GFR SERPL CREATININE-BSD FRML MDRD: 86 ML/MIN/1.73SQ M
GLUCOSE P FAST SERPL-MCNC: 93 MG/DL (ref 65–99)
HBA1C MFR BLD: 5.7 %
HDLC SERPL-MCNC: 66 MG/DL
LDLC SERPL CALC-MCNC: 50 MG/DL (ref 0–100)
POTASSIUM SERPL-SCNC: 3.7 MMOL/L (ref 3.5–5.3)
PROT SERPL-MCNC: 6.8 G/DL (ref 6.4–8.4)
SODIUM SERPL-SCNC: 143 MMOL/L (ref 135–147)
TRIGL SERPL-MCNC: 106 MG/DL (ref ?–150)

## 2025-02-04 PROCEDURE — 36415 COLL VENOUS BLD VENIPUNCTURE: CPT

## 2025-02-04 PROCEDURE — 83036 HEMOGLOBIN GLYCOSYLATED A1C: CPT

## 2025-02-04 PROCEDURE — 80053 COMPREHEN METABOLIC PANEL: CPT

## 2025-02-04 PROCEDURE — 80061 LIPID PANEL: CPT

## 2025-02-04 NOTE — TELEPHONE ENCOUNTER
Patient called to see what labs she had done today because she only received 1 result. Told her that she had multiple tests done but they do not come back all at once. Stated understanding and said that she is high anxiety. Has an appt on Thursday.

## 2025-02-06 ENCOUNTER — TELEMEDICINE (OUTPATIENT)
Dept: FAMILY MEDICINE CLINIC | Facility: CLINIC | Age: 80
End: 2025-02-06
Payer: COMMERCIAL

## 2025-02-06 DIAGNOSIS — R63.4 WEIGHT LOSS: Primary | ICD-10-CM

## 2025-02-06 DIAGNOSIS — F41.9 ANXIETY: ICD-10-CM

## 2025-02-06 DIAGNOSIS — Z03.818 ENCOUNTER FOR PATIENT CONCERN ABOUT EXPOSURE TO INFECTIOUS ORGANISM: ICD-10-CM

## 2025-02-06 DIAGNOSIS — R05.3 PERSISTENT COUGH: ICD-10-CM

## 2025-02-06 DIAGNOSIS — E78.5 DYSLIPIDEMIA: ICD-10-CM

## 2025-02-06 PROCEDURE — 99214 OFFICE O/P EST MOD 30 MIN: CPT | Performed by: FAMILY MEDICINE

## 2025-02-06 PROCEDURE — G2211 COMPLEX E/M VISIT ADD ON: HCPCS | Performed by: FAMILY MEDICINE

## 2025-02-06 NOTE — PROGRESS NOTES
Virtual Regular Visit  Name: Paula Paniagua      : 1945      MRN: [id number]  Encounter Provider: Michelle Rg DO  Encounter Date: 2025   Encounter department: [facility]      Verification of patient location:  Patient is located at Home in the following state in which I hold an active license [place] :  Assessment & Plan  Weight loss  Patient reports that she continues with weight loss.  She is down to 107.  She is asking about SIBO testing.  She does have follow-up with gastroenterology since her scope and biopsies and so I recommended that she discuss this with them.  Biopsies of the stomach and small intestine were negative.  She did have 2 polyps that were tubular adenomas and she is to have a colonoscopy in 3 years.  They will also be following her endoscopic ultrasound.  Orders:  •  CBC and Platelet; Future  •  HIV 1/2 AB/AG w Reflex [facility] for 2 yr old and above; Future  •  Hepatitis C antibody; Future    Encounter for patient concern about exposure to infectious organism  Patient is concerned about sexual relations she had a little over 2 years ago.  We had discussed this in the past and I had ordered blood work but she says she did not go for it.  She did have a hepatitis C antibody in 2022 but we will check again since this was just around the time she had relations.  Orders:  •  HIV 1/2 AB/AG w Reflex [facility] for 2 yr old and above; Future  •  Hepatitis C antibody; Future    Persistent cough  Patient did have a follow-up with pulmonary.  They have recommended that she use Breo once a day, take Claritin, Pepcid and use albuterol as needed and when she was going to go out into the cold.  She has not been taking the Claritin.  I told her that this should help with her runny nose and she needs to take this daily along with the other medication.  She has concerns that she has been using the albuterol too often because sometimes she is feeling that it does not work and takes  an extra puff.  I warned her against this.  Too much albuterol could make her feel jittery and cause palpitations.       Anxiety  Patient tells me that she was called by behavioral health but told that they do not participate in her insurance.  She does not know where to go to see a psychiatrist.  I will check on this for her.       Dyslipidemia  Continue Atorvastatin - chhol 137, triglycerides 106.       We will call her to schedule appoint with me in the office so that I can do a [place] exam and also assess how she is using her albuterol. Will check BP then.    Encounter provider Michelle Rg DO    The patient was identified by name and date of birth. Paula Paniagua was informed that this is a telemedicine visit and that the visit is being conducted through the Epic Embedded platform. She agrees to proceed..  My office door was closed. No one else was in the room.  She acknowledged consent and understanding of privacy and security of the video platform. The patient has agreed to participate and understands they can discontinue the visit at any time.    Patient is aware this is a billable service.     History of Present Illness     Patient with many concerns - weight loss, memory, SIBO?, psychiatry      Review of Systems   Constitutional:  Positive for unexpected weight change.   HENT:  Positive for rhinorrhea.    Respiratory:  Positive for cough.    Neurological:         Memory issues   Psychiatric/Behavioral:  Positive for sleep disturbance. Negative for hallucinations, self-injury and suicidal ideas. The patient is nervous/anxious.        Objective   There were no vitals taken for this visit.    Physical Exam  HENT:      Head: Normocephalic.   Eyes:      General: No scleral icterus.  Pulmonary:      Effort: Pulmonary effort is normal. No respiratory distress.   Musculoskeletal:      Cervical back: Normal range of motion.      Comments: Patient is ambulating.   Skin:     Coloration: Skin is not pale.    Neurological:      Mental Status: She is alert and oriented to person, place, and time.   Psychiatric:         Mood and Affect: Mood is anxious.         Visit Time  Total Visit Duration: 30 minutes

## 2025-02-06 NOTE — ASSESSMENT & PLAN NOTE
Patient tells me that she was called by behavioral health but told that they do not participate in her insurance.  She does not know where to go to see a psychiatrist.  I will check on this for her.

## 2025-02-07 ENCOUNTER — APPOINTMENT (OUTPATIENT)
Dept: LAB | Facility: CLINIC | Age: 80
End: 2025-02-07
Payer: COMMERCIAL

## 2025-02-07 DIAGNOSIS — Z03.818 ENCOUNTER FOR PATIENT CONCERN ABOUT EXPOSURE TO INFECTIOUS ORGANISM: ICD-10-CM

## 2025-02-07 DIAGNOSIS — R63.4 WEIGHT LOSS: ICD-10-CM

## 2025-02-07 LAB
ERYTHROCYTE [DISTWIDTH] IN BLOOD BY AUTOMATED COUNT: 12.4 % (ref 11.6–15.1)
HCT VFR BLD AUTO: 45.5 % (ref 34.8–46.1)
HGB BLD-MCNC: 15.1 G/DL (ref 11.5–15.4)
MCH RBC QN AUTO: 31.5 PG (ref 26.8–34.3)
MCHC RBC AUTO-ENTMCNC: 33.2 G/DL (ref 31.4–37.4)
MCV RBC AUTO: 95 FL (ref 82–98)
PLATELET # BLD AUTO: 211 THOUSANDS/UL (ref 149–390)
PMV BLD AUTO: 10.7 FL (ref 8.9–12.7)
RBC # BLD AUTO: 4.79 MILLION/UL (ref 3.81–5.12)
WBC # BLD AUTO: 7.43 THOUSAND/UL (ref 4.31–10.16)

## 2025-02-07 PROCEDURE — 87389 HIV-1 AG W/HIV-1&-2 AB AG IA: CPT

## 2025-02-07 PROCEDURE — 86803 HEPATITIS C AB TEST: CPT

## 2025-02-07 PROCEDURE — 85027 COMPLETE CBC AUTOMATED: CPT

## 2025-02-07 PROCEDURE — 36415 COLL VENOUS BLD VENIPUNCTURE: CPT

## 2025-02-08 LAB
HCV AB SER QL: NORMAL
HIV 1+2 AB+HIV1 P24 AG SERPL QL IA: NORMAL
HIV 2 AB SERPL QL IA: NORMAL
HIV1 AB SERPL QL IA: NORMAL
HIV1 P24 AG SERPL QL IA: NORMAL

## 2025-02-12 ENCOUNTER — APPOINTMENT (OUTPATIENT)
Dept: LAB | Facility: CLINIC | Age: 80
End: 2025-02-12
Payer: COMMERCIAL

## 2025-02-12 ENCOUNTER — OFFICE VISIT (OUTPATIENT)
Dept: FAMILY MEDICINE CLINIC | Facility: CLINIC | Age: 80
End: 2025-02-12
Payer: COMMERCIAL

## 2025-02-12 VITALS
WEIGHT: 109.4 LBS | DIASTOLIC BLOOD PRESSURE: 68 MMHG | TEMPERATURE: 98.3 F | BODY MASS INDEX: 20.13 KG/M2 | SYSTOLIC BLOOD PRESSURE: 130 MMHG | OXYGEN SATURATION: 96 % | HEIGHT: 62 IN | HEART RATE: 91 BPM

## 2025-02-12 DIAGNOSIS — R63.4 WEIGHT LOSS: ICD-10-CM

## 2025-02-12 DIAGNOSIS — F41.9 ANXIETY: ICD-10-CM

## 2025-02-12 DIAGNOSIS — R05.3 CHRONIC COUGH: ICD-10-CM

## 2025-02-12 DIAGNOSIS — C49.A0 GASTROINTESTINAL STROMAL TUMOR (GIST) (HCC): ICD-10-CM

## 2025-02-12 DIAGNOSIS — R41.3 MEMORY CHANGE: Primary | ICD-10-CM

## 2025-02-12 DIAGNOSIS — R41.3 MEMORY CHANGE: ICD-10-CM

## 2025-02-12 LAB
TSH SERPL DL<=0.05 MIU/L-ACNC: 1.34 UIU/ML (ref 0.45–4.5)
VIT B12 SERPL-MCNC: 1456 PG/ML (ref 180–914)

## 2025-02-12 PROCEDURE — G2211 COMPLEX E/M VISIT ADD ON: HCPCS | Performed by: FAMILY MEDICINE

## 2025-02-12 PROCEDURE — 84443 ASSAY THYROID STIM HORMONE: CPT

## 2025-02-12 PROCEDURE — 36415 COLL VENOUS BLD VENIPUNCTURE: CPT

## 2025-02-12 PROCEDURE — 99214 OFFICE O/P EST MOD 30 MIN: CPT | Performed by: FAMILY MEDICINE

## 2025-02-12 PROCEDURE — 82607 VITAMIN B-12: CPT

## 2025-02-12 RX ORDER — BUSPIRONE HYDROCHLORIDE 5 MG/1
5 TABLET ORAL 2 TIMES DAILY
Qty: 60 TABLET | Refills: 1 | Status: SHIPPED | OUTPATIENT
Start: 2025-02-12

## 2025-02-12 RX ORDER — LORAZEPAM 0.5 MG/1
TABLET ORAL
Qty: 2 TABLET | Refills: 0 | Status: SHIPPED | OUTPATIENT
Start: 2025-02-12

## 2025-02-12 NOTE — ASSESSMENT & PLAN NOTE
Followed by pulmonary - believed to be multi factorial - there was discussion about possible bronchoscopy at last visit in December - I recommended that patient call them.

## 2025-02-12 NOTE — PATIENT INSTRUCTIONS
Schedule MRI Neuro Quant - will look at brain for problems with memory  Lorazepam is pill to take for claustrophobia for MRI.  Buspirone - 5 mg twice a day - to help with anxiety and then maybe you will sleep better. It should not make you drowsy.

## 2025-02-12 NOTE — PROGRESS NOTES
Name: Paula Paniagua      : 1945      MRN: [id number]  Encounter Provider: Michelle Rg DO  Encounter Date: 2025   Encounter department: [facility]  :  Assessment & Plan  Memory change  MOCA exam scored 18/30. Patient very anxious.   She had trouble with drawing cube andhands on clock and naming animals.She could not recall any of the 5 words.Was able to name over 11 words that start with F and able to repeat sentences.  Patient does not drive but arranges for her rides here with an [employer]  that lives nearby.  Has had MRI of brain in the past. Will check Neuroquant. Also refer to [facility].  Orders:  •  MRI brain NeuroQuant wo contrast; Future  •  Ambulatory Referral to [facility]; Future  •  Vitamin B12; Future  •  TSH, 3rd generation with Free T4 reflex; Future    Weight loss  Weight was 110 in December and 109 today.  Sees GI -is followed for GIST - has had CT of chest/abd/pelvis, upper and lower endoscopy.  Seen by pulmonary for chronic cough. Has used inhalers.  Neg quantiferon gold two years ago. Recent HIV negative.  Orders:  •  Vitamin B12; Future  •  TSH, 3rd generation with Free T4 reflex; Future    Anxiety  Patient very anxious. I also feel there is a component of depression. She feels isolated. Moved here about 5 years ago from [place] because of COVID to live with daughter. Daughter and  fixed up basement for her. She has not really gotten involved in any activities or made friends here.  I had referred patient to behavioral health but she tells me they called her and told her that they do not take her insurance. I gave her a list of other providers in the area.  Patient was prescribed hydroxyzine in the past - had side effects.  She does take Melatonin for sleep. Had tried Trazodone but also with side effects.  Will try Buspirone for anxiety - patient very hesitant to take medication - patient understands that this should be taken on a daily basis  "and is not a controlled medication. Lorazepam - prescribed only 2 tablets for MRI Neuroquant.  She will let us know if she has any problems with medication.  Orders:  •  Vitamin B12; Future  •  TSH, 3rd generation with Free T4 reflex; Future  •  busPIRone (BUSPAR) 5 mg tablet; Take 1 tablet (5 mg total) by mouth 2 (two) times a day  •  LORazepam (Ativan) 0.5 mg tablet; One tablet one hour prior to MRI, can repeat at time of MRI if needed    Gastrointestinal stromal tumor (GIST) (HCC)  Followed by GI       Chronic cough  Followed by pulmonary - believed to be multi factorial - there was discussion about possible bronchoscopy at last visit in December - I recommended that patient call them.         I will see her back in four to six weeks.     History of Present Illness   Patient is here for concern for memory. Took an online cognitive test and did poorly.  She resides in her daughter's basement - they fixed it up for her during COVID. Has lived here for 5 years - feels isolated. Lived in [place] since 1996.  Also trouble with sleep.  Patient is stressed about being here with green card and it is time to renew in June - she is very stressed that she will not be allowed to stay here with daughter. She never took citizenship test because she was afraid she would not pass the test.  Patient continues with cough.  Patient is tearful that she was not able to return to [place] to see her Mom before she passed away.      Review of Systems   Constitutional:  Positive for unexpected weight change.   Respiratory:  Positive for cough.    Neurological:         Decreased memory   Psychiatric/Behavioral:  Positive for sleep disturbance. The patient is nervous/anxious.        Objective   /68 (BP Location: Left arm, Patient Position: Sitting, Cuff Size: Adult)   Pulse 91   Temp 98.3 °F (36.8 °C)   Ht 5' 2\" (1.575 m)   Wt 49.6 kg (109 lb 6.4 oz)   SpO2 96%   BMI 20.01 kg/m²      Physical Exam  Vitals and nursing note " reviewed.   Constitutional:       General: She is not in acute distress.  HENT:      Head: Normocephalic.   Neck:      Thyroid: No thyromegaly.   Cardiovascular:      Rate and Rhythm: Normal rate and regular rhythm.      Heart sounds: Normal heart sounds.   Pulmonary:      Effort: Pulmonary effort is normal.      Breath sounds: Normal breath sounds.   Musculoskeletal:      Comments: Lipoma posterior shoulder area   Lymphadenopathy:      Cervical: No cervical adenopathy.   Skin:     General: Skin is warm and dry.   Neurological:      Mental Status: She is alert and oriented to person, place, and time.      Comments: MOCA exam - knew date, but thought we were in [place]?     Psychiatric:         Mood and Affect: Mood is anxious.

## 2025-02-12 NOTE — ASSESSMENT & PLAN NOTE
Patient very anxious. I also feel there is a component of depression. She feels isolated. Moved here about 5 years ago from [place] because of COVID to live with daughter. Daughter and  fixed up basement for her. She has not really gotten involved in any activities or made friends here.  I had referred patient to behavioral health but she tells me they called her and told her that they do not take her insurance. I gave her a list of other providers in the area.  Patient was prescribed hydroxyzine in the past - had side effects.  She does take Melatonin for sleep. Had tried Trazodone but also with side effects.  Will try Buspirone for anxiety - patient very hesitant to take medication - patient understands that this should be taken on a daily basis and is not a controlled medication. Lorazepam - prescribed only 2 tablets for MRI Neuroquant.  She will let us know if she has any problems with medication.  Orders:  •  Vitamin B12; Future  •  TSH, 3rd generation with Free T4 reflex; Future  •  busPIRone (BUSPAR) 5 mg tablet; Take 1 tablet (5 mg total) by mouth 2 (two) times a day  •  LORazepam (Ativan) 0.5 mg tablet; One tablet one hour prior to MRI, can repeat at time of MRI if needed

## 2025-02-13 ENCOUNTER — PATIENT MESSAGE (OUTPATIENT)
Dept: FAMILY MEDICINE CLINIC | Facility: CLINIC | Age: 80
End: 2025-02-13

## 2025-02-13 ENCOUNTER — RESULTS FOLLOW-UP (OUTPATIENT)
Dept: FAMILY MEDICINE CLINIC | Facility: CLINIC | Age: 80
End: 2025-02-13

## 2025-02-14 ENCOUNTER — TELEPHONE (OUTPATIENT)
Dept: OTHER | Facility: OTHER | Age: 80
End: 2025-02-14

## 2025-02-14 NOTE — TELEPHONE ENCOUNTER
"Pt called stating, \"I was called several times from a [facility] number. \" The call came through [facility] as [facility]. Pt has already discussed lab work with PCP.     RASHMI  "

## 2025-02-18 ENCOUNTER — TELEPHONE (OUTPATIENT)
Age: 80
End: 2025-02-18

## 2025-02-18 DIAGNOSIS — R05.3 CHRONIC COUGH: Primary | ICD-10-CM

## 2025-02-18 RX ORDER — BENZONATATE 200 MG/1
200 CAPSULE ORAL 3 TIMES DAILY PRN
Qty: 20 CAPSULE | Refills: 3 | Status: SHIPPED | OUTPATIENT
Start: 2025-02-18

## 2025-02-18 NOTE — TELEPHONE ENCOUNTER
Pt states that the this time she has a cough but it's different then when seen in December because this time that she has dry cough along with phlegm, and runny nose she hasn't been feeling well mentally as well. She states it feels like a sick cough and she is asking if provider can send something to the pharmacy for him.     She went to her PCP and made her aware of the same information above but they didn't do anything and the lungs sounded fine so they didn't recommend anything.       Pt states that she tried Delsym but that made her sick, informed her that the symptoms could be related to a cold and she can try OTC medication like mucinex, robitussin, drinking warm liquids when having coughing spell, she can use a humidifier as well, drink plenty of liquids, and rest. She asks what else she can take as she is concerned that some of these OTC will interfere with her medications.     Please advise.       Pt states that in the past Symbicort and mucinex had helped her with her regular cough.

## 2025-02-24 ENCOUNTER — TELEPHONE (OUTPATIENT)
Dept: OTHER | Facility: OTHER | Age: 80
End: 2025-02-24

## 2025-02-24 NOTE — TELEPHONE ENCOUNTER
Pt is requesting an appt for senior care. She has a referral on Chart, placed on 02/2025.    Please advise.

## 2025-02-24 NOTE — TELEPHONE ENCOUNTER
Pt is requesting an appt for behavioral health. She has a referral on Chart, placed on 11/2024.    Please advise.

## 2025-02-25 ENCOUNTER — TELEPHONE (OUTPATIENT)
Age: 80
End: 2025-02-25

## 2025-02-25 NOTE — TELEPHONE ENCOUNTER
Patient called in inquiring about [facility] wait list update.     Writer informed patient there are no update on wait list at the moment.     Patient shared she would like [facility] services as well.   Writer added patient to MM wait list.

## 2025-02-27 ENCOUNTER — OFFICE VISIT (OUTPATIENT)
Dept: FAMILY MEDICINE CLINIC | Facility: CLINIC | Age: 80
End: 2025-02-27
Payer: COMMERCIAL

## 2025-02-27 VITALS
BODY MASS INDEX: 19.14 KG/M2 | WEIGHT: 108 LBS | OXYGEN SATURATION: 97 % | HEIGHT: 63 IN | SYSTOLIC BLOOD PRESSURE: 130 MMHG | HEART RATE: 79 BPM | DIASTOLIC BLOOD PRESSURE: 70 MMHG | TEMPERATURE: 97.6 F

## 2025-02-27 DIAGNOSIS — C49.A0 GASTROINTESTINAL STROMAL TUMOR (GIST) (HCC): ICD-10-CM

## 2025-02-27 DIAGNOSIS — M94.0 COSTOCHONDRITIS: Primary | ICD-10-CM

## 2025-02-27 DIAGNOSIS — F41.9 ANXIETY: ICD-10-CM

## 2025-02-27 PROCEDURE — G2211 COMPLEX E/M VISIT ADD ON: HCPCS | Performed by: FAMILY MEDICINE

## 2025-02-27 PROCEDURE — 99214 OFFICE O/P EST MOD 30 MIN: CPT | Performed by: FAMILY MEDICINE

## 2025-02-27 NOTE — PROGRESS NOTES
"Name: Paula Paniagua      : 1945      MRN: [id number]  Encounter Provider: Michelle Rg DO  Encounter Date: 2025   Encounter department: [facility]  :  Assessment & Plan  Costochondritis  Patient with reproducible chest pain. Has chronic cough and anxiety. Recommended tylenol and making sure that she takes deep breathes.       Anxiety  Continue Buspar  Reassured patient that I do not think this is related to gastric tumor..       Gastrointestinal stromal tumor (GIST) (HCC)  Continues to follow with GI.            Keep appointment to see e in March. Has MRI neuro quant scheduled       History of Present Illness   Patient had chest pain - all her bones hurt. Four to five days ago, she had left sided pain that was reproducible.  She thinks it is from coughing all the time.      Review of Systems   Constitutional:  Negative for chills and fever.   Respiratory:  Positive for cough.    Cardiovascular:  Positive for chest pain.   Genitourinary:  Positive for pelvic pain.   Psychiatric/Behavioral:  The patient is nervous/anxious.        Objective   /70   Pulse 79   Temp 97.6 °F (36.4 °C) (Temporal)   Ht 5' 2.5\" (1.588 m)   Wt 49 kg (108 lb)   SpO2 97%   BMI 19.44 kg/m²      Physical Exam  Vitals and nursing note reviewed.   Constitutional:       General: She is not in acute distress.  HENT:      Head: Normocephalic.   Neck:      Thyroid: No thyromegaly.   Cardiovascular:      Rate and Rhythm: Normal rate and regular rhythm.      Heart sounds: Normal heart sounds.   Pulmonary:      Effort: Pulmonary effort is normal.      Breath sounds: Normal breath sounds.   Chest:      Chest wall: Tenderness (tender along costochondral junction bilaterally. Also ribs tender.) present.   Abdominal:      Palpations: Abdomen is soft.      Tenderness: There is abdominal tenderness (diffuse).   Musculoskeletal:      Right lower leg: No edema.      Left lower leg: No edema.   Lymphadenopathy: "      Cervical: No cervical adenopathy.   Skin:     General: Skin is warm and dry.   Neurological:      Mental Status: She is alert and oriented to person, place, and time.   Psychiatric:         Mood and Affect: Mood normal.

## 2025-03-11 ENCOUNTER — TELEPHONE (OUTPATIENT)
Age: 80
End: 2025-03-11

## 2025-03-11 NOTE — TELEPHONE ENCOUNTER
Patient states that she has an MRI of her brain scheduled for rosibel. She would like to know if she can take the PRN lorazepam before her MRI even though she is taking buspar. Please f/u with patient today, MRI is rosibel am.

## 2025-03-13 ENCOUNTER — HOSPITAL ENCOUNTER (OUTPATIENT)
Facility: MEDICAL CENTER | Age: 80
Discharge: HOME | End: 2025-03-13
Payer: COMMERCIAL

## 2025-03-13 DIAGNOSIS — R41.3 MEMORY CHANGE: ICD-10-CM

## 2025-03-13 PROCEDURE — 70551 MRI BRAIN STEM W/O DYE: CPT

## 2025-03-31 DIAGNOSIS — F41.9 ANXIETY: ICD-10-CM

## 2025-03-31 NOTE — TELEPHONE ENCOUNTER
Reason for call:   [x] Refill   [] Prior Auth  [] Other:     Office:   [x] PCP/Provider - Michelle Rg   [] Specialty/Provider -     Medication: busPIRone (BUSPAR) 5 mg     Dose/Frequency:  Take 1 tablet (5 mg total) by mouth 2 (two) times a day,     Quantity: 180    Pharmacy: [facility]   Does the patient have enough for 3 days?   [] Yes   [] No - Send as HP to POD    Mail Away Pharmacy   Does the patient have enough for 10 days? unknown  [] Yes   [] No - Send as HP to POD

## 2025-04-02 RX ORDER — BUSPIRONE HYDROCHLORIDE 5 MG/1
5 TABLET ORAL 2 TIMES DAILY
Qty: 60 TABLET | Refills: 5 | Status: SHIPPED | OUTPATIENT
Start: 2025-04-02

## 2025-04-15 NOTE — ASSESSMENT & PLAN NOTE
Continues on PO supplementation and routine DEXA scans.   Patient reports ambulating without any assistive devices.   Denies any recent falls.   PT/OT not indicated at this time, can order in the future if needs increase.   Educated on fall precautions due to increased risk of injury if a fall occurs.

## 2025-04-15 NOTE — ASSESSMENT & PLAN NOTE
Stable.   Continues on Amlodipine 10 mg daily for BP management.   Denies any dizziness or headaches.   Continue to follow up with PCP for BP management.

## 2025-04-15 NOTE — PROGRESS NOTES
[facility]  [address], [address]  [place] [address]  [phone]      GERIATRIC EVALUATION - ASSESSMENT & PLAN:      1. MCI (mild cognitive impairment)  Assessment & Plan:  Consistent with MCI, NOT related to memory changes, rather severe anxiety and depression. During assessment, she continued to endorse very severe anxiety and depression, and seclusion after recent move to [place]. Denies SI.  Staging: Mild Cognitive Impairment due to anxiety and depression.   Decision-making capacity: Intact for medical and financial decisions  Caregiver Review: SW met with patient to discuss other needs/options to assist.   Safety:  Medication Review: Medications reviewed, no concerns with medications at this time.   Driving: Not driving. Has never driven.   Fall Risk: Low fall risk  ACP Review: Does not have a POA or Living Will in place, encouraged patient to get some form of paperwork together for wishes to be made known/appoint family member for decisions if needed.   Plan to follow up at scheduled care conference.   Orders:  -     Ambulatory Referral to Senior Care  -     Ambulatory Referral to Nutrition Services; Future; Expected date: 05/02/2025  2. Anxiety  Assessment & Plan:  Patient is pleasant and interactive today in office.   She reports severe situational anxiety and depression, she is more secluded after most recent move to [place].   She has been following with Eva for Talk Therapy, continues on Buspar 5 mg HS. Denies SI.   Encouraged stress relief techniques, relaxation, and emotional support.   SW gave patient resources for Adult Day Programs for more socialization, and transportation.   Plan to continue to f/u with Eva for management.   3. Weight loss  Assessment & Plan:  Reports steady weight loss with no improvement despite multiple attempts to gain weight.   Known GI hx, follows closely with GI for recommendations.   Would recommend nutrition consult for additional  "supports/recommendations.   4. Essential hypertension  Assessment & Plan:  Stable.   Continues on Amlodipine 10 mg daily for BP management.   Denies any dizziness or headaches.   Continue to follow up with PCP for BP management.   5. NED (obstructive sleep apnea)  Assessment & Plan:  Patient reports sleeping well at nighttime.   Does not use any sleep aides for restful sleep.   Encouraged good sleep hygiene, avoid daytime napping, avoiding OTC medications that may cause oversedation/increased confusion.   6. Age-related osteoporosis without current pathological fracture  Assessment & Plan:  Continues on PO supplementation and routine DEXA scans.   Patient reports ambulating without any assistive devices.   Denies any recent falls.   PT/OT not indicated at this time, can order in the future if needs increase.   Educated on fall precautions due to increased risk of injury if a fall occurs.       HPI     We had the pleasure of evaluating Paula Paniagua who is a 79 y.o. female in Geriatric consultation today. She reports more issues with her severe anxiety and depression. She recently moved to [place] where she feels very isolated. In [place] she was able to walk to places, and see her friends, or go out when she pleased. Now since she does not drive, she is very limited and feels like all she does is \"stare out the window and sit.\" She does have familial support in [place], but does not want to burden her family therefore she further isolates. She reports being very anxious with the new immigration laws, even though she is here legally, she is afraid she will be grabbed off the street and deported. Her anxiety and depression seem to get more in the way then anything. She reports she has had a very gradual, minimal memory loss that does not interfere with day to day function.     COGNITION:  Memory Issues noticed since 2 year(s)    Memory affected: short term memory loss    Symptoms started: gradual  Over time the memory has: " stable  Memory issue(s) were noted by: patient   Difficulty finding the right word while speaking: No  Requires repeat information or asking the same question repeatedly: Yes  Fluctuation in alertness: No  Changes in mood or personality: Yes - very anxious and depressed compared to usual   Seems anxious: Yes  Seems depressed: Yes  With suicidal ideation: No    Current or previous treatment for depression or anxiety: Yes    Family member with dementia and what type? no  History of head trauma: No  History of stroke: No  History of alcohol or substance abuse: No    FUNCTIONAL STATUS:  BADLs  Does patient require assistance with:  Bathing: No  Dressing: No  Transferring: No  Continence: No  Toileting: No  Feeding: No    IADLs  Does patient require assistance with:  Telephone: No  Shopping: No  Food Preparation: No  Housekeeping: No  Laundry: No  Transportation: Yes - never drove   Medications: No  Finances: No    NEUROPSYCH SYMPTOMS:  Is patient less interested in his or her usual activities or in the activities and plans of others? No  Does patient get angry or hostile?  Resist care from others? No  Does patient act impatient and cranky? Does mood frequently change for no apparent reason? No  Does patient have trouble sleeping at night? No  Does patient see or hear things that no one else can see or hear? No  Does patient act suspicious without good reason (example: believes that others are stealing from him or her, or planning to harm him or her in some way)? No    SAFETY:  Hearing issue: No  Vision issue: Yes  Any gait or balance disorder: No  Uses: No Assistive Devices  Any falls in the last year: No  Any history of wandering: No  Are there firearms or guns in the home: No  Does patient drive: No  POA papers completed: No was given 5 wishes form, never filled it out       Allergies   Allergen Reactions    Nsaids Other (See Comments)    Other Cough     Seasonal, perfumes, colon, chemicals    Sulfa Antibiotics Other  (See Comments)    Codeine Rash    Shellfish Allergy - Food Allergy Rash       Medications:    Current Outpatient Medications on File Prior to Visit   Medication Sig Dispense Refill    acetaminophen (TYLENOL) 500 mg tablet Take 500 mg by mouth every 6 (six) hours as needed for mild pain      albuterol (Proventil HFA) 90 mcg/act inhaler Inhale 2 puffs every 6 (six) hours as needed for wheezing 51 g 1    amLODIPine (NORVASC) 10 mg tablet TAKE 1 TABLET EVERY DAY 90 tablet 1    atorvastatin (LIPITOR) 20 mg tablet TAKE 1 TABLET EVERY DAY 90 tablet 1    benzonatate (TESSALON) 200 MG capsule Take 1 capsule (200 mg total) by mouth 3 (three) times a day as needed for cough 20 capsule 3    busPIRone (BUSPAR) 5 mg tablet Take 1 tablet (5 mg total) by mouth 2 (two) times a day 60 tablet 5    cholecalciferol (VITAMIN D3) 1,000 units tablet Take 1,000 Units by mouth daily      famotidine (PEPCID) 20 mg tablet Take 2 tablets (40 mg total) by mouth daily 60 tablet 5    Fluticasone Furoate-Vilanterol (Breo Ellipta) 100-25 mcg/actuation inhaler Inhale 1 puff daily Rinse mouth after use. 180 blister 1    Multiple Vitamin (multivitamin) capsule Take 1 capsule by mouth daily      vitamin B-12 (VITAMIN B-12) 1,000 mcg tablet Take by mouth daily      Blood Glucose Monitoring Suppl KIT Please use to check blood glucose once daily. (Patient not taking: Reported on 4/18/2025) 1 kit 0    Blood Pressure Monitor IRMA Use in the morning (Patient not taking: Reported on 4/18/2025) 1 each 0    glucose blood test strip Use to check blood glucose once daily. (Patient not taking: Reported on 2/27/2025) 100 each 1    Lancets 33G MISC PLEASE USE TO CHECK BLOOD GLUCOSE ONCE DAILY (Patient not taking: Reported on 2/27/2025) 100 each 1    latanoprost (XALATAN) 0.005 % ophthalmic solution  (Patient not taking: Reported on 7/9/2024)      LORazepam (Ativan) 0.5 mg tablet One tablet one hour prior to MRI, can repeat at time of MRI if needed (Patient not  taking: Reported on 2025) 2 tablet 0    melatonin 1 mg Take 1 mg by mouth daily at bedtime (Patient not taking: Reported on 2025)      polyethylene glycol (Golytely) 4000 mL solution Take 4,000 mL by mouth once for 1 dose Take 4000 mL by mouth once for 1 dose. Use as directed (Patient not taking: Reported on 2025) 4000 mL 0     No current facility-administered medications on file prior to visit.       History:  Past Medical History:   Diagnosis Date    Allergic     Allergic to codine    Anxiety     Asthma     variant cough asthma    Cataract     Chronic kidney disease     Kidney and liver cyst    Clotting disorder (HCC)     Gums    Diverticulitis of colon Maybe  or 16    GERD (gastroesophageal reflux disease)     Headache(784.0)     Heart murmur     Hyperlipidemia     Hypertension     Scoliosis     Seizures (HCC) 2014 and 2019    Visual impairment     Early stage Glucoma.  And Cataract     Past Surgical History:   Procedure Laterality Date    EYE SURGERY      HYSTERECTOMY      OOPHORECTOMY Bilateral     UTERINE SUSPENSION       Family History   Problem Relation Age of Onset    Asthma Mother     Hypertension Mother             Glaucoma Mother     Glaucoma Father             No Known Problems Sister     No Known Problems Maternal Grandmother     No Known Problems Maternal Grandfather     No Known Problems Paternal Grandmother     No Known Problems Paternal Grandfather     No Known Problems Sister     Hypertension Brother         Pace maker     Social History     Socioeconomic History    Marital status:      Spouse name: Not on file    Number of children: Not on file    Years of education: Not on file    Highest education level: Not on file   Occupational History    Not on file   Tobacco Use    Smoking status: Never     Passive exposure: Never    Smokeless tobacco: Never   Vaping Use    Vaping status: Never Used   Substance and Sexual Activity     Alcohol use: Not Currently    Drug use: Never    Sexual activity: Not Currently     Partners: Male   Other Topics Concern    Not on file   Social History Narrative    Not on file     Social Drivers of Health     Financial Resource Strain: Low Risk  (7/27/2023)    Overall Financial Resource Strain (CARDIA)     Difficulty of Paying Living Expenses: Not very hard   Food Insecurity: No Food Insecurity (8/1/2024)    Nursing - Inadequate Food Risk Classification     Worried About Running Out of Food in the Last Year: Never true     Ran Out of Food in the Last Year: Never true     Ran Out of Food in the Last Year: Not on file   Transportation Needs: Unmet Transportation Needs (8/1/2024)    PRAPARE - Transportation     Lack of Transportation (Medical): Yes     Lack of Transportation (Non-Medical): Yes   Physical Activity: Not on file   Stress: Not on file   Social Connections: Not on file   Intimate Partner Violence: Not on file   Housing Stability: Low Risk  (8/1/2024)    Housing Stability Vital Sign     Unable to Pay for Housing in the Last Year: No     Number of Times Moved in the Last Year: 0     Homeless in the Last Year: No     Past Surgical History:   Procedure Laterality Date    EYE SURGERY      HYSTERECTOMY      OOPHORECTOMY Bilateral     UTERINE SUSPENSION           SUBJECTIVE     Review of Systems   Psychiatric/Behavioral:  Positive for dysphoric mood. The patient is nervous/anxious.    All other systems reviewed and are negative.      OBJECTIVE     Vitals:  Vitals:    04/18/25 0900   BP: 110/58   Pulse: 77   Temp: (!) 97.4 °F (36.3 °C)   SpO2: 97%        Physical Exam  Observed Ambulation:  independent with no assistive devices     Physical Exam  Constitutional:       General: She is not in acute distress.     Appearance: Normal appearance. She is normal weight. She is not ill-appearing.   HENT:      Head: Normocephalic and atraumatic.   Cardiovascular:      Rate and Rhythm: Normal rate and regular rhythm.       Pulses: Normal pulses.      Heart sounds: Normal heart sounds. No murmur heard.  Pulmonary:      Effort: Pulmonary effort is normal. No respiratory distress.      Breath sounds: Normal breath sounds. No wheezing.   Abdominal:      General: Bowel sounds are normal. There is no distension.      Palpations: Abdomen is soft.      Tenderness: There is no abdominal tenderness.   Musculoskeletal:         General: Normal range of motion.      Cervical back: Normal range of motion and neck supple.   Skin:     General: Skin is warm and dry.      Capillary Refill: Capillary refill takes less than 2 seconds.   Neurological:      General: No focal deficit present.      Mental Status: She is alert and oriented to person, place, and time. Mental status is at baseline.   Psychiatric:         Mood and Affect: Mood normal.         MoCA: 21/30 - deficits in attention, and language   GDS: 12/15    Labs & Imaging:  Lab Results   Component Value Date    [id number] 1,456 (H) 02/12/2025     Lab Results   Component Value Date    [id number] 1.336 02/12/2025       I have spent a total time of 54 minutes in caring for this patient on the day of the visit/encounter including Diagnostic results, Risks and benefits of tx options, Instructions for management, Patient and family education, Importance of tx compliance, Risk factor reductions, Impressions, Counseling / Coordination of care, Documenting in the medical record, Reviewing/placing orders in the medical record (including tests, medications, and/or procedures), Obtaining or reviewing history  , and Communicating with other healthcare professionals .     YESSICA Aguilera  04/18/25

## 2025-04-15 NOTE — ASSESSMENT & PLAN NOTE
Patient reports sleeping well at nighttime.   Does not use any sleep aides for restful sleep.   Encouraged good sleep hygiene, avoid daytime napping, avoiding OTC medications that may cause oversedation/increased confusion.

## 2025-04-15 NOTE — ASSESSMENT & PLAN NOTE
Patient is --- today in office.   Continues on Buspar 5 mg BID. Denies SI.   Encouraged stress relief techniques, relaxation, and emotional support.

## 2025-04-18 ENCOUNTER — CONSULT (OUTPATIENT)
Age: 80
End: 2025-04-18
Payer: COMMERCIAL

## 2025-04-18 VITALS
OXYGEN SATURATION: 97 % | SYSTOLIC BLOOD PRESSURE: 110 MMHG | HEIGHT: 64 IN | HEART RATE: 77 BPM | DIASTOLIC BLOOD PRESSURE: 58 MMHG | WEIGHT: 110 LBS | BODY MASS INDEX: 18.78 KG/M2 | TEMPERATURE: 97.4 F

## 2025-04-18 DIAGNOSIS — R63.4 WEIGHT LOSS: ICD-10-CM

## 2025-04-18 DIAGNOSIS — I10 ESSENTIAL HYPERTENSION: ICD-10-CM

## 2025-04-18 DIAGNOSIS — F41.9 ANXIETY: ICD-10-CM

## 2025-04-18 DIAGNOSIS — G47.33 OSA (OBSTRUCTIVE SLEEP APNEA): ICD-10-CM

## 2025-04-18 DIAGNOSIS — M81.0 AGE-RELATED OSTEOPOROSIS WITHOUT CURRENT PATHOLOGICAL FRACTURE: ICD-10-CM

## 2025-04-18 DIAGNOSIS — G31.84 MCI (MILD COGNITIVE IMPAIRMENT): Primary | ICD-10-CM

## 2025-04-18 PROBLEM — R41.3 MEMORY CHANGE: Status: ACTIVE | Noted: 2025-04-18

## 2025-04-18 PROCEDURE — 99203 OFFICE O/P NEW LOW 30 MIN: CPT

## 2025-04-18 NOTE — PATIENT INSTRUCTIONS
Diagnosis - Mild Cognitive Impairment   Please stay very active physically, mentally, and socially active - please utilize resources given by social work and continue Talk Therapy

## 2025-04-18 NOTE — ASSESSMENT & PLAN NOTE
Patient is pleasant and interactive today in office.   She reports severe situational anxiety and depression, she is more secluded after most recent move to [place].   She has been following with Eva for Talk Therapy, continues on Buspar 5 mg HS. Denies SI.   Encouraged stress relief techniques, relaxation, and emotional support.   SW gave patient resources for Adult Day Programs for more socialization, and transportation.   Plan to continue to f/u with Eva for management.

## 2025-04-18 NOTE — PROGRESS NOTES
[facility]  [address], [address]  [place] [address]  [phone]    Social Work Intake    Paula Paniagua presents today for a geriatric assessment, alone.  MSW met with patient during visit to provide additional support and resources.  MSW made aware that patient moved from [place] to live with her daughter, son-in-law and grandchild and she has her own in-law suite.  However, she does not drive and feels alone and has been hesitant to leave the home.  Through further discussion patient informed she feels very safe and comfortable in her space but used to be very social and outgoing and would like to try to get more involved and meet others.  Patient informed she has never driven and does have transportation to medical appointments covered through insurance.  Patient also informed that she does have [employer] but has not used same in a very long time.  MSW expressed understanding of same.  Through further discussion patient informed that her renewal for her green card will be coming up and she has felt very anxious about this.  Emotional support and discussion provided and same accepted.  MSW provided resources for support with this process, if needed.  Patient does have MH services, through [employer], in place and feels same has been supportive.  Throughout visit, patient engaged in positive discussion and was appreciative of resources.  MSW reviewed and provided the following resources.    -Senior Center list  -Cognitive Apps list  -Silver Sneakers  -Transportation Options  -[employer] Application   -[employer] contact information (immigration services)  -Elder Law  list  -Psychology, Psychiatry, Crisis information    MSW will remain available as needed.

## 2025-04-18 NOTE — ASSESSMENT & PLAN NOTE
Reports steady weight loss with no improvement despite multiple attempts to gain weight.   Known GI hx, follows closely with GI for recommendations.   Would recommend nutrition consult for additional supports/recommendations.

## 2025-04-18 NOTE — ASSESSMENT & PLAN NOTE
Consistent with MCI, NOT related to memory changes, rather severe anxiety and depression. During assessment, she continued to endorse very severe anxiety and depression, and seclusion after recent move to [place]. Denies SI.  Staging: Mild Cognitive Impairment due to anxiety and depression.   Decision-making capacity: Intact for medical and financial decisions  Caregiver Review: SW met with patient to discuss other needs/options to assist.   Safety:  Medication Review: Medications reviewed, no concerns with medications at this time.   Driving: Not driving. Has never driven.   Fall Risk: Low fall risk  ACP Review: Does not have a POA or Living Will in place, encouraged patient to get some form of paperwork together for wishes to be made known/appoint family member for decisions if needed.   Plan to follow up at scheduled care conference.

## 2025-04-21 ENCOUNTER — TELEPHONE (OUTPATIENT)
Age: 80
End: 2025-04-21

## 2025-04-21 NOTE — TELEPHONE ENCOUNTER
"Patient states she has been taking buspirone since February. States she is only taking in once a day because it makes her head feel \"weird\".  Would like her to know if she can stop the medication. Please f/u with patient.   "

## 2025-04-22 DIAGNOSIS — K21.9 GASTROESOPHAGEAL REFLUX DISEASE, UNSPECIFIED WHETHER ESOPHAGITIS PRESENT: ICD-10-CM

## 2025-04-22 RX ORDER — FAMOTIDINE 20 MG/1
40 TABLET, FILM COATED ORAL DAILY
Qty: 60 TABLET | Refills: 0 | Status: SHIPPED | OUTPATIENT
Start: 2025-04-22 | End: 2025-10-19

## 2025-04-24 ENCOUNTER — TELEPHONE (OUTPATIENT)
Age: 80
End: 2025-04-24

## 2025-04-24 NOTE — TELEPHONE ENCOUNTER
MSW received call from patient this day requesting to again review and discuss resources that were provided during visit on 4/18/25.  Patient expressed that she just wants to make sure she understands everything.  Patient had those resources with her during the call and went through each one to discuss further with MSW, wanting to better understand how they may be a support to her.  Resources provided and reviewed during intake visit include:   -Senior Center list  -Cognitive Apps list  -Silver [email or web address]  -Transportation Options  -[employer] Application   -[employer] contact information (immigration services)  -Elder Law  list  -Psychology, Psychiatry, Crisis information    Same reviewed and discussed again this day and satisfied with same.  Patient then requested to review upcoming appointment.  MSW reviewed care conference visit scheduled for 5/23/25 and what to expect during same.  Patient expressed understanding.  MSW will remain available as needed.

## 2025-05-15 NOTE — PROGRESS NOTES
[facility]  [address], [address]  [place] [address]  [phone]    Care Conference    NAME: Paula Paniagua AGE: 79 y.o. SEX: female  YOB: 1945  DATE OF ASSESSMENT: 04/18/2025  DATE OF CONFERENCE: 05/23/2025    Family Present: Patient came alone to visit.   Staff Present: YESSICA Sharma    Patient / Family Goals of Care: Review cognitive decline and associated symptoms, discuss treatment options and care planning.     Medical Concerns (Current/Historical): Anxiety, Weight loss, Essential hypertension, NED (obstructive sleep apnea), Age-related osteoporosis without current pathological fracture    Geriatric Syndromes/Age Related Syndromes: insomnia, anxiety, constipation, and osteoporosis.     Neuropsychological  Staging: MCI (mild cognitive impairment)  Stanley Cognitive Assessment: 21/30, deficits in: attention, language, and executive thinking.   Geriatric Depression Screen: 12/15    Consistent with MCI, NOT related to memory changes, rather severe anxiety and depression. During assessment, she continued to endorse very severe anxiety and depression, and seclusion after recent move to [place]. Denies SI.     Decision-making capacity: Intact for medical and financial decisions   Caregiver Review: SW met with patient to discuss other needs/options to assist.   Driving Safety: Not driving. Has never driven.   ACP Review: Does not have a POA or Living Will in place, encouraged patient to get some form of paperwork together for wishes to be made known/appoint family member for decisions if needed.     Remain active physically, mentally and socially  Pharmaceutical and non-pharmaceutical interventions discussed  Engage in cognitively challenging exercises such as crosswords and puzzles  Maintain chronic conditions under control  Repeat cognitive assessment in 6 months      Diagnostic Studies  Review of blood work - B12 elevated would recommend discontinuing B12  supplement at this time. Will re-check in 2-3 months, and restart if levels are low.   Review of previous imaging   MRI brain NeuroQuant without contrast (3/13/25)  IMPRESSION  No mass effect, acute intracranial hemorrhage or evidence of recent infarction. Moderate chronic microvascular ischemic change.  NeuroQuant analysis was performed: Normal study; Does not support neurodegeneration.    Physical Finding Impacting Function   Fall Risk: Low fall risk   Activities of Daily Living: Independent   Instrumental Activities of Daily Living: Independent, aside from transportation as patient has never driven    Encourage appropriate footwear at all times  Review fall risk prevention tips and adjust within the home environment as needed    Medications Reviewed   Medications reviewed, no concerns with medications at this time.   Check with PCP before using over the counter medications  Avoid over the counter medications that can affect cognition (e.g., Benadryl, Tylenol PM)   Avoid NSAIDs due to risk of GI bleed and renal impairment    Other Findings   Overall health  BMI: 18.88 kg/m2  Maintain well-balanced diet  Continue following with primary care physician regularly  Anxiety  Patient is pleasant and interactive today in office.   She reports severe situational anxiety and depression, she is more secluded after most recent move to [place].   She has been following with [facility] (app) for Talk Therapy.   Recently stopped Buspar - continues to have severe anxiety. Will start Zoloft 25 mg HS.   Please start at 12.5 mg for one week before bedtime, then 25 mg daily at nighttime   Encouraged stress relief techniques, relaxation, and emotional support.   SW gave patient resources for Adult Day Programs for more socialization, and transportation.   Plan to continue to f/u with Eva for management.   Weight loss  Reports steady weight loss with no improvement despite multiple attempts to gain weight.   Known GI hx, follows closely with GI for  recommendations.   Would recommend nutrition consult for additional supports/recommendations.    Essential hypertension  Stable.   Continues on Amlodipine 10 mg daily for BP management.   Denies any dizziness or headaches.   Continue to follow up with PCP for BP management.   NED (obstructive sleep apnea)  Patient reports sleeping well at nighttime.   Does not use any sleep aides for restful sleep.   Encouraged good sleep hygiene, avoid daytime napping, avoiding  OTC medications that may cause oversedation/increased confusion.   Age-related osteoporosis without current pathological fracture  Continues on PO supplementation and routine DEXA scans.   Patient reports ambulating without any assistive devices.   Denies any recent falls.   PT/OT not indicated at this time, can order in the future if needs increase.   Educated on fall precautions due to increased risk of injury if a fall occurs.       Social /safety recommendations & considerations  Consider an [facility] for positive socialization, physical exercise, cognitive stimulation and family respite  Stay in touch with family and friends  Plan self-care activities for your mental well-being each week  Consider volunteering through [employer] (131-982-8227[employer]an or [employer]  Recommend review of fall risk prevention tips  Recommend use of fall precautions including fall alert device  Scam safety: do not answer suspicious mail, phone calls, email or text messages without having another person review for safety  Consider assistance for medication administration such as blister packaging or use of an automated pill dispenser  Consider contacting your local [facility] Agency on Aging for possible eligible programs such as OPTIONS, Caregiver Support Program, or WAIVER    Long term care recommendations  Maintain updated advance directives and provide a copy to your primary care provider  Consider caregiver support groups and educational resources through  the Alzheimer's Association; access Alzheimer's Association 24/7 Helpline at 1-220.472.2438  [facility] does offer a monthly caregiver support group. If interested, please speak with a .  Utilize reorientation and redirection as needed (dependent on situation)  Review educational information provided    Patient and family verbalized understanding of above care plan and in agreement with plan. For care coordination purposes, this care plan will be shared with your primary care provider. With any questions, please contact our office at [phone].     I have spent a total time of 30 minutes in caring for this patient on the day of the visit/encounter including Diagnostic results, Prognosis, Risks and benefits of tx options, Instructions for management, Patient and family education, Importance of tx compliance, Risk factor reductions, Impressions, Counseling / Coordination of care, Documenting in the medical record, Reviewing/placing orders in the medical record (including tests, medications, and/or procedures), Obtaining or reviewing history  , and Communicating with other healthcare professionals .

## 2025-05-21 ENCOUNTER — OFFICE VISIT (OUTPATIENT)
Dept: GASTROENTEROLOGY | Facility: MEDICAL CENTER | Age: 80
End: 2025-05-21

## 2025-05-21 ENCOUNTER — TELEPHONE (OUTPATIENT)
Dept: GASTROENTEROLOGY | Facility: MEDICAL CENTER | Age: 80
End: 2025-05-21

## 2025-05-21 VITALS
SYSTOLIC BLOOD PRESSURE: 130 MMHG | HEIGHT: 64 IN | HEART RATE: 67 BPM | OXYGEN SATURATION: 99 % | TEMPERATURE: 97.8 F | BODY MASS INDEX: 17.75 KG/M2 | WEIGHT: 104 LBS | DIASTOLIC BLOOD PRESSURE: 72 MMHG

## 2025-05-21 DIAGNOSIS — R63.4 WEIGHT LOSS, ABNORMAL: ICD-10-CM

## 2025-05-21 DIAGNOSIS — C49.A0 GASTROINTESTINAL STROMAL TUMOR (GIST) (HCC): Primary | ICD-10-CM

## 2025-05-21 RX ORDER — SODIUM CHLORIDE, SODIUM LACTATE, POTASSIUM CHLORIDE, CALCIUM CHLORIDE 600; 310; 30; 20 MG/100ML; MG/100ML; MG/100ML; MG/100ML
125 INJECTION, SOLUTION INTRAVENOUS CONTINUOUS
OUTPATIENT
Start: 2025-05-21

## 2025-05-21 NOTE — TELEPHONE ENCOUNTER
Pt calling to find out about scheduling he EUS, aware she will receive a call to schedule, asking about the gastric emptying advised I could transfer to Central scheduling to assist.  Pt will call them tomorrow.

## 2025-05-21 NOTE — PROGRESS NOTES
Name: Paula Paniagua      : 1945      MRN: [id number]  Encounter Provider: YESSICA Steinberg  Encounter Date: 2025   Encounter department: [facility] [place]  :  Assessment & Plan  Gastrointestinal stromal tumor (GIST) (HCC)  The patient had a recent endoscopic ultrasound as she had a CT scan in February revealing an indeterminate 1.4 x 1.0 cm soft tissue nodule abutting the medial gastric body body wall. Endoscopic ultrasound revealed a solid oval-shaped 1.4 x 1.4cm lesion in the mid body. Fine-needle aspiration was obtained and revealed rare clusters of bland appearing spindle cells consistent with GIST tumor.  Due for repeat endoscopic evaluation.  Will set up EUS.  Orders:    Endoscopic ultrasonography, GI (Upper); Future    EGD; Future  Follow-up in office after testing  Weight loss, abnormal  Reports she continues to lose weight.  Weight has essentially been stable since January at around 104 pounds.  Reports she only eats 2 meals a day.  She has tried [place] instant breakfast and Ensure with no weight gain.  Just has decreased appetite.  Does have a history of a GIST tumor in her stomach.  She is due for repeat EUS at this time.  Recent EGD and colonoscopy noted colon polyps that were tubular adenomas.  EGD noted a stable GIST tumor of 14 mm x 14 mm in the stomach.  Did have recent CT abdomen pelvis which did not show any causes for weight loss.  Will obtain a gastric emptying study to assess for gastroparesis.  She does not have abdominal pain.  BMs are brown and formed daily.  Denies any melena or hematochezia.    Orders:    NM gastric emptying; Future        History of Present Illness   Paula Paniagua is a 79 y.o. female who presents for follow-up.  She was last seen by myself  for weight loss, chronic cough, gastrointestinal stromal tumor, constipation and GERD.  HPI      The patient had a recent endoscopic ultrasound as she had a CT  scan in February revealing an indeterminate 1.4 x 1.0 cm soft tissue nodule abutting the medial gastric body body wall. Endoscopic ultrasound revealed a solid oval-shaped 1.4 x 1.4cm lesion in the mid body. Fine-needle aspiration was obtained and revealed rare clusters of bland appearing spindle cells consistent with GIST tumor.  Repeat endoscopic ultrasound recommended 4/25.    She has been losing weight over the past year.  She is lost 12 pounds since 7/24.  She started with a nonproductive cough 2/24.  Reports that she had a chest x-ray that was normal.  Reports that she is anxious most of the time.  Recently went to a nutritionist to help with increasing her caloric intake.  She denies any nausea, vomiting or dysphagia.  Heartburn and reflux have increased slightly since her last visit.  She is using famotidine 20 mg as needed which is helping.  BMs are brown and formed daily.  Denies any melena hematochezia     Interval history: Reports she continues to lose weight.  Weight has essentially been stable since January at around 104 pounds.  Reports she only eats 2 meals a day.  She has tried [place] instant breakfast and Ensure with no weight gain.  Just has decreased appetite.  Does have a history of a GIST tumor in her stomach.  She is due for repeat EUS at this time.      CT chest abdomen pelvis 11/24-    1. CT of the chest demonstrates mild reticulonodular change in the lateral aspect of the left lower lobe unchanged since 2022 perhaps related to a low-grade inflammatory or infectious process. Additional scattered subcentimeter nodules without change. No   suspicious pulmonary lesions  2. Stable appearance of hepatic cysts, subcentimeter hypodense nodules, and partially calcified nodule in the right hepatic lobe. No suspicious liver lesions  3. 1.4 cm soft tissue nodule (biopsy-proven GIST) adjacent to the medial border of the stomach unchanged in size since prior CT. Follow-up recommendations as per GI  service.  4. No findings are seen to account for the patient's history of weight loss      Labs 2/25-CMP normal, CBC normal, hemoglobin A1c 5.7, TSH normal    Previous EGD/colonoscopy    EGD 12/24-14 mm x 14 mm nodule was visualized in the body of the stomach.  Esophagus and duodenum were normal.  Repeat endoscopic ultrasound 4/25 for surveillance of GIST.  Biopsies negative for celiac.  Biopsy shows small amount of spindle cells or smooth muscle proliferation.  It is not certain if this represents lesional tissue as described in endoscopic report.  Rebiopsy may be helpful if clinically indicated.  Negative for intestinal metaplasia.    Colonoscopy 12/24-3 polyps and diverticulosis of moderate severity in the sigmoid colon.  Repeat colonoscopy in 3 years due to personal history of colon polyps.  Biopsies noted tubular adenomas.     Endoscopic ultrasound 4/24-esophagus and stomach normal.  Duodenal bulb and second part of duodenum normal. Radial echoendoscope was used for this exam. The lesion in question was identified at 45 cm from the incisors in the mid -body of the stomach. The lesion was hypoechoic and solid with oval shape measuring 1.4 x 1.4 cm. This was well circumscribed and confined to the 4th layer. A linear echoendoscope was then used for this exam. The 25 gauge core biopsy needle was used x 2 passes to obtain core of tissue that was sent for pathology.  Small just (less than 2 cm).  Repeat US in 1 year and can decide whether this should be removed at that time.     Colonoscopy 2015-1 diminutive polyp which was removed and revealed normal mucosa.  Recall colonoscopy recommended in 10 years.      Review of Systems   Constitutional:  Positive for unexpected weight change. Negative for chills and fever.   HENT:  Negative for ear pain and sore throat.    Eyes:  Negative for pain and visual disturbance.   Respiratory:  Negative for cough and shortness of breath.    Cardiovascular:  Negative for chest pain and  palpitations.   Gastrointestinal:  Negative for abdominal pain and vomiting.   Genitourinary:  Negative for dysuria and hematuria.   Musculoskeletal:  Negative for arthralgias and back pain.   Skin:  Negative for color change and rash.   Neurological:  Negative for seizures and syncope.   All other systems reviewed and are negative.   A complete review of systems is negative other than that noted above in the HPI.      Current Medications[1]  Objective   There were no vitals taken for this visit.    Physical Exam  Vitals and nursing note reviewed.   Constitutional:       General: She is not in acute distress.     Appearance: She is well-developed.   HENT:      Head: Normocephalic and atraumatic.     Eyes:      Conjunctiva/sclera: Conjunctivae normal.       Cardiovascular:      Rate and Rhythm: Normal rate and regular rhythm.      Heart sounds: No murmur heard.  Pulmonary:      Effort: Pulmonary effort is normal. No respiratory distress.      Breath sounds: Normal breath sounds.   Abdominal:      General: Bowel sounds are normal.      Palpations: Abdomen is soft.      Tenderness: There is no abdominal tenderness.     Musculoskeletal:         General: No swelling.      Cervical back: Neck supple.     Skin:     General: Skin is warm and dry.      Capillary Refill: Capillary refill takes less than 2 seconds.     Neurological:      Mental Status: She is alert and oriented to person, place, and time.     Psychiatric:         Mood and Affect: Mood normal.            Lab Results: I personally reviewed relevant lab results.       Results for orders placed during the hospital encounter of 12/19/24    EGD    Narrative  Table formatting from the original result was not included.  [facility]  [address] [address]  [phone]  [phone]      DATE OF SERVICE:  12/19/24    PHYSICIAN(S):  Attending:  Emmanuel Robison MD    Fellow:  Gisell Salazar DO      INDICATION:  Weight loss,  abnormal    POST-OP DIAGNOSIS:  See the impression below.    PREPROCEDURE:  Informed consent was obtained for the procedure, including sedation.  Risks of perforation, hemorrhage, adverse drug reaction and aspiration were discussed. The patient was placed in the left lateral decubitus position.    Patient was explained about the risks and benefits of the procedure. Risks including but not limited to bleeding, infection, and perforation were explained in detail. Also explained about less than 100% sensitivity with the exam and other alternatives.    PROCEDURE: EGD    DETAILS OF PROCEDURE:  Patient was taken to the procedure room where a time out was performed to confirm correct patient and correct procedure. The patient underwent monitored anesthesia care, which was administered by an anesthesia professional. The patient's blood pressure, heart rate, level of consciousness, respirations, oxygen, ECG and ETCO2 were monitored throughout the procedure. The scope was introduced through the mouth and advanced to the second part of the duodenum. Retroflexion was performed in the fundus. The patient experienced no blood loss. The procedure was not difficult. The patient tolerated the procedure well. There were no apparent adverse events.    ANESTHESIA INFORMATION:  ASA: III  Anesthesia Type: IV Sedation with Anesthesia    MEDICATIONS:  lactated ringers infusion Not documented*  *Total volume has not been documented. View each administration to see the amount administered.  simethicone (MYLICON) oral suspension 40 mg  (Totals for administrations occurring from 0808 to 0921 on 12/19/24)      FINDINGS:  14 mm x 14 mm nodule was visualized in the body of the stomach  The esophagus and duodenum appeared normal. Z-line is 38 cm from the incisors. Retroflexed view in the stomach was also unremarkable.  Performed multiple forceps biopsies in the stomach and duodenum      SPECIMENS:  ID Type Source Tests Collected by Time  Destination  1 : Duodenum cold biopsy- R/O Celiac Tissue Duodenum TISSUE EXAM Emmanuel Robison MD 12/19/2024  8:47 AM  2 : Gastric cold biopsy- R/O H.Pylori Tissue Stomach TISSUE EXAM Emmanuel Robison MD 12/19/2024  8:48 AM  3 : Ascending colon polyp; cold snare Tissue Large Intestine, Right/Ascending Colon TISSUE EXAM Emmanuel Robison MD 12/19/2024  9:10 AM  4 : sigmoid colon polyp x 2; cold snare Tissue Large Intestine, Sigmoid Colon TISSUE EXAM Emmanuel Robison MD 12/19/2024  9:15 AM          Impression  14 mm x 14 mm nodule was visualized in the body of the stomach  The esophagus and duodenum appeared normal.  Performed forceps biopsies in the stomach and duodenum  Gastric body subepithelial lesion was previously evaluated with endoscopic ultrasound and fine-needle aspiration and found to be a gastrointestinal stromal tumor.    RECOMMENDATION:  Await pathology results  Proceed with colonoscopy            Emmanuel Robison MD               [1]   Current Outpatient Medications   Medication Sig Dispense Refill    acetaminophen (TYLENOL) 500 mg tablet Take 500 mg by mouth every 6 (six) hours as needed for mild pain      albuterol (Proventil HFA) 90 mcg/act inhaler Inhale 2 puffs every 6 (six) hours as needed for wheezing 51 g 1    amLODIPine (NORVASC) 10 mg tablet TAKE 1 TABLET EVERY DAY 90 tablet 1    atorvastatin (LIPITOR) 20 mg tablet TAKE 1 TABLET EVERY DAY 90 tablet 1    benzonatate (TESSALON) 200 MG capsule Take 1 capsule (200 mg total) by mouth 3 (three) times a day as needed for cough 20 capsule 3    busPIRone (BUSPAR) 5 mg tablet Take 1 tablet (5 mg total) by mouth 2 (two) times a day 60 tablet 5    cholecalciferol (VITAMIN D3) 1,000 units tablet Take 1,000 Units by mouth in the morning.      famotidine (PEPCID) 20 mg tablet Take 2 tablets (40 mg total) by mouth daily 60 tablet 0    Fluticasone Furoate-Vilanterol (Breo Ellipta) 100-25 mcg/actuation inhaler Inhale 1 puff daily Rinse mouth after use. 180 blister 1     Multiple Vitamin (multivitamin) capsule Take 1 capsule by mouth daily      vitamin B-12 (VITAMIN B-12) 1,000 mcg tablet Take by mouth in the morning.      Blood Glucose Monitoring Suppl KIT Please use to check blood glucose once daily. (Patient not taking: Reported on 4/18/2025) 1 kit 0    Blood Pressure Monitor IRMA Use in the morning (Patient not taking: Reported on 4/18/2025) 1 each 0    glucose blood test strip Use to check blood glucose once daily. (Patient not taking: Reported on 2/27/2025) 100 each 1    Lancets 33G MISC PLEASE USE TO CHECK BLOOD GLUCOSE ONCE DAILY (Patient not taking: Reported on 2/27/2025) 100 each 1    latanoprost (XALATAN) 0.005 % ophthalmic solution  (Patient not taking: Reported on 7/9/2024)      LORazepam (Ativan) 0.5 mg tablet One tablet one hour prior to MRI, can repeat at time of MRI if needed (Patient not taking: Reported on 2/27/2025) 2 tablet 0    melatonin 1 mg Take 1 mg by mouth daily at bedtime (Patient not taking: Reported on 2/27/2025)      polyethylene glycol (Golytely) 4000 mL solution Take 4,000 mL by mouth once for 1 dose Take 4000 mL by mouth once for 1 dose. Use as directed (Patient not taking: Reported on 4/18/2025) 4000 mL 0     No current facility-administered medications for this visit.

## 2025-05-21 NOTE — ASSESSMENT & PLAN NOTE
The patient had a recent endoscopic ultrasound 2/24as she had a CT scan in February revealing an indeterminate 1.4 x 1.0 cm soft tissue nodule abutting the medial gastric body body wall. Endoscopic ultrasound revealed a solid oval-shaped 1.4 x 1.4cm lesion in the mid body. Fine-needle aspiration was obtained and revealed rare clusters of bland appearing spindle cells consistent with GIST tumor.  Due for repeat endoscopic evaluation.  Will set up EUS.  Orders:    Endoscopic ultrasonography, GI (Upper); Future    EGD; Future  Follow-up in office after testing

## 2025-05-21 NOTE — TELEPHONE ENCOUNTER
Patient was checked out after office visit with Ursula today. She ordered an EUS in the office today to schedule.

## 2025-05-22 ENCOUNTER — TELEPHONE (OUTPATIENT)
Age: 80
End: 2025-05-22

## 2025-05-22 DIAGNOSIS — K21.9 GASTROESOPHAGEAL REFLUX DISEASE, UNSPECIFIED WHETHER ESOPHAGITIS PRESENT: ICD-10-CM

## 2025-05-22 RX ORDER — FAMOTIDINE 20 MG/1
40 TABLET, FILM COATED ORAL DAILY
Qty: 60 TABLET | Refills: 5 | Status: SHIPPED | OUTPATIENT
Start: 2025-05-22 | End: 2025-11-18

## 2025-05-22 NOTE — TELEPHONE ENCOUNTER
Pt requests refill for famotidine. She has 2 tablets remaining. [facility] confirmed.

## 2025-05-23 ENCOUNTER — TELEPHONE (OUTPATIENT)
Age: 80
End: 2025-05-23

## 2025-05-23 ENCOUNTER — OFFICE VISIT (OUTPATIENT)
Age: 80
End: 2025-05-23

## 2025-05-23 VITALS
OXYGEN SATURATION: 97 % | HEIGHT: 64 IN | SYSTOLIC BLOOD PRESSURE: 120 MMHG | WEIGHT: 109.4 LBS | BODY MASS INDEX: 18.68 KG/M2 | TEMPERATURE: 98.2 F | HEART RATE: 74 BPM | DIASTOLIC BLOOD PRESSURE: 70 MMHG

## 2025-05-23 DIAGNOSIS — F41.1 GENERALIZED ANXIETY DISORDER: Primary | ICD-10-CM

## 2025-05-23 DIAGNOSIS — R41.3 MEMORY CHANGE: ICD-10-CM

## 2025-05-23 RX ORDER — SERTRALINE HYDROCHLORIDE 25 MG/1
25 TABLET, FILM COATED ORAL
Qty: 90 TABLET | Refills: 0 | Status: SHIPPED | OUTPATIENT
Start: 2025-05-23

## 2025-05-23 NOTE — PATIENT INSTRUCTIONS
[facility]  [address], [address]  [place] [address]  [phone]    Care Conference    NAME: Paula Paniagua AGE: 79 y.o. SEX: female  YOB: 1945  DATE OF ASSESSMENT: 04/18/2025  DATE OF CONFERENCE: 05/23/2025    Family Present: Patient came alone to visit.   Staff Present: YESSICA Sharma    Patient / Family Goals of Care: Review cognitive decline and associated symptoms, discuss treatment options and care planning.     Medical Concerns (Current/Historical): Anxiety, Weight loss, Essential hypertension, NED (obstructive sleep apnea), Age-related osteoporosis without current pathological fracture    Geriatric Syndromes/Age Related Syndromes: insomnia, anxiety, constipation, and osteoporosis.     Neuropsychological  Staging: MCI (mild cognitive impairment)  Terrell Cognitive Assessment: 21/30, deficits in: attention, language, and executive thinking.   Geriatric Depression Screen: 12/15    Consistent with MCI, NOT related to memory changes, rather severe anxiety and depression. During assessment, she continued to endorse very severe anxiety and depression, and seclusion after recent move to [place]. Denies SI.     Decision-making capacity: Intact for medical and financial decisions   Caregiver Review: SW met with patient to discuss other needs/options to assist.   Driving Safety: Not driving. Has never driven.   ACP Review: Does not have a POA or Living Will in place, encouraged patient to get some form of paperwork together for wishes to be made known/appoint family member for decisions if needed.     Remain active physically, mentally and socially  Pharmaceutical and non-pharmaceutical interventions discussed  Engage in cognitively challenging exercises such as crosswords and puzzles  Maintain chronic conditions under control  Repeat cognitive assessment in 6 months      Diagnostic Studies  Review of blood work - B12 elevated would recommend discontinuing B12  supplement at this time. Will re-check in 2-3 months, and restart if levels are low.   Review of previous imaging   MRI brain NeuroQuant without contrast (3/13/25)  IMPRESSION  No mass effect, acute intracranial hemorrhage or evidence of recent infarction. Moderate chronic microvascular ischemic change.  NeuroQuant analysis was performed: Normal study; Does not support neurodegeneration.    Physical Finding Impacting Function   Fall Risk: Low fall risk   Activities of Daily Living: Independent   Instrumental Activities of Daily Living: Independent, aside from transportation as patient has never driven    Encourage appropriate footwear at all times  Review fall risk prevention tips and adjust within the home environment as needed    Medications Reviewed   Medications reviewed, no concerns with medications at this time.   Check with PCP before using over the counter medications  Avoid over the counter medications that can affect cognition (e.g., Benadryl, Tylenol PM)   Avoid NSAIDs due to risk of GI bleed and renal impairment    Other Findings   Overall health  BMI: 18.88 kg/m2  Maintain well-balanced diet  Continue following with primary care physician regularly  Anxiety  Patient is pleasant and interactive today in office.   She reports severe situational anxiety and depression, she is more secluded after most recent move to [place].   She has been following with [facility] for Talk Therapy.   Recently stopped Buspar - continues to have severe anxiety. Will start Zoloft 25 mg HS.   Please start at 12.5 mg for one week before bedtime, then 25 mg daily at nighttime   Encouraged stress relief techniques, relaxation, and emotional support.   SW gave patient resources for Adult Day Programs for more socialization, and transportation.   Plan to continue to f/u with [facility] for management.   Weight loss  Reports steady weight loss with no improvement despite multiple attempts to gain weight.   Known GI hx, follows closely with GI for  recommendations.   Would recommend nutrition consult for additional supports/recommendations.    Essential hypertension  Stable.   Continues on Amlodipine 10 mg daily for BP management.   Denies any dizziness or headaches.   Continue to follow up with PCP for BP management.   NED (obstructive sleep apnea)  Patient reports sleeping well at nighttime.   Does not use any sleep aides for restful sleep.   Encouraged good sleep hygiene, avoid daytime napping, avoiding  OTC medications that may cause oversedation/increased confusion.   Age-related osteoporosis without current pathological fracture  Continues on PO supplementation and routine DEXA scans.   Patient reports ambulating without any assistive devices.   Denies any recent falls.   PT/OT not indicated at this time, can order in the future if needs increase.   Educated on fall precautions due to increased risk of injury if a fall occurs.       Social /safety recommendations & considerations  Consider an [facility] for positive socialization, physical exercise, cognitive stimulation and family respite  Stay in touch with family and friends  Plan self-care activities for your mental well-being each week  Consider volunteering through [employer] ([phone] or Volunteer [employer]  Recommend review of fall risk prevention tips  Recommend use of fall precautions including fall alert device  Scam safety: do not answer suspicious mail, phone calls, email or text messages without having another person review for safety  Consider assistance for medication administration such as blister packaging or use of an automated pill dispenser  Consider contacting your local [facility] Agency on Aging for possible eligible programs such as OPTIONS, Caregiver Support Program, or WAIVER    Long term care recommendations  Maintain updated advance directives and provide a copy to your primary care provider  Consider caregiver support groups and educational resources through  the Alzheimer's Association; access Alzheimer's Association 24/7 Helpline at 1-770.823.1936  [facility] does offer a monthly caregiver support group. If interested, please speak with a .  Utilize reorientation and redirection as needed (dependent on situation)  Review educational information provided    Patient and family verbalized understanding of above care plan and in agreement with plan. For care coordination purposes, this care plan will be shared with your primary care provider. With any questions, please contact our office at [phone].

## 2025-05-23 NOTE — PROGRESS NOTES
[facility]  [address], [address]  [place], [place] [address] [phone]    Care Conference: Resources Provided    MSW provided the following resources, along with a copy of the care plan:  [address] [place] [id number]    General Information  - 10 Ways to Love Your Brain  - Memory loss ladder  - Alzheimer's Association information sheet on Mild Cognitive Impairment diagnosis  - NIH: Reducing your risk of Dementia    Caregiver Support  - Flyer for [facility] Virtual Caregiver Support Group (meeting 2x per month by Zoom)    Safety  - CDC fall prevention / home safety checklist    Community Resources  - [facility] Aging in Place Resource Guide (contains information about higher levels of care, homecare, etc.)    Resources provided at initial assessment: Senior Center list, Cognitive Apps list, Silver Sneakers, Transportation Options, [employer] Application, [employer] contact information (immigration services), Elder Law  list, Psychology/Psychiatry/Crisis infomration

## 2025-05-23 NOTE — TELEPHONE ENCOUNTER
Mary calls from [facility] to confirm directions for Famotidine. No further questions or concerns

## 2025-06-04 ENCOUNTER — NURSE TRIAGE (OUTPATIENT)
Age: 80
End: 2025-06-04

## 2025-06-04 NOTE — TELEPHONE ENCOUNTER
"  REASON FOR CONVERSATION: Shortness of Breath    SYMPTOMS: intermittent SOB    OTHER HEALTH INFORMATION: hx of Asthma, occasional pain in her L calf. No redness or swelling    PROTOCOL DISPOSITION: Go to Office Now/Urgent Care    CARE ADVICE PROVIDED: deep breaths, relaxation, Zoloft albuterol inhaler    PRACTICE FOLLOW-UP: Patient was scheduled for 6/5/25.      Reason for Disposition   Patient wants to be seen    Answer Assessment - Initial Assessment Questions  1. RESPIRATORY STATUS: \"Describe your breathing?\" (e.g., wheezing, shortness of breath, unable to speak, severe coughing)       Patient reports that sudden shortness of breath can occur.   2. ONSET: \"When did this breathing problem begin?\"       2 times  3. PATTERN \"Does the difficult breathing come and go, or has it been constant since it started?\"       Comes and goes  4. SEVERITY: \"How bad is your breathing?\" (e.g., mild, moderate, severe)       Mild to moderate  5. RECURRENT SYMPTOM: \"Have you had difficulty breathing before?\" If Yes, ask: \"When was the last time?\" and \"What happened that time?\"       denies  6. CARDIAC HISTORY: \"Do you have any history of heart disease?\" (e.g., heart attack, angina, bypass surgery, angioplasty)       denies  7. LUNG HISTORY: \"Do you have any history of lung disease?\"  (e.g., pulmonary embolus, asthma, emphysema)      asthma  8. CAUSE: \"What do you think is causing the breathing problem?\"       unsure  9. OTHER SYMPTOMS: \"Do you have any other symptoms?\" (e.g., chest pain, cough, dizziness, fever, runny nose)      Denies cough. Patient does have some weight loss and weakness  10. O2 SATURATION MONITOR:  \"Do you use an oxygen saturation monitor (pulse oximeter) at home?\" If Yes, ask: \"What is your reading (oxygen level) today?\" \"What is your usual oxygen saturation reading?\" (e.g., 95%)        No oximeter  11. PREGNANCY: \"Is there any chance you are pregnant?\" \"When was your last menstrual period?\"        denies  12. " "TRAVEL: \"Have you traveled out of the country in the last month?\" (e.g., travel history, exposures)        denies    Protocols used: Breathing Difficulty-Adult-[place]    "

## 2025-06-05 ENCOUNTER — APPOINTMENT (OUTPATIENT)
Dept: RADIOLOGY | Facility: CLINIC | Age: 80
End: 2025-06-05
Payer: COMMERCIAL

## 2025-06-05 ENCOUNTER — OFFICE VISIT (OUTPATIENT)
Dept: FAMILY MEDICINE CLINIC | Facility: CLINIC | Age: 80
End: 2025-06-05
Payer: COMMERCIAL

## 2025-06-05 VITALS
TEMPERATURE: 98.2 F | DIASTOLIC BLOOD PRESSURE: 64 MMHG | BODY MASS INDEX: 19.03 KG/M2 | SYSTOLIC BLOOD PRESSURE: 120 MMHG | HEART RATE: 78 BPM | OXYGEN SATURATION: 98 % | WEIGHT: 107.4 LBS | HEIGHT: 63 IN

## 2025-06-05 DIAGNOSIS — F41.9 ANXIETY: Primary | ICD-10-CM

## 2025-06-05 DIAGNOSIS — J45.20 MILD INTERMITTENT ASTHMA WITHOUT COMPLICATION: ICD-10-CM

## 2025-06-05 DIAGNOSIS — R06.00 DYSPNEA, UNSPECIFIED TYPE: ICD-10-CM

## 2025-06-05 PROCEDURE — 71046 X-RAY EXAM CHEST 2 VIEWS: CPT

## 2025-06-05 PROCEDURE — 99213 OFFICE O/P EST LOW 20 MIN: CPT | Performed by: FAMILY MEDICINE

## 2025-06-05 PROCEDURE — G2211 COMPLEX E/M VISIT ADD ON: HCPCS | Performed by: FAMILY MEDICINE

## 2025-06-05 NOTE — ASSESSMENT & PLAN NOTE
Patient presents with 3 recent episodes over the past few weeks of brief dyspnea.  Patient states that these have all been nonexertional.  She states that she feels like she cannot catch her breath.  Symptoms only lasted a brief second.  She does admit to ongoing anxiety.  She was prescribed sertraline by [facility], but she has been afraid to take it due to possible side effects.  On examination, blood pressure 120/64.  Heart rate 78.  Pulse oximetry 98%.  Lungs are clear.  Cardiac exam is unremarkable.  Patient had EKG August 15, 2024 which was unremarkable.  Will send for chest x-ray today.  Symptoms most likely secondary to anxiety.  I encourage patient to start her sertraline that was prescribed to her by [facility].  Will call with chest x-ray results.  ER precautions given.

## 2025-06-05 NOTE — PROGRESS NOTES
Name: Paula Paniagua      : 1945      MRN: [id number]  Encounter Provider: Jaya Torres DO  Encounter Date: 2025   Encounter department: [facility]    Assessment & Plan  Anxiety  Patient presents with 3 recent episodes over the past few weeks of brief dyspnea.  Patient states that these have all been nonexertional.  She states that she feels like she cannot catch her breath.  Symptoms only lasted a brief second.  She does admit to ongoing anxiety.  She was prescribed sertraline by [facility], but she has been afraid to take it due to possible side effects.  On examination, blood pressure 120/64.  Heart rate 78.  Pulse oximetry 98%.  Lungs are clear.  Cardiac exam is unremarkable.  Patient had EKG August 15, 2024 which was unremarkable.  Will send for chest x-ray today.  Symptoms most likely secondary to anxiety.  I encourage patient to start her sertraline that was prescribed to her by [facility].  Will call with chest x-ray results.  ER precautions given.  Dyspnea, unspecified type    Orders:  •  XR chest pa and lateral; Future    Mild intermittent asthma without complication              History of Present Illness     Patient presents with 3 recent episodes over the past few weeks of brief dyspnea.  Patient states that these have all been nonexertional.  She states that she feels like she cannot catch her breath.  Symptoms only lasted a brief second.  She does admit to ongoing anxiety.  She was prescribed sertraline by [facility], but she has been afraid to take it due to possible side effects.      Review of Systems  Past Medical History[1]  Past Surgical History[2]  Family History[3]  Social History[4]  Medications[5]  Allergies   Allergen Reactions   • Nsaids Other (See Comments)   • Other Cough     Seasonal, perfumes, colon, chemicals   • Sulfa Antibiotics Other (See Comments)   • Codeine Rash   • Shellfish Allergy - Food Allergy Rash     Immunization History  "  Administered Date(s) Administered   • COVID-19 PFIZER VACCINE 0.3 ML IM 2021, 2021, 2021   • INFLUENZA 2020, 10/24/2021, 2022, 2023   • Influenza Split High Dose Preservative Free IM 10/18/2024   • Influenza, high dose seasonal 0.7 mL 2020, 2022   • Pneumococcal Conjugate Vaccine 20-valent (Pcv20), Polysace 2022     Objective   /64 (BP Location: Left arm, Patient Position: Sitting, Cuff Size: Adult)   Pulse 78   Temp 98.2 °F (36.8 °C) (Temporal)   Ht 5' 2.75\" (1.594 m)   Wt 48.7 kg (107 lb 6.4 oz)   SpO2 98%   BMI 19.18 kg/m²     Physical Exam           [1]  Past Medical History:  Diagnosis Date   • Allergic ’s    Allergic to codine   • Anxiety    • Asthma     variant cough asthma   • Cataract    • Chronic kidney disease ’s    Kidney and liver cyst   • Clotting disorder (HCC)     Gums   • Diverticulitis of colon Maybe  or 16   • GERD (gastroesophageal reflux disease)    • Headache(784.0)    • Heart murmur    • Hyperlipidemia    • Hypertension    • Scoliosis    • Seizures (HCC) 2014 and 2019   • Visual impairment     Early stage Glucoma.  And Cataract   [2]  Past Surgical History:  Procedure Laterality Date   • EYE SURGERY     • HYSTERECTOMY     • OOPHORECTOMY Bilateral    • UTERINE SUSPENSION     [3]  Family History  Problem Relation Name Age of Onset   • Asthma Mother Rocky    • Hypertension Mother Rocky            • Glaucoma Mother Rocky    • Glaucoma Father Goldie            • No Known Problems Sister     • No Known Problems Maternal Grandmother     • No Known Problems Maternal Grandfather     • No Known Problems Paternal Grandmother     • No Known Problems Paternal Grandfather     • No Known Problems Sister     • Hypertension Brother Boris         Pace maker   [4]  Social History  Tobacco Use   • Smoking status: Never     Passive exposure: Never   • Smokeless tobacco: Never   Vaping Use   • Vaping " status: Never Used   Substance and Sexual Activity   • Alcohol use: Not Currently   • Drug use: Never   • Sexual activity: Not Currently     Partners: Male   [5]  Current Outpatient Medications on File Prior to Visit   Medication Sig   • albuterol (Proventil HFA) 90 mcg/act inhaler Inhale 2 puffs every 6 (six) hours as needed for wheezing   • amLODIPine (NORVASC) 10 mg tablet TAKE 1 TABLET EVERY DAY   • atorvastatin (LIPITOR) 20 mg tablet TAKE 1 TABLET EVERY DAY   • cholecalciferol (VITAMIN D3) 1,000 units tablet Take 1,000 Units by mouth in the morning.   • famotidine (PEPCID) 20 mg tablet Take 2 tablets (40 mg total) by mouth daily   • Multiple Vitamin (multivitamin) capsule Take 1 capsule by mouth daily   • acetaminophen (TYLENOL) 500 mg tablet Take 500 mg by mouth every 6 (six) hours as needed for mild pain   • sertraline (Zoloft) 25 mg tablet Take 1 tablet (25 mg total) by mouth daily at bedtime Please start at 12.5 mg for one week before bedtime, then 25 mg daily at nighttime.

## 2025-06-07 ENCOUNTER — RESULTS FOLLOW-UP (OUTPATIENT)
Dept: FAMILY MEDICINE CLINIC | Facility: CLINIC | Age: 80
End: 2025-06-07

## 2025-06-12 ENCOUNTER — TELEPHONE (OUTPATIENT)
Dept: GASTROENTEROLOGY | Facility: MEDICAL CENTER | Age: 80
End: 2025-06-12

## 2025-06-12 NOTE — TELEPHONE ENCOUNTER
Patients GI provider:  YESSICA Padilla.     Number to return call: [phone]    Reason for call: Pt called in regard to GES scheduled for 7/9/2025. Pt is concerned about the radiation used for imaging study. Pt is asking for other recommendations.    Scheduled procedure/appointment date if applicable: 7/9/2025

## 2025-06-12 NOTE — TELEPHONE ENCOUNTER
There is no other test that you can evaluate how the food is emptying out of the stomach.  This is not a large amount of radiation.  I do recommend patient pursue this test.

## 2025-06-13 NOTE — TELEPHONE ENCOUNTER
Called the pt to review Robin-NRCP note and got the pt voicemail and left a call back message to review NRCP'S note.     Robin- NRCP note-    There is no other test that you can evaluate how the food is emptying out of the stomach. This is not a large amount of radiation. I do recommend patient pursue this test.

## 2025-06-16 NOTE — TELEPHONE ENCOUNTER
Patient calling back regarding amount of radiation she will be receiving with testing.  Advised small amount and that it is the only test that shows what they are looking for.  Unsure if patient will proceed.

## 2025-06-18 NOTE — TELEPHONE ENCOUNTER
Calling due to SOB spells she has been having.  Saw pcp and had xray which was clear.  States they told her they feel it is anxiety.  Asking if should cancel EGD.  Advised to wait to see if medication helps.  Will see PCP on Monday.

## 2025-06-23 ENCOUNTER — OFFICE VISIT (OUTPATIENT)
Dept: FAMILY MEDICINE CLINIC | Facility: CLINIC | Age: 80
End: 2025-06-23
Payer: COMMERCIAL

## 2025-06-23 VITALS
HEART RATE: 77 BPM | BODY MASS INDEX: 19.14 KG/M2 | OXYGEN SATURATION: 96 % | SYSTOLIC BLOOD PRESSURE: 110 MMHG | TEMPERATURE: 99 F | DIASTOLIC BLOOD PRESSURE: 70 MMHG | HEIGHT: 62 IN | WEIGHT: 104 LBS

## 2025-06-23 DIAGNOSIS — R63.4 WEIGHT LOSS: ICD-10-CM

## 2025-06-23 DIAGNOSIS — E78.5 DYSLIPIDEMIA: ICD-10-CM

## 2025-06-23 DIAGNOSIS — Z12.31 BREAST CANCER SCREENING BY MAMMOGRAM: ICD-10-CM

## 2025-06-23 DIAGNOSIS — I10 ESSENTIAL HYPERTENSION: Primary | ICD-10-CM

## 2025-06-23 DIAGNOSIS — F41.9 ANXIETY: ICD-10-CM

## 2025-06-23 DIAGNOSIS — R73.03 PRE-DIABETES: ICD-10-CM

## 2025-06-23 DIAGNOSIS — E53.8 B12 DEFICIENCY: ICD-10-CM

## 2025-06-23 PROCEDURE — 99214 OFFICE O/P EST MOD 30 MIN: CPT | Performed by: FAMILY MEDICINE

## 2025-06-23 PROCEDURE — G2211 COMPLEX E/M VISIT ADD ON: HCPCS | Performed by: FAMILY MEDICINE

## 2025-06-23 NOTE — LETTER
June 23, 2025     Patient: Paula Paniagua  YOB: 1945  Date of Visit: 6/23/2025      To Whom it May Concern:    Paula Paniagua is under my professional care. Paula was seen in my office on 6/23/2025. Paula was recently seen by geriatric medicine and diagnosed with mild cognitive impairment. We treat her for hypertension and hyperlipidemia. She is also following with gastroenterology regarding weight loss.    If you have any questions or concerns, please don't hesitate to call.         Sincerely,          Michelle Rg DO        CC: No Recipients

## 2025-06-30 ENCOUNTER — TELEPHONE (OUTPATIENT)
Age: 80
End: 2025-06-30

## 2025-06-30 ENCOUNTER — NURSE TRIAGE (OUTPATIENT)
Age: 80
End: 2025-06-30

## 2025-06-30 NOTE — TELEPHONE ENCOUNTER
Called patient back to discuss call made to the call center. Continues to endorse acute disorientation, tachycardia, SOB, and weakness that started this morning. Educated patient that she should be evaluated in the ED to r/o cardiac/pulmonology vs anxiety. She reports she was unsure if it was due to the recent medication adjustment, recently started Zoloft over a month ago. Although, these are all acute symptoms that just started this morning so this is unlikely. Patient agreeable to be evaluated in the ED. Educated to call back if they are needing any further assistance.

## 2025-06-30 NOTE — TELEPHONE ENCOUNTER
"REASON FOR CONVERSATION: Shortness of Breath, Rapid Heart Rate, and Fatigue    SYMPTOMS: Patient report sob at rest when she speaks. Patient seems confused on her s/s and wasn't clear on her report. Patient denies chest pain, numbness/tingling and no other issues at this time she states.     OTHER HEALTH INFORMATION: Extensive cardiac hx    PROTOCOL DISPOSITION: Go to ED Now    CARE ADVICE PROVIDED: Triage nurse advised pt to go to the ER, pt refused. Triage nurse educated pt and still she declined. Triage nurse then attempted to make pt an appt, especially with medication(sertraline (Zoloft) 25 mg tablet) questions. Patient still declined.     PRACTICE FOLLOW-UP: PCP and [facility] doctor please follow up with patient/daughter.         Reason for Disposition   MODERATE difficulty breathing (e.g., speaks in phrases, SOB even at rest, pulse 100-120) of new-onset or worse than normal    Answer Assessment - Initial Assessment Questions  1. RESPIRATORY STATUS: \"Describe your breathing?\" (e.g., wheezing, shortness of breath, unable to speak, severe coughing)       Sob at rest   2. ONSET: \"When did this breathing problem begin?\"       Unsure   3. PATTERN \"Does the difficult breathing come and go, or has it been constant since it started?\"       Comes and goes   4. SEVERITY: \"How bad is your breathing?\" (e.g., mild, moderate, severe)       Severe but unsure seems nervous  5. RECURRENT SYMPTOM: \"Have you had difficulty breathing before?\" If Yes, ask: \"When was the last time?\" and \"What happened that time?\"       Denies  6. CARDIAC HISTORY: \"Do you have any history of heart disease?\" (e.g., heart attack, angina, bypass surgery, angioplasty)       Extensive cardiac hx  7. LUNG HISTORY: \"Do you have any history of lung disease?\"  (e.g., pulmonary embolus, asthma, emphysema)      Asthma   8. CAUSE: \"What do you think is causing the breathing problem?\"       Unsure seems very confused at baseline   9. OTHER SYMPTOMS: \"Do you " "have any other symptoms?\" (e.g., chest pain, cough, dizziness, fever, runny nose)      SOB denies other s/s   10. O2 SATURATION MONITOR:  \"Do you use an oxygen saturation monitor (pulse oximeter) at home?\" If Yes, ask: \"What is your reading (oxygen level) today?\" \"What is your usual oxygen saturation reading?\" (e.g., 95%)        denies  11. PREGNANCY: \"Is there any chance you are pregnant?\" \"When was your last menstrual period?\"        N/a  12. TRAVEL: \"Have you traveled out of the country in the last month?\" (e.g., travel history, exposures)        denies    Protocols used: Breathing Difficulty-Adult-[place]    "

## 2025-06-30 NOTE — TELEPHONE ENCOUNTER
Patient would like to speak with provider regarding Zoloft script.    Patient states she is experiencing the following side effects:    Heart racing;  Shortness of breath; and  Weakness.    Patient states she spoke with a nurse this morning and while on phone experienced SOB. Appears to experience SOB when talking on telephone.    Warmed transferred to nurse triage.

## 2025-07-01 ENCOUNTER — TELEPHONE (OUTPATIENT)
Age: 80
End: 2025-07-01

## 2025-07-01 DIAGNOSIS — F40.240 CLAUSTROPHOBIA: ICD-10-CM

## 2025-07-01 DIAGNOSIS — R06.02 SHORTNESS OF BREATH: Primary | ICD-10-CM

## 2025-07-01 DIAGNOSIS — R79.89 ELEVATED D-DIMER: ICD-10-CM

## 2025-07-01 RX ORDER — LORAZEPAM 1 MG/1
TABLET ORAL
Qty: 5 TABLET | Refills: 0 | Status: SHIPPED | OUTPATIENT
Start: 2025-07-01

## 2025-07-01 NOTE — TELEPHONE ENCOUNTER
Pt calling. States she was seen in the ED last night for shortness of breath and everything was normal, feeling better today.  States they were unable to CT chest in ED due to her having Claustrophobia and advised she follow up for that as outpt.   Advised that she should reach out to PCP for this.      Pt concerned because she has upcoming colonoscopy on 7/10/25 and gastric emptying study on 7/9/25.  Pt asking if okay to keep these tests scheduled.  Would like to know asap since she has to arrange ride with daughter, who needs to take off work.     Please advise.

## 2025-07-01 NOTE — TELEPHONE ENCOUNTER
Patient called in regards to wanting provider to place an order for a CT Scan of the Chest. Patient stated that she is having trouble breathing and they did not look at her chest yesterday in the ED.

## 2025-07-01 NOTE — TELEPHONE ENCOUNTER
Received call from patient stating she was told she would need a CT scan prior to upcoming upper endoscopy on 7/10    She states she was in the ED yesterday and was unable to get CT scan done due to claustrophobia.     She states she called GI to see if she can get her endoscopy done without the CT scan and is waiting to hear back.     Patient advised to c all us back once she hears back from GI.

## 2025-07-01 NOTE — TELEPHONE ENCOUNTER
Patient called again regarding CT of the chest and stated she needs Lorazapem for the ct because she gets anxiety. Just relaying the message verbatim. Patient stated she needs to get the CT done before the 10th because she needs to go for the gastroenterology endoscopy. Please review and advise.

## 2025-07-02 ENCOUNTER — TELEPHONE (OUTPATIENT)
Age: 80
End: 2025-07-02

## 2025-07-02 DIAGNOSIS — I10 ESSENTIAL HYPERTENSION: ICD-10-CM

## 2025-07-02 DIAGNOSIS — E78.5 DYSLIPIDEMIA: ICD-10-CM

## 2025-07-02 NOTE — TELEPHONE ENCOUNTER
Please call patient's pharmacy to confirm that the prescription is necessary prior to her CT scan.  She should take 1 tablet 1 hour before the test and a second at the time of the test if needed.  The max daily dose of the lorazepam will be 2 tablets.  Okay to dispense 5 tablets as written to keep on hand for future CT scans.

## 2025-07-02 NOTE — TELEPHONE ENCOUNTER
Patient calling concerned about all the testing in less than a week.  Asking to reschedule gastric emptying study.  Warm transferred to central scheduling.     Bexarotene Counseling:  I discussed with the patient the risks of bexarotene including but not limited to hair loss, dry lips/skin/eyes, liver abnormalities, hyperlipidemia, pancreatitis, depression/suicidal ideation, photosensitivity, drug rash/allergic reactions, hypothyroidism, anemia, leukopenia, infection, cataracts, and teratogenicity.  Patient understands that they will need regular blood tests to check lipid profile, liver function tests, white blood cell count, thyroid function tests and pregnancy test if applicable. Ivermectin Pregnancy And Lactation Text: This medication is Pregnancy Category C and it isn't known if it is safe during pregnancy. It is also excreted in breast milk. Ilumya Pregnancy And Lactation Text: The risk during pregnancy and breastfeeding is uncertain with this medication. Opioid Counseling: I discussed with the patient the potential side effects of opioids including but not limited to addiction, altered mental status, and depression. I stressed avoiding alcohol, benzodiazepines, muscle relaxants and sleep aids unless specifically okayed by a physician. The patient verbalized understanding of the proper use and possible adverse effects of opioids. All of the patient's questions and concerns were addressed. They were instructed to flush the remaining pills down the toilet if they did not need them for pain. Protopic Counseling: Patient may experience a mild burning sensation during topical application. Protopic is not approved in children less than 2 years of age. There have been case reports of hematologic and skin malignancies in patients using topical calcineurin inhibitors although causality is questionable. Xelestelaz Pregnancy And Lactation Text: This medication is Pregnancy Category D and is not considered safe during pregnancy.  The risk during breast feeding is also uncertain. Finasteride Pregnancy And Lactation Text: This medication is absolutely contraindicated during pregnancy. It is unknown if it is excreted in breast milk. 5-Fu Counseling: 5-Fluorouracil Counseling:  I discussed with the patient the risks of 5-fluorouracil including but not limited to erythema, scaling, itching, weeping, crusting, and pain. Fluconazole Counseling:  Patient counseled regarding adverse effects of fluconazole including but not limited to headache, diarrhea, nausea, upset stomach, liver function test abnormalities, taste disturbance, and stomach pain.  There is a rare possibility of liver failure that can occur when taking fluconazole.  The patient understands that monitoring of LFTs and kidney function test may be required, especially at baseline. The patient verbalized understanding of the proper use and possible adverse effects of fluconazole.  All of the patient's questions and concerns were addressed. Acitretin Pregnancy And Lactation Text: This medication is Pregnancy Category X and should not be given to women who are pregnant or may become pregnant in the future. This medication is excreted in breast milk. Oxybutynin Pregnancy And Lactation Text: This medication is Pregnancy Category B and is considered safe during pregnancy. It is unknown if it is excreted in breast milk. Ivermectin Counseling:  Patient instructed to take medication on an empty stomach with a full glass of water.  Patient informed of potential adverse effects including but not limited to nausea, diarrhea, dizziness, itching, and swelling of the extremities or lymph nodes.  The patient verbalized understanding of the proper use and possible adverse effects of ivermectin.  All of the patient's questions and concerns were addressed. Opioid Pregnancy And Lactation Text: These medications can lead to premature delivery and should be avoided during pregnancy. These medications are also present in breast milk in small amounts. Picato Pregnancy And Lactation Text: This medication is Pregnancy Category C. It is unknown if this medication is excreted in breast milk. Gabapentin Counseling: I discussed with the patient the risks of gabapentin including but not limited to dizziness, somnolence, fatigue and ataxia. Xeljanz Counseling: I discussed with the patient the risks of Xeljanz therapy including increased risk of infection, liver issues, headache, diarrhea, or cold symptoms. Live vaccines should be avoided. They were instructed to call if they have any problems. Ilumya Counseling: I discussed with the patient the risks of tildrakizumab including but not limited to immunosuppression, malignancy, posterior leukoencephalopathy syndrome, and serious infections.  The patient understands that monitoring is required including a PPD at baseline and must alert us or the primary physician if symptoms of infection or other concerning signs are noted. Acitretin Counseling:  I discussed with the patient the risks of acitretin including but not limited to hair loss, dry lips/skin/eyes, liver damage, hyperlipidemia, depression/suicidal ideation, photosensitivity.  Serious rare side effects can include but are not limited to pancreatitis, pseudotumor cerebri, bony changes, clot formation/stroke/heart attack.  Patient understands that alcohol is contraindicated since it can result in liver toxicity and significantly prolong the elimination of the drug by many years. Propranolol Counseling:  I discussed with the patient the risks of propranolol including but not limited to low heart rate, low blood pressure, low blood sugar, restlessness and increased cold sensitivity. They should call the office if they experience any of these side effects. Carac Pregnancy And Lactation Text: This medication is Pregnancy Category X and contraindicated in pregnancy and in women who may become pregnant. It is unknown if this medication is excreted in breast milk. Doxycycline Counseling:  Patient counseled regarding possible photosensitivity and increased risk for sunburn.  Patient instructed to avoid sunlight, if possible.  When exposed to sunlight, patients should wear protective clothing, sunglasses, and sunscreen.  The patient was instructed to call the office immediately if the following severe adverse effects occur:  hearing changes, easy bruising/bleeding, severe headache, or vision changes.  The patient verbalized understanding of the proper use and possible adverse effects of doxycycline.  All of the patient's questions and concerns were addressed. Fluconazole Pregnancy And Lactation Text: This medication is Pregnancy Category C and it isn't know if it is safe during pregnancy. It is also excreted in breast milk. Picato Counseling:  I discussed with the patient the risks of Picato including but not limited to erythema, scaling, itching, weeping, crusting, and pain. Humira Pregnancy And Lactation Text: This medication is Pregnancy Category B and is considered safe during pregnancy. It is unknown if this medication is excreted in breast milk. Gabapentin Pregnancy And Lactation Text: This medication is Pregnancy Category C and isn't considered safe during pregnancy. It is excreted in breast milk. Propranolol Pregnancy And Lactation Text: This medication is Pregnancy Category C and it isn't known if it is safe during pregnancy. It is excreted in breast milk. Carac Counseling:  I discussed with the patient the risks of Carac including but not limited to erythema, scaling, itching, weeping, crusting, and pain. Albendazole Counseling:  I discussed with the patient the risks of albendazole including but not limited to cytopenia, kidney damage, nausea/vomiting and severe allergy.  The patient understands that this medication is being used in an off-label manner. Arava Counseling:  Patient counseled regarding adverse effects of Arava including but not limited to nausea, vomiting, abnormalities in liver function tests. Patients may develop mouth sores, rash, diarrhea, and abnormalities in blood counts. The patient understands that monitoring is required including LFTs and blood counts.  There is a rare possibility of scarring of the liver and lung problems that can occur when taking methotrexate. Persistent nausea, loss of appetite, pale stools, dark urine, cough, and shortness of breath should be reported immediately. Patient advised to discontinue Arava treatment and consult with a physician prior to attempting conception. The patient will have to undergo a treatment to eliminate Arava from the body prior to conception. Mirvaso Pregnancy And Lactation Text: This medication has not been assigned a Pregnancy Risk Category. It is unknown if the medication is excreted in breast milk. Doxycycline Pregnancy And Lactation Text: This medication is Pregnancy Category D and not consider safe during pregnancy. It is also excreted in breast milk but is considered safe for shorter treatment courses. Griseofulvin Counseling:  I discussed with the patient the risks of griseofulvin including but not limited to photosensitivity, cytopenia, liver damage, nausea/vomiting and severe allergy.  The patient understands that this medication is best absorbed when taken with a fatty meal (e.g., ice cream or french fries). Humira Counseling:  I discussed with the patient the risks of adalimumab including but not limited to myelosuppression, immunosuppression, autoimmune hepatitis, demyelinating diseases, lymphoma, and serious infections.  The patient understands that monitoring is required including a PPD at baseline and must alert us or the primary physician if symptoms of infection or other concerning signs are noted. Prednisone Pregnancy And Lactation Text: This medication is Pregnancy Category C and it isn't know if it is safe during pregnancy. This medication is excreted in breast milk. Tremfya Counseling: I discussed with the patient the risks of guselkumab including but not limited to immunosuppression, serious infections, worsening of inflammatory bowel disease and drug reactions.  The patient understands that monitoring is required including a PPD at baseline and must alert us or the primary physician if symptoms of infection or other concerning signs are noted. Glycopyrrolate Counseling:  I discussed with the patient the risks of glycopyrrolate including but not limited to skin rash, drowsiness, dry mouth, difficulty urinating, and blurred vision. Benzoyl Peroxide Pregnancy And Lactation Text: This medication is Pregnancy Category C. It is unknown if benzoyl peroxide is excreted in breast milk. Detail Level: Simple Griseofulvin Pregnancy And Lactation Text: This medication is Pregnancy Category X and is known to cause serious birth defects. It is unknown if this medication is excreted in breast milk but breast feeding should be avoided. Arava Pregnancy And Lactation Text: This medication is Pregnancy Category X and is absolutely contraindicated during pregnancy. It is unknown if it is excreted in breast milk. Birth Control Pills Counseling: Birth Control Pill Counseling: I discussed with the patient the potential side effects of OCPs including but not limited to increased risk of stroke, heart attack, thrombophlebitis, deep venous thrombosis, hepatic adenomas, breast changes, GI upset, headaches, and depression.  The patient verbalized understanding of the proper use and possible adverse effects of OCPs. All of the patient's questions and concerns were addressed. Mirvaso Counseling: Mirvaso is a topical medication which can decrease superficial blood flow where applied. Side effects are uncommon and include stinging, redness and allergic reactions. Erythromycin Counseling:  I discussed with the patient the risks of erythromycin including but not limited to GI upset, allergic reaction, drug rash, diarrhea, increase in liver enzymes, and yeast infections. Hydroxyzine Pregnancy And Lactation Text: This medication is not safe during pregnancy and should not be taken. It is also excreted in breast milk and breast feeding isn't recommended. Glycopyrrolate Pregnancy And Lactation Text: This medication is Pregnancy Category B and is considered safe during pregnancy. It is unknown if it is excreted breast milk. Benzoyl Peroxide Counseling: Patient counseled that medicine may cause skin irritation and bleach clothing.  In the event of skin irritation, the patient was advised to reduce the amount of the drug applied or use it less frequently.   The patient verbalized understanding of the proper use and possible adverse effects of benzoyl peroxide.  All of the patient's questions and concerns were addressed. Prednisone Counseling:  I discussed with the patient the risks of prolonged use of prednisone including but not limited to weight gain, insomnia, osteoporosis, mood changes, diabetes, susceptibility to infection, glaucoma and high blood pressure.  In cases where prednisone use is prolonged, patients should be monitored with blood pressure checks, serum glucose levels and an eye exam.  Additionally, the patient may need to be placed on GI prophylaxis, PCP prophylaxis, and calcium and vitamin D supplementation and/or a bisphosphonate.  The patient verbalized understanding of the proper use and the possible adverse effects of prednisone.  All of the patient's questions and concerns were addressed. Clofazimine Counseling:  I discussed with the patient the risks of clofazimine including but not limited to skin and eye pigmentation, liver damage, nausea/vomiting, gastrointestinal bleeding and allergy. Birth Control Pills Pregnancy And Lactation Text: This medication should be avoided if pregnant and for the first 30 days post-partum. Erythromycin Pregnancy And Lactation Text: This medication is Pregnancy Category B and is considered safe during pregnancy. It is also excreted in breast milk. Itraconazole Counseling:  I discussed with the patient the risks of itraconazole including but not limited to liver damage, nausea/vomiting, neuropathy, and severe allergy.  The patient understands that this medication is best absorbed when taken with acidic beverages such as non-diet cola or ginger ale.  The patient understands that monitoring is required including baseline LFTs and repeat LFTs at intervals.  The patient understands that they are to contact us or the primary physician if concerning signs are noted. Enbrel Counseling:  I discussed with the patient the risks of etanercept including but not limited to myelosuppression, immunosuppression, autoimmune hepatitis, demyelinating diseases, lymphoma, and infections.  The patient understands that monitoring is required including a PPD at baseline and must alert us or the primary physician if symptoms of infection or other concerning signs are noted. Taltz Counseling: I discussed with the patient the risks of ixekizumab including but not limited to immunosuppression, serious infections, worsening of inflammatory bowel disease and drug reactions.  The patient understands that monitoring is required including a PPD at baseline and must alert us or the primary physician if symptoms of infection or other concerning signs are noted. Hydroxychloroquine Counseling:  I discussed with the patient that a baseline ophthalmologic exam is needed at the start of therapy and every year thereafter while on therapy. A CBC may also be warranted for monitoring.  The side effects of this medication were discussed with the patient, including but not limited to agranulocytosis, aplastic anemia, seizures, rashes, retinopathy, and liver toxicity. Patient instructed to call the office should any adverse effect occur.  The patient verbalized understanding of the proper use and possible adverse effects of Plaquenil.  All the patient's questions and concerns were addressed. Hydroxyzine Counseling: Patient advised that the medication is sedating and not to drive a car after taking this medication.  Patient informed of potential adverse effects including but not limited to dry mouth, urinary retention, and blurry vision.  The patient verbalized understanding of the proper use and possible adverse effects of hydroxyzine.  All of the patient's questions and concerns were addressed. Include Pregnancy/Lactation Warning?: No Methotrexate Pregnancy And Lactation Text: This medication is Pregnancy Category X and is known to cause fetal harm. This medication is excreted in breast milk. Minoxidil Pregnancy And Lactation Text: This medication has not been assigned a Pregnancy Risk Category but animal studies failed to show danger with the topical medication. It is unknown if the medication is excreted in breast milk. Spironolactone Counseling: Patient advised regarding risks of diarrhea, abdominal pain, hyperkalemia, birth defects (for female patients), liver toxicity and renal toxicity. The patient may need blood work to monitor liver and kidney function and potassium levels while on therapy. The patient verbalized understanding of the proper use and possible adverse effects of spironolactone.  All of the patient's questions and concerns were addressed. Metronidazole Counseling:  I discussed with the patient the risks of metronidazole including but not limited to seizures, nausea/vomiting, a metallic taste in the mouth, nausea/vomiting and severe allergy. Dupixent Pregnancy And Lactation Text: This medication likely crosses the placenta but the risk for the fetus is uncertain. This medication is excreted in breast milk. Doxepin Pregnancy And Lactation Text: This medication is Pregnancy Category C and it isn't known if it is safe during pregnancy. It is also excreted in breast milk and breast feeding isn't recommended. Hydroxychloroquine Pregnancy And Lactation Text: This medication has been shown to cause fetal harm but it isn't assigned a Pregnancy Risk Category. There are small amounts excreted in breast milk. Methotrexate Counseling:  Patient counseled regarding adverse effects of methotrexate including but not limited to nausea, vomiting, abnormalities in liver function tests. Patients may develop mouth sores, rash, diarrhea, and abnormalities in blood counts. The patient understands that monitoring is required including LFT's and blood counts.  There is a rare possibility of scarring of the liver and lung problems that can occur when taking methotrexate. Persistent nausea, loss of appetite, pale stools, dark urine, cough, and shortness of breath should be reported immediately. Patient advised to discontinue methotrexate treatment at least three months before attempting to become pregnant.  I discussed the need for folate supplements while taking methotrexate.  These supplements can decrease side effects during methotrexate treatment. The patient verbalized understanding of the proper use and possible adverse effects of methotrexate.  All of the patient's questions and concerns were addressed. Metronidazole Pregnancy And Lactation Text: This medication is Pregnancy Category B and considered safe during pregnancy.  It is also excreted in breast milk. Minoxidil Counseling: Minoxidil is a topical medication which can increase blood flow where it is applied. It is uncertain how this medication increases hair growth. Side effects are uncommon and include stinging and allergic reactions. Spironolactone Pregnancy And Lactation Text: This medication can cause feminization of the male fetus and should be avoided during pregnancy. The active metabolite is also found in breast milk. Dupixent Counseling: I discussed with the patient the risks of dupilumab including but not limited to eye infection and irritation, cold sores, injection site reactions, worsening of asthma, allergic reactions and increased risk of parasitic infection.  Live vaccines should be avoided while taking dupilumab. Dupilumab will also interact with certain medications such as warfarin and cyclosporine. The patient understands that monitoring is required and they must alert us or the primary physician if symptoms of infection or other concerning signs are noted. Colchicine Counseling:  Patient counseled regarding adverse effects including but not limited to stomach upset (nausea, vomiting, stomach pain, or diarrhea).  Patient instructed to limit alcohol consumption while taking this medication.  Colchicine may reduce blood counts especially with prolonged use.  The patient understands that monitoring of kidney function and blood counts may be required, especially at baseline. The patient verbalized understanding of the proper use and possible adverse effects of colchicine.  All of the patient's questions and concerns were addressed. Stelara Counseling:  I discussed with the patient the risks of ustekinumab including but not limited to immunosuppression, malignancy, posterior leukoencephalopathy syndrome, and serious infections.  The patient understands that monitoring is required including a PPD at baseline and must alert us or the primary physician if symptoms of infection or other concerning signs are noted. Niacinamide Counseling: I recommended taking niacin or niacinamide, also know as vitamin B3, twice daily. Recent evidence suggests that taking vitamin B3 (500 mg twice daily) can reduce the risk of actinic keratoses and non-melanoma skin cancers. Side effects of vitamin B3 include flushing and headache. Tazorac Counseling:  Patient advised that medication is irritating and drying.  Patient may need to apply sparingly and wash off after an hour before eventually leaving it on overnight.  The patient verbalized understanding of the proper use and possible adverse effects of tazorac.  All of the patient's questions and concerns were addressed. SSKI Counseling:  I discussed with the patient the risks of SSKI including but not limited to thyroid abnormalities, metallic taste, GI upset, fever, headache, acne, arthralgias, paraesthesias, lymphadenopathy, easy bleeding, arrhythmias, and allergic reaction. Doxepin Counseling:  Patient advised that the medication is sedating and not to drive a car after taking this medication. Patient informed of potential adverse effects including but not limited to dry mouth, urinary retention, and blurry vision.  The patient verbalized understanding of the proper use and possible adverse effects of doxepin.  All of the patient's questions and concerns were addressed. Minocycline Counseling: Patient advised regarding possible photosensitivity and discoloration of the teeth, skin, lips, tongue and gums.  Patient instructed to avoid sunlight, if possible.  When exposed to sunlight, patients should wear protective clothing, sunglasses, and sunscreen.  The patient was instructed to call the office immediately if the following severe adverse effects occur:  hearing changes, easy bruising/bleeding, severe headache, or vision changes.  The patient verbalized understanding of the proper use and possible adverse effects of minocycline.  All of the patient's questions and concerns were addressed. High Dose Vitamin A Pregnancy And Lactation Text: High dose vitamin A therapy is contraindicated during pregnancy and breast feeding. Niacinamide Pregnancy And Lactation Text: These medications are considered safe during pregnancy. Tazorac Pregnancy And Lactation Text: This medication is not safe during pregnancy. It is unknown if this medication is excreted in breast milk. Cyclosporine Counseling:  I discussed with the patient the risks of cyclosporine including but not limited to hypertension, gingival hyperplasia,myelosuppression, immunosuppression, liver damage, kidney damage, neurotoxicity, lymphoma, and serious infections. The patient understands that monitoring is required including baseline blood pressure, CBC, CMP, lipid panel and uric acid, and then 1-2 times monthly CMP and blood pressure. Sski Pregnancy And Lactation Text: This medication is Pregnancy Category D and isn't considered safe during pregnancy. It is excreted in breast milk. Imiquimod Counseling:  I discussed with the patient the risks of imiquimod including but not limited to erythema, scaling, itching, weeping, crusting, and pain.  Patient understands that the inflammatory response to imiquimod is variable from person to person and was educated regarded proper titration schedule.  If flu-like symptoms develop, patient knows to discontinue the medication and contact us. High Dose Vitamin A Counseling: Side effects reviewed, pt to contact office should one occur. Cimetidine Pregnancy And Lactation Text: This medication is Pregnancy Category B and is considered safe during pregnancy. It is also excreted in breast milk and breast feeding isn't recommended. Dapsone Counseling: I discussed with the patient the risks of dapsone including but not limited to hemolytic anemia, agranulocytosis, rashes, methemoglobinemia, kidney failure, peripheral neuropathy, headaches, GI upset, and liver toxicity.  Patients who start dapsone require monitoring including baseline LFTs and weekly CBCs for the first month, then every month thereafter.  The patient verbalized understanding of the proper use and possible adverse effects of dapsone.  All of the patient's questions and concerns were addressed. Azithromycin Counseling:  I discussed with the patient the risks of azithromycin including but not limited to GI upset, allergic reaction, drug rash, diarrhea, and yeast infections. Minocycline Pregnancy And Lactation Text: This medication is Pregnancy Category D and not consider safe during pregnancy. It is also excreted in breast milk. Nsaids Counseling: NSAID Counseling: I discussed with the patient that NSAIDs should be taken with food. Prolonged use of NSAIDs can result in the development of stomach ulcers.  Patient advised to stop taking NSAIDs if abdominal pain occurs.  The patient verbalized understanding of the proper use and possible adverse effects of NSAIDs.  All of the patient's questions and concerns were addressed. Topical Clindamycin Counseling: Patient counseled that this medication may cause skin irritation or allergic reactions.  In the event of skin irritation, the patient was advised to reduce the amount of the drug applied or use it less frequently.   The patient verbalized understanding of the proper use and possible adverse effects of clindamycin.  All of the patient's questions and concerns were addressed. Cyclophosphamide Pregnancy And Lactation Text: This medication is Pregnancy Category D and it isn't considered safe during pregnancy. This medication is excreted in breast milk. Skyrizi Counseling: I discussed with the patient the risks of risankizumab-rzaa including but not limited to immunosuppression, and serious infections.  The patient understands that monitoring is required including a PPD at baseline and must alert us or the primary physician if symptoms of infection or other concerning signs are noted. Cimetidine Counseling:  I discussed with the patient the risks of Cimetidine including but not limited to gynecomastia, headache, diarrhea, nausea, drowsiness, arrhythmias, pancreatitis, skin rashes, psychosis, bone marrow suppression and kidney toxicity. Thalidomide Counseling: I discussed with the patient the risks of thalidomide including but not limited to birth defects, anxiety, weakness, chest pain, dizziness, cough and severe allergy. Dapsone Pregnancy And Lactation Text: This medication is Pregnancy Category C and is not considered safe during pregnancy or breast feeding. Quinolones Counseling:  I discussed with the patient the risks of fluoroquinolones including but not limited to GI upset, allergic reaction, drug rash, diarrhea, dizziness, photosensitivity, yeast infections, liver function test abnormalities, tendonitis/tendon rupture. Nsaids Pregnancy And Lactation Text: These medications are considered safe up to 30 weeks gestation. It is excreted in breast milk. Azithromycin Pregnancy And Lactation Text: This medication is considered safe during pregnancy and is also secreted in breast milk. Cyclophosphamide Counseling:  I discussed with the patient the risks of cyclophosphamide including but not limited to hair loss, hormonal abnormalities, decreased fertility, abdominal pain, diarrhea, nausea and vomiting, bone marrow suppression and infection. The patient understands that monitoring is required while taking this medication. Hydroquinone Counseling:  Patient advised that medication may result in skin irritation, lightening (hypopigmentation), dryness, and burning.  In the event of skin irritation, the patient was advised to reduce the amount of the drug applied or use it less frequently.  Rarely, spots that are treated with hydroquinone can become darker (pseudoochronosis).  Should this occur, patient instructed to stop medication and call the office. The patient verbalized understanding of the proper use and possible adverse effects of hydroquinone.  All of the patient's questions and concerns were addressed. Odomzo Counseling- I discussed with the patient the risks of Odomzo including but not limited to nausea, vomiting, diarrhea, constipation, weight loss, changes in the sense of taste, decreased appetite, muscle spasms, and hair loss.  The patient verbalized understanding of the proper use and possible adverse effects of Odomzo.  All of the patient's questions and concerns were addressed. Cellcept Pregnancy And Lactation Text: This medication is Pregnancy Category D and isn't considered safe during pregnancy. It is unknown if this medication is excreted in breast milk. Topical Retinoid counseling:  Patient advised to apply a pea-sized amount only at bedtime and wait 30 minutes after washing their face before applying.  If too drying, patient may add a non-comedogenic moisturizer. The patient verbalized understanding of the proper use and possible adverse effects of retinoids.  All of the patient's questions and concerns were addressed. Tranexamic Acid Counseling:  Patient advised of the small risk of bleeding problems with tranexamic acid. They were also instructed to call if they developed any nausea, vomiting or diarrhea. All of the patient's questions and concerns were addressed. Simponi Counseling:  I discussed with the patient the risks of golimumab including but not limited to myelosuppression, immunosuppression, autoimmune hepatitis, demyelinating diseases, lymphoma, and serious infections.  The patient understands that monitoring is required including a PPD at baseline and must alert us or the primary physician if symptoms of infection or other concerning signs are noted. Bactrim Counseling:  I discussed with the patient the risks of sulfa antibiotics including but not limited to GI upset, allergic reaction, drug rash, diarrhea, dizziness, photosensitivity, and yeast infections.  Rarely, more serious reactions can occur including but not limited to aplastic anemia, agranulocytosis, methemoglobinemia, blood dyscrasias, liver or kidney failure, lung infiltrates or desquamative/blistering drug rashes. Topical Sulfur Applications Counseling: Topical Sulfur Counseling: Patient counseled that this medication may cause skin irritation or allergic reactions.  In the event of skin irritation, the patient was advised to reduce the amount of the drug applied or use it less frequently.   The patient verbalized understanding of the proper use and possible adverse effects of topical sulfur application.  All of the patient's questions and concerns were addressed. Cosentyx Counseling:  I discussed with the patient the risks of Cosentyx including but not limited to worsening of Crohn's disease, immunosuppression, allergic reactions and infections.  The patient understands that monitoring is required including a PPD at baseline and must alert us or the primary physician if symptoms of infection or other concerning signs are noted. Rifampin Counseling: I discussed with the patient the risks of rifampin including but not limited to liver damage, kidney damage, red-orange body fluids, nausea/vomiting and severe allergy. Cellcept Counseling:  I discussed with the patient the risks of mycophenolate mofetil including but not limited to infection/immunosuppression, GI upset, hypokalemia, hypercholesterolemia, bone marrow suppression, lymphoproliferative disorders, malignancy, GI ulceration/bleed/perforation, colitis, interstitial lung disease, kidney failure, progressive multifocal leukoencephalopathy, and birth defects.  The patient understands that monitoring is required including a baseline creatinine and regular CBC testing. In addition, patient must alert us immediately if symptoms of infection or other concerning signs are noted. Bactrim Pregnancy And Lactation Text: This medication is Pregnancy Category D and is known to cause fetal risk.  It is also excreted in breast milk. Solaraze Pregnancy And Lactation Text: This medication is Pregnancy Category B and is considered safe. There is some data to suggest avoiding during the third trimester. It is unknown if this medication is excreted in breast milk. Tranexamic Acid Pregnancy And Lactation Text: It is unknown if this medication is safe during pregnancy or breast feeding. Terbinafine Counseling: Patient counseling regarding adverse effects of terbinafine including but not limited to headache, diarrhea, rash, upset stomach, liver function test abnormalities, itching, taste/smell disturbance, nausea, abdominal pain, and flatulence.  There is a rare possibility of liver failure that can occur when taking terbinafine.  The patient understands that a baseline LFT and kidney function test may be required. The patient verbalized understanding of the proper use and possible adverse effects of terbinafine.  All of the patient's questions and concerns were addressed. Topical Sulfur Applications Pregnancy And Lactation Text: This medication is Pregnancy Category C and has an unknown safety profile during pregnancy. It is unknown if this topical medication is excreted in breast milk. Rifampin Pregnancy And Lactation Text: This medication is Pregnancy Category C and it isn't know if it is safe during pregnancy. It is also excreted in breast milk and should not be used if you are breast feeding. Cimzia Pregnancy And Lactation Text: This medication crosses the placenta but can be considered safe in certain situations. Cimzia may be excreted in breast milk. Eucrisa Counseling: Patient may experience a mild burning sensation during topical application. Eucrisa is not approved in children less than 2 years of age. Siliq Counseling:  I discussed with the patient the risks of Siliq including but not limited to new or worsening depression, suicidal thoughts and behavior, immunosuppression, malignancy, posterior leukoencephalopathy syndrome, and serious infections.  The patient understands that monitoring is required including a PPD at baseline and must alert us or the primary physician if symptoms of infection or other concerning signs are noted. There is also a special program designed to monitor depression which is required with Siliq. Solaraze Counseling:  I discussed with the patient the risks of Solaraze including but not limited to erythema, scaling, itching, weeping, crusting, and pain. Valtrex Counseling: I discussed with the patient the risks of valacyclovir including but not limited to kidney damage, nausea, vomiting and severe allergy.  The patient understands that if the infection seems to be worsening or is not improving, they are to call. Otezla Counseling: The side effects of Otezla were discussed with the patient, including but not limited to worsening or new depression, weight loss, diarrhea, nausea, upper respiratory tract infection, and headache. Patient instructed to call the office should any adverse effect occur.  The patient verbalized understanding of the proper use and possible adverse effects of Otezla.  All the patient's questions and concerns were addressed. Cephalexin Counseling: I counseled the patient regarding use of cephalexin as an antibiotic for prophylactic and/or therapeutic purposes. Cephalexin (commonly prescribed under brand name Keflex) is a cephalosporin antibiotic which is active against numerous classes of bacteria, including most skin bacteria. Side effects may include nausea, diarrhea, gastrointestinal upset, rash, hives, yeast infections, and in rare cases, hepatitis, kidney disease, seizures, fever, confusion, neurologic symptoms, and others. Patients with severe allergies to penicillin medications are cautioned that there is about a 10% incidence of cross-reactivity with cephalosporins. When possible, patients with penicillin allergies should use alternatives to cephalosporins for antibiotic therapy. Ketoconazole Pregnancy And Lactation Text: This medication is Pregnancy Category C and it isn't know if it is safe during pregnancy. It is also excreted in breast milk and breast feeding isn't recommended. Wartpeel Counseling:  I discussed with the patient the risks of Wartpeel including but not limited to erythema, scaling, itching, weeping, crusting, and pain. Cimzia Counseling:  I discussed with the patient the risks of Cimzia including but not limited to immunosuppression, allergic reactions and infections.  The patient understands that monitoring is required including a PPD at baseline and must alert us or the primary physician if symptoms of infection or other concerning signs are noted. Otezla Pregnancy And Lactation Text: This medication is Pregnancy Category C and it isn't known if it is safe during pregnancy. It is unknown if it is excreted in breast milk. Rituxan Pregnancy And Lactation Text: This medication is Pregnancy Category C and it isn't know if it is safe during pregnancy. It is unknown if this medication is excreted in breast milk but similar antibodies are known to be excreted. Cephalexin Pregnancy And Lactation Text: This medication is Pregnancy Category B and considered safe during pregnancy.  It is also excreted in breast milk but can be used safely for shorter doses. Azathioprine Counseling:  I discussed with the patient the risks of azathioprine including but not limited to myelosuppression, immunosuppression, hepatotoxicity, lymphoma, and infections.  The patient understands that monitoring is required including baseline LFTs, Creatinine, possible TPMP genotyping and weekly CBCs for the first month and then every 2 weeks thereafter.  The patient verbalized understanding of the proper use and possible adverse effects of azathioprine.  All of the patient's questions and concerns were addressed. Valtrex Pregnancy And Lactation Text: this medication is Pregnancy Category B and is considered safe during pregnancy. This medication is not directly found in breast milk but it's metabolite acyclovir is present. Elidel Counseling: Patient may experience a mild burning sensation during topical application. Elidel is not approved in children less than 2 years of age. There have been case reports of hematologic and skin malignancies in patients using topical calcineurin inhibitors although causality is questionable. Ketoconazole Counseling:   Patient counseled regarding improving absorption with orange juice.  Adverse effects include but are not limited to breast enlargement, headache, diarrhea, nausea, upset stomach, liver function test abnormalities, taste disturbance, and stomach pain.  There is a rare possibility of liver failure that can occur when taking ketoconazole. The patient understands that monitoring of LFTs may be required, especially at baseline. The patient verbalized understanding of the proper use and possible adverse effects of ketoconazole.  All of the patient's questions and concerns were addressed. Isotretinoin Pregnancy And Lactation Text: This medication is Pregnancy Category X and is considered extremely dangerous during pregnancy. It is unknown if it is excreted in breast milk. Rhofade Counseling: Rhofade is a topical medication which can decrease superficial blood flow where applied. Side effects are uncommon and include stinging, redness and allergic reactions. Oxybutynin Counseling:  I discussed with the patient the risks of oxybutynin including but not limited to skin rash, drowsiness, dry mouth, difficulty urinating, and blurred vision. Rituxan Counseling:  I discussed with the patient the risks of Rituxan infusions. Side effects can include infusion reactions, severe drug rashes including mucocutaneous reactions, reactivation of latent hepatitis and other infections and rarely progressive multifocal leukoencephalopathy.  All of the patient's questions and concerns were addressed. Clindamycin Counseling: I counseled the patient regarding use of clindamycin as an antibiotic for prophylactic and/or therapeutic purposes. Clindamycin is active against numerous classes of bacteria, including skin bacteria. Side effects may include nausea, diarrhea, gastrointestinal upset, rash, hives, yeast infections, and in rare cases, colitis. Zyclara Counseling:  I discussed with the patient the risks of imiquimod including but not limited to erythema, scaling, itching, weeping, crusting, and pain.  Patient understands that the inflammatory response to imiquimod is variable from person to person and was educated regarded proper titration schedule.  If flu-like symptoms develop, patient knows to discontinue the medication and contact us. Tetracycline Counseling: Patient counseled regarding possible photosensitivity and increased risk for sunburn.  Patient instructed to avoid sunlight, if possible.  When exposed to sunlight, patients should wear protective clothing, sunglasses, and sunscreen.  The patient was instructed to call the office immediately if the following severe adverse effects occur:  hearing changes, easy bruising/bleeding, severe headache, or vision changes.  The patient verbalized understanding of the proper use and possible adverse effects of tetracycline.  All of the patient's questions and concerns were addressed. Patient understands to avoid pregnancy while on therapy due to potential birth defects. Erivedge Counseling- I discussed with the patient the risks of Erivedge including but not limited to nausea, vomiting, diarrhea, constipation, weight loss, changes in the sense of taste, decreased appetite, muscle spasms, and hair loss.  The patient verbalized understanding of the proper use and possible adverse effects of Erivedge.  All of the patient's questions and concerns were addressed. Drysol Pregnancy And Lactation Text: This medication is considered safe during pregnancy and breast feeding. Isotretinoin Counseling: Patient should get monthly blood tests, not donate blood, not drive at night if vision affected, not share medication, and not undergo elective surgery for 6 months after tx completed. Side effects reviewed, pt to contact office should one occur. Protopic Pregnancy And Lactation Text: This medication is Pregnancy Category C. It is unknown if this medication is excreted in breast milk when applied topically. Clindamycin Pregnancy And Lactation Text: This medication can be used in pregnancy if certain situations. Clindamycin is also present in breast milk. Xolair Pregnancy And Lactation Text: This medication is Pregnancy Category B and is considered safe during pregnancy. This medication is excreted in breast milk. Drysol Counseling:  I discussed with the patient the risks of drysol/aluminum chloride including but not limited to skin rash, itching, irritation, burning. Bexarotene Pregnancy And Lactation Text: This medication is Pregnancy Category X and should not be given to women who are pregnant or may become pregnant. This medication should not be used if you are breast feeding. Infliximab Counseling:  I discussed with the patient the risks of infliximab including but not limited to myelosuppression, immunosuppression, autoimmune hepatitis, demyelinating diseases, lymphoma, and serious infections.  The patient understands that monitoring is required including a PPD at baseline and must alert us or the primary physician if symptoms of infection or other concerning signs are noted. Xolair Counseling:  Patient informed of potential adverse effects including but not limited to fever, muscle aches, rash and allergic reactions.  The patient verbalized understanding of the proper use and possible adverse effects of Xolair.  All of the patient's questions and concerns were addressed. Finasteride Male Counseling: Finasteride Counseling:  I discussed with the patient the risks of use of finasteride including but not limited to decreased libido, decreased ejaculate volume, gynecomastia, and depression. Women should not handle medication.  All of the patient's questions and concerns were addressed.

## 2025-07-02 NOTE — TELEPHONE ENCOUNTER
[facility] pharmacy called and stated they need confirmation on patients prescription of Lorazepam. Andrés stated they need to enter in a Max dose and the instructions listed I seems that patient will only need to take two. Please return [facility] call [facility] confirm. Andrés stated patient is currently waiting at the pharmacy as her CT is tomorrow. Please advise. Unfortunately at this time I was unable to connect with office clinical at this time.

## 2025-07-02 NOTE — TELEPHONE ENCOUNTER
Spoke with pharmacist and read providers message pharmacist understood and had no further questions or concerns

## 2025-07-03 ENCOUNTER — HOSPITAL ENCOUNTER (OUTPATIENT)
Dept: CT IMAGING | Facility: HOSPITAL | Age: 80
End: 2025-07-03
Attending: FAMILY MEDICINE
Payer: COMMERCIAL

## 2025-07-03 DIAGNOSIS — R79.89 ELEVATED D-DIMER: ICD-10-CM

## 2025-07-03 DIAGNOSIS — R06.02 SHORTNESS OF BREATH: ICD-10-CM

## 2025-07-03 PROCEDURE — 71275 CT ANGIOGRAPHY CHEST: CPT

## 2025-07-03 RX ORDER — ATORVASTATIN CALCIUM 20 MG/1
20 TABLET, FILM COATED ORAL DAILY
Qty: 90 TABLET | Refills: 1 | Status: SHIPPED | OUTPATIENT
Start: 2025-07-03

## 2025-07-03 RX ORDER — AMLODIPINE BESYLATE 10 MG/1
10 TABLET ORAL DAILY
Qty: 90 TABLET | Refills: 1 | Status: SHIPPED | OUTPATIENT
Start: 2025-07-03

## 2025-07-03 RX ADMIN — IOHEXOL 80 ML: 350 INJECTION, SOLUTION INTRAVENOUS at 08:17

## 2025-07-08 ENCOUNTER — NURSE TRIAGE (OUTPATIENT)
Age: 80
End: 2025-07-08

## 2025-07-08 ENCOUNTER — TELEPHONE (OUTPATIENT)
Age: 80
End: 2025-07-08

## 2025-07-08 RX ORDER — SODIUM CHLORIDE, SODIUM LACTATE, POTASSIUM CHLORIDE, CALCIUM CHLORIDE 600; 310; 30; 20 MG/100ML; MG/100ML; MG/100ML; MG/100ML
125 INJECTION, SOLUTION INTRAVENOUS CONTINUOUS
Status: CANCELLED | OUTPATIENT
Start: 2025-07-08

## 2025-07-08 NOTE — TELEPHONE ENCOUNTER
----- Message from Anny TEAGUE sent at 7/8/2025  4:47 PM EDT -----  Pt is taking Zoloft and wants to know if she can just stop taking it? Pt is coming in for a EUS on the 10th and pt is worried about continuing taking the medication.    ----- Message from Anny TEAGUE sent at 7/8/2025  4:46 PM EDT -----  Pt is taking Zoloft and wants to know if she can just stop taking it? Pt is coming in for a EUS on the 10th and pt is worried about continuing taking the medication.    ----- Message from Anny TEAGUE sent at 7/8/2025  4:46 PM EDT -----  Pt is taking Zoloft and wants to know if she can just stop taking it? Pt is coming in for a EUS on the 10th and pt is worried about continuing taking the medication.

## 2025-07-08 NOTE — TELEPHONE ENCOUNTER
Patient called in stating since she started Zoloft in May she has not been feeling good. Patient states she is not sleeping well, and some days she is feeling weak and other days she is not.   Was seen in the ER at [facility] on 6/30/25. Patient also states she is losing weight-she believes it is the Zoloft and she would like to come off of it.  Patient asking if she can skip dose tonight because she wants to sleep as she has a CT scan ordered tomorrow morning.  Please advise

## 2025-07-10 ENCOUNTER — ANESTHESIA EVENT (OUTPATIENT)
Dept: GASTROENTEROLOGY | Facility: HOSPITAL | Age: 80
End: 2025-07-10
Payer: COMMERCIAL

## 2025-07-10 ENCOUNTER — ANESTHESIA (OUTPATIENT)
Dept: GASTROENTEROLOGY | Facility: HOSPITAL | Age: 80
End: 2025-07-10
Payer: COMMERCIAL

## 2025-07-10 ENCOUNTER — NURSE TRIAGE (OUTPATIENT)
Dept: OTHER | Facility: OTHER | Age: 80
End: 2025-07-10

## 2025-07-10 ENCOUNTER — HOSPITAL ENCOUNTER (OUTPATIENT)
Dept: GASTROENTEROLOGY | Facility: HOSPITAL | Age: 80
Setting detail: OUTPATIENT SURGERY
End: 2025-07-10
Attending: NURSE PRACTITIONER
Payer: COMMERCIAL

## 2025-07-10 VITALS
SYSTOLIC BLOOD PRESSURE: 114 MMHG | RESPIRATION RATE: 16 BRPM | OXYGEN SATURATION: 99 % | HEART RATE: 83 BPM | TEMPERATURE: 97.3 F | DIASTOLIC BLOOD PRESSURE: 59 MMHG

## 2025-07-10 DIAGNOSIS — J45.20 MILD INTERMITTENT ASTHMA WITHOUT COMPLICATION: Primary | ICD-10-CM

## 2025-07-10 DIAGNOSIS — C49.A0 GASTROINTESTINAL STROMAL TUMOR (GIST) (HCC): ICD-10-CM

## 2025-07-10 PROCEDURE — 88305 TISSUE EXAM BY PATHOLOGIST: CPT | Performed by: STUDENT IN AN ORGANIZED HEALTH CARE EDUCATION/TRAINING PROGRAM

## 2025-07-10 PROCEDURE — 43242 EGD US FINE NEEDLE BX/ASPIR: CPT | Performed by: INTERNAL MEDICINE

## 2025-07-10 PROCEDURE — 88313 SPECIAL STAINS GROUP 2: CPT | Performed by: STUDENT IN AN ORGANIZED HEALTH CARE EDUCATION/TRAINING PROGRAM

## 2025-07-10 PROCEDURE — C1889 IMPLANT/INSERT DEVICE, NOC: HCPCS

## 2025-07-10 PROCEDURE — 88342 IMHCHEM/IMCYTCHM 1ST ANTB: CPT | Performed by: STUDENT IN AN ORGANIZED HEALTH CARE EDUCATION/TRAINING PROGRAM

## 2025-07-10 PROCEDURE — 88360 TUMOR IMMUNOHISTOCHEM/MANUAL: CPT | Performed by: STUDENT IN AN ORGANIZED HEALTH CARE EDUCATION/TRAINING PROGRAM

## 2025-07-10 PROCEDURE — 88341 IMHCHEM/IMCYTCHM EA ADD ANTB: CPT | Performed by: STUDENT IN AN ORGANIZED HEALTH CARE EDUCATION/TRAINING PROGRAM

## 2025-07-10 RX ORDER — GLYCOPYRROLATE 0.2 MG/ML
INJECTION INTRAMUSCULAR; INTRAVENOUS AS NEEDED
Status: DISCONTINUED | OUTPATIENT
Start: 2025-07-10 | End: 2025-07-10

## 2025-07-10 RX ORDER — SODIUM CHLORIDE, SODIUM LACTATE, POTASSIUM CHLORIDE, CALCIUM CHLORIDE 600; 310; 30; 20 MG/100ML; MG/100ML; MG/100ML; MG/100ML
125 INJECTION, SOLUTION INTRAVENOUS CONTINUOUS
Status: DISCONTINUED | OUTPATIENT
Start: 2025-07-10 | End: 2025-07-14 | Stop reason: HOSPADM

## 2025-07-10 RX ORDER — LIDOCAINE HYDROCHLORIDE 20 MG/ML
INJECTION, SOLUTION EPIDURAL; INFILTRATION; INTRACAUDAL; PERINEURAL AS NEEDED
Status: DISCONTINUED | OUTPATIENT
Start: 2025-07-10 | End: 2025-07-10

## 2025-07-10 RX ORDER — PROPOFOL 10 MG/ML
INJECTION, EMULSION INTRAVENOUS AS NEEDED
Status: DISCONTINUED | OUTPATIENT
Start: 2025-07-10 | End: 2025-07-10

## 2025-07-10 RX ADMIN — PROPOFOL 80 MG: 10 INJECTION, EMULSION INTRAVENOUS at 07:38

## 2025-07-10 RX ADMIN — GLYCOPYRROLATE 0.2 MG: 0.2 INJECTION, SOLUTION INTRAMUSCULAR; INTRAVENOUS at 07:35

## 2025-07-10 RX ADMIN — PROPOFOL 130 MCG/KG/MIN: 10 INJECTION, EMULSION INTRAVENOUS at 07:41

## 2025-07-10 RX ADMIN — SODIUM CHLORIDE, SODIUM LACTATE, POTASSIUM CHLORIDE, AND CALCIUM CHLORIDE 125 ML/HR: .6; .31; .03; .02 INJECTION, SOLUTION INTRAVENOUS at 07:17

## 2025-07-10 RX ADMIN — PROPOFOL 30 MG: 10 INJECTION, EMULSION INTRAVENOUS at 07:40

## 2025-07-10 RX ADMIN — LIDOCAINE HYDROCHLORIDE 80 MG: 20 INJECTION, SOLUTION EPIDURAL; INFILTRATION; INTRACAUDAL at 07:38

## 2025-07-10 NOTE — ANESTHESIA PREPROCEDURE EVALUATION
Procedure:  ENDOSCOPIC ULTRASOUND (UPPER)    Relevant Problems   CARDIO   (+) Chest pain   (+) Essential hypertension   (+) Heart murmur   (+) Right-sided chest wall pain   (+) Venous insufficiency      GI/HEPATIC   (+) GERD (gastroesophageal reflux disease)   (+) Gastroesophageal reflux disease with esophagitis without hemorrhage   (+) Oropharyngeal dysphagia      MUSCULOSKELETAL   (+) Arthritis   (+) Upper back pain      NEURO/PSYCH   (+) Anxiety   (+) Depression   (+) Generalized anxiety disorder   (+) Morning headache      PULMONARY   (+) Asthma   (+) Mild intermittent asthma without complication   (+) NED (obstructive sleep apnea)      Other   (+) Weight loss    2023 Echo:  Normal left ventricle size and systolic and diastolic function.  Left ventricular ejection fraction is estimated as around 65%.  Normal right ventricle size and systolic function.  Normal left and right atrial size.  Normal aortic valve, no arctic valve stenosis or regurgitation.  Mitral valve leaflet sclerosis, trace mitral valve regurgitation.  Trace to mild tricuspid valve and trace pulmonic valve regurgitation.  No obvious pulmonary hypertension.  No pericardial effusion.    Physical Exam    Airway     Mallampati score: I  TM Distance: >3 FB    Mouth opening: >= 4 cm      Cardiovascular  Rhythm: regular, Rate: normal, MurmurSystolic murmur is present,  with a grade of 2/6.     Dental   Comment: Upper partial removed     Pulmonary   Breath sounds clear to auscultation    Neurological    She appears awake, alert and oriented x3.      Other Findings  post-pubertal.      Anesthesia Plan  ASA Score- 2     Anesthesia Type- IV sedation with anesthesia with ASA Monitors.         Additional Monitors:     Airway Plan: natural airway.    Comment: Ga PRN.       Plan Factors-Exercise tolerance (METS): >4 METS.    Chart reviewed.   Existing labs reviewed.     Patient is not a current smoker.      Obstructive sleep apnea risk education given  perioperatively.        Induction- intravenous.    Postoperative Plan- .   Monitoring Plan - Monitoring plan - standard ASA monitoring  Post Operative Pain Plan - non-opiod analgesics        Informed Consent- Anesthetic plan and risks discussed with patient.      NPO Status:  No vitals data found for the desired time range.

## 2025-07-10 NOTE — ANESTHESIA POSTPROCEDURE EVALUATION
Post-Op Assessment Note    CV Status:  Stable    Pain management: adequate       Mental Status:  Awake   Hydration Status:  Stable   PONV Controlled:  Controlled   Airway Patency:  Patent     Post Op Vitals Reviewed: Yes    No anethesia notable event occurred.    Staff: Anesthesiologist           Last Filed PACU Vitals:  Vitals Value Taken Time   Temp 97.3 °F (36.3 °C) 07/10/25 08:20   Pulse 83 07/10/25 08:47   /59 07/10/25 08:47   Resp 16 07/10/25 08:47   SpO2 99 % 07/10/25 08:47       Modified Holli:     Vitals Value Taken Time   Activity 2 07/10/25 08:47   Respiration 2 07/10/25 08:47   Circulation 1 07/10/25 08:47   Consciousness 2 07/10/25 08:47   Oxygen Saturation 2 07/10/25 08:47     Modified Holli Score: 9

## 2025-07-10 NOTE — H&P
History and Physical -  [facility]  Paula Paniagua 79 y.o. female MRN: [id number]    HPI: Paula Paniagua is a 79 y.o. year old female who presents with h/o gist.       Review of Systems    Historical Information   Past Medical History[1]  Past Surgical History[2]  Social History   Social History     Substance and Sexual Activity   Alcohol Use Not Currently     Social History     Substance and Sexual Activity   Drug Use Never     Tobacco Use History[3]  Family History[4]    Meds/Allergies     Not in a hospital admission.    Allergies[5]    Objective     /79   Pulse 85   Temp 98.2 °F (36.8 °C) (Temporal)   Resp 15   SpO2 98%       PHYSICAL EXAM    Gen: NAD  CV: RRR  CHEST: Clear  ABD: soft, NT/ND  EXT: no edema  Neuro: AAO      ASSESSMENT/PLAN:  This is a 79 y.o. year old female here for eus for follow up of gastric nodule/ GIST    PLAN:   Procedure: EUS           [1]   Past Medical History:  Diagnosis Date    Allergic ’s    Allergic to codine    Anxiety     Asthma     variant cough asthma    Cataract     Chronic kidney disease ’s    Kidney and liver cyst    Clotting disorder (HCC)     Gums    Diverticulitis of colon Maybe 2015 or 16    GERD (gastroesophageal reflux disease)     Headache(784.0)     Heart murmur     Hyperlipidemia     Hypertension     Scoliosis     Seizures (HCC) 2014 and 2019    Visual impairment     Early stage Glucoma.  And Cataract   [2]   Past Surgical History:  Procedure Laterality Date    EYE SURGERY      HYSTERECTOMY      OOPHORECTOMY Bilateral     UTERINE SUSPENSION     [3]   Social History  Tobacco Use   Smoking Status Never    Passive exposure: Never   Smokeless Tobacco Never   [4]   Family History  Problem Relation Name Age of Onset    Asthma Mother Rocky     Hypertension Mother Rocky             Glaucoma Mother Rocky     Glaucoma Father Goldie             No Known Problems Sister      No Known Problems Maternal  Grandmother      No Known Problems Maternal Grandfather      No Known Problems Paternal Grandmother      No Known Problems Paternal Grandfather      No Known Problems Sister      Hypertension Brother Boris         Pace maker   [5]   Allergies  Allergen Reactions    Nsaids Other (See Comments)    Other Cough     Seasonal, perfumes, colon, chemicals    Sulfa Antibiotics Other (See Comments)    Codeine Rash    Shellfish Allergy - Food Allergy Rash

## 2025-07-10 NOTE — ANESTHESIA POSTPROCEDURE EVALUATION
Post-Op Assessment Note    CV Status:  Stable    Pain management: adequate       Mental Status:  Somnolent   Hydration Status:  Euvolemic   PONV Controlled:  Controlled   Airway Patency:  Patent     Post Op Vitals Reviewed: Yes    No anethesia notable event occurred.    Staff: CRNA, Anesthesiologist           Last Filed PACU Vitals:  Vitals Value Taken Time   Temp 97.3    Pulse 84    BP 91/50    Resp 12    SpO2 99

## 2025-07-11 NOTE — TELEPHONE ENCOUNTER
Regarding: Had procedure this morning want to know if I can restart medication sertraline  ----- Message from Freddie HOUSTON sent at 7/10/2025  9:32 PM EDT -----  '' I had my procedure this morning and I want to know if I can restart medication sertraline (Zoloft)

## 2025-07-11 NOTE — TELEPHONE ENCOUNTER
"REASON FOR CONVERSATION: Advice Only    SYMPTOMS: None    OTHER HEALTH INFORMATION: Had EGD earlier today, inquiring if she can resume her zoloft    PROTOCOL DISPOSITION: Home Care (Information or Advice Only Call)    CARE ADVICE PROVIDED: advised to resume zoloft as scheduled    PRACTICE FOLLOW-UP: N/A            Reason for Disposition   Health information question, no triage required and triager able to answer question    Answer Assessment - Initial Assessment Questions  1. REASON FOR CALL: \"What is the main reason for your call?\" or \"How can I best help you?\"      Patient had an EGD earlier today, wondering if she is ok to take zoloft tonight as scheduled.    2. SYMPTOMS : \"Do you have any symptoms?\"       Denies abdominal pain/nausea    Protocols used: Information Only Call - No Triage-[facility]-    "

## 2025-07-15 ENCOUNTER — DOCUMENTATION (OUTPATIENT)
Dept: GASTROENTEROLOGY | Facility: MEDICAL CENTER | Age: 80
End: 2025-07-15

## 2025-07-15 ENCOUNTER — DOCUMENTATION (OUTPATIENT)
Dept: HEMATOLOGY ONCOLOGY | Facility: CLINIC | Age: 80
End: 2025-07-15

## 2025-07-15 ENCOUNTER — TELEPHONE (OUTPATIENT)
Age: 80
End: 2025-07-15

## 2025-07-15 PROCEDURE — 88342 IMHCHEM/IMCYTCHM 1ST ANTB: CPT | Performed by: STUDENT IN AN ORGANIZED HEALTH CARE EDUCATION/TRAINING PROGRAM

## 2025-07-15 PROCEDURE — 88305 TISSUE EXAM BY PATHOLOGIST: CPT | Performed by: STUDENT IN AN ORGANIZED HEALTH CARE EDUCATION/TRAINING PROGRAM

## 2025-07-15 PROCEDURE — 88360 TUMOR IMMUNOHISTOCHEM/MANUAL: CPT | Performed by: STUDENT IN AN ORGANIZED HEALTH CARE EDUCATION/TRAINING PROGRAM

## 2025-07-15 PROCEDURE — 88313 SPECIAL STAINS GROUP 2: CPT | Performed by: STUDENT IN AN ORGANIZED HEALTH CARE EDUCATION/TRAINING PROGRAM

## 2025-07-15 PROCEDURE — 88341 IMHCHEM/IMCYTCHM EA ADD ANTB: CPT | Performed by: STUDENT IN AN ORGANIZED HEALTH CARE EDUCATION/TRAINING PROGRAM

## 2025-07-17 ENCOUNTER — DOCUMENTATION (OUTPATIENT)
Dept: HEMATOLOGY ONCOLOGY | Facility: CLINIC | Age: 80
End: 2025-07-17

## 2025-07-17 NOTE — PROGRESS NOTES
Patient's case is being discussed at the [facility] on 7/31/2025. Chart reviewed, prep updated and completed.

## 2025-07-18 NOTE — TELEPHONE ENCOUNTER
Patient returned call about Zoloft. She reports she has not yet received provider recommendation.    Informed patient of recommendation, per provider note:  YESSICA Aguilera to Ursula Bui 7/9/25  9:54 AM  Hi please call and advise, it is okay to come off Zoloft, but she should taper. If she can do a half of a tablet x 7 days then she can discontinue. Just to prevent any increased side effects of coming off medication. Thank you!     Patient wrote down instructions, verbalized understanding.

## 2025-07-31 ENCOUNTER — DOCUMENTATION (OUTPATIENT)
Dept: HEMATOLOGY ONCOLOGY | Facility: CLINIC | Age: 80
End: 2025-07-31

## 2025-07-31 DIAGNOSIS — D21.4 GIST, NON-MALIGNANT: Primary | ICD-10-CM

## 2025-08-04 ENCOUNTER — APPOINTMENT (OUTPATIENT)
Dept: LAB | Facility: CLINIC | Age: 80
End: 2025-08-04
Payer: COMMERCIAL

## 2025-08-04 ENCOUNTER — TELEPHONE (OUTPATIENT)
Dept: FAMILY MEDICINE CLINIC | Facility: CLINIC | Age: 80
End: 2025-08-04

## 2025-08-04 ENCOUNTER — OFFICE VISIT (OUTPATIENT)
Dept: URGENT CARE | Facility: CLINIC | Age: 80
End: 2025-08-04
Payer: COMMERCIAL

## 2025-08-04 VITALS
HEART RATE: 81 BPM | RESPIRATION RATE: 18 BRPM | OXYGEN SATURATION: 98 % | SYSTOLIC BLOOD PRESSURE: 114 MMHG | TEMPERATURE: 96.6 F | DIASTOLIC BLOOD PRESSURE: 76 MMHG

## 2025-08-04 DIAGNOSIS — R73.03 PRE-DIABETES: ICD-10-CM

## 2025-08-04 DIAGNOSIS — I10 ESSENTIAL HYPERTENSION: ICD-10-CM

## 2025-08-04 DIAGNOSIS — E53.8 B12 DEFICIENCY: ICD-10-CM

## 2025-08-04 DIAGNOSIS — E78.5 DYSLIPIDEMIA: ICD-10-CM

## 2025-08-04 DIAGNOSIS — F41.8 SITUATIONAL ANXIETY: Primary | ICD-10-CM

## 2025-08-04 LAB
ALBUMIN SERPL BCG-MCNC: 4.2 G/DL (ref 3.5–5)
ALP SERPL-CCNC: 73 U/L (ref 34–104)
ALT SERPL W P-5'-P-CCNC: 15 U/L (ref 7–52)
ANION GAP SERPL CALCULATED.3IONS-SCNC: 11 MMOL/L (ref 4–13)
AST SERPL W P-5'-P-CCNC: 18 U/L (ref 13–39)
BASOPHILS # BLD AUTO: 0.04 THOUSANDS/ÂΜL (ref 0–0.1)
BASOPHILS NFR BLD AUTO: 1 % (ref 0–1)
BILIRUB SERPL-MCNC: 0.61 MG/DL (ref 0.2–1)
BUN SERPL-MCNC: 18 MG/DL (ref 5–25)
CALCIUM SERPL-MCNC: 9.5 MG/DL (ref 8.4–10.2)
CHLORIDE SERPL-SCNC: 103 MMOL/L (ref 96–108)
CHOLEST SERPL-MCNC: 157 MG/DL (ref ?–200)
CO2 SERPL-SCNC: 29 MMOL/L (ref 21–32)
CREAT SERPL-MCNC: 0.63 MG/DL (ref 0.6–1.3)
EOSINOPHIL # BLD AUTO: 0.11 THOUSAND/ÂΜL (ref 0–0.61)
EOSINOPHIL NFR BLD AUTO: 2 % (ref 0–6)
ERYTHROCYTE [DISTWIDTH] IN BLOOD BY AUTOMATED COUNT: 12.5 % (ref 11.6–15.1)
EST. AVERAGE GLUCOSE BLD GHB EST-MCNC: 120 MG/DL
GFR SERPL CREATININE-BSD FRML MDRD: 86 ML/MIN/1.73SQ M
GLUCOSE P FAST SERPL-MCNC: 90 MG/DL (ref 65–99)
HBA1C MFR BLD: 5.8 %
HCT VFR BLD AUTO: 45.7 % (ref 34.8–46.1)
HDLC SERPL-MCNC: 70 MG/DL
HGB BLD-MCNC: 15.1 G/DL (ref 11.5–15.4)
IMM GRANULOCYTES # BLD AUTO: 0.02 THOUSAND/UL (ref 0–0.2)
IMM GRANULOCYTES NFR BLD AUTO: 0 % (ref 0–2)
LDLC SERPL CALC-MCNC: 70 MG/DL (ref 0–100)
LYMPHOCYTES # BLD AUTO: 1.55 THOUSANDS/ÂΜL (ref 0.6–4.47)
LYMPHOCYTES NFR BLD AUTO: 23 % (ref 14–44)
MCH RBC QN AUTO: 31.7 PG (ref 26.8–34.3)
MCHC RBC AUTO-ENTMCNC: 33 G/DL (ref 31.4–37.4)
MCV RBC AUTO: 96 FL (ref 82–98)
MONOCYTES # BLD AUTO: 0.41 THOUSAND/ÂΜL (ref 0.17–1.22)
MONOCYTES NFR BLD AUTO: 6 % (ref 4–12)
NEUTROPHILS # BLD AUTO: 4.69 THOUSANDS/ÂΜL (ref 1.85–7.62)
NEUTS SEG NFR BLD AUTO: 68 % (ref 43–75)
NRBC BLD AUTO-RTO: 0 /100 WBCS
PLATELET # BLD AUTO: 202 THOUSANDS/UL (ref 149–390)
PMV BLD AUTO: 10.6 FL (ref 8.9–12.7)
POTASSIUM SERPL-SCNC: 4.3 MMOL/L (ref 3.5–5.3)
PROT SERPL-MCNC: 7.1 G/DL (ref 6.4–8.4)
RBC # BLD AUTO: 4.77 MILLION/UL (ref 3.81–5.12)
SODIUM SERPL-SCNC: 143 MMOL/L (ref 135–147)
TRIGL SERPL-MCNC: 85 MG/DL (ref ?–150)
VIT B12 SERPL-MCNC: 537 PG/ML (ref 180–914)
WBC # BLD AUTO: 6.82 THOUSAND/UL (ref 4.31–10.16)

## 2025-08-04 PROCEDURE — 80061 LIPID PANEL: CPT

## 2025-08-04 PROCEDURE — 83036 HEMOGLOBIN GLYCOSYLATED A1C: CPT

## 2025-08-04 PROCEDURE — 99213 OFFICE O/P EST LOW 20 MIN: CPT | Performed by: NURSE PRACTITIONER

## 2025-08-04 PROCEDURE — 36415 COLL VENOUS BLD VENIPUNCTURE: CPT

## 2025-08-04 PROCEDURE — 80053 COMPREHEN METABOLIC PANEL: CPT

## 2025-08-04 PROCEDURE — G0463 HOSPITAL OUTPT CLINIC VISIT: HCPCS | Performed by: NURSE PRACTITIONER

## 2025-08-04 PROCEDURE — 82607 VITAMIN B-12: CPT

## 2025-08-04 PROCEDURE — 85025 COMPLETE CBC W/AUTO DIFF WBC: CPT

## 2025-08-11 ENCOUNTER — HOSPITAL ENCOUNTER (OUTPATIENT)
Dept: NUCLEAR MEDICINE | Facility: HOSPITAL | Age: 80
Discharge: HOME | End: 2025-08-11
Attending: NURSE PRACTITIONER
Payer: COMMERCIAL

## 2025-08-13 ENCOUNTER — OFFICE VISIT (OUTPATIENT)
Dept: FAMILY MEDICINE CLINIC | Facility: CLINIC | Age: 80
End: 2025-08-13
Payer: COMMERCIAL

## 2025-08-13 ENCOUNTER — NURSE TRIAGE (OUTPATIENT)
Age: 80
End: 2025-08-13

## 2025-08-13 ENCOUNTER — APPOINTMENT (OUTPATIENT)
Dept: LAB | Facility: CLINIC | Age: 80
End: 2025-08-13
Payer: COMMERCIAL

## 2025-08-13 ENCOUNTER — RESULTS FOLLOW-UP (OUTPATIENT)
Dept: FAMILY MEDICINE CLINIC | Facility: CLINIC | Age: 80
End: 2025-08-13

## 2025-08-13 DIAGNOSIS — G44.89 OTHER HEADACHE SYNDROME: ICD-10-CM

## 2025-08-13 LAB — ERYTHROCYTE [SEDIMENTATION RATE] IN BLOOD: 8 MM/HOUR (ref 0–29)

## 2025-08-13 PROCEDURE — 36415 COLL VENOUS BLD VENIPUNCTURE: CPT

## 2025-08-13 PROCEDURE — 85652 RBC SED RATE AUTOMATED: CPT

## 2025-08-14 ENCOUNTER — TELEPHONE (OUTPATIENT)
Age: 80
End: 2025-08-14

## (undated) RX ORDER — SODIUM CHLORIDE 50 MG/ML: 1 SOLUTION OPHTHALMIC

## (undated) RX ORDER — LATANOPROST 50 UG/ML: 1 SOLUTION/ DROPS OPHTHALMIC EVERY EVENING